# Patient Record
Sex: FEMALE | Race: BLACK OR AFRICAN AMERICAN | NOT HISPANIC OR LATINO | Employment: FULL TIME | URBAN - METROPOLITAN AREA
[De-identification: names, ages, dates, MRNs, and addresses within clinical notes are randomized per-mention and may not be internally consistent; named-entity substitution may affect disease eponyms.]

---

## 2017-03-16 ENCOUNTER — OFFICE VISIT (OUTPATIENT)
Dept: INTERNAL MEDICINE | Facility: CLINIC | Age: 67
End: 2017-03-16
Payer: COMMERCIAL

## 2017-03-16 VITALS
HEIGHT: 65 IN | SYSTOLIC BLOOD PRESSURE: 145 MMHG | BODY MASS INDEX: 32.06 KG/M2 | TEMPERATURE: 98 F | WEIGHT: 192.44 LBS | DIASTOLIC BLOOD PRESSURE: 90 MMHG | HEART RATE: 57 BPM | OXYGEN SATURATION: 99 %

## 2017-03-16 DIAGNOSIS — R51.9 OCCIPITAL HEADACHE: Primary | ICD-10-CM

## 2017-03-16 PROCEDURE — 99999 PR PBB SHADOW E&M-EST. PATIENT-LVL III: CPT | Mod: PBBFAC,,, | Performed by: INTERNAL MEDICINE

## 2017-03-16 PROCEDURE — 99213 OFFICE O/P EST LOW 20 MIN: CPT | Mod: S$GLB,,, | Performed by: INTERNAL MEDICINE

## 2017-03-16 PROCEDURE — 1160F RVW MEDS BY RX/DR IN RCRD: CPT | Mod: S$GLB,,, | Performed by: INTERNAL MEDICINE

## 2017-03-16 PROCEDURE — 3080F DIAST BP >= 90 MM HG: CPT | Mod: S$GLB,,, | Performed by: INTERNAL MEDICINE

## 2017-03-16 PROCEDURE — 3077F SYST BP >= 140 MM HG: CPT | Mod: S$GLB,,, | Performed by: INTERNAL MEDICINE

## 2017-03-16 RX ORDER — BUTALBITAL, ACETAMINOPHEN AND CAFFEINE 50; 325; 40 MG/1; MG/1; MG/1
1 TABLET ORAL EVERY 6 HOURS PRN
Qty: 20 TABLET | Refills: 0 | Status: SHIPPED | OUTPATIENT
Start: 2017-03-16 | End: 2017-04-15

## 2017-03-16 NOTE — PROGRESS NOTES
"Subjective:       Patient ID: Estephania Lyman is a 66 y.o. female.    Chief Complaint: No chief complaint on file.    HPI Comments:   Pt here for urgent care.     Pt c/o throbbing pain in R occipital region which started yesterday.  It is constant, 10/10, nonradiating.  No associated blurry vision or photophobia or nausea.  She considers it as different than a HA. No fever or neck stiffness.  Temporary relief w/ibuprofen and Tylenol.      No associated ear pain.          Review of Systems   Constitutional: Negative.    HENT: Negative.    Eyes: Negative.    Respiratory: Negative.    Cardiovascular: Negative.    Gastrointestinal: Negative.    Genitourinary: Negative.    Musculoskeletal: Negative.    Skin: Negative.    Neurological: Negative.    Psychiatric/Behavioral: Negative.        Objective:       Vitals:    03/16/17 1436   BP: (!) 145/90   Pulse: (!) 57   Temp: 98.3 °F (36.8 °C)   SpO2: 99%   Weight: 87.3 kg (192 lb 7.4 oz)   Height: 5' 5" (1.651 m)     Physical Exam   Constitutional: She is oriented to person, place, and time. She appears well-developed and well-nourished. No distress.   HENT:   Head: Normocephalic and atraumatic.   Right Ear: Tympanic membrane, external ear and ear canal normal.   Left Ear: Tympanic membrane, external ear and ear canal normal.   Mouth/Throat: Uvula is midline, oropharynx is clear and moist and mucous membranes are normal. No oropharyngeal exudate or posterior oropharyngeal erythema.   Eyes: Conjunctivae and EOM are normal. Pupils are equal, round, and reactive to light.   Neck: Normal range of motion. Neck supple.   Cardiovascular: Normal rate, regular rhythm and normal heart sounds.  Exam reveals no gallop and no friction rub.    No murmur heard.  Pulmonary/Chest: Effort normal and breath sounds normal. No respiratory distress. She has no wheezes. She has no rhonchi. She has no rales.   Lymphadenopathy:     She has no cervical adenopathy.   Neurological: She is alert and " oriented to person, place, and time. She has normal strength. She displays no atrophy and no tremor. No cranial nerve deficit. She exhibits normal muscle tone. Gait normal.   Skin: No rash noted. She is not diaphoretic.   Psychiatric: She has a normal mood and affect. Her behavior is normal. Thought content normal.       Assessment:       1. Occipital headache        Plan:           HA - Migrainous.  Pt was given Fioricet to try.  Pt advised to let us know if this is not working.

## 2017-03-16 NOTE — MR AVS SNAPSHOT
[facility]  [address]  [facility] [address]  Phone: [phone]  Fax: [phone]                  Estephania Lyman   3/16/2017 2:40 PM   Office Visit    Description:  Female : 1950   Provider:  Pop Farmer MD   Department:  [facility]           Diagnoses this Visit        Comments    Occipital headache    -  Primary            To Do List           Goals (5 Years of Data)     None      Follow-Up and Disposition     Return if symptoms worsen or fail to improve.       These Medications        Disp Refills Start End    butalbital-acetaminophen-caffeine -40 mg (FIORICET, ESGIC) -40 mg per tablet 20 tablet 0 3/16/2017 4/15/2017    Take 1 tablet by mouth every 6 (six) hours as needed for Headaches. - Oral    Pharmacy: [facility]/pharmacy #[id number] - [place], [place] - [address] #: [phone]         [facility]     [facility] Nurse Care [facility] -  Assistance  Registered nurses in the [facility] On Call Center provide clinical advisement, health education, appointment booking, and other advisory services.  Call for this free service at [phone].             Medications           Message regarding Medications     Verify the changes and/or additions to your medication regime listed below are the same as discussed with your clinician today.  If any of these changes or additions are incorrect, please notify your healthcare provider.        START taking these NEW medications        Refills    butalbital-acetaminophen-caffeine -40 mg (FIORICET, ESGIC) -40 mg per tablet 0    Sig: Take 1 tablet by mouth every 6 (six) hours as needed for Headaches.    Class: Normal    Route: Oral           Verify that the below list of medications is an accurate representation of the medications you are currently taking.  If none reported, the list may be blank. If incorrect, please contact your healthcare provider. Carry this list with  "you in case of emergency.           Current Medications     alendronate (FOSAMAX) 70 MG tablet Take 1 tablet (70 mg total) by mouth every 7 days.    citalopram (CELEXA) 20 MG tablet Take 1 tablet (20 mg total) by mouth once daily.    lisinopril 10 MG tablet Take 1 tablet (10 mg total) by mouth once daily.    pravastatin (PRAVACHOL) 20 MG tablet Take 1 tablet (20 mg total) by mouth once daily.    butalbital-acetaminophen-caffeine -40 mg (FIORICET, ESGIC) -40 mg per tablet Take 1 tablet by mouth every 6 (six) hours as needed for Headaches.    CHERATUSSIN AC  mg/5 mL syrup TAKE 2 TEASPOONSFUL EVERY 6 HOURS AS NEEDED FOR COUGH    estrogens, conjugated, (PREMARIN) 0.625 MG tablet Take 1 tablet (0.625 mg total) by mouth once daily.    ibuprofen (ADVIL,MOTRIN) 800 MG tablet Take 1 tablet (800 mg total) by mouth every 6 (six) hours as needed for Pain.    levocetirizine (XYZAL) 5 MG tablet Take 1 tablet (5 mg total) by mouth every evening.    naproxen (NAPROSYN) 500 MG tablet Take 1 tablet (500 mg total) by mouth 2 (two) times daily with meals.    pantoprazole (PROTONIX) 40 MG tablet TAKE 1 TABLET BY MOUTH EVERY DAY FOR ACID REFLUX           Clinical Reference Information           Your Vitals Were     BP Pulse Temp Height Weight SpO2    145/90 57 98.3 °F (36.8 °C) 5' 5" (1.651 m) 87.3 kg (192 lb 7.4 oz) 99%    BMI                32.03 kg/m2          Blood Pressure          Most Recent Value    BP  (!)  145/90      Allergies as of 3/16/2017     Penicillins    Sulfa (Sulfonamide Antibiotics)      Immunizations Administered on Date of Encounter - 3/16/2017     None      Language Assistance Services     ATTENTION: Language assistance services are available, free of charge. Please call [phone].      ATENCIÓN: Si brennan morgan, tiene a stevens disposición servicios gratuitos de asistencia lingüística. Llame al [phone].     CHÚ Ý: N?u b?n nói Ti?ng Vi?t, có các d?ch v? h? tr? ngôn ng? mi?n phí angel gomez " b?n. G?i s? [phone].         [facility] complies with applicable Federal civil rights laws and does not discriminate on the basis of race, color, national origin, age, disability, or sex.

## 2017-03-24 ENCOUNTER — TELEPHONE (OUTPATIENT)
Dept: FAMILY MEDICINE | Facility: CLINIC | Age: 67
End: 2017-03-24

## 2017-03-24 RX ORDER — IBUPROFEN 800 MG/1
800 TABLET ORAL
Qty: 60 TABLET | Refills: 0 | Status: SHIPPED | OUTPATIENT
Start: 2017-03-24 | End: 2017-04-12 | Stop reason: ALTCHOICE

## 2017-03-24 NOTE — TELEPHONE ENCOUNTER
Patient was informed that her prescription for ibuprofen was sent over to her pharmacy to help with her back pain.

## 2017-03-24 NOTE — TELEPHONE ENCOUNTER
----- Message from Aidee Lora MD sent at 3/24/2017 11:27 AM CDT -----  Contact: Pt  done  ----- Message -----     From: Tiara Lambert     Sent: 3/23/2017  10:10 AM       To: Aidee Lora MD    A reminder to send over Ibuprofen for back pain.

## 2017-04-12 ENCOUNTER — OFFICE VISIT (OUTPATIENT)
Dept: FAMILY MEDICINE | Facility: CLINIC | Age: 67
End: 2017-04-12
Attending: FAMILY MEDICINE
Payer: COMMERCIAL

## 2017-04-12 VITALS
BODY MASS INDEX: 32.55 KG/M2 | OXYGEN SATURATION: 96 % | DIASTOLIC BLOOD PRESSURE: 80 MMHG | HEIGHT: 65 IN | HEART RATE: 59 BPM | SYSTOLIC BLOOD PRESSURE: 128 MMHG | WEIGHT: 195.38 LBS | RESPIRATION RATE: 16 BRPM

## 2017-04-12 DIAGNOSIS — S39.012A LOW BACK STRAIN, INITIAL ENCOUNTER: Primary | ICD-10-CM

## 2017-04-12 PROCEDURE — 90670 PCV13 VACCINE IM: CPT | Mod: S$GLB,,, | Performed by: FAMILY MEDICINE

## 2017-04-12 PROCEDURE — 1160F RVW MEDS BY RX/DR IN RCRD: CPT | Mod: S$GLB,,, | Performed by: FAMILY MEDICINE

## 2017-04-12 PROCEDURE — 90471 IMMUNIZATION ADMIN: CPT | Mod: S$GLB,,, | Performed by: FAMILY MEDICINE

## 2017-04-12 PROCEDURE — 99213 OFFICE O/P EST LOW 20 MIN: CPT | Mod: 25,S$GLB,, | Performed by: FAMILY MEDICINE

## 2017-04-12 PROCEDURE — 3074F SYST BP LT 130 MM HG: CPT | Mod: S$GLB,,, | Performed by: FAMILY MEDICINE

## 2017-04-12 PROCEDURE — 90472 IMMUNIZATION ADMIN EACH ADD: CPT | Mod: S$GLB,,, | Performed by: FAMILY MEDICINE

## 2017-04-12 PROCEDURE — 90715 TDAP VACCINE 7 YRS/> IM: CPT | Mod: S$GLB,,, | Performed by: FAMILY MEDICINE

## 2017-04-12 PROCEDURE — 99999 PR PBB SHADOW E&M-EST. PATIENT-LVL III: CPT | Mod: PBBFAC,,, | Performed by: FAMILY MEDICINE

## 2017-04-12 PROCEDURE — 3079F DIAST BP 80-89 MM HG: CPT | Mod: S$GLB,,, | Performed by: FAMILY MEDICINE

## 2017-04-12 RX ORDER — METHOCARBAMOL 500 MG/1
500 TABLET, FILM COATED ORAL NIGHTLY PRN
Qty: 20 TABLET | Refills: 0 | Status: SHIPPED | OUTPATIENT
Start: 2017-04-12 | End: 2017-04-22

## 2017-04-12 RX ORDER — NAPROXEN 500 MG/1
500 TABLET ORAL 2 TIMES DAILY WITH MEALS
Qty: 60 TABLET | Refills: 0 | Status: SHIPPED | OUTPATIENT
Start: 2017-04-12 | End: 2018-03-12

## 2017-04-12 NOTE — PATIENT INSTRUCTIONS
Your test results will be communicated to you via : [facility], Telephone or Letter.   If you have not received your test results in one week, please contact the clinic at [phone].

## 2017-04-12 NOTE — PROGRESS NOTES
"Subjective:       Patient ID: Estephania Lyman is a 66 y.o. female.    Chief Complaint: Back Pain    HPI   Pt is here for low back pain after long walking over this past weekend no acute event no heavy lifting  she has been using moist heat and nsaids with some relief but she would like a muscle relaxer for night time pain 4/10   Review of Systems   Constitutional: Negative for chills, fatigue and fever.   Respiratory: Negative for cough, chest tightness and shortness of breath.    Cardiovascular: Negative for chest pain and palpitations.   Gastrointestinal: Negative for abdominal distention and blood in stool.   Musculoskeletal: Positive for back pain and neck pain.       Objective:      Physical Exam   Constitutional: She appears well-developed and well-nourished. No distress.   Cardiovascular: Normal rate and regular rhythm.  Exam reveals no gallop.    Pulmonary/Chest: Effort normal and breath sounds normal. No respiratory distress. She has no rales.   Abdominal: Soft. Bowel sounds are normal. She exhibits no distension. There is no tenderness.     labs discussed with pt   Assessment:       1. Low back strain, initial encounter        Plan:     orders x-ray declined  Start muscle relaxer prn qhs  Apply moist heat prn  No heavy lifting  rtc prn        "This note will not be shared with the patient."   "

## 2017-04-12 NOTE — MR AVS SNAPSHOT
[facility]  [address], [address]  [facility] [address]  Phone: [phone]                  Estephania Lyman   2017 10:20 AM   Office Visit    Description:  Female : 1950   Provider:  Aidee Lora MD   Department:  [facility]           Reason for Visit     Back Pain           Diagnoses this Visit        Comments    Muscle spasms of both lower extremities    -  Primary            To Do List           Future Appointments        Provider Department Dept Phone    2017 10:00 AM Aidee Lora MD [facility] [phone]      Goals (5 Years of Data)     None       These Medications        Disp Refills Start End    methocarbamol (ROBAXIN) 500 MG Tab 20 tablet 0 2017    Take 1 tablet (500 mg total) by mouth nightly as needed. - Oral    Pharmacy: [facility]/pharmacy #[id number] - [place], [place] - [address] Ph #: [phone]       naproxen (NAPROSYN) 500 MG tablet 60 tablet 0 2017     Take 1 tablet (500 mg total) by mouth 2 (two) times daily with meals. - Oral    Pharmacy: [facility]/pharmacy #[id number] - [place], [place] - [address] Ph #: [phone]         [facility] On Call     [facility] On Call Nurse Care Line - 24/ Assistance  Unless otherwise directed by your provider, please contact [facility], our nurse care line that is available for 24/ assistance.     Registered nurses in the [facility] Center provide: appointment scheduling, clinical advisement, health education, and other advisory services.  Call: [phone] (toll free)               Medications           Message regarding Medications     Verify the changes and/or additions to your medication regime listed below are the same as discussed with your clinician today.  If any of these changes or additions are incorrect, please notify your healthcare provider.        START taking these NEW medications        Refills    methocarbamol (ROBAXIN) 500 MG  "Tab 0    Sig: Take 1 tablet (500 mg total) by mouth nightly as needed.    Class: Normal    Route: Oral      STOP taking these medications     ibuprofen (ADVIL,MOTRIN) 800 MG tablet Take 1 tablet (800 mg total) by mouth after meals as needed for Pain.           Verify that the below list of medications is an accurate representation of the medications you are currently taking.  If none reported, the list may be blank. If incorrect, please contact your healthcare provider. Carry this list with you in case of emergency.           Current Medications     alendronate (FOSAMAX) 70 MG tablet Take 1 tablet (70 mg total) by mouth every 7 days.    butalbital-acetaminophen-caffeine -40 mg (FIORICET, ESGIC) -40 mg per tablet Take 1 tablet by mouth every 6 (six) hours as needed for Headaches.    citalopram (CELEXA) 20 MG tablet Take 1 tablet (20 mg total) by mouth once daily.    estrogens, conjugated, (PREMARIN) 0.625 MG tablet Take 1 tablet (0.625 mg total) by mouth once daily.    lisinopril 10 MG tablet Take 1 tablet (10 mg total) by mouth once daily.    pantoprazole (PROTONIX) 40 MG tablet TAKE 1 TABLET BY MOUTH EVERY DAY FOR ACID REFLUX    pravastatin (PRAVACHOL) 20 MG tablet Take 1 tablet (20 mg total) by mouth once daily.    CHERATUSSIN AC  mg/5 mL syrup TAKE 2 TEASPOONSFUL EVERY 6 HOURS AS NEEDED FOR COUGH    levocetirizine (XYZAL) 5 MG tablet Take 1 tablet (5 mg total) by mouth every evening.    methocarbamol (ROBAXIN) 500 MG Tab Take 1 tablet (500 mg total) by mouth nightly as needed.    naproxen (NAPROSYN) 500 MG tablet Take 1 tablet (500 mg total) by mouth 2 (two) times daily with meals.           Clinical Reference Information           Your Vitals Were     BP Pulse Resp Height Weight SpO2    128/80 (BP Location: Left arm, Patient Position: Sitting, BP Method: Manual) 59 16 5' 5" (1.651 m) 88.6 kg (195 lb 6.4 oz) 96%    BMI                32.52 kg/m2          Blood Pressure          Most Recent Value "    BP  128/80      Allergies as of 4/12/2017     Penicillins    Sulfa (Sulfonamide Antibiotics)      Immunizations Administered on Date of Encounter - 4/12/2017     Name Date Dose VIS Date Route    Pneumococcal Conjugate - 13 Valent 4/12/2017 0.5 mL 11/5/2015 Intramuscular    TDAP 4/12/2017 0.5 mL 2/24/2015 Intramuscular      Orders Placed During Today's Visit      Normal Orders This Visit    Pneumococcal Conjugate Vaccine (13 Valent) (IM)     Tdap Vaccine       Instructions    Your test results will be communicated to you via : [facility], Telephone or Letter.   If you have not received your test results in one week, please contact the clinic at [phone].       Language Assistance Services     ATTENTION: Language assistance services are available, free of charge. Please call [phone].      ATENCIÓN: Si brennan cathy, tiene a stevens disposición servicios gratuitos de asistencia lingüística. Llame al [phone].     CHÚ Ý: N?u b?n nói Ti?ng Vi?t, có các d?ch v? h? tr? ngôn ng? mi?n phí dành cho b?n. G?i s? [phone].         [facility] complies with applicable Federal civil rights laws and does not discriminate on the basis of race, color, national origin, age, disability, or sex.

## 2017-05-04 ENCOUNTER — LAB VISIT (OUTPATIENT)
Dept: LAB | Facility: HOSPITAL | Age: 67
End: 2017-05-04
Attending: FAMILY MEDICINE
Payer: COMMERCIAL

## 2017-05-04 ENCOUNTER — OFFICE VISIT (OUTPATIENT)
Dept: FAMILY MEDICINE | Facility: CLINIC | Age: 67
End: 2017-05-04
Attending: FAMILY MEDICINE
Payer: COMMERCIAL

## 2017-05-04 VITALS
HEART RATE: 70 BPM | SYSTOLIC BLOOD PRESSURE: 140 MMHG | BODY MASS INDEX: 33.05 KG/M2 | DIASTOLIC BLOOD PRESSURE: 84 MMHG | HEIGHT: 65 IN | OXYGEN SATURATION: 98 % | WEIGHT: 198.38 LBS | RESPIRATION RATE: 16 BRPM

## 2017-05-04 DIAGNOSIS — E78.00 HYPERCHOLESTEROLEMIA: ICD-10-CM

## 2017-05-04 DIAGNOSIS — Z12.11 SCREEN FOR COLON CANCER: ICD-10-CM

## 2017-05-04 DIAGNOSIS — I10 HTN (HYPERTENSION), BENIGN: ICD-10-CM

## 2017-05-04 DIAGNOSIS — Z00.00 ANNUAL PHYSICAL EXAM: Primary | ICD-10-CM

## 2017-05-04 DIAGNOSIS — Z00.00 ANNUAL PHYSICAL EXAM: ICD-10-CM

## 2017-05-04 LAB
ALBUMIN SERPL BCP-MCNC: 3.9 G/DL
ALP SERPL-CCNC: 108 U/L
ALT SERPL W/O P-5'-P-CCNC: 15 U/L
ANION GAP SERPL CALC-SCNC: 8 MMOL/L
AST SERPL-CCNC: 22 U/L
BASOPHILS # BLD AUTO: 0.05 K/UL
BASOPHILS NFR BLD: 0.9 %
BILIRUB SERPL-MCNC: 0.6 MG/DL
BILIRUB UR QL STRIP: NEGATIVE
BUN SERPL-MCNC: 13 MG/DL
CALCIUM SERPL-MCNC: 9.4 MG/DL
CHLORIDE SERPL-SCNC: 103 MMOL/L
CHOLEST/HDLC SERPL: 4.2 {RATIO}
CLARITY UR REFRACT.AUTO: CLEAR
CO2 SERPL-SCNC: 31 MMOL/L
COLOR UR AUTO: YELLOW
CREAT SERPL-MCNC: 0.9 MG/DL
DIFFERENTIAL METHOD: ABNORMAL
EOSINOPHIL # BLD AUTO: 0.7 K/UL
EOSINOPHIL NFR BLD: 11.8 %
ERYTHROCYTE [DISTWIDTH] IN BLOOD BY AUTOMATED COUNT: 12.5 %
EST. GFR  (AFRICAN AMERICAN): >60 ML/MIN/1.73 M^2
EST. GFR  (NON AFRICAN AMERICAN): >60 ML/MIN/1.73 M^2
GLUCOSE SERPL-MCNC: 68 MG/DL
GLUCOSE UR QL STRIP: NEGATIVE
HCT VFR BLD AUTO: 40.2 %
HDL/CHOLESTEROL RATIO: 23.9 %
HDLC SERPL-MCNC: 234 MG/DL
HDLC SERPL-MCNC: 56 MG/DL
HGB BLD-MCNC: 12.5 G/DL
HGB UR QL STRIP: NEGATIVE
KETONES UR QL STRIP: NEGATIVE
LDLC SERPL CALC-MCNC: 151.6 MG/DL
LEUKOCYTE ESTERASE UR QL STRIP: NEGATIVE
LYMPHOCYTES # BLD AUTO: 1.6 K/UL
LYMPHOCYTES NFR BLD: 28.2 %
MCH RBC QN AUTO: 31.4 PG
MCHC RBC AUTO-ENTMCNC: 31.1 %
MCV RBC AUTO: 101 FL
MONOCYTES # BLD AUTO: 0.5 K/UL
MONOCYTES NFR BLD: 9.8 %
NEUTROPHILS # BLD AUTO: 2.7 K/UL
NEUTROPHILS NFR BLD: 49.1 %
NITRITE UR QL STRIP: NEGATIVE
NONHDLC SERPL-MCNC: 178 MG/DL
PH UR STRIP: 7 [PH] (ref 5–8)
PLATELET # BLD AUTO: 226 K/UL
PMV BLD AUTO: 12 FL
POTASSIUM SERPL-SCNC: 4.7 MMOL/L
PROT SERPL-MCNC: 7.8 G/DL
PROT UR QL STRIP: NEGATIVE
RBC # BLD AUTO: 3.98 M/UL
SODIUM SERPL-SCNC: 142 MMOL/L
SP GR UR STRIP: 1.01 (ref 1–1.03)
TRIGL SERPL-MCNC: 132 MG/DL
TSH SERPL DL<=0.005 MIU/L-ACNC: 1.09 UIU/ML
URN SPEC COLLECT METH UR: NORMAL
UROBILINOGEN UR STRIP-ACNC: NEGATIVE EU/DL
WBC # BLD AUTO: 5.5 K/UL

## 2017-05-04 PROCEDURE — 84443 ASSAY THYROID STIM HORMONE: CPT

## 2017-05-04 PROCEDURE — 3077F SYST BP >= 140 MM HG: CPT | Mod: S$GLB,,, | Performed by: FAMILY MEDICINE

## 2017-05-04 PROCEDURE — 36415 COLL VENOUS BLD VENIPUNCTURE: CPT | Mod: PO

## 2017-05-04 PROCEDURE — 86803 HEPATITIS C AB TEST: CPT

## 2017-05-04 PROCEDURE — 80053 COMPREHEN METABOLIC PANEL: CPT

## 2017-05-04 PROCEDURE — 3079F DIAST BP 80-89 MM HG: CPT | Mod: S$GLB,,, | Performed by: FAMILY MEDICINE

## 2017-05-04 PROCEDURE — 99999 PR PBB SHADOW E&M-EST. PATIENT-LVL III: CPT | Mod: PBBFAC,,, | Performed by: FAMILY MEDICINE

## 2017-05-04 PROCEDURE — 99397 PER PM REEVAL EST PAT 65+ YR: CPT | Mod: S$GLB,,, | Performed by: FAMILY MEDICINE

## 2017-05-04 PROCEDURE — 80061 LIPID PANEL: CPT

## 2017-05-04 PROCEDURE — 85025 COMPLETE CBC W/AUTO DIFF WBC: CPT

## 2017-05-04 PROCEDURE — 81003 URINALYSIS AUTO W/O SCOPE: CPT

## 2017-05-04 NOTE — PROGRESS NOTES
Subjective:       Patient ID: Estephania Lyman is a 66 y.o. female.    Chief Complaint: Annual Exam    HPI   Pt is here for annual exam pt is well no sob/cp stable htn on ace no cough acceptable bp today  Pt has hypercholesterolemia stable on statin no muscle aches   Review of Systems   Constitutional: Negative for activity change, chills, diaphoresis, fatigue and fever.   HENT: Negative for congestion, ear discharge, ear pain, hearing loss, postnasal drip, rhinorrhea, sinus pressure, sneezing, sore throat, trouble swallowing and voice change.    Eyes: Negative for photophobia, discharge, redness, itching and visual disturbance.   Respiratory: Negative for cough, chest tightness, shortness of breath and wheezing.    Cardiovascular: Negative for chest pain, palpitations and leg swelling.   Gastrointestinal: Negative for abdominal pain, anal bleeding, blood in stool, constipation, diarrhea, nausea, rectal pain and vomiting.   Genitourinary: Negative for dyspareunia, dysuria, flank pain, frequency, hematuria, menstrual problem, pelvic pain, urgency, vaginal bleeding and vaginal discharge.   Musculoskeletal: Negative for arthralgias, back pain, joint swelling and neck pain.   Skin: Negative for color change and rash.   Neurological: Negative for dizziness, speech difficulty, weakness, light-headedness, numbness and headaches.   Hematological: Does not bruise/bleed easily.   Psychiatric/Behavioral: Negative for agitation, confusion, decreased concentration, sleep disturbance and suicidal ideas. The patient is not nervous/anxious.        Objective:      Physical Exam   Constitutional: She appears well-developed and well-nourished.   HENT:   Head: Normocephalic and atraumatic.   Right Ear: External ear normal.   Left Ear: External ear normal.   Nose: Nose normal.   Mouth/Throat: Oropharynx is clear and moist.   Eyes: Conjunctivae and EOM are normal. Pupils are equal, round, and reactive to light. Right eye exhibits no  "discharge. Left eye exhibits no discharge.   Neck: Normal range of motion. Neck supple. No thyromegaly present.   Cardiovascular: Normal rate, regular rhythm, normal heart sounds and intact distal pulses.  Exam reveals no gallop and no friction rub.    Pulmonary/Chest: Effort normal and breath sounds normal. She has no wheezes. She has no rales.   Abdominal: Soft. Bowel sounds are normal. She exhibits no distension. There is no tenderness. There is no rebound and no guarding.   Genitourinary:   Genitourinary Comments: declines   Musculoskeletal: Normal range of motion. She exhibits no edema or tenderness.   Lymphadenopathy:     She has no cervical adenopathy.   Neurological: She is alert. No cranial nerve deficit. She exhibits normal muscle tone. Coordination normal.   Skin: Skin is warm and dry. No rash noted. No erythema.   Psychiatric: She has a normal mood and affect. Her behavior is normal. Judgment and thought content normal.       Assessment:       1. Annual physical exam    2. HTN (hypertension), benign    3. Hypercholesterolemia    4. Screen for colon cancer        Plan:     orders cmp cbc lipid tsh urine  Cont meds  Low fat low salt diet  Graded exercise    Health maintenance  Pap discussed  Mammogram discussed  colonoscopy discussed  bmd discussed  Flu shot in fall  tetanus q 10 years         "This note will not be shared with the patient."   "

## 2017-05-04 NOTE — PATIENT INSTRUCTIONS
Your test results will be communicated to you via : [facility], Telephone or Letter.   If you have not received your test results in one week, please contact the clinic at [phone].

## 2017-05-04 NOTE — MR AVS SNAPSHOT
[facility]  [address] [place] [address], [address]  [facility] [address]  Phone: [phone]                  Estephania KHOURY Lyman   2017 10:00 AM   Office Visit    Description:  Female : 1950   Provider:  Aidee Lora MD   Department:  [facility]           Reason for Visit     Annual Exam           Diagnoses this Visit        Comments    Annual physical exam    -  Primary     HTN (hypertension), benign         Hypercholesterolemia         Screen for colon cancer                To Do List           Future Appointments        Provider Department Dept Phone    2017 10:30 AM [facility] [phone]      Goals (5 Years of Data)     None      [facility] On Call     [facility] Nurse Care Line -  Assistance  Unless otherwise directed by your provider, please contact [facility], our nurse care line that is available for  assistance.     Registered nurses in the [facility] Center provide: appointment scheduling, clinical advisement, health education, and other advisory services.  Call: [phone] (toll free)               Medications           Message regarding Medications     Verify the changes and/or additions to your medication regime listed below are the same as discussed with your clinician today.  If any of these changes or additions are incorrect, please notify your healthcare provider.             Verify that the below list of medications is an accurate representation of the medications you are currently taking.  If none reported, the list may be blank. If incorrect, please contact your healthcare provider. Carry this list with you in case of emergency.           Current Medications     alendronate (FOSAMAX) 70 MG tablet Take 1 tablet (70 mg total) by mouth every 7 days.    CHERATUSSIN AC  mg/5 mL syrup TAKE 2 TEASPOONSFUL EVERY 6 HOURS AS NEEDED FOR COUGH    citalopram (CELEXA) 20 MG tablet Take  "1 tablet (20 mg total) by mouth once daily.    estrogens, conjugated, (PREMARIN) 0.625 MG tablet Take 1 tablet (0.625 mg total) by mouth once daily.    lisinopril 10 MG tablet Take 1 tablet (10 mg total) by mouth once daily.    naproxen (NAPROSYN) 500 MG tablet Take 1 tablet (500 mg total) by mouth 2 (two) times daily with meals.    pantoprazole (PROTONIX) 40 MG tablet TAKE 1 TABLET BY MOUTH EVERY DAY FOR ACID REFLUX    pravastatin (PRAVACHOL) 20 MG tablet Take 1 tablet (20 mg total) by mouth once daily.    levocetirizine (XYZAL) 5 MG tablet Take 1 tablet (5 mg total) by mouth every evening.           Clinical Reference Information           Your Vitals Were     BP Pulse Resp Height Weight SpO2    140/84 (BP Location: Left arm, Patient Position: Sitting, BP Method: Manual) 70 16 5' 5" (1.651 m) 90 kg (198 lb 6.4 oz) 98%    BMI                33.02 kg/m2          Blood Pressure          Most Recent Value    BP  (!)  140/84      Allergies as of 5/4/2017     Penicillins    Sulfa (Sulfonamide Antibiotics)      Immunizations Administered on Date of Encounter - 5/4/2017     None      Orders Placed During Today's Visit      Normal Orders This Visit    Case request GI: COLONOSCOPY     URINALYSIS     Future Labs/Procedures Expected by Expires    CBC auto differential  5/4/2017 5/4/2018    Comprehensive metabolic panel  5/4/2017 5/4/2018    Hepatitis C antibody  5/4/2017 7/3/2018    Lipid panel  5/4/2017 7/3/2018    TSH  5/4/2017 5/4/2018    Zoster Vaccine - Live  As directed 5/4/2018      Instructions    Your test results will be communicated to you via : [facility], Telephone or Letter.   If you have not received your test results in one week, please contact the clinic at [phone].       Language Assistance Services     ATTENTION: Language assistance services are available, free of charge. Please call [phone].      ATENCIÓN: Si habla español, tiene a stevens disposición servicios gratuitos de asistencia lingüística. " Vin jamil [phone].     EDOUARD Ý: N?u b?n nói Ti?ng Vi?t, có các d?ch v? h? tr? ngôn ng? mi?n phí dành cho b?n. G?i s? [phone].         [facility] complies with applicable Federal civil rights laws and does not discriminate on the basis of race, color, national origin, age, disability, or sex.

## 2017-05-05 LAB — HCV AB SERPL QL IA: NEGATIVE

## 2017-05-22 ENCOUNTER — TELEPHONE (OUTPATIENT)
Dept: ENDOSCOPY | Facility: HOSPITAL | Age: 67
End: 2017-05-22

## 2017-05-22 DIAGNOSIS — Z86.010 HISTORY OF COLON POLYPS: Primary | ICD-10-CM

## 2017-05-22 RX ORDER — POLYETHYLENE GLYCOL 3350, SODIUM SULFATE ANHYDROUS, SODIUM BICARBONATE, SODIUM CHLORIDE, POTASSIUM CHLORIDE 236; 22.74; 6.74; 5.86; 2.97 G/4L; G/4L; G/4L; G/4L; G/4L
4 POWDER, FOR SOLUTION ORAL ONCE
Qty: 4000 ML | Refills: 0 | Status: SHIPPED | OUTPATIENT
Start: 2017-05-22 | End: 2017-05-22

## 2017-05-30 ENCOUNTER — ANESTHESIA EVENT (OUTPATIENT)
Dept: ENDOSCOPY | Facility: HOSPITAL | Age: 67
End: 2017-05-30
Payer: COMMERCIAL

## 2017-05-30 ENCOUNTER — HOSPITAL ENCOUNTER (OUTPATIENT)
Facility: HOSPITAL | Age: 67
Discharge: HOME | End: 2017-05-30
Attending: COLON & RECTAL SURGERY | Admitting: COLON & RECTAL SURGERY
Payer: COMMERCIAL

## 2017-05-30 ENCOUNTER — ANESTHESIA (OUTPATIENT)
Dept: ENDOSCOPY | Facility: HOSPITAL | Age: 67
End: 2017-05-30
Payer: COMMERCIAL

## 2017-05-30 VITALS
HEIGHT: 62 IN | HEART RATE: 67 BPM | DIASTOLIC BLOOD PRESSURE: 72 MMHG | WEIGHT: 192 LBS | BODY MASS INDEX: 35.33 KG/M2 | OXYGEN SATURATION: 100 % | RESPIRATION RATE: 18 BRPM | SYSTOLIC BLOOD PRESSURE: 110 MMHG | TEMPERATURE: 98 F

## 2017-05-30 DIAGNOSIS — Z12.11 SCREENING FOR COLON CANCER: ICD-10-CM

## 2017-05-30 PROCEDURE — 63600175 PHARM REV CODE 636 W HCPCS: Performed by: NURSE ANESTHETIST, CERTIFIED REGISTERED

## 2017-05-30 PROCEDURE — G0105 COLORECTAL SCRN; HI RISK IND: HCPCS | Performed by: COLON & RECTAL SURGERY

## 2017-05-30 PROCEDURE — 25000003 PHARM REV CODE 250: Performed by: NURSE PRACTITIONER

## 2017-05-30 PROCEDURE — 25000003 PHARM REV CODE 250: Performed by: NURSE ANESTHETIST, CERTIFIED REGISTERED

## 2017-05-30 PROCEDURE — D9220A PRA ANESTHESIA: Mod: CRNA,,, | Performed by: NURSE ANESTHETIST, CERTIFIED REGISTERED

## 2017-05-30 PROCEDURE — G0105 COLORECTAL SCRN; HI RISK IND: HCPCS | Mod: ,,, | Performed by: COLON & RECTAL SURGERY

## 2017-05-30 PROCEDURE — D9220A PRA ANESTHESIA: Mod: ANES,,, | Performed by: ANESTHESIOLOGY

## 2017-05-30 PROCEDURE — 37000008 HC ANESTHESIA 1ST 15 MINUTES: Performed by: COLON & RECTAL SURGERY

## 2017-05-30 PROCEDURE — 37000009 HC ANESTHESIA EA ADD 15 MINS: Performed by: COLON & RECTAL SURGERY

## 2017-05-30 RX ORDER — SODIUM CHLORIDE 9 MG/ML
INJECTION, SOLUTION INTRAVENOUS CONTINUOUS
Status: DISCONTINUED | OUTPATIENT
Start: 2017-05-30 | End: 2017-05-30 | Stop reason: HOSPADM

## 2017-05-30 RX ORDER — GLYCOPYRROLATE 0.2 MG/ML
INJECTION INTRAMUSCULAR; INTRAVENOUS
Status: DISCONTINUED | OUTPATIENT
Start: 2017-05-30 | End: 2017-05-30

## 2017-05-30 RX ORDER — LIDOCAINE HCL/PF 100 MG/5ML
SYRINGE (ML) INTRAVENOUS
Status: DISCONTINUED | OUTPATIENT
Start: 2017-05-30 | End: 2017-05-30

## 2017-05-30 RX ORDER — PROPOFOL 10 MG/ML
VIAL (ML) INTRAVENOUS
Status: DISCONTINUED | OUTPATIENT
Start: 2017-05-30 | End: 2017-05-30

## 2017-05-30 RX ORDER — PROPOFOL 10 MG/ML
VIAL (ML) INTRAVENOUS CONTINUOUS PRN
Status: DISCONTINUED | OUTPATIENT
Start: 2017-05-30 | End: 2017-05-30

## 2017-05-30 RX ORDER — PHENYLEPHRINE HYDROCHLORIDE 10 MG/ML
INJECTION INTRAVENOUS
Status: DISCONTINUED | OUTPATIENT
Start: 2017-05-30 | End: 2017-05-30

## 2017-05-30 RX ADMIN — SODIUM CHLORIDE: 0.9 INJECTION, SOLUTION INTRAVENOUS at 08:05

## 2017-05-30 RX ADMIN — PROPOFOL 80 MG: 10 INJECTION, EMULSION INTRAVENOUS at 09:05

## 2017-05-30 RX ADMIN — PHENYLEPHRINE HYDROCHLORIDE 100 MCG: 10 INJECTION INTRAVENOUS at 09:05

## 2017-05-30 RX ADMIN — LIDOCAINE HYDROCHLORIDE 80 MG: 20 INJECTION, SOLUTION INTRAVENOUS at 09:05

## 2017-05-30 RX ADMIN — GLYCOPYRROLATE 0.1 MG: 0.2 INJECTION, SOLUTION INTRAMUSCULAR; INTRAVENOUS at 09:05

## 2017-05-30 RX ADMIN — PROPOFOL 150 MCG/KG/MIN: 10 INJECTION, EMULSION INTRAVENOUS at 09:05

## 2017-05-30 NOTE — TRANSFER OF CARE
"Anesthesia Transfer of Care Note    Patient: Estephania Lyman    Procedure(s) Performed: Procedure(s) (LRB):  COLONOSCOPY (N/A)    Patient location: [facility]    Anesthesia Type: general    Transport from OR: Transported from OR on 6-10 L/min O2 by face mask with adequate spontaneous ventilation    Post pain: adequate analgesia    Post assessment: no apparent anesthetic complications and tolerated procedure well    Post vital signs: stable    Level of consciousness: awake and responds to stimulation    Nausea/Vomiting: no nausea/vomiting    Complications: none    Transfer of care protocol was followed      Last vitals:   Visit Vitals  /69 (BP Location: Left arm, Patient Position: Lying, BP Method: Automatic)   Pulse 81   Temp 36.6 °C (97.8 °F) (Axillary)   Resp 16   Ht 5' 2" (1.575 m)   Wt 87.1 kg (192 lb)   SpO2 (!) 94%   Breastfeeding? No   BMI 35.12 kg/m²     "

## 2017-05-30 NOTE — ANESTHESIA RELEASE NOTE
"Anesthesia Release from PACU Note    Patient: Estephania Lyman    Procedure(s) Performed: Procedure(s) (LRB):  COLONOSCOPY (N/A)      Last Vitals:   Visit Vitals  /67   Pulse (!) 58   Temp 36.5 °C (97.7 °F)   Resp 14   Ht 5' 2" (1.575 m)   Wt 87.1 kg (192 lb)   SpO2 100%   Breastfeeding? No   BMI 35.12 kg/m²       Anesthesia type: general    Post pain: Adequate analgesia    Post assessment: no apparent anesthetic complications, tolerated procedure well and no evidence of recall    Post vital signs: stable    Level of consciousness: awake, alert  and oriented    Nausea/Vomiting: no nausea/no vomiting    Complications: none    Airway Patency: patent    Respiratory: unassisted, spontaneous ventilation, room air    Cardiovascular: stable and blood pressure at baseline    Hydration: euvolemic     "

## 2017-05-30 NOTE — H&P
Endoscopy H&P    Procedure : Colonoscopy      asymptomatic screening exam      Past Medical History:   Diagnosis Date    GERD (gastroesophageal reflux disease)     High cholesterol     Hyperlipidemia     Hypertension     Sarcoidosis        Family History   Problem Relation Age of Onset    Cancer Mother      colon    Cancer Father      throat    Hypertension Sister     Breast cancer Sister     Hypertension Brother     Diabetes Sister     Hypertension Sister     Stroke Neg Hx     Ovarian cancer Neg Hx     Miscarriages / Stillbirths Neg Hx     Eclampsia Neg Hx        Social History     Social History    Marital status:      Spouse name: N/A    Number of children: N/A    Years of education: N/A     Occupational History     [employer]     Social History Main Topics    Smoking status: Former Smoker     Packs/day: 1.00     Years: 3.00     Types: Cigarettes     Quit date: 7/9/2010    Smokeless tobacco: Not on file    Alcohol use No    Drug use: Unknown    Sexual activity: Yes     Partners: Male     Birth control/ protection: Surgical      Comment: Hysterectomy     Other Topics Concern    Not on file     Social History Narrative    No narrative on file       Review of Systems:  Respiratory ROS: no cough, shortness of breath, or wheezing  Cardiovascular ROS: no chest pain or dyspnea on exertion  Gastrointestinal ROS: no abdominal pain, change in bowel habits, or black or bloody stools  Musculoskeletal ROS: negative  Neurological ROS: no TIA or stroke symptoms        Physical Exam:  General: no distress  Head: normocephalic  Neck: supple, symmetrical, trachea midline  Lungs:  clear to auscultation bilaterally and normal respiratory effort  Heart: regular rate and rhythm, S1, S2 normal, no murmur, rub or gallop  Abdomen: soft, non-tender non-distented; bowel sounds normal; no masses,  no  organomegaly  Extremities: no cyanosis or edema, or clubbing       Deep Sedation: Mallampati Score II (hard and soft palate, upper portion of tonsils anduvula visible)    II    Assessment and Plan:  Proceed with Colonoscopy

## 2017-05-30 NOTE — ANESTHESIA POSTPROCEDURE EVALUATION
"Anesthesia Post Evaluation    Patient: Estephania Lyman    Procedure(s) Performed: Procedure(s) (LRB):  COLONOSCOPY (N/A)    Final Anesthesia Type: general  Patient location during evaluation: GI PACU  Patient participation: Yes- Able to Participate  Level of consciousness: awake and alert and oriented  Post-procedure vital signs: reviewed and stable  Pain management: adequate  Airway patency: patent  PONV status at discharge: No PONV  Anesthetic complications: no      Cardiovascular status: blood pressure returned to baseline and hemodynamically stable  Respiratory status: unassisted, spontaneous ventilation and room air  Hydration status: euvolemic  Follow-up not needed.        Visit Vitals  /67   Pulse (!) 58   Temp 36.5 °C (97.7 °F)   Resp 14   Ht 5' 2" (1.575 m)   Wt 87.1 kg (192 lb)   SpO2 100%   Breastfeeding? No   BMI 35.12 kg/m²       Pain/Denise Score: Pain Assessment Performed: Yes (5/30/2017 10:16 AM)  Presence of Pain: denies (5/30/2017 10:16 AM)  Denise Score: 9 (5/30/2017 10:15 AM)      "

## 2017-05-30 NOTE — DISCHARGE INSTRUCTIONS
Colonoscopy     A camera attached to a flexible tube with a viewing lens is used to take video pictures.     Colonoscopy is a test to view the inside of your lower digestive tract (colon and rectum). Sometimes it can show the last part of the small intestine (ileum). During the test, small pieces of tissue may be removed for testing. This is called a biopsy. Small growths, such as polyps, may also be removed.   Why is colonoscopy done?  The test is done to help look for colon cancer. And it can help find the source of abdominal pain, bleeding, and changes in bowel habits. It may be needed once a year, depending on factors such as your:  · Age  · Health history  · Family health history  · Symptoms  · Results from any prior colonoscopy  Risks and possible complications  These include:  · Bleeding               · A puncture or tear in the colon   · Risks of anesthesia  · A cancer lesion not being seen  Getting ready   To prepare for the test:  · Talk with your healthcare provider about the risks of the test (see below). Also ask your healthcare provider about alternatives to the test.  · Tell your healthcare provider about any medicines you take. Also tell him or her about any health conditions you may have.  · Make sure your rectum and colon are empty for the test. Follow the diet and bowel prep instructions exactly. If you dont, the test may need to be rescheduled.  · Plan for a friend or family member to drive you home after the test.     Colonoscopy provides an inside view of the entire colon.     You may discuss the results with your doctor right away or at a future visit.  During the test   The test is usually done in the hospital on an outpatient basis. This means you go home the same day. The procedure takes about 30 minutes. During that time:  · You are given relaxing (sedating) medicine through an IV line. You may be drowsy, or fully asleep.  · The healthcare provider will first give you a physical exam to  check for anal and rectal problems.  · Then the anus is lubricated and the scope inserted.  · If you are awake, you may have a feeling similar to needing to have a bowel movement. You may also feel pressure as air is pumped into the colon. Its OK to pass gas during the procedure.  · Biopsy, polyp removal, or other treatments may be done during the test.  After the test   You may have gas right after the test. It can help to try to pass it to help prevent later bloating. Your healthcare provider may discuss the results with you right away. Or you may need to schedule a follow-up visit to talk about the results. After the test, you can go back to your normal eating and other activities. You may be tired from the sedation and need to rest for a few hours.  Date Last Reviewed: 11/1/2016  © 5001-2155 [employer]. [address] [place] [address], [place] [address]. All rights reserved. This information is not intended as a substitute for professional medical care. Always follow your healthcare professional's instructions.

## 2017-05-30 NOTE — PATIENT INSTRUCTIONS
Discharge Summary/Instructions for after Colonoscopy without   Biopsy/Polypectomy  Patient Name: Estephania Lyman  Patient MRN: [id number]  Patient YOB: 1950  Tuesday, May 30, 2017    Jed Garcia MD  RESTRICTIONS ON ACTIVITY:  - Do not drive a car or operate machinery until the day after the procedure.      - The following day: return to full activity including work.  - Diet: Eat and drink normally unless instructed otherwise.  TREATMENT FOR COMMON SIDE EFFECTS:  - Mild abdominal pain and bloating or excessive gas: walk, eat lightly, and   use a heating pad.  SYMPTOMS TO WATCH FOR AND REPORT TO YOUR PHYSICIAN:  1. Severe abdominal pain.  2. Fever greater than 101 degrees F within 24 hours after a procedure.  3. A large amount of rectal bleeding. (A small amount of blood from the   rectum is not serious, especially if hemorrhoids are present.  3.  Because air was put into your colon during the procedure, expelling   large amounts of air from your rectum is normal.  4.  You may not have a bowel movement for 1-3 days because of the   colonoscopy prep.  This is normal.  5.  Call you Doctor or go to the emergency room if you notice any of the   following:   Chills and/or fever over 101   Persistent vomiting   Severe abdominal pain, other than gas cramps   Severe chest pain   Black, tarry stools   Any bleeding - exceeding one cup  Your doctor recommends these additional instructions:  Your physician has recommended a repeat colonoscopy in five years for   screening purposes.  If you have any questions or problems, please call your physician.  COLON AND RECTAL SURGERY OFFICE:  [phone]  EMERGENCY PHONE NUMBER: [phone]  Jed Garcia MD  5/30/2017 9:57:18 AM  This report has been verified and signed electronically.

## 2017-05-30 NOTE — ANESTHESIA PREPROCEDURE EVALUATION
05/30/2017  Estephania Lyman is a 66 y.o., female.    Anesthesia Evaluation    I have reviewed the Patient Summary Reports.     I have reviewed the Medications.     Review of Systems  Anesthesia Hx:  No problems with previous Anesthesia Denies Hx of Anesthetic complications  History of prior surgery of interest to airway management or planning:  Denies Personal Hx of Anesthesia complications.   Social:  Former Smoker, No Alcohol Use    Hematology/Oncology:  Hematology Normal   Oncology Normal     EENT/Dental:EENT/Dental Normal   Cardiovascular:   Hypertension  Denies Angina. ECG has been reviewed.    Pulmonary:   Denies Shortness of breath.    Hepatic/GI:   GERD, well controlled    Musculoskeletal:   Arthritis         Physical Exam  General:  Well nourished    Airway/Jaw/Neck:  Airway Findings: Mouth Opening: Normal Tongue: Normal  General Airway Assessment: Adult  Mallampati: III  Improves to II with phonation.  TM Distance: Normal, at least 6 cm  Jaw/Neck Findings:  Neck ROM: Normal ROM      Dental:  Dental Findings: Upper Dentures   Chest/Lungs:  Chest/Lungs Findings: Clear to auscultation, Normal Respiratory Rate     Heart/Vascular:  Heart Findings: Rate: Normal  Rhythm: Regular Rhythm  Sounds: Normal        Mental Status:  Mental Status Findings:  Cooperative, Alert and Oriented         Anesthesia Plan  Type of Anesthesia, risks & benefits discussed:  Anesthesia Type:  general  Patient's Preference:   Intra-op Monitoring Plan: standard ASA monitors  Intra-op Monitoring Plan Comments:   Post Op Pain Control Plan:   Post Op Pain Control Plan Comments:   Induction:   IV  Beta Blocker:  Patient is not currently on a Beta-Blocker (No further documentation required).       Informed Consent: Patient understands risks and agrees with Anesthesia plan.  Questions answered. Anesthesia consent signed with  patient.  ASA Score: 2     Day of Surgery Review of History & Physical:    H&P update referred to the provider.         Ready For Surgery From Anesthesia Perspective.

## 2017-06-06 ENCOUNTER — TELEPHONE (OUTPATIENT)
Dept: ENDOSCOPY | Facility: HOSPITAL | Age: 67
End: 2017-06-06

## 2017-08-19 ENCOUNTER — HOSPITAL ENCOUNTER (OUTPATIENT)
Facility: HOSPITAL | Age: 67
Discharge: HOME | End: 2017-08-20
Attending: FAMILY MEDICINE | Admitting: HOSPITALIST
Payer: COMMERCIAL

## 2017-08-19 DIAGNOSIS — E78.00 HYPERCHOLESTEROLEMIA: ICD-10-CM

## 2017-08-19 DIAGNOSIS — I10 HTN (HYPERTENSION), BENIGN: ICD-10-CM

## 2017-08-19 DIAGNOSIS — K11.20 SIALADENITIS: Primary | ICD-10-CM

## 2017-08-19 DIAGNOSIS — R60.0 SUBMANDIBULAR GLAND SWELLING: ICD-10-CM

## 2017-08-19 LAB
ALBUMIN SERPL BCP-MCNC: 3.8 G/DL
ALP SERPL-CCNC: 117 U/L
ALT SERPL W/O P-5'-P-CCNC: 11 U/L
ANION GAP SERPL CALC-SCNC: 11 MMOL/L
AST SERPL-CCNC: 18 U/L
BASOPHILS # BLD AUTO: 0.04 K/UL
BASOPHILS NFR BLD: 0.6 %
BILIRUB SERPL-MCNC: 0.6 MG/DL
BUN SERPL-MCNC: 14 MG/DL
CALCIUM SERPL-MCNC: 9.2 MG/DL
CHLORIDE SERPL-SCNC: 105 MMOL/L
CO2 SERPL-SCNC: 27 MMOL/L
CREAT SERPL-MCNC: 0.9 MG/DL
DIFFERENTIAL METHOD: ABNORMAL
EOSINOPHIL # BLD AUTO: 0.5 K/UL
EOSINOPHIL NFR BLD: 7.7 %
ERYTHROCYTE [DISTWIDTH] IN BLOOD BY AUTOMATED COUNT: 12.8 %
EST. GFR  (AFRICAN AMERICAN): >60 ML/MIN/1.73 M^2
EST. GFR  (NON AFRICAN AMERICAN): >60 ML/MIN/1.73 M^2
GLUCOSE SERPL-MCNC: 93 MG/DL
HCT VFR BLD AUTO: 37.5 %
HGB BLD-MCNC: 12.4 G/DL
LYMPHOCYTES # BLD AUTO: 1.5 K/UL
LYMPHOCYTES NFR BLD: 21.8 %
MCH RBC QN AUTO: 32 PG
MCHC RBC AUTO-ENTMCNC: 33.1 G/DL
MCV RBC AUTO: 97 FL
MONOCYTES # BLD AUTO: 0.6 K/UL
MONOCYTES NFR BLD: 8.4 %
NEUTROPHILS # BLD AUTO: 4.1 K/UL
NEUTROPHILS NFR BLD: 61.3 %
PLATELET # BLD AUTO: 211 K/UL
PMV BLD AUTO: 10.6 FL
POTASSIUM SERPL-SCNC: 4 MMOL/L
PROT SERPL-MCNC: 7.6 G/DL
RBC # BLD AUTO: 3.87 M/UL
SODIUM SERPL-SCNC: 143 MMOL/L
WBC # BLD AUTO: 6.64 K/UL

## 2017-08-19 PROCEDURE — 96375 TX/PRO/DX INJ NEW DRUG ADDON: CPT

## 2017-08-19 PROCEDURE — G0378 HOSPITAL OBSERVATION PER HR: HCPCS

## 2017-08-19 PROCEDURE — 85025 COMPLETE CBC W/AUTO DIFF WBC: CPT

## 2017-08-19 PROCEDURE — S0077 INJECTION, CLINDAMYCIN PHOSP: HCPCS | Performed by: PHYSICIAN ASSISTANT

## 2017-08-19 PROCEDURE — 25500020 PHARM REV CODE 255: Performed by: EMERGENCY MEDICINE

## 2017-08-19 PROCEDURE — 96365 THER/PROPH/DIAG IV INF INIT: CPT

## 2017-08-19 PROCEDURE — 63600175 PHARM REV CODE 636 W HCPCS: Performed by: PHYSICIAN ASSISTANT

## 2017-08-19 PROCEDURE — 80053 COMPREHEN METABOLIC PANEL: CPT

## 2017-08-19 PROCEDURE — 63600175 PHARM REV CODE 636 W HCPCS: Performed by: HOSPITALIST

## 2017-08-19 PROCEDURE — 25000003 PHARM REV CODE 250: Performed by: PHYSICIAN ASSISTANT

## 2017-08-19 PROCEDURE — 99284 EMERGENCY DEPT VISIT MOD MDM: CPT | Mod: ,,, | Performed by: PHYSICIAN ASSISTANT

## 2017-08-19 PROCEDURE — 99285 EMERGENCY DEPT VISIT HI MDM: CPT | Mod: 25

## 2017-08-19 PROCEDURE — 99220 PR INITIAL OBSERVATION CARE,LEVL III: CPT | Mod: ,,, | Performed by: HOSPITALIST

## 2017-08-19 PROCEDURE — 63600175 PHARM REV CODE 636 W HCPCS: Performed by: EMERGENCY MEDICINE

## 2017-08-19 PROCEDURE — 99244 OFF/OP CNSLTJ NEW/EST MOD 40: CPT | Mod: ,,, | Performed by: OTOLARYNGOLOGY

## 2017-08-19 PROCEDURE — 96372 THER/PROPH/DIAG INJ SC/IM: CPT

## 2017-08-19 RX ORDER — CLINDAMYCIN PHOSPHATE 900 MG/50ML
900 INJECTION, SOLUTION INTRAVENOUS
Status: DISCONTINUED | OUTPATIENT
Start: 2017-08-20 | End: 2017-08-20 | Stop reason: HOSPADM

## 2017-08-19 RX ORDER — CLINDAMYCIN PHOSPHATE 900 MG/50ML
900 INJECTION, SOLUTION INTRAVENOUS
Status: COMPLETED | OUTPATIENT
Start: 2017-08-19 | End: 2017-08-19

## 2017-08-19 RX ORDER — PANTOPRAZOLE SODIUM 40 MG/1
40 TABLET, DELAYED RELEASE ORAL DAILY
Status: DISCONTINUED | OUTPATIENT
Start: 2017-08-20 | End: 2017-08-20 | Stop reason: HOSPADM

## 2017-08-19 RX ORDER — LISINOPRIL 5 MG/1
10 TABLET ORAL DAILY
Status: DISCONTINUED | OUTPATIENT
Start: 2017-08-20 | End: 2017-08-20 | Stop reason: HOSPADM

## 2017-08-19 RX ORDER — DEXAMETHASONE SODIUM PHOSPHATE 4 MG/ML
8 INJECTION, SOLUTION INTRA-ARTICULAR; INTRALESIONAL; INTRAMUSCULAR; INTRAVENOUS; SOFT TISSUE
Status: COMPLETED | OUTPATIENT
Start: 2017-08-19 | End: 2017-08-19

## 2017-08-19 RX ORDER — ONDANSETRON 2 MG/ML
4 INJECTION INTRAMUSCULAR; INTRAVENOUS EVERY 6 HOURS PRN
Status: DISCONTINUED | OUTPATIENT
Start: 2017-08-19 | End: 2017-08-20 | Stop reason: HOSPADM

## 2017-08-19 RX ORDER — DEXAMETHASONE SODIUM PHOSPHATE 4 MG/ML
8 INJECTION, SOLUTION INTRA-ARTICULAR; INTRALESIONAL; INTRAMUSCULAR; INTRAVENOUS; SOFT TISSUE EVERY 8 HOURS
Status: DISCONTINUED | OUTPATIENT
Start: 2017-08-20 | End: 2017-08-20 | Stop reason: HOSPADM

## 2017-08-19 RX ORDER — PRAVASTATIN SODIUM 20 MG/1
20 TABLET ORAL DAILY
Status: DISCONTINUED | OUTPATIENT
Start: 2017-08-20 | End: 2017-08-20 | Stop reason: HOSPADM

## 2017-08-19 RX ORDER — CITALOPRAM 10 MG/1
20 TABLET ORAL DAILY
Status: DISCONTINUED | OUTPATIENT
Start: 2017-08-20 | End: 2017-08-20 | Stop reason: HOSPADM

## 2017-08-19 RX ORDER — ACETAMINOPHEN 325 MG/1
650 TABLET ORAL EVERY 4 HOURS PRN
Status: DISCONTINUED | OUTPATIENT
Start: 2017-08-19 | End: 2017-08-20 | Stop reason: HOSPADM

## 2017-08-19 RX ORDER — KETOROLAC TROMETHAMINE 30 MG/ML
10 INJECTION, SOLUTION INTRAMUSCULAR; INTRAVENOUS
Status: COMPLETED | OUTPATIENT
Start: 2017-08-19 | End: 2017-08-19

## 2017-08-19 RX ORDER — IBUPROFEN 200 MG
24 TABLET ORAL
Status: DISCONTINUED | OUTPATIENT
Start: 2017-08-19 | End: 2017-08-20 | Stop reason: HOSPADM

## 2017-08-19 RX ORDER — LISINOPRIL 10 MG/1
10 TABLET ORAL
Status: COMPLETED | OUTPATIENT
Start: 2017-08-19 | End: 2017-08-19

## 2017-08-19 RX ORDER — GLUCAGON 1 MG
1 KIT INJECTION
Status: DISCONTINUED | OUTPATIENT
Start: 2017-08-19 | End: 2017-08-20 | Stop reason: HOSPADM

## 2017-08-19 RX ORDER — KETOROLAC TROMETHAMINE 30 MG/ML
15 INJECTION, SOLUTION INTRAMUSCULAR; INTRAVENOUS EVERY 6 HOURS PRN
Status: DISCONTINUED | OUTPATIENT
Start: 2017-08-19 | End: 2017-08-20 | Stop reason: HOSPADM

## 2017-08-19 RX ORDER — ENOXAPARIN SODIUM 100 MG/ML
40 INJECTION SUBCUTANEOUS EVERY 24 HOURS
Status: DISCONTINUED | OUTPATIENT
Start: 2017-08-19 | End: 2017-08-20 | Stop reason: HOSPADM

## 2017-08-19 RX ORDER — DEXAMETHASONE SODIUM PHOSPHATE 4 MG/ML
8 INJECTION, SOLUTION INTRA-ARTICULAR; INTRALESIONAL; INTRAMUSCULAR; INTRAVENOUS; SOFT TISSUE EVERY 8 HOURS
Status: DISCONTINUED | OUTPATIENT
Start: 2017-08-20 | End: 2017-08-19

## 2017-08-19 RX ORDER — IPRATROPIUM BROMIDE AND ALBUTEROL SULFATE 2.5; .5 MG/3ML; MG/3ML
3 SOLUTION RESPIRATORY (INHALATION) EVERY 4 HOURS PRN
Status: DISCONTINUED | OUTPATIENT
Start: 2017-08-19 | End: 2017-08-20 | Stop reason: HOSPADM

## 2017-08-19 RX ORDER — IBUPROFEN 200 MG
16 TABLET ORAL
Status: DISCONTINUED | OUTPATIENT
Start: 2017-08-19 | End: 2017-08-20 | Stop reason: HOSPADM

## 2017-08-19 RX ADMIN — ENOXAPARIN SODIUM 40 MG: 100 INJECTION SUBCUTANEOUS at 10:08

## 2017-08-19 RX ADMIN — KETOROLAC TROMETHAMINE 10 MG: 30 INJECTION, SOLUTION INTRAMUSCULAR at 03:08

## 2017-08-19 RX ADMIN — DEXAMETHASONE SODIUM PHOSPHATE 8 MG: 4 INJECTION, SOLUTION INTRAMUSCULAR; INTRAVENOUS at 06:08

## 2017-08-19 RX ADMIN — LISINOPRIL 10 MG: 10 TABLET ORAL at 07:08

## 2017-08-19 RX ADMIN — CLINDAMYCIN IN 5 PERCENT DEXTROSE 900 MG: 18 INJECTION, SOLUTION INTRAVENOUS at 04:08

## 2017-08-19 RX ADMIN — IOHEXOL 100 ML: 350 INJECTION, SOLUTION INTRAVENOUS at 07:08

## 2017-08-19 NOTE — SUBJECTIVE & OBJECTIVE
Medications:  Continuous Infusions:   Scheduled Meds:   dexamethasone  8 mg Intravenous ED 1 Time     PRN Meds:     No current facility-administered medications on file prior to encounter.      Current Outpatient Prescriptions on File Prior to Encounter   Medication Sig    estrogens, conjugated, (PREMARIN) 0.625 MG tablet Take 1 tablet (0.625 mg total) by mouth once daily.    lisinopril 10 MG tablet Take 1 tablet (10 mg total) by mouth once daily.    pravastatin (PRAVACHOL) 20 MG tablet Take 1 tablet (20 mg total) by mouth once daily.    alendronate (FOSAMAX) 70 MG tablet Take 1 tablet (70 mg total) by mouth every 7 days.    CHERATUSSIN AC  mg/5 mL syrup TAKE 2 TEASPOONSFUL EVERY 6 HOURS AS NEEDED FOR COUGH    citalopram (CELEXA) 20 MG tablet Take 1 tablet (20 mg total) by mouth once daily.    levocetirizine (XYZAL) 5 MG tablet Take 1 tablet (5 mg total) by mouth every evening.    naproxen (NAPROSYN) 500 MG tablet Take 1 tablet (500 mg total) by mouth 2 (two) times daily with meals.    pantoprazole (PROTONIX) 40 MG tablet TAKE 1 TABLET BY MOUTH EVERY DAY FOR ACID REFLUX       Review of patient's allergies indicates:   Allergen Reactions    Penicillins      Other reaction(s): Unknown    Sulfa (sulfonamide antibiotics)      Other reaction(s): Rash       Past Medical History:   Diagnosis Date    GERD (gastroesophageal reflux disease)     High cholesterol     Hyperlipidemia     Hypertension     Sarcoidosis      Past Surgical History:   Procedure Laterality Date    ANKLE FRACTURE SURGERY      COLONOSCOPY N/A 5/30/2017    Procedure: COLONOSCOPY;  Surgeon: Jed Garcia MD;  Location[address];  Service: Endoscopy;  Laterality: N/A;    HYSTERECTOMY  age 39    endo    LEG SURGERY      tibia    OOPHORECTOMY  age 39    endo    TONSILLECTOMY       Family History     Problem Relation (Age of Onset)    Breast cancer Sister    Cancer Mother, Father    Diabetes Sister    Hypertension  Sister, Brother, Sister        Social History Main Topics    Smoking status: Former Smoker     Packs/day: 1.00     Years: 3.00     Types: Cigarettes     Quit date: 7/9/2010    Smokeless tobacco: Never Used    Alcohol use No    Drug use: No    Sexual activity: Yes     Partners: Male     Birth control/ protection: Surgical      Comment: Hysterectomy     Review of Systems  Objective:     Vital Signs (Most Recent):  Temp: 98.3 °F (36.8 °C) (08/19/17 1635)  Pulse: 62 (08/19/17 1635)  Resp: 20 (08/19/17 1635)  BP: (!) 156/76 (08/19/17 1635)  SpO2: 100 % (room air) (08/19/17 1635) Vital Signs (24h Range):  Temp:  [98.3 °F (36.8 °C)-98.8 °F (37.1 °C)] 98.3 °F (36.8 °C)  Pulse:  [57-78] 62  Resp:  [16-20] 20  SpO2:  [97 %-100 %] 100 %  BP: (143-156)/(74-78) 156/76     Weight: 87.1 kg (192 lb)  Body mass index is 34.01 kg/m².         Physical Exam    General: NAD; Well appearing. Sipping [employer] iced tea on arrival  Neuro: AAOx3. CN II-XII intact.  Cardiovascular: normal rate  Respiratory: No labored breathing, no stridor  Head/Face: Swelling/fullness to left sided submandibular gland; No skin changes noted and primarily indurated on palpation without significant fluctuance. Negative sinus pressure/tenderness; TTP over gland  Eyes: EOMI; PERRLA   Right Ear: Auricle WNL. EAC WNL.      Left Ear: Auricle WNL. EAC WNL.   Nose: normal ext appearance and palpation. Normal septum, inferior turbinates, mucosa.   OC: Minimal purulence expressed with left submax gland pressure from Leticia's duct. No palpable stones intraorally. Lips and gingiva wnl.  Full maxillary denture. FOM Soft.  Anterior Tongue nml size and mobility; Hard Palate wnl  OP: BOT WNL. Soft palate wnl with Midline uvula.  Tonsils 2+ bilaterally. Posterior oropharynx patent, wnl  Neck/Lymphatic: No LAD.  Trachea midline. No thyromegaly. Full ROM.  Nl.      Flexible Fiberoptic Laryngoscopy   Verbal consent obtained. Anesthesia with 4% lidocaine instilled to  bilateral nares.  Nasal Cavity- normal   Nasopharynx   Adenoid tissue - normal    eustachian tube orifices - normal   Oropharynx   Posterior pharyngeal wall - normal   Base of tongue - normal    Valleculae - normal  Hypopharynx   Pyriform sinuses - normal    Post-cricoid normal  Supraglottis   Epiglottis - normal    AE folds- cystic pocket extending from left arytenoid over left AE fold   Arytenoids - left side with cystic fluid pocket over arytenoid. Right wnl.   Interarytenoid space - normal   False vocal cords - normal   Glottis   True vocal cords - normal  Subglottis: difficult to assess due to visualization      Significant Labs:  CBC:   Recent Labs  Lab 08/19/17  1512   WBC 6.64   RBC 3.87*   HGB 12.4   HCT 37.5      MCV 97   MCH 32.0*   MCHC 33.1     CMP:   Recent Labs  Lab 08/19/17  1512   GLU 93   CALCIUM 9.2   ALBUMIN 3.8   PROT 7.6      K 4.0   CO2 27      BUN 14   CREATININE 0.9   ALKPHOS 117   ALT 11   AST 18   BILITOT 0.6       Significant Diagnostics:  CT: I have reviewed all pertinent results/findings within the past 24 hours and my personal findings are:  pending

## 2017-08-19 NOTE — ED PROVIDER NOTES
Encounter Date: 8/19/2017       History     Chief Complaint   Patient presents with    Facial Swelling     sent from urgent care to see ent blocked salivary gland, on antibiotics     Patient is a 66-year-old female with a past medical history of sarcoidosis of the lung and uvea, HTN, HLD who presents the ED with jaw/neck swelling.  Patient states yesterday she noticed the swelling while eating.  She went to an urgent care yesterday and was prescribed Augmentin.  Patient states this morning the pain and swelling was worse and she presented back to the urgent care.  She was then referred to the emergency department after the physician there express purulent material for her from her salivary glands.  She completed a 5/10 pain to the area, difficulty swallowing, and chills.  She denies any fever.          Review of patient's allergies indicates:   Allergen Reactions    Penicillins      Other reaction(s): Unknown    Sulfa (sulfonamide antibiotics)      Other reaction(s): Rash     Past Medical History:   Diagnosis Date    GERD (gastroesophageal reflux disease)     High cholesterol     Hyperlipidemia     Hypertension     Sarcoidosis      Past Surgical History:   Procedure Laterality Date    ANKLE FRACTURE SURGERY      COLONOSCOPY N/A 5/30/2017    Procedure: COLONOSCOPY;  Surgeon: Jed Garcia MD;  Location: [address]);  Service: Endoscopy;  Laterality: N/A;    HYSTERECTOMY  age 39    endo    LEG SURGERY      tibia    OOPHORECTOMY  age 39    endo    TONSILLECTOMY       Family History   Problem Relation Age of Onset    Cancer Mother      colon    Cancer Father      throat    Hypertension Sister     Breast cancer Sister     Hypertension Brother     Diabetes Sister     Hypertension Sister     Stroke Neg Hx     Ovarian cancer Neg Hx     Miscarriages / Stillbirths Neg Hx     Eclampsia Neg Hx      Social History   Substance Use Topics    Smoking status: Former Smoker     Packs/day: 1.00      Years: 3.00     Types: Cigarettes     Quit date: 7/9/2010    Smokeless tobacco: Never Used    Alcohol use No     Review of Systems    Physical Exam     Initial Vitals [08/19/17 1158]   BP Pulse Resp Temp SpO2   (!) 155/74 78 18 98.7 °F (37.1 °C) 97 %      MAP       101         Physical Exam    ED Course   Procedures  Labs Reviewed - No data to display                            ED Course     Clinical Impression:   The primary encounter diagnosis was Sialadenitis. Diagnoses of Submandibular gland swelling, HTN (hypertension), benign, and Hypercholesterolemia were also pertinent to this visit.                           Valentino Rodriguez MD  08/23/17 0713

## 2017-08-19 NOTE — CONSULTS
[facility]  Otorhinolaryngology-Head & Neck Surgery  Consult Note    Patient Name: Estephania Lyman  MRN: [id number]  Code Status: No Order  Admission Date: 8/19/2017  Hospital Length of Stay: 0 days  Attending Physician: Valentino Rodriguez MD  Primary Care Provider: Aidee Lora MD    Patient information was obtained from patient, past medical records and ER records.     Inpatient consult to ENT  Consult performed by: KENDALL DILL  Consult ordered by: TOMAS CHAPA  Reason for consult: Sialadenitis        Subjective:     Chief Complaint/Reason for Admission: Sialadenitis    History of Present Illness: Ms. Lyman is a 67 yo F with sarcoid, HTN, HLD, PSVT, and h/o angina who presents with 2 day history of left sided submandibular swelling. Patient states the swelling came on suddenly, has become more painful, and subsequently has developed increased difficulty with swallowing solid foods 2/2 to pain. No difficulty with liquids. Denies SOB, iWOB, or fevers. Also denies prior similar episodes, trauma, or recent tooth/mouth infection. Does report chills last night. Initially went to urgent care 2 days ago and was prescribed Augmentin, but did not improve after one day so returned and was told to come to ED due to purulence from floor of mouth. ENT consulted for further recommendations.    Medications:  Continuous Infusions:   Scheduled Meds:   dexamethasone  8 mg Intravenous ED 1 Time     PRN Meds:     No current facility-administered medications on file prior to encounter.      Current Outpatient Prescriptions on File Prior to Encounter   Medication Sig    estrogens, conjugated, (PREMARIN) 0.625 MG tablet Take 1 tablet (0.625 mg total) by mouth once daily.    lisinopril 10 MG tablet Take 1 tablet (10 mg total) by mouth once daily.    pravastatin (PRAVACHOL) 20 MG tablet Take 1 tablet (20 mg total) by mouth once daily.    alendronate (FOSAMAX) 70 MG tablet Take 1 tablet (70 mg  total) by mouth every 7 days.    CHERATUSSIN AC  mg/5 mL syrup TAKE 2 TEASPOONSFUL EVERY 6 HOURS AS NEEDED FOR COUGH    citalopram (CELEXA) 20 MG tablet Take 1 tablet (20 mg total) by mouth once daily.    levocetirizine (XYZAL) 5 MG tablet Take 1 tablet (5 mg total) by mouth every evening.    naproxen (NAPROSYN) 500 MG tablet Take 1 tablet (500 mg total) by mouth 2 (two) times daily with meals.    pantoprazole (PROTONIX) 40 MG tablet TAKE 1 TABLET BY MOUTH EVERY DAY FOR ACID REFLUX       Review of patient's allergies indicates:   Allergen Reactions    Penicillins      Other reaction(s): Unknown    Sulfa (sulfonamide antibiotics)      Other reaction(s): Rash       Past Medical History:   Diagnosis Date    GERD (gastroesophageal reflux disease)     High cholesterol     Hyperlipidemia     Hypertension     Sarcoidosis      Past Surgical History:   Procedure Laterality Date    ANKLE FRACTURE SURGERY      COLONOSCOPY N/A 5/30/2017    Procedure: COLONOSCOPY;  Surgeon: Jed Garcia MD;  Location: [address]);  Service: Endoscopy;  Laterality: N/A;    HYSTERECTOMY  age 39    endo    LEG SURGERY      tibia    OOPHORECTOMY  age 39    endo    TONSILLECTOMY       Family History     Problem Relation (Age of Onset)    Breast cancer Sister    Cancer Mother, Father    Diabetes Sister    Hypertension Sister, Brother, Sister        Social History Main Topics    Smoking status: Former Smoker     Packs/day: 1.00     Years: 3.00     Types: Cigarettes     Quit date: 7/9/2010    Smokeless tobacco: Never Used    Alcohol use No    Drug use: No    Sexual activity: Yes     Partners: Male     Birth control/ protection: Surgical      Comment: Hysterectomy     Review of Systems  Objective:     Vital Signs (Most Recent):  Temp: 98.3 °F (36.8 °C) (08/19/17 1635)  Pulse: 62 (08/19/17 1635)  Resp: 20 (08/19/17 1635)  BP: (!) 156/76 (08/19/17 1635)  SpO2: 100 % (room air) (08/19/17 1635) Vital Signs (24h  Range):  Temp:  [98.3 °F (36.8 °C)-98.8 °F (37.1 °C)] 98.3 °F (36.8 °C)  Pulse:  [57-78] 62  Resp:  [16-20] 20  SpO2:  [97 %-100 %] 100 %  BP: (143-156)/(74-78) 156/76     Weight: 87.1 kg (192 lb)  Body mass index is 34.01 kg/m².         Physical Exam    General: NAD; Well appearing. Sipping [employer] iced tea on arrival  Neuro: AAOx3. CN II-XII intact.  Cardiovascular: normal rate  Respiratory: No labored breathing, no stridor  Head/Face: Swelling/fullness to left sided submandibular gland; No skin changes noted and primarily indurated on palpation without significant fluctuance. Negative sinus pressure/tenderness; TTP over gland  Eyes: EOMI; PERRLA   Right Ear: Auricle WNL. EAC WNL.      Left Ear: Auricle WNL. EAC WNL.   Nose: normal ext appearance and palpation. Normal septum, inferior turbinates, mucosa.   OC: Minimal purulence expressed with left submax gland pressure from Flushing's duct. No palpable stones intraorally. Lips and gingiva wnl.  Full maxillary denture. FOM Soft.  Anterior Tongue nml size and mobility; Hard Palate wnl  OP: BOT WNL. Soft palate wnl with Midline uvula.  Tonsils 2+ bilaterally. Posterior oropharynx patent, wnl  Neck/Lymphatic: No LAD.  Trachea midline. No thyromegaly. Full ROM.  Nl.      Flexible Fiberoptic Laryngoscopy   Verbal consent obtained. Anesthesia with 4% lidocaine instilled to bilateral nares.  Nasal Cavity- normal   Nasopharynx   Adenoid tissue - normal    eustachian tube orifices - normal   Oropharynx   Posterior pharyngeal wall - normal   Base of tongue - normal    Valleculae - normal  Hypopharynx   Pyriform sinuses - normal    Post-cricoid normal  Supraglottis   Epiglottis - normal    AE folds- cystic pocket extending from left arytenoid over left AE fold   Arytenoids - left side with cystic fluid pocket over arytenoid. Right wnl.   Interarytenoid space - normal   False vocal cords - normal   Glottis   True vocal cords - normal  Subglottis: difficult to assess due to  visualization      Significant Labs:  CBC:   Recent Labs  Lab 08/19/17  1512   WBC 6.64   RBC 3.87*   HGB 12.4   HCT 37.5      MCV 97   MCH 32.0*   MCHC 33.1     CMP:   Recent Labs  Lab 08/19/17  1512   GLU 93   CALCIUM 9.2   ALBUMIN 3.8   PROT 7.6      K 4.0   CO2 27      BUN 14   CREATININE 0.9   ALKPHOS 117   ALT 11   AST 18   BILITOT 0.6       Significant Diagnostics:  CT: I have reviewed all pertinent results/findings within the past 24 hours and my personal findings are: Significant left submandibular swelling with overlying fat stranding consistent with cellulitis. No fluid collection noted. Also with left arytenoid fullness as noted on scope exam. Airway patent overall.    Assessment/Plan:     Sialadenitis - left sided    65yo M with HTN, sarcoid, HLD, PSVT, and h/o angina now presents with left sided sialadenitis refractory to 2 days of OP treatment with Augmentin.    - CT scan with left submandibular swelling and superficial fat stranding consistent with sialadenitis  - Recommend medicine admission, given other comorbidities, failure of OP abx treatment and potential airway risk due to cystic fluid development over left arytenoid  - Recommend continuous pulse oximetry on floor as precaution  - Patient massages of gland q4 hours and prn  - Sialagogues (sugar free lemon candy) q4 hours and prn  - Warm compresses q 4hrs and prn  - IVF resuscitation  - IV decadron 8mg q 8 hrs if OK with primary given HTN (at least x1)  - Continue Clindamycin 900 mg IV q 8 hrs   - ENT will cont to follow, but no surgical intervention at this time            VTE Risk Mitigation     None          Thank you for your consult. I will follow-up with patient. Please contact us if you have any additional questions.    Lucien Roger MD  Otorhinolaryngology-Head & Neck Surgery  [facility]

## 2017-08-19 NOTE — HPI
Ms. Lyman is a 65 yo F with sarcoid, HTN, HLD, PSVT, and h/o angina who presents with 2 day history of right sided submandibular swelling. Patient states the swelling came on suddenly, has become more painful, and subsequently has developed increased difficulty with swallowing solid foods 2/2 to pain. No difficulty with liquids. Denies SOB, iWOB, or fevers. Also denies prior similar episodes, trauma, or recent tooth/mouth infection. Does report chills last night. Initially went to urgent care 2 days ago and was prescribed Augmentin, but did not improve after one day so returned and was told to come to ED due to purulence from floor of mouth. ENT consulted for further recommendations.

## 2017-08-19 NOTE — ED TRIAGE NOTES
"Pt presents to the ED c/o left neck swelling that began yesterday. Pt states she went to an urgent care yesterday and today. Pt states the Dr. at the urgent care this AM said her left salivary gland was enlarged. Pt states the Dr "tried to squeeze some of the pus out of it." Pt states she had an unknown temperature last night. +chills  "

## 2017-08-19 NOTE — ASSESSMENT & PLAN NOTE
65yo M with HTN, sarcoid, HLD, PSVT, and h/o angina now presents with left sided sialadenitis refractory to 2 days of OP treatment with Augmentin.    - Will follow-up CT scan  - Recommend medicine admission, given other comorbidities .   - Patient massages of gland q4 hours and prn  - Sialagogues (sugar free lemon candy) q4 hours and prn  - Warm compresses q 4hrs and prn  - IVF resuscitation  - IV decadron 8mg q 8 hrs if OK with primary given HTN (at least x1)  - Continue Clindamycin 900 mg IV q 8 hrs   - ENT will cont to follow, but no surgical intervention at this time

## 2017-08-20 VITALS
SYSTOLIC BLOOD PRESSURE: 130 MMHG | TEMPERATURE: 98 F | RESPIRATION RATE: 18 BRPM | OXYGEN SATURATION: 98 % | WEIGHT: 192 LBS | DIASTOLIC BLOOD PRESSURE: 70 MMHG | BODY MASS INDEX: 34.02 KG/M2 | HEART RATE: 61 BPM | HEIGHT: 63 IN

## 2017-08-20 PROBLEM — K12.2 CELLULITIS OF SUBMANDIBULAR REGION: Status: ACTIVE | Noted: 2017-08-20

## 2017-08-20 PROBLEM — Z86.2 HISTORY OF SARCOIDOSIS: Status: ACTIVE | Noted: 2017-08-20

## 2017-08-20 PROBLEM — F32.A DEPRESSION: Status: ACTIVE | Noted: 2017-08-20

## 2017-08-20 LAB
ALBUMIN SERPL BCP-MCNC: 3.6 G/DL
ALP SERPL-CCNC: 116 U/L
ALT SERPL W/O P-5'-P-CCNC: 10 U/L
ANION GAP SERPL CALC-SCNC: 10 MMOL/L
AST SERPL-CCNC: 16 U/L
BASOPHILS # BLD AUTO: 0 K/UL
BASOPHILS NFR BLD: 0 %
BILIRUB SERPL-MCNC: 0.5 MG/DL
BUN SERPL-MCNC: 11 MG/DL
CALCIUM SERPL-MCNC: 9.7 MG/DL
CHLORIDE SERPL-SCNC: 106 MMOL/L
CO2 SERPL-SCNC: 26 MMOL/L
CREAT SERPL-MCNC: 0.9 MG/DL
DIFFERENTIAL METHOD: ABNORMAL
EOSINOPHIL # BLD AUTO: 0 K/UL
EOSINOPHIL NFR BLD: 0 %
ERYTHROCYTE [DISTWIDTH] IN BLOOD BY AUTOMATED COUNT: 12.6 %
EST. GFR  (AFRICAN AMERICAN): >60 ML/MIN/1.73 M^2
EST. GFR  (NON AFRICAN AMERICAN): >60 ML/MIN/1.73 M^2
GLUCOSE SERPL-MCNC: 148 MG/DL
HCT VFR BLD AUTO: 39.6 %
HGB BLD-MCNC: 13 G/DL
LYMPHOCYTES # BLD AUTO: 0.7 K/UL
LYMPHOCYTES NFR BLD: 9.3 %
MAGNESIUM SERPL-MCNC: 1.9 MG/DL
MCH RBC QN AUTO: 31.6 PG
MCHC RBC AUTO-ENTMCNC: 32.8 G/DL
MCV RBC AUTO: 96 FL
MONOCYTES # BLD AUTO: 0 K/UL
MONOCYTES NFR BLD: 0.6 %
NEUTROPHILS # BLD AUTO: 6.4 K/UL
NEUTROPHILS NFR BLD: 90 %
PHOSPHATE SERPL-MCNC: 3.2 MG/DL
PLATELET # BLD AUTO: 233 K/UL
PMV BLD AUTO: 11.4 FL
POTASSIUM SERPL-SCNC: 4.4 MMOL/L
PROT SERPL-MCNC: 7.9 G/DL
RBC # BLD AUTO: 4.11 M/UL
SODIUM SERPL-SCNC: 142 MMOL/L
WBC # BLD AUTO: 7.06 K/UL

## 2017-08-20 PROCEDURE — 80053 COMPREHEN METABOLIC PANEL: CPT

## 2017-08-20 PROCEDURE — 84100 ASSAY OF PHOSPHORUS: CPT

## 2017-08-20 PROCEDURE — 99217 PR OBSERVATION CARE DISCHARGE: CPT | Mod: ,,, | Performed by: PHYSICIAN ASSISTANT

## 2017-08-20 PROCEDURE — G0378 HOSPITAL OBSERVATION PER HR: HCPCS

## 2017-08-20 PROCEDURE — 99213 OFFICE O/P EST LOW 20 MIN: CPT | Mod: ,,, | Performed by: OTOLARYNGOLOGY

## 2017-08-20 PROCEDURE — S0077 INJECTION, CLINDAMYCIN PHOSP: HCPCS | Performed by: HOSPITALIST

## 2017-08-20 PROCEDURE — 63600175 PHARM REV CODE 636 W HCPCS: Performed by: HOSPITALIST

## 2017-08-20 PROCEDURE — 83735 ASSAY OF MAGNESIUM: CPT

## 2017-08-20 PROCEDURE — 85025 COMPLETE CBC W/AUTO DIFF WBC: CPT

## 2017-08-20 PROCEDURE — 25000003 PHARM REV CODE 250: Performed by: HOSPITALIST

## 2017-08-20 PROCEDURE — 36415 COLL VENOUS BLD VENIPUNCTURE: CPT

## 2017-08-20 RX ORDER — HYDRALAZINE HYDROCHLORIDE 25 MG/1
25 TABLET, FILM COATED ORAL EVERY 8 HOURS PRN
Status: DISCONTINUED | OUTPATIENT
Start: 2017-08-20 | End: 2017-08-20 | Stop reason: HOSPADM

## 2017-08-20 RX ORDER — METHYLPREDNISOLONE 4 MG/1
TABLET ORAL
Qty: 1 PACKAGE | Refills: 0 | Status: SHIPPED | OUTPATIENT
Start: 2017-08-20 | End: 2017-09-10

## 2017-08-20 RX ORDER — SODIUM CHLORIDE 9 MG/ML
INJECTION, SOLUTION INTRAVENOUS ONCE
Status: COMPLETED | OUTPATIENT
Start: 2017-08-20 | End: 2017-08-20

## 2017-08-20 RX ORDER — CLINDAMYCIN HYDROCHLORIDE 300 MG/1
300 CAPSULE ORAL 4 TIMES DAILY
Qty: 40 CAPSULE | Refills: 0 | Status: SHIPPED | OUTPATIENT
Start: 2017-08-20 | End: 2017-08-30

## 2017-08-20 RX ADMIN — DEXAMETHASONE SODIUM PHOSPHATE 8 MG: 4 INJECTION, SOLUTION INTRAMUSCULAR; INTRAVENOUS at 02:08

## 2017-08-20 RX ADMIN — CLINDAMYCIN IN 5 PERCENT DEXTROSE 900 MG: 18 INJECTION, SOLUTION INTRAVENOUS at 01:08

## 2017-08-20 RX ADMIN — CITALOPRAM HYDROBROMIDE 20 MG: 10 TABLET ORAL at 10:08

## 2017-08-20 RX ADMIN — LISINOPRIL 10 MG: 5 TABLET ORAL at 10:08

## 2017-08-20 RX ADMIN — DEXAMETHASONE SODIUM PHOSPHATE 8 MG: 4 INJECTION, SOLUTION INTRAMUSCULAR; INTRAVENOUS at 01:08

## 2017-08-20 RX ADMIN — PRAVASTATIN SODIUM 20 MG: 20 TABLET ORAL at 10:08

## 2017-08-20 RX ADMIN — SODIUM CHLORIDE: 0.9 INJECTION, SOLUTION INTRAVENOUS at 02:08

## 2017-08-20 RX ADMIN — CLINDAMYCIN IN 5 PERCENT DEXTROSE 900 MG: 18 INJECTION, SOLUTION INTRAVENOUS at 10:08

## 2017-08-20 RX ADMIN — CLINDAMYCIN IN 5 PERCENT DEXTROSE 900 MG: 18 INJECTION, SOLUTION INTRAVENOUS at 03:08

## 2017-08-20 RX ADMIN — PANTOPRAZOLE SODIUM 40 MG: 40 TABLET, DELAYED RELEASE ORAL at 10:08

## 2017-08-20 RX ADMIN — KETOROLAC TROMETHAMINE 15 MG: 30 INJECTION, SOLUTION INTRAMUSCULAR at 12:08

## 2017-08-20 NOTE — NURSING
Discharge instructions,medication,and follow-up appt complete with and her  verbalize understanding. No acute distress or needs at this time. Pt  Refused transport bu requests wheel chair to exit unit per  pushing.

## 2017-08-20 NOTE — HOSPITAL COURSE
65 yo female admitted to observation for further evaluation and treatment of sialadenitis. CT soft tissue neck with contrast completed which showed LEFT submandibular gland enlarged and heterogeneous suggesting sialadenitis without evidence of discrete abscess collection. No salivary duct calculus identified. ENT consulted in the ER and evaluated patient with flexible fiberoptic laryngoscopy. Patient initiated on IV clindamycin and decadron in the ER. ENT followed during admission. Patient with improvement in pain, symptoms overnight with IV antibiotics and steroids. ENT ok with discharge, patient to continue PO Clindamycin x10 days and complete medrol dose pack, no surgical intervention at this time. Patient afebrile and without leukocytosis, tolerating regular diet well on day of discharge. Stable for discharge home today to complete PO antibiotics/steroids, will follow up with PCP.

## 2017-08-20 NOTE — HPI
Patient is a 66-year-old female with a past medical history of sarcoidosis of the lung, HTN, HLD who presents the ED with left jaw/neck swelling for one day.  Patient states yesterday she noticed the swelling while eating.  She went to an urgent care yesterday and was prescribed Augmentin.  Patient states this morning the pain and swelling was worse and she presented back to the urgent care.  She was then referred to the emergency department after the physician there expressed purulent material for her from her salivary glands.   She reports  a 5/10 pain to the area, pain with swallowing solid food, occasional dizziness upon standing and chills.  She denies any fever, trauma,  or or mouth infection,  sob, stridor, change in voice focal weakness or numbness. Denies any recent change of her medications .     ENT was consulted by ER and evaluated patient ER with flexible fiberoptic laryngoscopy. No fluid collection on CT to drain. Received clindamycin and decadron in ER.

## 2017-08-20 NOTE — ASSESSMENT & PLAN NOTE
- CT scan with left submandibular swelling and superficial fat stranding consistent with sialadenitis  - seen by ENT in ER with flexible fiberoptic laryngoscopy  - airway patent   - observation given failure  of OP abx treatment and potential airway risk due to cystic fluid development over left arytenoid  - Patient initiated on IV clindamycin and IV decadron 8/19  - Patient with significant improvement overnight with IV steroids and antibiotics. Patient advanced to regular diet and tolerated well. ENT recommended patient complete 3 doses of IV Clindamycin, once complete patient stable for discharge with PO Clindamycin x10 days and Medrol dose pack.   - Patient massages of gland q4 hours and prn  - Sialagogues (sugar free lemon candy) q4 hours and prn  - Warm compresses q 4hrs and prn  - Patient to follow up with PCP

## 2017-08-20 NOTE — DISCHARGE SUMMARY
[facility] Hospital Medicine  Discharge Summary      Patient Name: Estephania Lyman  MRN: [id number]  Admission Date: 8/19/2017  Hospital Length of Stay: 0 days  Discharge Date and Time:  08/20/2017 2:29 PM  Attending Physician: Ish Hayden MD   Discharging Provider: Yoselyn Klein PA-C  Primary Care Provider: Aidee Lora MD  [facility] Medicine Team: [facility] HOSP MED E Yoselyn Klein PA-C    HPI:   Patient is a 66-year-old female with a past medical history of sarcoidosis of the lung, HTN, HLD who presents the ED with left jaw/neck swelling for one day.  Patient states yesterday she noticed the swelling while eating.  She went to an urgent care yesterday and was prescribed Augmentin.  Patient states this morning the pain and swelling was worse and she presented back to the urgent care.  She was then referred to the emergency department after the physician there expressed purulent material for her from her salivary glands.   She reports  a 5/10 pain to the area, pain with swallowing solid food, occasional dizziness upon standing and chills.  She denies any fever, trauma,  or or mouth infection,  sob, stridor, change in voice focal weakness or numbness. Denies any recent change of her medications .     ENT was consulted by ER and evaluated patient ER with flexible fiberoptic laryngoscopy. No fluid collection on CT to drain. Received clindamycin and decadron in ER.     * No surgery found *      Indwelling Lines/Drains at time of discharge:   Lines/Drains/Airways          No matching active lines, drains, or airways        Hospital Course:   67 yo female admitted to observation for further evaluation and treatment of sialadenitis. CT soft tissue neck with contrast completed which showed LEFT submandibular gland enlarged and heterogeneous suggesting sialadenitis without evidence of discrete abscess collection. No salivary duct calculus identified. ENT consulted in the ER and evaluated  patient with flexible fiberoptic laryngoscopy. Patient initiated on IV clindamycin and decadron in the ER. ENT followed during admission. Patient with improvement in pain, symptoms overnight with IV antibiotics and steroids. Patient completed IV Clindamycin x3 doses during admission. ENT ok with discharge, patient to continue PO Clindamycin x10 days and complete medrol dose pack, no surgical intervention at this time. Patient afebrile and without leukocytosis, tolerating regular diet well on day of discharge. Stable for discharge home today to complete PO antibiotics/steroids, will follow up with PCP.     Consults:   Consults         Status Ordering Provider     Inpatient consult to ENT  Once     Provider:  (Not yet assigned)    Completed TOMAS CHAPA          Significant Diagnostic Studies: Radiology: CT scan:   LEFT submandibular gland is enlarged and heterogeneous suggesting sialadenitis without evidence of discrete abscess collection. No salivary duct calculus identified.     No soft tissue abscess identified in the neck. There is contiguous soft tissue phlegmon from the LEFT submandibular gland to the LEFT hypopharynx with some effacement of fat planes. There is asymmetric soft tissue mass in the hypopharynx on the LEFT side effacing the LEFT piriform sinus and encroaching on the laryngeal vestibule. This is concerning for potential extension of inflammatory process resulting in phlegmon without discrete abscess formation. As it encroaches on the airway, recommend ENT consultation.    Pending Diagnostic Studies:     None        Final Active Diagnoses:    Diagnosis Date Noted POA    PRINCIPAL PROBLEM:  Sialadenitis - left sided [K11.20] 08/19/2017 Yes    Cellulitis of submandibular region [K12.2] 08/20/2017 Yes    Depression [F32.9] 08/20/2017 Yes    History of sarcoidosis [Z86.2] 08/20/2017 Yes    Hypercholesterolemia [E78.00] 08/07/2012 Yes    HTN (hypertension), benign [I10] 07/09/2012 Yes       Problems Resolved During this Admission:    Diagnosis Date Noted Date Resolved POA      * Sialadenitis - left sided      - CT scan with left submandibular swelling and superficial fat stranding consistent with sialadenitis  - seen by ENT in ER with flexible fiberoptic laryngoscopy  - airway patent   - observation given failure  of OP abx treatment and potential airway risk due to cystic fluid development over left arytenoid  - Patient initiated on IV clindamycin and IV decadron 8/19  - Patient with significant improvement overnight with IV steroids and antibiotics. Patient advanced to regular diet and tolerated well. ENT recommended patient complete 3 doses of IV Clindamycin, once complete patient stable for discharge with PO Clindamycin x10 days and Medrol dose pack.   - Patient massages of gland q4 hours and prn  - Sialagogues (sugar free lemon candy) q4 hours and prn  - Warm compresses q 4hrs and prn  - Patient to follow up with PCP            History of sarcoidosis    - No s/s of flare up          Depression    - Stable on Celexa  - No SI/HI          Hypercholesterolemia    - Continue pravastatin          HTN (hypertension), benign    - Continue lisinopril              Discharged Condition: good    Disposition: Home or Self Care    Follow Up:  Follow-up Information     Aidee Lora MD.    Specialty:  Family Medicine  Why:  Please follow up with PCP in 1-2 weeks  Contact information:  [address]  [address]  [facility] [id number] [phone]                 Patient Instructions:     Diet Cardiac     Activity as tolerated   Order Comments: - Patient massages of gland q4 hours and prn  - Sialagogues (sugar free lemon candy) q4 hours and prn  - Warm compresses q 4hrs and prn     Call MD for:  severe uncontrolled pain     Call MD for:  temperature >100.4     Call MD for:  difficulty breathing or increased cough     Call MD for:  increased confusion or weakness       Medications:  Reconciled Home  Medications:   Current Discharge Medication List      START taking these medications    Details   clindamycin (CLEOCIN) 300 MG capsule Take 1 capsule (300 mg total) by mouth 4 (four) times daily.  Qty: 40 capsule, Refills: 0      methylPREDNISolone (MEDROL DOSEPACK) 4 mg tablet use as directed  Qty: 1 Package, Refills: 0         CONTINUE these medications which have NOT CHANGED    Details   estrogens, conjugated, (PREMARIN) 0.625 MG tablet Take 1 tablet (0.625 mg total) by mouth once daily.  Qty: 30 tablet, Refills: 11      lisinopril 10 MG tablet Take 1 tablet (10 mg total) by mouth once daily.  Qty: 90 tablet, Refills: 3      pravastatin (PRAVACHOL) 20 MG tablet Take 1 tablet (20 mg total) by mouth once daily.  Qty: 90 tablet, Refills: 3      alendronate (FOSAMAX) 70 MG tablet Take 1 tablet (70 mg total) by mouth every 7 days.  Qty: 12 tablet, Refills: 0      CHERATUSSIN AC  mg/5 mL syrup TAKE 2 TEASPOONSFUL EVERY 6 HOURS AS NEEDED FOR COUGH  Refills: 0      citalopram (CELEXA) 20 MG tablet Take 1 tablet (20 mg total) by mouth once daily.  Qty: 90 tablet, Refills: 3      levocetirizine (XYZAL) 5 MG tablet Take 1 tablet (5 mg total) by mouth every evening.  Qty: 30 tablet, Refills: 11      naproxen (NAPROSYN) 500 MG tablet Take 1 tablet (500 mg total) by mouth 2 (two) times daily with meals.  Qty: 60 tablet, Refills: 0      pantoprazole (PROTONIX) 40 MG tablet TAKE 1 TABLET BY MOUTH EVERY DAY FOR ACID REFLUX  Refills: 2           Time spent on the discharge of patient: 30 minutes    HOS POC IP DISCHARGE SUMMARY    Yoselyn Klein PA-C  Department of Hospital Medicine  [facility]  Staff: Dr. Hayden

## 2017-08-20 NOTE — PLAN OF CARE
Problem: Patient Care Overview  Goal: Plan of Care Review  Plan of care reviewed with patient, all questions and concerns addressed. Pt vital signs remain stable with no complaints of headache or nasuea. PT is resting comfortably at this time with no complaints of pain. Pt voided once and is tolerating diet well. Will continue to monitor patient.

## 2017-08-20 NOTE — DISCHARGE INSTRUCTIONS
- Patient massages of gland q4 hours and prn  - Sialagogues (sugar free lemon candy) q4 hours and prn  - Warm compresses q 4hrs and prn

## 2017-08-20 NOTE — ED PROVIDER NOTES
Encounter Date: 8/19/2017    SCRIBE #1 NOTE: I, Cassie Anthony, am scribing for, and in the presence of,  Dr. Rodriguez. I have scribed the following portions of the note - the APC attestation.       History     Chief Complaint   Patient presents with    Facial Swelling     sent from urgent care to see ent blocked salivary gland, on antibiotics     This is a 66 year old female with HTN, sarcoidosis, HTN, PSVT and angina who presents to the ED with a chief complaint of submandibular swelling. Patient states the swelling came on suddenly 2 days ago, has become more painful and has since developed increased difficulty with swallowing solid foods secondary to pain. No difficulty with liquids. Does report chills last night. Initially went to urgent care yesterday and was prescribed Augmentin, but did not improve after 2 doses, so returned and was referred to ED for worsening symptoms including purulence from floor of mouth. ENT consulted for further recommendations.    She denies fever, chills, dental problems, trauma, nausea/vomiting, drooling, neck pain/stiffness.          Review of patient's allergies indicates:   Allergen Reactions    Penicillins      Other reaction(s): Unknown    Sulfa (sulfonamide antibiotics)      Other reaction(s): Rash     Past Medical History:   Diagnosis Date    GERD (gastroesophageal reflux disease)     High cholesterol     Hyperlipidemia     Hypertension     Sarcoidosis      Past Surgical History:   Procedure Laterality Date    ANKLE FRACTURE SURGERY      COLONOSCOPY N/A 5/30/2017    Procedure: COLONOSCOPY;  Surgeon: Jed Garcia MD;  Location: [address]);  Service: Endoscopy;  Laterality: N/A;    HYSTERECTOMY  age 39    endo    LEG SURGERY      tibia    OOPHORECTOMY  age 39    endo    TONSILLECTOMY       Family History   Problem Relation Age of Onset    Cancer Mother      colon    Cancer Father      throat    Hypertension Sister     Breast cancer Sister      Hypertension Brother     Diabetes Sister     Hypertension Sister     Stroke Neg Hx     Ovarian cancer Neg Hx     Miscarriages / Stillbirths Neg Hx     Eclampsia Neg Hx      Social History   Substance Use Topics    Smoking status: Former Smoker     Packs/day: 1.00     Years: 3.00     Types: Cigarettes     Quit date: 7/9/2010    Smokeless tobacco: Never Used    Alcohol use No     Review of Systems   Constitutional: Positive for chills. Negative for fever.   HENT: Positive for facial swelling and trouble swallowing. Negative for dental problem, drooling, sore throat and voice change.    Respiratory: Negative for shortness of breath.    Cardiovascular: Negative for chest pain.   Gastrointestinal: Negative for nausea and vomiting.   Genitourinary: Negative for dysuria.   Musculoskeletal: Negative for back pain.   Skin: Negative for rash.   Neurological: Negative for weakness and headaches.   Hematological: Does not bruise/bleed easily.       Physical Exam     Initial Vitals [08/19/17 1158]   BP Pulse Resp Temp SpO2   (!) 155/74 78 18 98.7 °F (37.1 °C) 97 %      MAP       101         Physical Exam    Constitutional: She appears well-developed and well-nourished. No distress.   HENT:   Head: Normocephalic and atraumatic.   Right Ear: Tympanic membrane and ear canal normal.   Left Ear: Tympanic membrane and ear canal normal.   Nose: Nose normal.   Mouth/Throat: Uvula is midline, oropharynx is clear and moist and mucous membranes are normal. No trismus in the jaw. No dental abscesses or uvula swelling.   Left sided submandibular gland swelling, tenderness, and induration. No erythema or fluctuance.  Floor of mouth is soft.   Eyes: Conjunctivae and EOM are normal. Pupils are equal, round, and reactive to light.   Neck: Normal range of motion and full passive range of motion without pain. Neck supple.   Cardiovascular: Normal rate, regular rhythm and normal heart sounds.   Pulmonary/Chest: Breath sounds normal. No  respiratory distress. She has no wheezes. She has no rhonchi. She has no rales.   Abdominal: Soft. Bowel sounds are normal. There is no tenderness.   Neurological: She is alert and oriented to person, place, and time.   Skin: Skin is warm and dry. No rash noted.         ED Course   Procedures  Labs Reviewed   CBC W/ AUTO DIFFERENTIAL - Abnormal; Notable for the following:        Result Value    RBC 3.87 (*)     MCH 32.0 (*)     All other components within normal limits   COMPREHENSIVE METABOLIC PANEL     Imaging Results          CT Soft Tissue Neck With Contrast (Final result)     Abnormal  Result time 08/19/17 19:41:27    Final result by Sherif Leung MD (08/19/17 19:41:27)                 Impression:        LEFT submandibular gland is enlarged and heterogeneous suggesting sialadenitis without evidence of discrete abscess collection. No salivary duct calculus identified.     No soft tissue abscess identified in the neck. There is contiguous soft tissue phlegmon from the LEFT submandibular gland to the LEFT hypopharynx with some effacement of fat planes. There is asymmetric soft tissue mass in the hypopharynx on the LEFT side effacing the LEFT piriform sinus and encroaching on the laryngeal vestibule. This is concerning for potential extension of inflammatory process resulting in phlegmon without discrete abscess formation. As it encroaches on the airway, recommend ENT consultation.    Additional findings as above.    Report has been flagged in the EPIC medical record system.        Electronically signed by: SHERIF LEUNG MD  Date:     08/19/17  Time:    19:41              Narrative:    Technique:    Axial images of the soft tissues of the neck were obtained at 3.0 mm intervals following administration of 100 cc of omni-350 IV contrast.    Comparison:    March 21, 2013.    Findings:    LEFT submandibular gland is enlarged and heterogeneous without evidence of discrete abscess collection. No salivary duct  "calculus identified. The RIGHT submandibular gland and bilateral parotid glands appear unchanged from prior study. No soft tissue abscess identified in the neck. There is contiguous soft tissue phlegmon from the LEFT submandibular gland to the LEFT hypopharynx with some effacement of fat planes. There is asymmetric soft tissue mass in the hypopharynx on the LEFT side effacing the LEFT piriform sinus and encroaching on the laryngeal vestibule. This has developed since prior study. No cervical lymphadenopathy identified.     The visualized paranasal sinuses and mastoid air cells appear clear. The included pulmonary apices are clear. No thyroid enlargement or nodule identified. Aberrant RIGHT subclavian artery. Cervical vasculature otherwise appears unremarkable. The visualized portions of the brain and orbits appear unremarkable.                                      Medical Decision Making:   History:   Old Medical Records: I decided to obtain old medical records.  Clinical Tests:   Lab Tests: Ordered and Reviewed  Radiological Study: Ordered and Reviewed  Other:   I have discussed this case with another health care provider.       APC / Resident Notes:   66 year old female presents with left sided submandibular swelling and purulent drainage despite initiation of oral antibiotics.   ENT evaluated the pt in the ED, appreciate consult.    Labs are stable.  CT demonstrates "Left submandibular gland is enlarged and heterogeneous suggesting sialadenitis without evidence of discrete abscess collection. No salivary duct calculus identified."    Clindamycin and Decadron given in ED. Will admit to observation. I discussed the care of this patient with my supervising MD.        Scribe Attestation:   Scribe #1: I performed the above scribed service and the documentation accurately describes the services I performed. I attest to the accuracy of the note.    Attending Attestation:     Physician Attestation Statement for NP/PA: "   I discussed this assessment and plan of this patient with the NP/PA, but I did not personally examine the patient. The face to face encounter was performed by the NP/PA.    Other NP/PA Attestation Additions:    History of Present Illness: 66 year old woman presents for worsening tender facial swelling for several days duration          Physician Attestation for Scribe:  Physician Attestation Statement for Scribe #1: I, Dr. Rodriguez, reviewed documentation, as scribed by Cassie Anthony in my presence, and it is both accurate and complete.                 ED Course     Clinical Impression:   The primary encounter diagnosis was Sialadenitis. A diagnosis of Submandibular gland swelling was also pertinent to this visit.    Disposition:   Disposition: Placed in Observation  Condition: TERRANCE Little  08/20/17 0015

## 2017-08-20 NOTE — PROGRESS NOTES
[facility]  Otorhinolaryngology-Head & Neck Surgery  Progress Note    Subjective:     Post-Op Info:  * No surgery found *      Hospital Day: 2     Interval History: NAEON. Pain and swelling much improved. Difficulty with swallowing improving.    Medications:  Continuous Infusions:   Scheduled Meds:   citalopram  20 mg Oral Daily    clindamycin (CLEOCIN) IVPB  900 mg Intravenous Q8H    dexamethasone  8 mg Intravenous Q8H    enoxaparin  40 mg Subcutaneous Daily    lisinopril  10 mg Oral Daily    pantoprazole  40 mg Oral Daily    pravastatin  20 mg Oral Daily     PRN Meds:acetaminophen, albuterol-ipratropium 2.5mg-0.5mg/3mL, dextrose 50%, dextrose 50%, glucagon (human recombinant), glucose, glucose, ketorolac, ondansetron     Review of patient's allergies indicates:   Allergen Reactions    Penicillins      Other reaction(s): Unknown    Sulfa (sulfonamide antibiotics)      Other reaction(s): Rash     Objective:     Vital Signs (24h Range):  Temp:  [97.7 °F (36.5 °C)-98.8 °F (37.1 °C)] 97.7 °F (36.5 °C)  Pulse:  [57-78] 62  Resp:  [16-20] 18  SpO2:  [97 %-100 %] 99 %  BP: (143-176)/(74-91) 162/77        Lines/Drains/Airways     Peripheral Intravenous Line                 Peripheral IV - Single Lumen 08/19/17 1513 Left Antecubital less than 1 day                Physical Exam  General: NAD; Well appearing. Sipping [employer] iced tea on arrival  Neuro: AAOx3. CN II-XII intact.  Cardiovascular: normal rate  Respiratory: No labored breathing, no stridor  Head/Face: Swelling/fullness to left sided submandibular gland improving; Negative sinus pressure/tenderness; minimal if anyTTP over gland  Eyes: EOMI; PERRLA   Right Ear: Auricle WNL. EAC WNL.      Left Ear: Auricle WNL. EAC WNL.   Nose: normal ext appearance and palpation. Normal septum, inferior turbinates, mucosa.   OC: Minimal purulence expressed with left submax gland pressure from Leticia's duct. No palpable stones intraorally. Lips and  gingiva wnl.  Full maxillary denture. FOM Soft.  Anterior Tongue nml size and mobility; Hard Palate wnl  OP: BOT WNL. Soft palate wnl with Midline uvula.  Tonsils 2+ bilaterally. Posterior oropharynx patent, wnl  Neck/Lymphatic: No LAD.  Trachea midline. No thyromegaly. Full ROM.  Nl.    Significant Labs:  CBC:   Recent Labs  Lab 08/20/17  0438   WBC 7.06   RBC 4.11   HGB 13.0   HCT 39.6      MCV 96   MCH 31.6*   MCHC 32.8     CMP:   Recent Labs  Lab 08/20/17  0437   *   CALCIUM 9.7   ALBUMIN 3.6   PROT 7.9      K 4.4   CO2 26      BUN 11   CREATININE 0.9   ALKPHOS 116   ALT 10   AST 16   BILITOT 0.5       Significant Diagnostics:  None    Assessment/Plan:     * Sialadenitis - left sided    65yo M with HTN, sarcoid, HLD, PSVT, and h/o angina now presents with left sided sialadenitis refractory to 2 days of OP treatment with Augmentin. Doing well now after IP IV antibiotics and steroids.    - Can begin PO regular diet  - After 3rd dose of IV Clindamycin, OK for PO Clinda and discharge per primary with 10 days of OP Clindamycin + Medrol dose carla  - Patient massages of gland q4 hours and prn  - Sialagogues (sugar free lemon candy) q4 hours and prn  - Warm compresses q 4hrs and prn  - IVF resuscitation  - IV decadron 8mg q 8 hrs if OK with primary given HTN (at least x1)  - Continue Clindamycin 900 mg IV q 8 hrs x 3 doses total  - ENT will be available for further questions, but no surgical intervention at this time              Lucien Roger MD  Otorhinolaryngology-Head & Neck Surgery  [facility]

## 2017-08-20 NOTE — SUBJECTIVE & OBJECTIVE
Interval History: NAEON. Pain and swelling much improved. Difficulty with swallowing improving.    Medications:  Continuous Infusions:   Scheduled Meds:   citalopram  20 mg Oral Daily    clindamycin (CLEOCIN) IVPB  900 mg Intravenous Q8H    dexamethasone  8 mg Intravenous Q8H    enoxaparin  40 mg Subcutaneous Daily    lisinopril  10 mg Oral Daily    pantoprazole  40 mg Oral Daily    pravastatin  20 mg Oral Daily     PRN Meds:acetaminophen, albuterol-ipratropium 2.5mg-0.5mg/3mL, dextrose 50%, dextrose 50%, glucagon (human recombinant), glucose, glucose, ketorolac, ondansetron     Review of patient's allergies indicates:   Allergen Reactions    Penicillins      Other reaction(s): Unknown    Sulfa (sulfonamide antibiotics)      Other reaction(s): Rash     Objective:     Vital Signs (24h Range):  Temp:  [97.7 °F (36.5 °C)-98.8 °F (37.1 °C)] 97.7 °F (36.5 °C)  Pulse:  [57-78] 62  Resp:  [16-20] 18  SpO2:  [97 %-100 %] 99 %  BP: (143-176)/(74-91) 162/77        Lines/Drains/Airways     Peripheral Intravenous Line                 Peripheral IV - Single Lumen 08/19/17 1513 Left Antecubital less than 1 day                Physical Exam  General: NAD; Well appearing. Sipping [employer] iced tea on arrival  Neuro: AAOx3. CN II-XII intact.  Cardiovascular: normal rate  Respiratory: No labored breathing, no stridor  Head/Face: Swelling/fullness to left sided submandibular gland improving; Negative sinus pressure/tenderness; minimal if anyTTP over gland  Eyes: EOMI; PERRLA   Right Ear: Auricle WNL. EAC WNL.      Left Ear: Auricle WNL. EAC WNL.   Nose: normal ext appearance and palpation. Normal septum, inferior turbinates, mucosa.   OC: Minimal purulence expressed with left submax gland pressure from Letciia's duct. No palpable stones intraorally. Lips and gingiva wnl.  Full maxillary denture. FOM Soft.  Anterior Tongue nml size and mobility; Hard Palate wnl  OP: BOT WNL. Soft palate wnl with Midline uvula.  Tonsils 2+  bilaterally. Posterior oropharynx patent, wnl  Neck/Lymphatic: No LAD.  Trachea midline. No thyromegaly. Full ROM.  Nl.    Significant Labs:  CBC:   Recent Labs  Lab 08/20/17  0438   WBC 7.06   RBC 4.11   HGB 13.0   HCT 39.6      MCV 96   MCH 31.6*   MCHC 32.8     CMP:   Recent Labs  Lab 08/20/17  0437   *   CALCIUM 9.7   ALBUMIN 3.6   PROT 7.9      K 4.4   CO2 26      BUN 11   CREATININE 0.9   ALKPHOS 116   ALT 10   AST 16   BILITOT 0.5       Significant Diagnostics:  None

## 2017-08-20 NOTE — ASSESSMENT & PLAN NOTE
65yo M with HTN, sarcoid, HLD, PSVT, and h/o angina now presents with left sided sialadenitis refractory to 2 days of OP treatment with Augmentin. Doing well now after IP IV antibiotics and steroids.    - Can begin PO regular diet  - After 3rd dose of IV Clindamycin, OK for PO Clinda and discharge per primary with 10 days of OP Clindamycin + Medrol dose carla  - Patient massages of gland q4 hours and prn  - Sialagogues (sugar free lemon candy) q4 hours and prn  - Warm compresses q 4hrs and prn  - IVF resuscitation  - IV decadron 8mg q 8 hrs if OK with primary given HTN (at least x1)  - Continue Clindamycin 900 mg IV q 8 hrs x 3 doses total  - ENT will be available for further questions, but no surgical intervention at this time

## 2017-08-20 NOTE — H&P
[facility] Medicine  History & Physical    Patient Name: Estephania Lyman  MRN: [id number]  Admission Date: 8/19/2017  Attending Physician: Ish Hayden MD   Primary Care Provider: Aidee Lora MD    [facility] Medicine Team: [facility] MED E Samantha Pool DO     Patient information was obtained from patient and ER records.     Subjective:     Principal Problem:Sialadenitis    Chief Complaint:   Chief Complaint   Patient presents with    Facial Swelling     sent from urgent care to see ent blocked salivary gland, on antibiotics        HPI:     Patient is a 66-year-old female with a past medical history of sarcoidosis of the lung, HTN, HLD who presents the ED with left jaw/neck swelling for one day.  Patient states yesterday she noticed the swelling while eating.  She went to an urgent care yesterday and was prescribed Augmentin.  Patient states this morning the pain and swelling was worse and she presented back to the urgent care.  She was then referred to the emergency department after the physician there expressed purulent material for her from her salivary glands.   She reports  a 5/10 pain to the area, pain with swallowing solid food, occasional dizziness upon standing and chills.  She denies any fever, trauma,  or or mouth infection,  sob, stridor, change in voice focal weakness or numbness. Denies any recent change of her medications .    ENT was consulted by ER and evaluated patient ER with flexible fiberoptic laryngoscopy. No fluid collection on CT to drain. Received clindamycin and decadron in ER.           Past Medical History:   Diagnosis Date    GERD (gastroesophageal reflux disease)     High cholesterol     Hyperlipidemia     Hypertension     Sarcoidosis        Past Surgical History:   Procedure Laterality Date    ANKLE FRACTURE SURGERY      COLONOSCOPY N/A 5/30/2017    Procedure: COLONOSCOPY;  Surgeon: Jed Garcia MD;  Location: [facility] ([address] [place] [address]);   Service: Endoscopy;  Laterality: N/A;    HYSTERECTOMY  age 39    endo    LEG SURGERY      tibia    OOPHORECTOMY  age 39    endo    TONSILLECTOMY         Review of patient's allergies indicates:   Allergen Reactions    Penicillins      Other reaction(s): Unknown    Sulfa (sulfonamide antibiotics)      Other reaction(s): Rash       No current facility-administered medications on file prior to encounter.      Current Outpatient Prescriptions on File Prior to Encounter   Medication Sig    estrogens, conjugated, (PREMARIN) 0.625 MG tablet Take 1 tablet (0.625 mg total) by mouth once daily.    lisinopril 10 MG tablet Take 1 tablet (10 mg total) by mouth once daily.    pravastatin (PRAVACHOL) 20 MG tablet Take 1 tablet (20 mg total) by mouth once daily.    alendronate (FOSAMAX) 70 MG tablet Take 1 tablet (70 mg total) by mouth every 7 days.    CHERATUSSIN AC  mg/5 mL syrup TAKE 2 TEASPOONSFUL EVERY 6 HOURS AS NEEDED FOR COUGH    citalopram (CELEXA) 20 MG tablet Take 1 tablet (20 mg total) by mouth once daily.    levocetirizine (XYZAL) 5 MG tablet Take 1 tablet (5 mg total) by mouth every evening.    naproxen (NAPROSYN) 500 MG tablet Take 1 tablet (500 mg total) by mouth 2 (two) times daily with meals.    pantoprazole (PROTONIX) 40 MG tablet TAKE 1 TABLET BY MOUTH EVERY DAY FOR ACID REFLUX     Family History     Problem Relation (Age of Onset)    Breast cancer Sister    Cancer Mother, Father    Diabetes Sister    Hypertension Sister, Brother, Sister        Social History Main Topics    Smoking status: Former Smoker     Packs/day: 1.00     Years: 3.00     Types: Cigarettes     Quit date: 7/9/2010    Smokeless tobacco: Never Used    Alcohol use No    Drug use: No    Sexual activity: Yes     Partners: Male     Birth control/ protection: Surgical      Comment: Hysterectomy     Review of Systems   Constitutional: Negative for activity change, appetite change, chills, diaphoresis, fatigue and fever.    HENT: Positive for facial swelling (swelling and pain over below left mandible). Negative for congestion, dental problem, drooling, ear discharge, ear pain, hearing loss, mouth sores, nosebleeds, postnasal drip, rhinorrhea, sinus pressure, sneezing, sore throat, tinnitus, trouble swallowing and voice change.    Eyes: Negative for photophobia, pain, discharge, redness, itching and visual disturbance.   Respiratory: Negative for apnea, cough, choking, chest tightness, shortness of breath, wheezing and stridor.    Cardiovascular: Negative for chest pain, palpitations and leg swelling.   Gastrointestinal: Negative for abdominal distention, abdominal pain, anal bleeding, blood in stool, constipation, diarrhea, nausea, rectal pain and vomiting.   Endocrine: Negative for cold intolerance, heat intolerance, polydipsia, polyphagia and polyuria.   Genitourinary: Positive for difficulty urinating. Negative for decreased urine volume, dyspareunia, dysuria, enuresis, flank pain, frequency, genital sores, hematuria, menstrual problem, pelvic pain, urgency, vaginal bleeding, vaginal discharge and vaginal pain.   Musculoskeletal: Negative for arthralgias, back pain, gait problem, joint swelling, myalgias, neck pain and neck stiffness.   Skin: Negative for color change, pallor, rash and wound.   Allergic/Immunologic: Negative for environmental allergies, food allergies and immunocompromised state.   Neurological: Positive for dizziness. Negative for tremors, seizures, syncope, facial asymmetry, speech difficulty, weakness, light-headedness, numbness and headaches.   Hematological: Negative for adenopathy. Does not bruise/bleed easily.   Psychiatric/Behavioral: Negative for agitation, behavioral problems, confusion, decreased concentration, dysphoric mood, hallucinations, self-injury, sleep disturbance and suicidal ideas. The patient is not nervous/anxious and is not hyperactive.      Objective:     Vital Signs (Most  Recent):  Temp: 98.6 °F (37 °C) (08/19/17 2235)  Pulse: 61 (08/19/17 2300)  Resp: 16 (08/19/17 2235)  BP: (!) 176/84 (08/19/17 2235)  SpO2: 97 % (08/19/17 2235) Vital Signs (24h Range):  Temp:  [98.3 °F (36.8 °C)-98.8 °F (37.1 °C)] 98.6 °F (37 °C)  Pulse:  [57-78] 61  Resp:  [16-20] 16  SpO2:  [97 %-100 %] 97 %  BP: (143-176)/(74-91) 176/84     Weight: 87.1 kg (192 lb)  Body mass index is 34.01 kg/m².    Physical Exam   Constitutional: She is oriented to person, place, and time. She appears well-developed and well-nourished. No distress.   HENT:   Head: Normocephalic and atraumatic.   Right Ear: External ear normal.   Left Ear: External ear normal.   Nose: Nose normal.   Mouth/Throat: Oropharynx is clear and moist. No oropharyngeal exudate.   Eyes: Conjunctivae and EOM are normal. Pupils are equal, round, and reactive to light. Right eye exhibits no discharge. Left eye exhibits no discharge. No scleral icterus.   Neck: Normal range of motion. Neck supple. No JVD present. No thyromegaly present.   Swelling and fullness below left mandible with mild TTP.  No swelling of the tongue or lips. Posterior pharynx clear.   Cardiovascular: Normal rate, regular rhythm, normal heart sounds and intact distal pulses.  Exam reveals no gallop and no friction rub.    No murmur heard.  Pulmonary/Chest: Effort normal and breath sounds normal. No stridor. No respiratory distress. She has no wheezes. She has no rales. She exhibits no tenderness.   Abdominal: Soft. Bowel sounds are normal. She exhibits no distension and no mass. There is no tenderness. There is no rebound and no guarding. No hernia.   Genitourinary: Rectal exam shows guaiac negative stool. No vaginal discharge found.   Musculoskeletal: Normal range of motion. She exhibits no edema, tenderness or deformity.   Lymphadenopathy:     She has no cervical adenopathy.   Neurological: She is alert and oriented to person, place, and time. She has normal reflexes. She displays  normal reflexes. No cranial nerve deficit. She exhibits normal muscle tone. Coordination normal.   Skin: Skin is warm and dry. No rash noted. She is not diaphoretic. No erythema. No pallor.   Psychiatric: She has a normal mood and affect. Her behavior is normal. Judgment and thought content normal.       Significant Labs:   Recent Results (from the past 24 hour(s))   CBC auto differential    Collection Time: 08/19/17  3:12 PM   Result Value Ref Range    WBC 6.64 3.90 - 12.70 K/uL    RBC 3.87 (L) 4.00 - 5.40 M/uL    Hemoglobin 12.4 12.0 - 16.0 g/dL    Hematocrit 37.5 37.0 - 48.5 %    MCV 97 82 - 98 fL    MCH 32.0 (H) 27.0 - 31.0 pg    MCHC 33.1 32.0 - 36.0 g/dL    RDW 12.8 11.5 - 14.5 %    Platelets 211 150 - 350 K/uL    MPV 10.6 9.2 - 12.9 fL    Gran # 4.1 1.8 - 7.7 K/uL    Lymph # 1.5 1.0 - 4.8 K/uL    Mono # 0.6 0.3 - 1.0 K/uL    Eos # 0.5 0.0 - 0.5 K/uL    Baso # 0.04 0.00 - 0.20 K/uL    Gran% 61.3 38.0 - 73.0 %    Lymph% 21.8 18.0 - 48.0 %    Mono% 8.4 4.0 - 15.0 %    Eosinophil% 7.7 0.0 - 8.0 %    Basophil% 0.6 0.0 - 1.9 %    Differential Method Automated    Comprehensive metabolic panel    Collection Time: 08/19/17  3:12 PM   Result Value Ref Range    Sodium 143 136 - 145 mmol/L    Potassium 4.0 3.5 - 5.1 mmol/L    Chloride 105 95 - 110 mmol/L    CO2 27 23 - 29 mmol/L    Glucose 93 70 - 110 mg/dL    BUN, Bld 14 8 - 23 mg/dL    Creatinine 0.9 0.5 - 1.4 mg/dL    Calcium 9.2 8.7 - 10.5 mg/dL    Total Protein 7.6 6.0 - 8.4 g/dL    Albumin 3.8 3.5 - 5.2 g/dL    Total Bilirubin 0.6 0.1 - 1.0 mg/dL    Alkaline Phosphatase 117 55 - 135 U/L    AST 18 10 - 40 U/L    ALT 11 10 - 44 U/L    Anion Gap 11 8 - 16 mmol/L    eGFR if African American >60.0 >60 mL/min/1.73 m^2    eGFR if non African American >60.0 >60 mL/min/1.73 m^2         Significant Imaging: I have reviewed and interpreted all pertinent imaging results/findings within the past 24 hours.       CT soft tissue neck with contrast :     LEFT submandibular gland is  enlarged and heterogeneous suggesting sialadenitis without evidence of discrete abscess collection. No salivary duct calculus identified.     No soft tissue abscess identified in the neck. There is contiguous soft tissue phlegmon from the LEFT submandibular gland to the LEFT hypopharynx with some effacement of fat planes. There is asymmetric soft tissue mass in the hypopharynx on the LEFT side effacing the LEFT piriform sinus and encroaching on the laryngeal vestibule. This is concerning for potential extension of inflammatory process resulting in phlegmon without discrete abscess formation. As it encroaches on the airway, recommend ENT consultation.      Assessment/Plan:     # Left sialadenitis, submandibular cellulitis   - CT scan with left submandibular swelling and superficial fat stranding consistent with sialadenitis  - seen by ENT in ER with flexible fiberoptic laryngoscopy  - airway patent   - observation given failure  of OP abx treatment and potential airway risk due to cystic fluid development over left arytenoid  - continuous pulse oximetry on floor as precaution  - Patient massages of gland q4 hours and prn  - Sialagogues (sugar free lemon candy) q4 hours and prn  - Warm compresses q 4hrs and prn  - IVF resuscitation  - IV decadron 8mg q 8 hrs   - Continue Clindamycin 900 mg IV q 8 hr    # HTN   - continue lisinopril     # HLD   - continue pravastatin     # depression / anxiety     - stable on celexa   - no SI/HI    # h/o sarcoidosis    - no s/s of flare up     Active Diagnoses:    Diagnosis Date Noted POA    PRINCIPAL PROBLEM:  Sialadenitis - left sided [K11.20] 08/19/2017 Yes    Cellulitis of submandibular region [K12.2] 08/20/2017 Yes    Hypercholesterolemia [E78.00] 08/07/2012 Yes    HTN (hypertension), benign [I10] 07/09/2012 Yes      Problems Resolved During this Admission:    Diagnosis Date Noted Date Resolved POA     VTE Risk Mitigation         Ordered     enoxaparin injection 40 mg  Daily      Route:  Subcutaneous        08/19/17 2045     Medium Risk of VTE  Once      08/19/17 2045          Samantha Pool DO  Department of Hospital Medicine   [facility]

## 2017-08-21 ENCOUNTER — OFFICE VISIT (OUTPATIENT)
Dept: FAMILY MEDICINE | Facility: CLINIC | Age: 67
End: 2017-08-21
Attending: FAMILY MEDICINE
Payer: COMMERCIAL

## 2017-08-21 VITALS
DIASTOLIC BLOOD PRESSURE: 78 MMHG | HEART RATE: 68 BPM | OXYGEN SATURATION: 97 % | SYSTOLIC BLOOD PRESSURE: 140 MMHG | HEIGHT: 63 IN | WEIGHT: 196.19 LBS | BODY MASS INDEX: 34.76 KG/M2

## 2017-08-21 DIAGNOSIS — I10 HTN (HYPERTENSION), BENIGN: Primary | ICD-10-CM

## 2017-08-21 DIAGNOSIS — K11.20 SIALADENITIS: ICD-10-CM

## 2017-08-21 PROCEDURE — 3008F BODY MASS INDEX DOCD: CPT | Mod: S$GLB,,, | Performed by: FAMILY MEDICINE

## 2017-08-21 PROCEDURE — 3078F DIAST BP <80 MM HG: CPT | Mod: S$GLB,,, | Performed by: FAMILY MEDICINE

## 2017-08-21 PROCEDURE — 3077F SYST BP >= 140 MM HG: CPT | Mod: S$GLB,,, | Performed by: FAMILY MEDICINE

## 2017-08-21 PROCEDURE — 99999 PR PBB SHADOW E&M-EST. PATIENT-LVL III: CPT | Mod: PBBFAC,,, | Performed by: FAMILY MEDICINE

## 2017-08-21 PROCEDURE — 99214 OFFICE O/P EST MOD 30 MIN: CPT | Mod: S$GLB,,, | Performed by: FAMILY MEDICINE

## 2017-08-21 RX ORDER — LISINOPRIL 20 MG/1
20 TABLET ORAL DAILY
Qty: 90 TABLET | Refills: 3 | Status: SHIPPED | OUTPATIENT
Start: 2017-08-21 | End: 2018-02-26 | Stop reason: SDUPTHER

## 2017-08-21 NOTE — PATIENT INSTRUCTIONS
Your test results will be communicated to you via : [facility], Telephone or Letter.   If you have not received your test results in one week, please contact the clinic at [phone].

## 2017-08-23 NOTE — PROGRESS NOTES
"Subjective:       Patient ID: Estephania Lyman is a 66 y.o. female.    Chief Complaint: Hypertension and Follow-up    HPI   Pt is here for follow up of htn pt is well no sob/cp bp mildly elevated today pt has not had her medication yet   No cough on ace  Pt had an ed visit for sialadenitis pt treated in ed feeling much better no pain currently swelling is going down    Review of Systems   Constitutional: Negative for chills, fatigue and fever.   HENT: Positive for facial swelling.    Respiratory: Negative for cough, chest tightness and shortness of breath.    Cardiovascular: Negative for chest pain and palpitations.   Gastrointestinal: Negative for abdominal distention and abdominal pain.       Objective:      Physical Exam   Constitutional: She appears well-developed and well-nourished. No distress.   Neck:   Left submandibular prominence nontender   Cardiovascular: Normal rate and regular rhythm.  Exam reveals no gallop.    Pulmonary/Chest: Effort normal and breath sounds normal. No respiratory distress. She has no rales.       Assessment:       1. HTN (hypertension), benign    2. Sialadenitis        Plan:     orders labs declined  Pt declines pain meds  Cont meds  Low salt diet  Graded exercise   bp check in 1 month        "This note will not be shared with the patient."   "

## 2017-08-28 ENCOUNTER — TELEPHONE (OUTPATIENT)
Dept: FAMILY MEDICINE | Facility: CLINIC | Age: 67
End: 2017-08-28

## 2017-08-28 NOTE — TELEPHONE ENCOUNTER
----- Message from Ayleen Durand sent at 8/28/2017  8:29 AM CDT -----  _  1st Request  _  2nd Request  _  3rd Request        Who: Estephania Lyman    Why: Called and stated since Dr Lora increase her lisinopril (PRINIVIL,ZESTRIL) 20 MG tablet once daily have her feeling really bad and weak.  Pt stated she took her pressure on 08/26/17 and it was 88/64.  Pt stated on 08/27/17 its was 102/74.  She stated it makes her heart beat fast and she begin to sweat    What Number to Call Back:    When to Expect a call back: (With in 24 hours)

## 2017-08-29 NOTE — TELEPHONE ENCOUNTER
The patient was informed to start cutting her lisinopril 20mg in half.Patient is to call us if she kramer any further questions or concerns.Patient verbally understands.Thanks.

## 2017-09-11 RX ORDER — PRAVASTATIN SODIUM 20 MG/1
20 TABLET ORAL DAILY
Qty: 90 TABLET | Refills: 1 | Status: SHIPPED | OUTPATIENT
Start: 2017-09-11 | End: 2018-05-11 | Stop reason: SDUPTHER

## 2017-09-11 RX ORDER — CITALOPRAM 20 MG/1
20 TABLET, FILM COATED ORAL DAILY
Qty: 90 TABLET | Refills: 1 | Status: SHIPPED | OUTPATIENT
Start: 2017-09-11 | End: 2018-05-11 | Stop reason: SDUPTHER

## 2017-11-15 RX ORDER — LISINOPRIL 10 MG/1
10 TABLET ORAL DAILY
Qty: 90 TABLET | Refills: 2 | Status: SHIPPED | OUTPATIENT
Start: 2017-11-15 | End: 2018-03-12 | Stop reason: DRUGHIGH

## 2018-01-08 ENCOUNTER — TELEPHONE (OUTPATIENT)
Dept: PULMONOLOGY | Facility: CLINIC | Age: 68
End: 2018-01-08

## 2018-01-08 NOTE — TELEPHONE ENCOUNTER
----- Message from Tomasz Morris sent at 1/8/2018  9:42 AM CST -----  Contact: Self- [phone]  Patient requesting an earlier appointment to be seen  for sarcoidosis. The next available appointment is in March.   Thanks!

## 2018-02-26 NOTE — TELEPHONE ENCOUNTER
----- Message from Gina Tom sent at 2/26/2018 12:55 PM CST -----      x_  1st Request  _  2nd Request  _  3rd Request    Please refill the medication(s) listed below. Please call the patient when the prescription(s) is ready for  at this phone number      Pt states she is out of her medication.      Medication #1lisinopril (PRINIVIL,ZESTRIL) 20 MG tablet 90 tablet   Medication #2      Preferred Pharmacy:

## 2018-02-27 RX ORDER — LISINOPRIL 20 MG/1
20 TABLET ORAL DAILY
Qty: 90 TABLET | Refills: 3 | Status: SHIPPED | OUTPATIENT
Start: 2018-02-27 | End: 2018-03-12 | Stop reason: ALTCHOICE

## 2018-03-12 ENCOUNTER — HOSPITAL ENCOUNTER (OUTPATIENT)
Dept: RADIOLOGY | Facility: OTHER | Age: 68
Discharge: HOME | End: 2018-03-12
Attending: FAMILY MEDICINE
Payer: COMMERCIAL

## 2018-03-12 ENCOUNTER — OFFICE VISIT (OUTPATIENT)
Dept: FAMILY MEDICINE | Facility: CLINIC | Age: 68
End: 2018-03-12
Attending: FAMILY MEDICINE
Payer: COMMERCIAL

## 2018-03-12 VITALS
WEIGHT: 199.88 LBS | RESPIRATION RATE: 16 BRPM | SYSTOLIC BLOOD PRESSURE: 134 MMHG | HEIGHT: 63 IN | OXYGEN SATURATION: 99 % | HEART RATE: 60 BPM | DIASTOLIC BLOOD PRESSURE: 94 MMHG | BODY MASS INDEX: 35.41 KG/M2

## 2018-03-12 DIAGNOSIS — G89.29 CHRONIC RIGHT SHOULDER PAIN: ICD-10-CM

## 2018-03-12 DIAGNOSIS — I10 HTN (HYPERTENSION), BENIGN: ICD-10-CM

## 2018-03-12 DIAGNOSIS — M25.511 CHRONIC RIGHT SHOULDER PAIN: ICD-10-CM

## 2018-03-12 DIAGNOSIS — G89.29 CHRONIC RIGHT SHOULDER PAIN: Primary | ICD-10-CM

## 2018-03-12 DIAGNOSIS — M25.511 CHRONIC RIGHT SHOULDER PAIN: Primary | ICD-10-CM

## 2018-03-12 PROCEDURE — 73030 X-RAY EXAM OF SHOULDER: CPT | Mod: TC,FY,RT

## 2018-03-12 PROCEDURE — 73030 X-RAY EXAM OF SHOULDER: CPT | Mod: 26,RT,, | Performed by: RADIOLOGY

## 2018-03-12 PROCEDURE — 3080F DIAST BP >= 90 MM HG: CPT | Mod: CPTII,S$GLB,, | Performed by: FAMILY MEDICINE

## 2018-03-12 PROCEDURE — 99999 PR PBB SHADOW E&M-EST. PATIENT-LVL III: CPT | Mod: PBBFAC,,, | Performed by: FAMILY MEDICINE

## 2018-03-12 PROCEDURE — 96372 THER/PROPH/DIAG INJ SC/IM: CPT | Mod: S$GLB,,, | Performed by: FAMILY MEDICINE

## 2018-03-12 PROCEDURE — 99214 OFFICE O/P EST MOD 30 MIN: CPT | Mod: 25,S$GLB,, | Performed by: FAMILY MEDICINE

## 2018-03-12 PROCEDURE — 3075F SYST BP GE 130 - 139MM HG: CPT | Mod: CPTII,S$GLB,, | Performed by: FAMILY MEDICINE

## 2018-03-12 RX ORDER — NAPROXEN 500 MG/1
500 TABLET ORAL 2 TIMES DAILY WITH MEALS
Qty: 60 TABLET | Refills: 0 | Status: SHIPPED | OUTPATIENT
Start: 2018-03-12 | End: 2018-05-23 | Stop reason: ALTCHOICE

## 2018-03-12 RX ORDER — METHYLPREDNISOLONE ACETATE 40 MG/ML
40 INJECTION, SUSPENSION INTRA-ARTICULAR; INTRALESIONAL; INTRAMUSCULAR; SOFT TISSUE
Status: COMPLETED | OUTPATIENT
Start: 2018-03-12 | End: 2018-03-12

## 2018-03-12 RX ORDER — AMLODIPINE BESYLATE 10 MG/1
10 TABLET ORAL DAILY
Qty: 90 TABLET | Refills: 0 | Status: SHIPPED | OUTPATIENT
Start: 2018-03-12 | End: 2018-05-11 | Stop reason: SDUPTHER

## 2018-03-12 RX ADMIN — METHYLPREDNISOLONE ACETATE 40 MG: 40 INJECTION, SUSPENSION INTRA-ARTICULAR; INTRALESIONAL; INTRAMUSCULAR; SOFT TISSUE at 10:03

## 2018-03-22 ENCOUNTER — PATIENT MESSAGE (OUTPATIENT)
Dept: FAMILY MEDICINE | Facility: CLINIC | Age: 68
End: 2018-03-22

## 2018-03-22 ENCOUNTER — TELEPHONE (OUTPATIENT)
Dept: FAMILY MEDICINE | Facility: CLINIC | Age: 68
End: 2018-03-22

## 2018-03-22 RX ORDER — OSELTAMIVIR PHOSPHATE 75 MG/1
75 CAPSULE ORAL 2 TIMES DAILY
Qty: 10 CAPSULE | Refills: 0 | Status: SHIPPED | OUTPATIENT
Start: 2018-03-22 | End: 2018-03-27

## 2018-03-22 NOTE — TELEPHONE ENCOUNTER
----- Message from Jie Siddiuqi sent at 3/22/2018  8:44 AM CDT -----  Contact: pt  x_  1st Request  _  2nd Request  _  3rd Request    Who: LAURENCE CUELLAR [[id number]]    Why: Patient would like to speak with staff in reference to finding out what medications she can take for a cold. Offered appt. Pt declined.    What Number to Call Back:[phone]    When to Expect a call back: (Within 24 hours)    Please return the call at earliest convenience. Thanks!

## 2018-03-22 NOTE — TELEPHONE ENCOUNTER
Patient lost her voice, hurting all over, and gets really cold.  Patient had a cough since Monday and it have gotten worst since then.She having really bad body ache and her throat is really soar.. Patient stated her ribs hurts as well and this morning the pain have moved to her back.  She have taken:  Theraflu   Nyquil

## 2018-03-23 NOTE — PROGRESS NOTES
"Subjective:       Patient ID: Estephania Lyman is a 67 y.o. female.    Chief Complaint: Shoulder Pain and Medication Reaction (lisinopril)    HPI   Pt is here for right shoulder pain many months no acute event 5/10 takes otc prn with temp improvement   Pt has htn no sob/cp bp fine today would like to try a different med she has been on ace bp fine today no cough no difficulty breathing   Review of Systems   Constitutional: Negative for chills, fatigue and fever.   Respiratory: Negative for cough, chest tightness and shortness of breath.    Cardiovascular: Negative for chest pain and palpitations.   Gastrointestinal: Negative for abdominal distention, abdominal pain and blood in stool.   Musculoskeletal: Positive for arthralgias. Negative for joint swelling.       Objective:      Physical Exam   Constitutional: She appears well-developed and well-nourished. No distress.   Cardiovascular: Normal rate and regular rhythm.  Exam reveals no gallop.    Pulmonary/Chest: Effort normal and breath sounds normal. No respiratory distress. She has no rales.   Abdominal: Soft. Bowel sounds are normal. She exhibits no distension. There is no guarding.   Musculoskeletal: She exhibits no tenderness or deformity.   Decreased right shoulder abduction     labs reviewed with pt   Assessment:       1. Chronic right shoulder pain    2. HTN (hypertension), benign        Plan:     orders shoulder x-ray  Start nsaids vance schedule  Change to [facility]  rtc 1 month bp check        "This note will not be shared with the patient."   "

## 2018-04-27 ENCOUNTER — PATIENT OUTREACH (OUTPATIENT)
Dept: ADMINISTRATIVE | Facility: HOSPITAL | Age: 68
End: 2018-04-27

## 2018-04-27 NOTE — PROGRESS NOTES
[facility] is committed to your overall health.  To help you get the most out of each of your visits, we will review your information to make sure you are up to date on all of your recommended tests and/or procedures.       Your PCP  Aidee Lora MD   found that you may be due for:       Health Maintenance Due   Topic Date Due    Pneumococcal (65+) (2 of 2 - PPSV23) 04/12/2018             If you have had any of the above done at another facility, please bring the records or information with you so that your record at [facility] will be complete.  If you would like to schedule any of these, please contact me.     If you are currently taking medication, please bring it with you to your appointment for review.     Also, if you have any type of Advanced Directives, please bring them with you to your office visit so we may scan them into your chart.       Thank you for Choosing [facility] for your healthcare needs.        Additional Information  If you have questions, you can email [email or web address] or call [phone]  to talk to our [facility] staff. Remember, [facility] is NOT to be used for urgent needs. For medical emergencies, dial 911.

## 2018-05-11 ENCOUNTER — LAB VISIT (OUTPATIENT)
Dept: LAB | Facility: HOSPITAL | Age: 68
End: 2018-05-11
Attending: FAMILY MEDICINE
Payer: COMMERCIAL

## 2018-05-11 ENCOUNTER — OFFICE VISIT (OUTPATIENT)
Dept: FAMILY MEDICINE | Facility: CLINIC | Age: 68
End: 2018-05-11
Attending: FAMILY MEDICINE
Payer: COMMERCIAL

## 2018-05-11 VITALS
SYSTOLIC BLOOD PRESSURE: 118 MMHG | WEIGHT: 199.69 LBS | HEIGHT: 63 IN | BODY MASS INDEX: 35.38 KG/M2 | DIASTOLIC BLOOD PRESSURE: 82 MMHG | OXYGEN SATURATION: 97 % | HEART RATE: 82 BPM

## 2018-05-11 DIAGNOSIS — I10 HTN (HYPERTENSION), BENIGN: ICD-10-CM

## 2018-05-11 DIAGNOSIS — E78.00 HYPERCHOLESTEROLEMIA: ICD-10-CM

## 2018-05-11 DIAGNOSIS — Z00.00 ANNUAL PHYSICAL EXAM: Primary | ICD-10-CM

## 2018-05-11 DIAGNOSIS — Z23 NEED FOR PNEUMOCOCCAL VACCINATION: ICD-10-CM

## 2018-05-11 DIAGNOSIS — G89.29 CHRONIC RIGHT SHOULDER PAIN: ICD-10-CM

## 2018-05-11 DIAGNOSIS — Z00.00 ANNUAL PHYSICAL EXAM: ICD-10-CM

## 2018-05-11 DIAGNOSIS — M25.511 CHRONIC RIGHT SHOULDER PAIN: ICD-10-CM

## 2018-05-11 LAB
ALBUMIN SERPL BCP-MCNC: 3.7 G/DL
ALP SERPL-CCNC: 122 U/L
ALT SERPL W/O P-5'-P-CCNC: 11 U/L
ANION GAP SERPL CALC-SCNC: 8 MMOL/L
AST SERPL-CCNC: 17 U/L
BASOPHILS # BLD AUTO: 0.06 K/UL
BASOPHILS NFR BLD: 1.2 %
BILIRUB SERPL-MCNC: 0.6 MG/DL
BILIRUB SERPL-MCNC: NEGATIVE MG/DL
BLOOD URINE, POC: ABNORMAL
BUN SERPL-MCNC: 13 MG/DL
CALCIUM SERPL-MCNC: 9.2 MG/DL
CHLORIDE SERPL-SCNC: 104 MMOL/L
CHOLEST SERPL-MCNC: 245 MG/DL
CHOLEST/HDLC SERPL: 4.6 {RATIO}
CO2 SERPL-SCNC: 29 MMOL/L
COLOR, POC UA: YELLOW
CREAT SERPL-MCNC: 0.9 MG/DL
DIFFERENTIAL METHOD: ABNORMAL
EOSINOPHIL # BLD AUTO: 0.7 K/UL
EOSINOPHIL NFR BLD: 12.6 %
ERYTHROCYTE [DISTWIDTH] IN BLOOD BY AUTOMATED COUNT: 12 %
EST. GFR  (AFRICAN AMERICAN): >60 ML/MIN/1.73 M^2
EST. GFR  (NON AFRICAN AMERICAN): >60 ML/MIN/1.73 M^2
GLUCOSE SERPL-MCNC: 78 MG/DL
GLUCOSE UR QL STRIP: NORMAL
HCT VFR BLD AUTO: 40.4 %
HDLC SERPL-MCNC: 53 MG/DL
HDLC SERPL: 21.6 %
HGB BLD-MCNC: 13 G/DL
IMM GRANULOCYTES # BLD AUTO: 0.02 K/UL
IMM GRANULOCYTES NFR BLD AUTO: 0.4 %
KETONES UR QL STRIP: NEGATIVE
LDLC SERPL CALC-MCNC: 164.8 MG/DL
LEUKOCYTE ESTERASE URINE, POC: NEGATIVE
LYMPHOCYTES # BLD AUTO: 1.4 K/UL
LYMPHOCYTES NFR BLD: 27 %
MCH RBC QN AUTO: 31.9 PG
MCHC RBC AUTO-ENTMCNC: 32.2 G/DL
MCV RBC AUTO: 99 FL
MONOCYTES # BLD AUTO: 0.5 K/UL
MONOCYTES NFR BLD: 10.1 %
NEUTROPHILS # BLD AUTO: 2.5 K/UL
NEUTROPHILS NFR BLD: 48.7 %
NITRITE, POC UA: NEGATIVE
NONHDLC SERPL-MCNC: 192 MG/DL
NRBC BLD-RTO: 0 /100 WBC
PH, POC UA: 6
PLATELET # BLD AUTO: 244 K/UL
PMV BLD AUTO: 11.2 FL
POTASSIUM SERPL-SCNC: 4.5 MMOL/L
PROT SERPL-MCNC: 7.8 G/DL
PROTEIN, POC: NEGATIVE
RBC # BLD AUTO: 4.07 M/UL
SODIUM SERPL-SCNC: 141 MMOL/L
SPECIFIC GRAVITY, POC UA: 1.01
TRIGL SERPL-MCNC: 136 MG/DL
TSH SERPL DL<=0.005 MIU/L-ACNC: 1.09 UIU/ML
UROBILINOGEN, POC UA: NORMAL
WBC # BLD AUTO: 5.14 K/UL

## 2018-05-11 PROCEDURE — 3074F SYST BP LT 130 MM HG: CPT | Mod: CPTII,S$GLB,, | Performed by: FAMILY MEDICINE

## 2018-05-11 PROCEDURE — 90732 PPSV23 VACC 2 YRS+ SUBQ/IM: CPT | Mod: S$GLB,,, | Performed by: FAMILY MEDICINE

## 2018-05-11 PROCEDURE — 3079F DIAST BP 80-89 MM HG: CPT | Mod: CPTII,S$GLB,, | Performed by: FAMILY MEDICINE

## 2018-05-11 PROCEDURE — 84443 ASSAY THYROID STIM HORMONE: CPT

## 2018-05-11 PROCEDURE — 99397 PER PM REEVAL EST PAT 65+ YR: CPT | Mod: 25,S$GLB,, | Performed by: FAMILY MEDICINE

## 2018-05-11 PROCEDURE — 81001 URINALYSIS AUTO W/SCOPE: CPT | Mod: S$GLB,,, | Performed by: FAMILY MEDICINE

## 2018-05-11 PROCEDURE — 80053 COMPREHEN METABOLIC PANEL: CPT

## 2018-05-11 PROCEDURE — 36415 COLL VENOUS BLD VENIPUNCTURE: CPT | Mod: PO

## 2018-05-11 PROCEDURE — 85025 COMPLETE CBC W/AUTO DIFF WBC: CPT

## 2018-05-11 PROCEDURE — 90471 IMMUNIZATION ADMIN: CPT | Mod: S$GLB,,, | Performed by: FAMILY MEDICINE

## 2018-05-11 PROCEDURE — 99999 PR PBB SHADOW E&M-EST. PATIENT-LVL IV: CPT | Mod: PBBFAC,,, | Performed by: FAMILY MEDICINE

## 2018-05-11 PROCEDURE — 80061 LIPID PANEL: CPT

## 2018-05-11 RX ORDER — PANTOPRAZOLE SODIUM 40 MG/1
TABLET, DELAYED RELEASE ORAL
Qty: 90 TABLET | Refills: 3 | Status: SHIPPED | OUTPATIENT
Start: 2018-05-11 | End: 2019-07-10 | Stop reason: SDUPTHER

## 2018-05-11 RX ORDER — ALENDRONATE SODIUM 70 MG/1
70 TABLET ORAL
Qty: 12 TABLET | Refills: 3 | Status: SHIPPED | OUTPATIENT
Start: 2018-05-11 | End: 2021-01-26

## 2018-05-11 RX ORDER — AMLODIPINE BESYLATE 10 MG/1
10 TABLET ORAL DAILY
Qty: 90 TABLET | Refills: 3 | Status: SHIPPED | OUTPATIENT
Start: 2018-05-11 | End: 2019-01-09 | Stop reason: ALTCHOICE

## 2018-05-11 RX ORDER — PRAVASTATIN SODIUM 20 MG/1
20 TABLET ORAL DAILY
Qty: 90 TABLET | Refills: 3 | Status: SHIPPED | OUTPATIENT
Start: 2018-05-11 | End: 2019-07-25

## 2018-05-11 RX ORDER — CITALOPRAM 20 MG/1
20 TABLET, FILM COATED ORAL DAILY
Qty: 90 TABLET | Refills: 3 | Status: SHIPPED | OUTPATIENT
Start: 2018-05-11 | End: 2019-11-21 | Stop reason: SDUPTHER

## 2018-05-11 NOTE — PATIENT INSTRUCTIONS
Your test results will be communicated to you via : [facility], Telephone or Letter.   If you have not received your test results in one week, please contact the clinic at [phone].

## 2018-05-14 ENCOUNTER — OFFICE VISIT (OUTPATIENT)
Dept: ORTHOPEDICS | Facility: CLINIC | Age: 68
End: 2018-05-14
Payer: COMMERCIAL

## 2018-05-14 VITALS — BODY MASS INDEX: 35.39 KG/M2 | HEIGHT: 63 IN | WEIGHT: 199.75 LBS

## 2018-05-14 DIAGNOSIS — M25.511 ACUTE PAIN OF RIGHT SHOULDER: Primary | ICD-10-CM

## 2018-05-14 PROCEDURE — 99999 PR PBB SHADOW E&M-EST. PATIENT-LVL III: CPT | Mod: PBBFAC,,, | Performed by: PHYSICIAN ASSISTANT

## 2018-05-14 PROCEDURE — 99203 OFFICE O/P NEW LOW 30 MIN: CPT | Mod: 25,S$GLB,, | Performed by: PHYSICIAN ASSISTANT

## 2018-05-14 PROCEDURE — 20610 DRAIN/INJ JOINT/BURSA W/O US: CPT | Mod: RT,S$GLB,, | Performed by: PHYSICIAN ASSISTANT

## 2018-05-14 RX ORDER — BETAMETHASONE SODIUM PHOSPHATE AND BETAMETHASONE ACETATE 3; 3 MG/ML; MG/ML
6 INJECTION, SUSPENSION INTRA-ARTICULAR; INTRALESIONAL; INTRAMUSCULAR; SOFT TISSUE
Status: COMPLETED | OUTPATIENT
Start: 2018-05-14 | End: 2018-05-14

## 2018-05-14 RX ADMIN — BETAMETHASONE SODIUM PHOSPHATE AND BETAMETHASONE ACETATE 6 MG: 3; 3 INJECTION, SUSPENSION INTRA-ARTICULAR; INTRALESIONAL; INTRAMUSCULAR; SOFT TISSUE at 06:05

## 2018-05-14 NOTE — PROGRESS NOTES
SUBJECTIVE:     Chief Complaint & History of Present Illness:  Estephania Lyman is a  New  patient 67 y.o. female who is seen here today with a complaint of    Chief Complaint   Patient presents with    Right Shoulder - Pain    .  Here today for evaluation treatment of progressively worsening pain in and about the right shoulder.  Was last seen and treated this condition by her primary care 3/12/2018 at which time she undergone an IM injection which has not relieved her symptoms.  She is not remember a specific trauma or injury to the shoulder but has much difficulty with any work greater than shoulder height and difficulty with abduction  On a scale of 1-10, with 10 being worst pain imaginable, he rates this pain as 4 on good days and 7 on bad days.  she describes the pain as or and aching.    Review of patient's allergies indicates:   Allergen Reactions    Penicillins      Other reaction(s): Unknown    Sulfa (sulfonamide antibiotics)      Other reaction(s): Rash         Current Outpatient Prescriptions   Medication Sig Dispense Refill    alendronate (FOSAMAX) 70 MG tablet Take 1 tablet (70 mg total) by mouth every 7 days. 12 tablet 3    amLODIPine (NORVASC) 10 MG tablet Take 1 tablet (10 mg total) by mouth once daily. 90 tablet 3    citalopram (CELEXA) 20 MG tablet Take 1 tablet (20 mg total) by mouth once daily. 90 tablet 3    naproxen (NAPROSYN) 500 MG tablet Take 1 tablet (500 mg total) by mouth 2 (two) times daily with meals. 60 tablet 0    pantoprazole (PROTONIX) 40 MG tablet TAKE 1 TABLET BY MOUTH EVERY DAY FOR ACID REFLUX 90 tablet 3    pravastatin (PRAVACHOL) 20 MG tablet Take 1 tablet (20 mg total) by mouth once daily. 90 tablet 3     Current Facility-Administered Medications   Medication Dose Route Frequency Provider Last Rate Last Dose    betamethasone acetate-betamethasone sodium phosphate injection 6 mg  6 mg Intra-articular 1 time in Clinic/HOD Louis Cali PA-C           Past  "Medical History:   Diagnosis Date    GERD (gastroesophageal reflux disease)     High cholesterol     Hyperlipidemia     Hypertension     Sarcoidosis        Past Surgical History:   Procedure Laterality Date    ANKLE FRACTURE SURGERY      COLONOSCOPY N/A 5/30/2017    Procedure: COLONOSCOPY;  Surgeon: Jed Garcia MD;  Location: [address];  Service: Endoscopy;  Laterality: N/A;    HYSTERECTOMY  age 39    endo    LEG SURGERY      tibia    OOPHORECTOMY  age 39    endo    TONSILLECTOMY         Vital Signs (Most Recent)  There were no vitals filed for this visit.    Review of Systems:  ROS:  Constitutional: no fever or chills  Eyes: no visual changes  ENT: no nasal congestion or sore throat  Respiratory: no cough or shortness of breath  Cardiovascular: no chest pain or palpitations  Gastrointestinal: no nausea or vomiting, tolerating diet, Positive for GERD  Genitourinary: no hematuria or dysuria  Integument/Breast: no rash or pruritis  Hematologic/Lymphatic: no easy bruising or lymphadenopathy  Musculoskeletal: no arthralgias or myalgias  Neurological: no seizures or tremors  Behavioral/Psych: no auditory or visual hallucinations, Mother for depression  Endocrine: no heat or cold intolerance      OBJECTIVE:     PHYSICAL EXAM:  Height: 5' 3" (160 cm) Weight: 90.6 kg (199 lb 11.8 oz), General Appearance: Well nourished, well developed, in no acute distress.  Neurological: Mood & affect are normal.  Shoulder exam: right  Tenderness: AC joint, posterior acromial  ROM: forward flexion 180/180, extension 45/45, full abduction 180/180, abduction-glenohumeral 90/90, external rotation 50/50, pain at the extremes of mobility  Shoulder Strength: biceps 5/5, triceps 5/5, abduction 5/5, adduction 5/5, external rotation 5/5 with shoulder at side, flexion 5/5, and extension 5/5  positive for tenderness about the glenohumeral joint, negative for tenderness over the acromioclavicular joint and positive for " impingement sign  Stability tests: anterior apprehension test positive for pain only and posterior apprehension test negative  Special Tests:Munoz' test: negative, Cross-chest abduction: negative and Yergason's test: negative                     RADIOGRAPHS:  X-rays from previous visit reviewed by me today demonstrate well-preserved joint spaces throughout the shoulder no evidence of fracture dislocation or advanced degenerative joint disease    ASSESSMENT/PLAN:     Plan: We discussed with the patient at length all the different treatment options available for her rightshoulder including anti-inflammatories, acetaminophen, rest, ice, Physical therapy to include strengthening exercise, occasional cortisone injections for temporary relief, arthroscopic surgical repair, and finally shoulder arthroplasty.   We'll proceed with therapeutic diagnostic cortisone injection of the right shoulder followed by course of physical therapy    The injection site was identified and the skin was prepared with an ETOH solution. The    right  shoulder was injected with 1 ml of Celestone and 5 ml Lidocaine under sterile technique. Estephania Lyman tolerated the procedure well, she was advised to rest the  shoulder  today, ice and support. she did receive immediate relief of the pain in and about her  shoulder  she was told this would be short lived and is secondary to the lidocaine. she may have an increase in her discomfort tonight followed by steady improvement over the next several days. It may take 1-3 weeks following the injection to get the full benefit of the medication.  I will see her back in 4 weeks. Sooner if she has any problems or concerns.

## 2018-05-14 NOTE — LETTER
May 14, 2018      Aidee Lora MD  [address]  [address]  [place] [facility] [address]           [facility]  [address] [address]  Phone: [phone]  Fax: [phone]          Patient: Estephania Lyman   MR Number: [id number]   YOB: 1950   Date of Visit: 5/14/2018       Dear Dr. Aidee Lora:    Thank you for referring Estephania Lyman to me for evaluation. Attached you will find relevant portions of my assessment and plan of care.    If you have questions, please do not hesitate to call me. I look forward to following Estephania Lyman along with you.    Sincerely,    Louis Cali PA-C    Enclosure  CC:  No Recipients    If you would like to receive this communication electronically, please contact [email or web address] or [phone] to request more information on [email or web address] access.    For providers and/or their staff who would like to refer a patient to [facility], please contact us through our one-stop-shop provider referral line, [facility], at [phone].    If you feel you have received this communication in error or would no longer like to receive these types of communications, please e-mail [email or web address]

## 2018-05-19 ENCOUNTER — OFFICE VISIT (OUTPATIENT)
Dept: URGENT CARE | Facility: CLINIC | Age: 68
End: 2018-05-19
Payer: COMMERCIAL

## 2018-05-19 ENCOUNTER — HOSPITAL ENCOUNTER (OUTPATIENT)
Facility: HOSPITAL | Age: 68
Discharge: HOME | End: 2018-05-19
Attending: EMERGENCY MEDICINE | Admitting: HOSPITALIST
Payer: COMMERCIAL

## 2018-05-19 VITALS
OXYGEN SATURATION: 97 % | HEIGHT: 65 IN | DIASTOLIC BLOOD PRESSURE: 69 MMHG | TEMPERATURE: 99 F | RESPIRATION RATE: 14 BRPM | BODY MASS INDEX: 33.15 KG/M2 | HEART RATE: 57 BPM | SYSTOLIC BLOOD PRESSURE: 120 MMHG | WEIGHT: 199 LBS

## 2018-05-19 VITALS
BODY MASS INDEX: 35.26 KG/M2 | RESPIRATION RATE: 18 BRPM | HEART RATE: 72 BPM | WEIGHT: 199 LBS | SYSTOLIC BLOOD PRESSURE: 155 MMHG | HEIGHT: 63 IN | OXYGEN SATURATION: 96 % | TEMPERATURE: 99 F | DIASTOLIC BLOOD PRESSURE: 92 MMHG

## 2018-05-19 DIAGNOSIS — R07.9 CHEST PAIN: Primary | ICD-10-CM

## 2018-05-19 DIAGNOSIS — R53.1 WEAKNESS: ICD-10-CM

## 2018-05-19 DIAGNOSIS — R51.9 NONINTRACTABLE HEADACHE, UNSPECIFIED CHRONICITY PATTERN, UNSPECIFIED HEADACHE TYPE: ICD-10-CM

## 2018-05-19 DIAGNOSIS — R07.9 CHEST PAIN, UNSPECIFIED TYPE: Primary | ICD-10-CM

## 2018-05-19 DIAGNOSIS — R07.89 ATYPICAL CHEST PAIN: ICD-10-CM

## 2018-05-19 LAB
ALBUMIN SERPL BCP-MCNC: 3.8 G/DL
ALP SERPL-CCNC: 114 U/L
ALT SERPL W/O P-5'-P-CCNC: 30 U/L
ANION GAP SERPL CALC-SCNC: 10 MMOL/L
AST SERPL-CCNC: 25 U/L
BASOPHILS # BLD AUTO: 0.05 K/UL
BASOPHILS NFR BLD: 0.7 %
BILIRUB SERPL-MCNC: 0.7 MG/DL
BUN SERPL-MCNC: 13 MG/DL
CALCIUM SERPL-MCNC: 9.3 MG/DL
CHLORIDE SERPL-SCNC: 104 MMOL/L
CO2 SERPL-SCNC: 25 MMOL/L
CREAT SERPL-MCNC: 0.9 MG/DL
DIFFERENTIAL METHOD: ABNORMAL
EOSINOPHIL # BLD AUTO: 0.2 K/UL
EOSINOPHIL NFR BLD: 3.2 %
ERYTHROCYTE [DISTWIDTH] IN BLOOD BY AUTOMATED COUNT: 12 %
EST. GFR  (AFRICAN AMERICAN): >60 ML/MIN/1.73 M^2
EST. GFR  (NON AFRICAN AMERICAN): >60 ML/MIN/1.73 M^2
GLUCOSE SERPL-MCNC: 73 MG/DL
HCT VFR BLD AUTO: 39.9 %
HGB BLD-MCNC: 13.1 G/DL
IMM GRANULOCYTES # BLD AUTO: 0.06 K/UL
IMM GRANULOCYTES NFR BLD AUTO: 0.8 %
LYMPHOCYTES # BLD AUTO: 1.8 K/UL
LYMPHOCYTES NFR BLD: 23.1 %
MCH RBC QN AUTO: 31.7 PG
MCHC RBC AUTO-ENTMCNC: 32.8 G/DL
MCV RBC AUTO: 97 FL
MONOCYTES # BLD AUTO: 0.8 K/UL
MONOCYTES NFR BLD: 10.8 %
NEUTROPHILS # BLD AUTO: 4.7 K/UL
NEUTROPHILS NFR BLD: 61.4 %
NRBC BLD-RTO: 0 /100 WBC
PLATELET # BLD AUTO: 269 K/UL
PMV BLD AUTO: 10.8 FL
POTASSIUM SERPL-SCNC: 3.7 MMOL/L
PROT SERPL-MCNC: 7.9 G/DL
RBC # BLD AUTO: 4.13 M/UL
SODIUM SERPL-SCNC: 139 MMOL/L
TROPONIN I SERPL DL<=0.01 NG/ML-MCNC: 0.01 NG/ML
TROPONIN I SERPL DL<=0.01 NG/ML-MCNC: 0.01 NG/ML
WBC # BLD AUTO: 7.57 K/UL

## 2018-05-19 PROCEDURE — 99220 PR INITIAL OBSERVATION CARE,LEVL III: CPT | Mod: ,,, | Performed by: PHYSICIAN ASSISTANT

## 2018-05-19 PROCEDURE — 84484 ASSAY OF TROPONIN QUANT: CPT

## 2018-05-19 PROCEDURE — 99284 EMERGENCY DEPT VISIT MOD MDM: CPT | Mod: ,,, | Performed by: NURSE PRACTITIONER

## 2018-05-19 PROCEDURE — 99284 EMERGENCY DEPT VISIT MOD MDM: CPT

## 2018-05-19 PROCEDURE — G0378 HOSPITAL OBSERVATION PER HR: HCPCS

## 2018-05-19 PROCEDURE — 99214 OFFICE O/P EST MOD 30 MIN: CPT | Mod: 25,S$GLB,, | Performed by: NURSE PRACTITIONER

## 2018-05-19 PROCEDURE — 63600175 PHARM REV CODE 636 W HCPCS: Performed by: NURSE PRACTITIONER

## 2018-05-19 PROCEDURE — 3074F SYST BP LT 130 MM HG: CPT | Mod: CPTII,S$GLB,, | Performed by: NURSE PRACTITIONER

## 2018-05-19 PROCEDURE — 93010 ELECTROCARDIOGRAM REPORT: CPT | Mod: ,,, | Performed by: INTERNAL MEDICINE

## 2018-05-19 PROCEDURE — 85025 COMPLETE CBC W/AUTO DIFF WBC: CPT

## 2018-05-19 PROCEDURE — 25000003 PHARM REV CODE 250: Performed by: NURSE PRACTITIONER

## 2018-05-19 PROCEDURE — 3078F DIAST BP <80 MM HG: CPT | Mod: CPTII,S$GLB,, | Performed by: NURSE PRACTITIONER

## 2018-05-19 PROCEDURE — 80053 COMPREHEN METABOLIC PANEL: CPT

## 2018-05-19 PROCEDURE — 93000 ELECTROCARDIOGRAM COMPLETE: CPT | Mod: S$GLB,,, | Performed by: NURSE PRACTITIONER

## 2018-05-19 RX ORDER — NITROGLYCERIN 0.4 MG/1
0.4 TABLET SUBLINGUAL EVERY 5 MIN PRN
Status: DISCONTINUED | OUTPATIENT
Start: 2018-05-19 | End: 2018-05-19 | Stop reason: HOSPADM

## 2018-05-19 RX ORDER — ASPIRIN 325 MG
325 TABLET ORAL
Status: COMPLETED | OUTPATIENT
Start: 2018-05-19 | End: 2018-05-19

## 2018-05-19 RX ORDER — ONDANSETRON 4 MG/1
4 TABLET, ORALLY DISINTEGRATING ORAL
Status: COMPLETED | OUTPATIENT
Start: 2018-05-19 | End: 2018-05-19

## 2018-05-19 RX ADMIN — ONDANSETRON 4 MG: 4 TABLET, ORALLY DISINTEGRATING ORAL at 04:05

## 2018-05-19 RX ADMIN — ASPIRIN 325 MG ORAL TABLET 325 MG: 325 PILL ORAL at 04:05

## 2018-05-19 RX ADMIN — NITROGLYCERIN 0.4 MG: 0.4 TABLET SUBLINGUAL at 04:05

## 2018-05-19 RX ADMIN — ALUMINUM HYDROXIDE, MAGNESIUM HYDROXIDE, AND SIMETHICONE 50 ML: 200; 200; 20 SUSPENSION ORAL at 04:05

## 2018-05-19 NOTE — ED PROVIDER NOTES
Encounter Date: 5/19/2018       History     Chief Complaint   Patient presents with    Chest Pain     Pt with complaints of chest pain, headache, and weakness in the legs since yesterday; was seen at Urgent Care and sent to the ER for further evaluation.     Patient is a 67-year-old female with medical history of GERD, hypertension, hyperlipidemia, sarcoidosis presenting to the ED for chest pain, palpations, and right leg weaknewss x1 day.  Patient states she was seen at the urgent care and advised to come to the ED due to hypertension and chest pain.  Patient states substernal chest pain nonradiating.  Patient states she has not taken her acid reflux medication for the past 2 days. Pt states she did have 1 episode of vomiting yesterday and nausea since that time.  Pt states chest painwoke her up out of her sleep and that she had it approx 10 or 11am this morning and that she also had it on the way here.  Pt denies abdominal pain, shortness of breath, dizziness, constipation or diarrhea.           Review of patient's allergies indicates:   Allergen Reactions    Penicillins      Other reaction(s): Unknown    Sulfa (sulfonamide antibiotics)      Other reaction(s): Rash     Past Medical History:   Diagnosis Date    GERD (gastroesophageal reflux disease)     High cholesterol     Hyperlipidemia     Hypertension     Sarcoidosis      Past Surgical History:   Procedure Laterality Date    ANKLE FRACTURE SURGERY      COLONOSCOPY N/A 5/30/2017    Procedure: COLONOSCOPY;  Surgeon: Jed Garcia MD;  Location: [address]);  Service: Endoscopy;  Laterality: N/A;    HYSTERECTOMY  age 39    endo    LEG SURGERY      tibia    OOPHORECTOMY  age 39    endo    TONSILLECTOMY       Family History   Problem Relation Age of Onset    Cancer Mother         colon    Cancer Father         throat    Hypertension Sister     Breast cancer Sister     Hypertension Brother     Diabetes Sister     Hypertension Sister      Stroke Neg Hx     Ovarian cancer Neg Hx     Miscarriages / Stillbirths Neg Hx     Eclampsia Neg Hx      Social History   Substance Use Topics    Smoking status: Former Smoker     Packs/day: 1.00     Years: 3.00     Types: Cigarettes     Quit date: 7/9/2010    Smokeless tobacco: Never Used    Alcohol use No     Review of Systems   Constitutional: Negative for activity change, appetite change, chills, fatigue and fever.   HENT: Negative for congestion, sinus pain, sinus pressure, sore throat and trouble swallowing.    Eyes: Negative for photophobia, pain and discharge.   Respiratory: Negative for apnea, cough, choking, chest tightness, shortness of breath, wheezing and stridor.    Cardiovascular: Positive for chest pain and palpitations. Negative for leg swelling.   Gastrointestinal: Positive for nausea and vomiting ( 1 episode yesterday). Negative for abdominal distention, abdominal pain, constipation and diarrhea.   Endocrine: Negative.    Genitourinary: Negative for difficulty urinating, dysuria, frequency and urgency.   Musculoskeletal: Negative for arthralgias, back pain, gait problem, joint swelling, myalgias, neck pain and neck stiffness.   Skin: Negative for pallor, rash and wound.   Allergic/Immunologic: Negative.    Neurological: Negative for dizziness, tremors, syncope, weakness, numbness and headaches.   Hematological: Negative for adenopathy.   Psychiatric/Behavioral: Negative.        Physical Exam     Initial Vitals [05/19/18 1534]   BP Pulse Resp Temp SpO2   (!) 174/88 75 18 98.5 °F (36.9 °C) 96 %      MAP       116.67         Physical Exam    Nursing note and vitals reviewed.  Constitutional: She appears well-developed and well-nourished. She is cooperative. She is easily aroused. She does not have a sickly appearance. She does not appear ill. No distress.   HENT:   Head: Normocephalic and atraumatic.   Mouth/Throat: Uvula is midline, oropharynx is clear and moist and mucous membranes are  normal.   Eyes: Conjunctivae, EOM and lids are normal. Pupils are equal, round, and reactive to light.   Neck: Trachea normal, normal range of motion, full passive range of motion without pain and phonation normal. No spinous process tenderness and no muscular tenderness present. No JVD present.   Cardiovascular: Normal rate, regular rhythm, normal heart sounds and intact distal pulses.   Pulses:       Radial pulses are 2+ on the right side, and 2+ on the left side.        Dorsalis pedis pulses are 2+ on the right side, and 2+ on the left side.   Pulmonary/Chest: Effort normal and breath sounds normal. Chest wall is not dull to percussion. She exhibits tenderness. She exhibits no mass and no bony tenderness.       Abdominal: Soft. Normal appearance and bowel sounds are normal. There is no tenderness. There is no rigidity, no rebound and no guarding.   Musculoskeletal: Normal range of motion.   Neurological: She is alert, oriented to person, place, and time and easily aroused. She has normal strength. No cranial nerve deficit or sensory deficit. GCS eye subscore is 4. GCS verbal subscore is 5. GCS motor subscore is 6.   BLE 5/5 strength.   Skin: Skin is warm, dry and intact. Capillary refill takes less than 2 seconds. No abrasion and no rash noted. No cyanosis. Nails show no clubbing.   Psychiatric: She has a normal mood and affect. Her speech is normal and behavior is normal. Judgment and thought content normal. Cognition and memory are normal.         ED Course   Procedures  Labs Reviewed   CBC W/ AUTO DIFFERENTIAL - Abnormal; Notable for the following:        Result Value    MCH 31.7 (*)     Immature Granulocytes 0.8 (*)     Immature Grans (Abs) 0.06 (*)     All other components within normal limits   COMPREHENSIVE METABOLIC PANEL   TROPONIN I   TROPONIN I                Additional MDM:   PERC Rule:   Age is greater than or equal to 50 = 1.0  Heart Rate is greater than or equal to 100 = 0.0  SaO2 on room air < 95%  "= 0.0  Unilateral leg swelling = 0.0  Hemoptysis = 0.0  Recent surgery or trauma = 0.0  Prior PE or DVT =  0.0  Hormone use = 0.00  PERC Score = 1    Well's Criteria Score:  -Clinical symptoms of DVT (leg swelling, pain with palpation) = 0.0  -Other diagnosis less likely than pulmonary embolism =            0.0  -Heart Rate >100 =   0.0  -Immobilization (= or > than 3 days) or surgery in the previous 4 weeks = 0.0  -Previous DVT/PE = 0.0  -Hemoptysis =          0.0  -Malignancy =           0.0  Well's Probability Score =    0        NIH Stroke Scale:   Interval = baseline (upon arrival/admit)  Level of consciousness = 0 - alert  LOC questions = 0 - answers both correctly  Best gaze = 0 - normal  Visual = 0 - no visual loss  Facial palsy = 0 - normal  Motor left arm =  0 - no drift  Motor right arm =  0 - no drift  Motor left leg = 0 - no drift  Motor right leg =  1 - drift  Limb ataxia = 0 - absent  Sensory = 0 - normal  Best language = 0 - no aphasia  Dysarthria = 0 - normal articulation  Extinction and inattention = 0 - no neglect    APC / Resident Notes:   Emergent evaluation of a 66 yo female patient presenting to the ER with chief complaint of substernal chest pain and occasional palpations starting yesterday.  Pt states that also had 1 episode of vomiting and nausea since that time. On exam, pt endorses pain on palpation to chest wall.  Breath sounds clear.  EKG NSR.  Abdomen soft and non tender.  BLE strength 5/5.  Pt states "my right leg feels weaker."  I will get labs, EKG, medicate, GI cocktail and reassess.  Differential diagnoses include but are not limited to ischemic chest pain (STEMI, NonSTEMI, or unstable angina), pulmonary embolism, aortic dissection, pericarditis, chest wall pain (rib strain, muscle strain, shingles), pneumonia, esophageal rupture, referred pain from the abdomen (biliary colic, cholecystitis, gastritis, gas pains, pancreatitis), anxiety or other causes of chest pain (GERD, " esophageal spasm).  EKG reviewed does not show acute ischemia and no signs of pericarditis.  I do not believe this patient has a thoracic aortic dissection, as the pain is not ripping or tearing, it does not go through to her back, no history of poorly controlled high blood pressure. I do not believe the patient's pain represents a PE the pain is not pleuritic and patient has no risk factors for PE based on Well's Criteria.  Abdominal exam is benign, LFT's normal. Lipase normal thus doubt this pain is referred from the ABD.  No abdominal imaging ordered. This is not chest pain from pneumonia as this patient does not have productive cough or fever and the CXR did not demonstrate infiltrates. This is not chest pain from esophageal rupture as there was preceding forceful vomiting or retching and the CXR does not show mediastinal free air. CBC is without severe anemia. A single troponin was ordered in the ED for atypical chest pain. This test was added to 'rule in' an atypical presentation for cardiac ischemia, not to 'rule out' cardiac ischemia. If positive/abnormal, this would have altered the disposition of the patient. Troponin was negative. I discussed the care of this patient with my Supervising Physician.    1730- Pt reassessed.  Pt states pain completely resolved with GI cocktail.  Labs unremarkable.  Pt updated on lab results.  Will repeat troponin and reassess.    1840- Troponin slightly elevated.  Will place in observation for chest pain r/o.    2030- Pt states she is feeling much better.  Pt requesting to go home.  Patient is hemodynamically stable, vital signs are normal. Discharge instructions given. Return to ED precautions discussed. Follow up as directed. Pt verbalized understanding of this plan. Pt is stable for discharge.                    Clinical Impression:   The primary encounter diagnosis was Chest pain. A diagnosis of Atypical chest pain was also pertinent to this visit.    Disposition:    Disposition: Discharged  Condition: Stable                        Magda Oates NP  05/19/18 1917       Magda Oates NP  05/19/18 2038

## 2018-05-19 NOTE — ED TRIAGE NOTES
Presents to ER with mid sternal chest pain that radiates to the left, headache, nausea, and feeling weak in her legs since last pm.  Patient's name and date of birth checked and is correct.  LOC: The patient is awake, alert and aware of environment with an appropriate affect, the patient is oriented x 3 and speaking appropriately.  APPEARANCE: Patient resting comfortably and in no acute distress, patient is clean and well groomed, patient's clothing is properly fastened.  CARDIOVASCULAR:  Heart rate regular and even with no peripheral edema noted.  SKIN: The skin is warm and dry, patient has normal skin turgor and moist mucus membranes, skin intact, no breakdown or brusing noted.   MUSKULOSKELETAL: Patient moving all extremities well, no obvious swelling or deformities noted.  RESPIRATORY: Airway is open and patent, respirations are spontaneous, patient has a normal effort and rate.

## 2018-05-19 NOTE — PATIENT INSTRUCTIONS
PLEASE GO DIRECTLY TO [facility] ER AS WE DISCUSSED FOR FURTHER WORKUP.  DO NOT EAT OR DRINK ANYTHING ON WAY TO HOSPITAL.

## 2018-05-19 NOTE — ED NOTES
"Pt AAOx3..  Pt aware we continue to wait for test results and/or bed assignment and/or POC.   No complaints or concerns at this time.  Respirations E/U.  Will continue to monitor for updates or changes.  Pain now 2/10.  Reports more relieved after "the liquid"      "

## 2018-05-19 NOTE — PROGRESS NOTES
"Subjective:       Patient ID: Estephania Lyman is a 67 y.o. female.    Vitals:  height is 5' 3" (1.6 m) and weight is 90.3 kg (199 lb). Her oral temperature is 98.7 °F (37.1 °C). Her blood pressure is 155/92 (abnormal) and her pulse is 72. Her respiration is 18 and oxygen saturation is 96%.     Chief Complaint: Chest Pain    Patient presents with sensation of "thumping" in her chest, legs feeling weak, and a dull headache since yesterday.  She is extremely vague with her complaint.  She states that she did throw up one time yesterday and feels steadily nauseated.  She reports that yesterday while at a field trip she felt extremely weak and everyone made her go inside and sit down.    She several times has changed when she has had the thumping sensation in her chest but admits that it woke her up out of her sleep and that she had it approx 10 or 11am this morning and that she also had it on the way here.  Pain is substernal and non radiation.  She denies belching or indigestion. She has a history of HTN, Hyperlipidemia.  She does not have a history of MI, CAD, Stroke, or TIA.  She has a history of GERD and Hiatal Hernia, she states that she knows that pain and that this is not similar.  She actually saw her PCP in the last few days and reports that everything was fine at that time with normal labs.  She recently had cortisone injection to her right shoulder but this is not bothering her.        Chest Pain    This is a new problem. The current episode started yesterday. The onset quality is sudden. The problem occurs intermittently. The problem has been unchanged. The pain is present in the substernal region. The pain is at a severity of 0/10. The pain is mild. Quality: thumping. The pain does not radiate. Associated symptoms include exertional chest pressure, headaches, nausea, vomiting and weakness. Pertinent negatives include no abdominal pain, back pain, cough, diaphoresis, dizziness, fever, hemoptysis, " irregular heartbeat, leg pain, lower extremity edema, malaise/fatigue, near-syncope, numbness, orthopnea, palpitations, shortness of breath or syncope. The pain is aggravated by exertion. She has tried nothing for the symptoms. Risk factors include being elderly and obesity.   Pertinent negatives for past medical history include no muscle weakness. Prior workup: Last stress test nl in 2015.     Review of Systems   Constitution: Positive for weakness. Negative for chills, diaphoresis, fever and malaise/fatigue.   HENT: Negative for hoarse voice.    Eyes: Negative for blurred vision.   Cardiovascular: Positive for chest pain. Negative for dyspnea on exertion, irregular heartbeat, leg swelling, near-syncope, orthopnea, palpitations and syncope.   Respiratory: Negative for cough, hemoptysis and shortness of breath.    Skin: Negative for rash.   Musculoskeletal: Negative for back pain, falls and muscle weakness.        Leg weakness     Gastrointestinal: Positive for nausea and vomiting. Negative for abdominal pain, heartburn and hematemesis.   Neurological: Positive for headaches. Negative for dizziness, focal weakness, light-headedness and numbness.   Psychiatric/Behavioral: The patient is not nervous/anxious.        Objective:      Physical Exam   Constitutional: She is oriented to person, place, and time. She appears well-developed and well-nourished. She is cooperative.  Non-toxic appearance. She does not appear ill. No distress.   HENT:   Head: Normocephalic and atraumatic.   Right Ear: Hearing, tympanic membrane, external ear and ear canal normal.   Left Ear: Hearing, tympanic membrane, external ear and ear canal normal.   Nose: Nose normal. No mucosal edema, rhinorrhea or nasal deformity. No epistaxis. Right sinus exhibits no maxillary sinus tenderness and no frontal sinus tenderness. Left sinus exhibits no maxillary sinus tenderness and no frontal sinus tenderness.   Mouth/Throat: Uvula is midline, oropharynx is  clear and moist and mucous membranes are normal. No trismus in the jaw. Normal dentition. No uvula swelling. No posterior oropharyngeal erythema.   Eyes: Conjunctivae, EOM and lids are normal. Pupils are equal, round, and reactive to light. Right eye exhibits no discharge. Left eye exhibits no discharge. No scleral icterus.   Sclera clear bilat   Neck: Trachea normal, normal range of motion, full passive range of motion without pain and phonation normal. Neck supple.   Cardiovascular: Normal rate, regular rhythm, normal heart sounds, intact distal pulses and normal pulses.    Pulmonary/Chest: Effort normal and breath sounds normal. No respiratory distress. She exhibits no tenderness.   Abdominal: Soft. Normal appearance and bowel sounds are normal. She exhibits no distension, no pulsatile midline mass and no mass. There is no tenderness.   Musculoskeletal: Normal range of motion. She exhibits no edema or deformity.   Neurological: She is alert and oriented to person, place, and time. She has normal strength and normal reflexes. She displays no atrophy and no tremor. No cranial nerve deficit or sensory deficit. She exhibits normal muscle tone. Coordination and gait normal.   Skin: Skin is warm, dry and intact. She is not diaphoretic. No pallor.   Psychiatric: She has a normal mood and affect. Her speech is normal and behavior is normal. Judgment and thought content normal. Cognition and memory are normal.   Nursing note and vitals reviewed.      EKG: Normal Sinus Rhythm with no acute ST changes, HR of 67, no changes from previous EKG.  Assessment:       1. Chest pain, unspecified type    2. Weakness    3. Nonintractable headache, unspecified chronicity pattern, unspecified headache type        Plan:       Case discussed with Medical Director Dr. Abhijeet Tierney.  Advised on sending patient to the ER for further workup including cardiac enzymes.  I spoke with the patient and advised her on this, she states that she  normally goes to [facility] and agrees with this recommendation as she thinks that this pain is new and different for her.    She is not currently having chest pain and requesting to go by private vehicle, she is stable at this time to go by private vehicle, her  is here to drive her.  Chest pain, unspecified type  -     EKG 12-lead  -     Refer to Emergency Dept.    Weakness  -     Cancel: POCT Chemistry Panel  -     Refer to Emergency Dept.    Nonintractable headache, unspecified chronicity pattern, unspecified headache type  -     Refer to Emergency Dept.      Patient Instructions   PLEASE GO DIRECTLY TO [facility] ER AS WE DISCUSSED FOR FURTHER WORKUP.  DO NOT EAT OR DRINK ANYTHING ON WAY TO HOSPITAL.

## 2018-05-19 NOTE — PROVIDER PROGRESS NOTES - EMERGENCY DEPT.
Encounter Date: 5/19/2018    ED Physician Progress Notes         EKG - STEMI Decision  Initial Reading: No STEMI present.

## 2018-05-20 NOTE — SUBJECTIVE & OBJECTIVE
Past Medical History:   Diagnosis Date    GERD (gastroesophageal reflux disease)     High cholesterol     Hyperlipidemia     Hypertension     Sarcoidosis        Past Surgical History:   Procedure Laterality Date    ANKLE FRACTURE SURGERY      COLONOSCOPY N/A 5/30/2017    Procedure: COLONOSCOPY;  Surgeon: Jed Garcia MD;  Location: [address]);  Service: Endoscopy;  Laterality: N/A;    HYSTERECTOMY  age 39    endo    LEG SURGERY      tibia    OOPHORECTOMY  age 39    endo    TONSILLECTOMY         Review of patient's allergies indicates:   Allergen Reactions    Penicillins      Other reaction(s): Unknown    Sulfa (sulfonamide antibiotics)      Other reaction(s): Rash       No current facility-administered medications on file prior to encounter.      Current Outpatient Prescriptions on File Prior to Encounter   Medication Sig    alendronate (FOSAMAX) 70 MG tablet Take 1 tablet (70 mg total) by mouth every 7 days.    amLODIPine (NORVASC) 10 MG tablet Take 1 tablet (10 mg total) by mouth once daily.    citalopram (CELEXA) 20 MG tablet Take 1 tablet (20 mg total) by mouth once daily.    pantoprazole (PROTONIX) 40 MG tablet TAKE 1 TABLET BY MOUTH EVERY DAY FOR ACID REFLUX    pravastatin (PRAVACHOL) 20 MG tablet Take 1 tablet (20 mg total) by mouth once daily.    naproxen (NAPROSYN) 500 MG tablet Take 1 tablet (500 mg total) by mouth 2 (two) times daily with meals.     Family History     Problem Relation (Age of Onset)    Breast cancer Sister    Cancer Mother, Father    Diabetes Sister    Hypertension Sister, Brother, Sister        Social History Main Topics    Smoking status: Former Smoker     Packs/day: 1.00     Years: 3.00     Types: Cigarettes     Quit date: 7/9/2010    Smokeless tobacco: Never Used    Alcohol use No    Drug use: No    Sexual activity: Yes     Partners: Male     Birth control/ protection: Surgical      Comment: Hysterectomy     Review of Systems   Constitutional:  Negative for chills and fever.   Respiratory: Negative for cough, chest tightness, shortness of breath and wheezing.    Cardiovascular: Positive for chest pain (substernal, non-radiating, burning). Negative for palpitations and leg swelling.   Gastrointestinal: Positive for nausea and vomiting. Negative for abdominal distention, abdominal pain and diarrhea.   Endocrine: Negative for polyphagia and polyuria.   Genitourinary: Negative for difficulty urinating and dysuria.   Musculoskeletal: Negative for arthralgias and back pain.   Skin: Negative for rash.   Neurological: Negative for dizziness, syncope, weakness and light-headedness.   Psychiatric/Behavioral: Negative for agitation and confusion.     Objective:     Vital Signs (Most Recent):  Temp: 98.6 °F (37 °C) (05/19/18 1929)  Pulse: (!) 57 (05/19/18 1929)  Resp: 14 (05/19/18 1605)  BP: 120/69 (05/19/18 1929)  SpO2: 97 % (05/19/18 1929) Vital Signs (24h Range):  Temp:  [98.5 °F (36.9 °C)-98.7 °F (37.1 °C)] 98.6 °F (37 °C)  Pulse:  [57-87] 57  Resp:  [14-18] 14  SpO2:  [96 %-97 %] 97 %  BP: (120-174)/(69-92) 120/69     Weight: 90.3 kg (199 lb)  Body mass index is 33.12 kg/m².    Physical Exam   Constitutional: She is oriented to person, place, and time. She appears well-developed and well-nourished. She appears distressed.   HENT:   Head: Normocephalic and atraumatic.   Eyes: EOM are normal. Pupils are equal, round, and reactive to light.   Neck: Normal range of motion. Neck supple.   Cardiovascular: Normal rate and regular rhythm.    No murmur heard.  No chest wall tenderness   Pulmonary/Chest: Effort normal and breath sounds normal. No respiratory distress. She has no wheezes.   Abdominal: Soft. Bowel sounds are normal. She exhibits no distension. There is no tenderness.   Musculoskeletal: Normal range of motion. She exhibits no edema.   Neurological: She is alert and oriented to person, place, and time. No cranial nerve deficit.   Skin: Skin is warm and dry.  She is not diaphoretic.   Psychiatric: She has a normal mood and affect. Her behavior is normal. Judgment and thought content normal.   Nursing note and vitals reviewed.        CRANIAL NERVES     CN III, IV, VI   Pupils are equal, round, and reactive to light.  Extraocular motions are normal.        Significant Labs:   BMP:   Recent Labs  Lab 05/19/18  1559   GLU 73      K 3.7      CO2 25   BUN 13   CREATININE 0.9   CALCIUM 9.3     CBC:   Recent Labs  Lab 05/19/18  1559   WBC 7.57   HGB 13.1   HCT 39.9        Cardiac Markers: No results for input(s): CKMB, MYOGLOBIN, BNP, TROPISTAT in the last 48 hours.  Lipase: No results for input(s): LIPASE in the last 48 hours.  TSH:   Recent Labs  Lab 05/11/18  0957   TSH 1.087     Urine Culture: No results for input(s): LABURIN in the last 48 hours.  Urine Studies: No results for input(s): COLORU, APPEARANCEUA, PHUR, SPECGRAV, PROTEINUA, GLUCUA, KETONESU, BILIRUBINUA, OCCULTUA, NITRITE, UROBILINOGEN, LEUKOCYTESUR, RBCUA, WBCUA, BACTERIA, SQUAMEPITHEL, HYALINECASTS in the last 48 hours.    Invalid input(s): WRIGHTSUR    Significant Imaging: CXR: I have reviewed all pertinent results/findings within the past 24 hours and my personal findings are:  clear  EKG: I have reviewed all pertinent results/findings within the past 24 hours and my personal findings are: no ischemic changes

## 2018-05-20 NOTE — H&P
[facility] Hospital Medicine  History & Physical    Patient Name: Estephania Lyman  MRN: [id number]  Admission Date: 5/19/2018  Attending Physician: No att. providers found   Primary Care Provider: Aidee Lora MD    [facility] Medicine Team: [facility] MED F Elías Ramirez PA-C     Patient information was obtained from patient, past medical records and ER records.     Subjective:     Principal Problem:Chest pain    Chief Complaint:   Chief Complaint   Patient presents with    Chest Pain     Pt with complaints of chest pain, headache, and weakness in the legs since yesterday; was seen at Urgent Care and sent to the ER for further evaluation.        HPI: Estephania Lyman is a 67F with GERD, HTN, HLD, sarcoid who presents with non-radiating substernal chest pain/burning. The patient states she has not taken her PPI in 2 days. She reports an episode of emesis yesterday and her CP awoke her from her sleep. It has occurred intermittently since then. She denies any abdominal pain, SOB, dizziness, syncope, diarrhea, fever, HA, cough, dysuria. She was given GI cocktail in ED and experienced complete relief. She denies prior MI history, denies family cardiac history, no DM. Negative stress test in 2015.    In ED, trop 0.006>0.009, EKG without ischemic changes, CXR negative.    Past Medical History:   Diagnosis Date    GERD (gastroesophageal reflux disease)     High cholesterol     Hyperlipidemia     Hypertension     Sarcoidosis        Past Surgical History:   Procedure Laterality Date    ANKLE FRACTURE SURGERY      COLONOSCOPY N/A 5/30/2017    Procedure: COLONOSCOPY;  Surgeon: Jed Garcia MD;  Location: [address];  Service: Endoscopy;  Laterality: N/A;    HYSTERECTOMY  age 39    endo    LEG SURGERY      tibia    OOPHORECTOMY  age 39    endo    TONSILLECTOMY         Review of patient's allergies indicates:   Allergen Reactions    Penicillins      Other reaction(s):  Unknown    Sulfa (sulfonamide antibiotics)      Other reaction(s): Rash       No current facility-administered medications on file prior to encounter.      Current Outpatient Prescriptions on File Prior to Encounter   Medication Sig    alendronate (FOSAMAX) 70 MG tablet Take 1 tablet (70 mg total) by mouth every 7 days.    amLODIPine (NORVASC) 10 MG tablet Take 1 tablet (10 mg total) by mouth once daily.    citalopram (CELEXA) 20 MG tablet Take 1 tablet (20 mg total) by mouth once daily.    pantoprazole (PROTONIX) 40 MG tablet TAKE 1 TABLET BY MOUTH EVERY DAY FOR ACID REFLUX    pravastatin (PRAVACHOL) 20 MG tablet Take 1 tablet (20 mg total) by mouth once daily.    naproxen (NAPROSYN) 500 MG tablet Take 1 tablet (500 mg total) by mouth 2 (two) times daily with meals.     Family History     Problem Relation (Age of Onset)    Breast cancer Sister    Cancer Mother, Father    Diabetes Sister    Hypertension Sister, Brother, Sister        Social History Main Topics    Smoking status: Former Smoker     Packs/day: 1.00     Years: 3.00     Types: Cigarettes     Quit date: 7/9/2010    Smokeless tobacco: Never Used    Alcohol use No    Drug use: No    Sexual activity: Yes     Partners: Male     Birth control/ protection: Surgical      Comment: Hysterectomy     Review of Systems   Constitutional: Negative for chills and fever.   Respiratory: Negative for cough, chest tightness, shortness of breath and wheezing.    Cardiovascular: Positive for chest pain (substernal, non-radiating, burning). Negative for palpitations and leg swelling.   Gastrointestinal: Positive for nausea and vomiting. Negative for abdominal distention, abdominal pain and diarrhea.   Endocrine: Negative for polyphagia and polyuria.   Genitourinary: Negative for difficulty urinating and dysuria.   Musculoskeletal: Negative for arthralgias and back pain.   Skin: Negative for rash.   Neurological: Negative for dizziness, syncope, weakness and  light-headedness.   Psychiatric/Behavioral: Negative for agitation and confusion.     Objective:     Vital Signs (Most Recent):  Temp: 98.6 °F (37 °C) (05/19/18 1929)  Pulse: (!) 57 (05/19/18 1929)  Resp: 14 (05/19/18 1605)  BP: 120/69 (05/19/18 1929)  SpO2: 97 % (05/19/18 1929) Vital Signs (24h Range):  Temp:  [98.5 °F (36.9 °C)-98.7 °F (37.1 °C)] 98.6 °F (37 °C)  Pulse:  [57-87] 57  Resp:  [14-18] 14  SpO2:  [96 %-97 %] 97 %  BP: (120-174)/(69-92) 120/69     Weight: 90.3 kg (199 lb)  Body mass index is 33.12 kg/m².    Physical Exam   Constitutional: She is oriented to person, place, and time. She appears well-developed and well-nourished. She appears distressed.   HENT:   Head: Normocephalic and atraumatic.   Eyes: EOM are normal. Pupils are equal, round, and reactive to light.   Neck: Normal range of motion. Neck supple.   Cardiovascular: Normal rate and regular rhythm.    No murmur heard.  No chest wall tenderness   Pulmonary/Chest: Effort normal and breath sounds normal. No respiratory distress. She has no wheezes.   Abdominal: Soft. Bowel sounds are normal. She exhibits no distension. There is no tenderness.   Musculoskeletal: Normal range of motion. She exhibits no edema.   Neurological: She is alert and oriented to person, place, and time. No cranial nerve deficit.   Skin: Skin is warm and dry. She is not diaphoretic.   Psychiatric: She has a normal mood and affect. Her behavior is normal. Judgment and thought content normal.   Nursing note and vitals reviewed.        CRANIAL NERVES     CN III, IV, VI   Pupils are equal, round, and reactive to light.  Extraocular motions are normal.        Significant Labs:   BMP:   Recent Labs  Lab 05/19/18  1559   GLU 73      K 3.7      CO2 25   BUN 13   CREATININE 0.9   CALCIUM 9.3     CBC:   Recent Labs  Lab 05/19/18  1559   WBC 7.57   HGB 13.1   HCT 39.9        Cardiac Markers: No results for input(s): CKMB, MYOGLOBIN, BNP, TROPISTAT in the last 48  hours.  Lipase: No results for input(s): LIPASE in the last 48 hours.  TSH:   Recent Labs  Lab 05/11/18  0957   TSH 1.087     Urine Culture: No results for input(s): LABURIN in the last 48 hours.  Urine Studies: No results for input(s): COLORU, APPEARANCEUA, PHUR, SPECGRAV, PROTEINUA, GLUCUA, KETONESU, BILIRUBINUA, OCCULTUA, NITRITE, UROBILINOGEN, LEUKOCYTESUR, RBCUA, WBCUA, BACTERIA, SQUAMEPITHEL, HYALINECASTS in the last 48 hours.    Invalid input(s): WRIGHTSUR    Significant Imaging: CXR: I have reviewed all pertinent results/findings within the past 24 hours and my personal findings are:  clear  EKG: I have reviewed all pertinent results/findings within the past 24 hours and my personal findings are: no ischemic changes    Assessment/Plan:     * Chest pain    67F with HTN, HLD, no cardiac history, no family cardiac history, no tobacco use, no history of arterial disease who presents with sub-sternal burning chest pain x 1 day. Missed PPI dose x 2 days. Trop x2 and EKG negative, CXR clear. No SOB, vitals stable. Patient had complete relief of symptoms with GI cocktail. Patient stable for discharge home with RF stratification outpatient with PCP. Last stress test in 2015 negative. Patient to continue PPI at home. HEART Score 3, low risk.        Hypercholesterolemia    - statin        HTN (hypertension), benign    - amlodipine          VTE Risk Mitigation     None             Elías Ramirez PA-C  Department of Hospital Medicine   [facility]

## 2018-05-20 NOTE — ASSESSMENT & PLAN NOTE
67F with HTN, HLD, no cardiac history, no family cardiac history, no tobacco use, no history of arterial disease who presents with sub-sternal burning chest pain x 1 day. Missed PPI dose x 2 days. Trop x2 and EKG negative, CXR clear. No SOB, vitals stable. Patient had complete relief of symptoms with GI cocktail. Patient stable for discharge home with RF stratification outpatient with PCP. Last stress test in 2015 negative. Patient to continue PPI at home. HEART Score 3, low risk.

## 2018-05-20 NOTE — DISCHARGE SUMMARY
[facility] Hospital Medicine  Discharge Summary      Patient Name: Estephania Lyman  MRN: [id number]  Admission Date: 2018  Hospital Length of Stay: 0 days  Discharge Date and Time:  2018 9:04 PM  Attending Physician: No att. providers found   Discharging Provider: Elías Ramirez PA-C  Primary Care Provider: Aidee Lora MD  [facility] Medicine Team: [facility]OSP MED F Elías Ramirez PA-C    HPI:   Estephania Lyman is a 67F with GERD, HTN, HLD, sarcoid who presents with non-radiating substernal chest pain/burning. The patient states she has not taken her PPI in 2 days. She reports an episode of emesis yesterday and her CP awoke her from her sleep. It has occurred intermittently since then. She denies any abdominal pain, SOB, dizziness, syncope, diarrhea, fever, HA, cough, dysuria. She was given GI cocktail in ED and experienced complete relief. She denies prior MI history, denies family cardiac history, no DM. Negative stress test in .    In ED, trop 0.006>0.009, EKG without ischemic changes, CXR negative.    * No surgery found *      Hospital Course:   Patient discharged by medicine from the ED.     Consults:     * Atypical chest pain    67F with HTN, HLD, no cardiac history, no family cardiac history, no tobacco use, no history of arterial disease who presents with sub-sternal burning chest pain x 1 day. Missed PPI dose x 2 days. Trop x2 and EKG negative, CXR clear. No SOB, vitals stable. Patient had complete relief of symptoms with GI cocktail. Patient stable for discharge home with RF stratification outpatient with PCP. Last stress test in  negative. Patient to continue PPI at home. HEART Score 3, low risk.        Hypercholesterolemia    -statin  - needs lipid panel outpatient, none on file          HTN (hypertension), benign    - amlodipine  Systolic (24hrs), Av , Min:120 , Max:174   Diastolic (24hrs), Av, Min:69, Max:92              Final Active Diagnoses:     Diagnosis Date Noted POA    PRINCIPAL PROBLEM:  Atypical chest pain [R07.89] 05/19/2018 Yes    Hypercholesterolemia [E78.00] 08/07/2012 Yes    HTN (hypertension), benign [I10] 07/09/2012 Yes      Problems Resolved During this Admission:    Diagnosis Date Noted Date Resolved POA       Discharged Condition: good    Disposition: Home or Self Care    Follow Up:  Follow-up Information     Aidee Lora MD In 2 weeks.    Specialty:  Family Medicine  Contact information:  [address]  [address]  [facility] [address]  [phone]                 Patient Instructions:     Diet Cardiac     Activity as tolerated     Notify your health care provider if you experience any of the following:  severe uncontrolled pain     Notify your health care provider if you experience any of the following:  difficulty breathing or increased cough     Notify your health care provider if you experience any of the following:  persistent dizziness, light-headedness, or visual disturbances         Significant Diagnostic Studies: Labs:   BMP:   Recent Labs  Lab 05/19/18  1559   GLU 73      K 3.7      CO2 25   BUN 13   CREATININE 0.9   CALCIUM 9.3   , CMP   Recent Labs  Lab 05/19/18  1559      K 3.7      CO2 25   GLU 73   BUN 13   CREATININE 0.9   CALCIUM 9.3   PROT 7.9   ALBUMIN 3.8   BILITOT 0.7   ALKPHOS 114   AST 25   ALT 30   ANIONGAP 10   ESTGFRAFRICA >60.0   EGFRNONAA >60.0   , CBC   Recent Labs  Lab 05/19/18  1559   WBC 7.57   HGB 13.1   HCT 39.9       and Troponin   Recent Labs  Lab 05/19/18  1747   TROPONINI 0.009     Radiology: X-Ray: CXR: X-Ray Chest 1 View (CXR): No results found for this visit on 05/19/18. and X-Ray Chest PA and Lateral (CXR): No results found for this visit on 05/19/18.  Cardiac Graphics: ECG: non-ischemic    Pending Diagnostic Studies:     None         Medications:  Reconciled Home Medications:      Medication List      CONTINUE taking these medications    alendronate 70  MG tablet  Commonly known as:  FOSAMAX  Take 1 tablet (70 mg total) by mouth every 7 days.     amLODIPine 10 MG tablet  Commonly known as:  NORVASC  Take 1 tablet (10 mg total) by mouth once daily.     citalopram 20 MG tablet  Commonly known as:  CELEXA  Take 1 tablet (20 mg total) by mouth once daily.     naproxen 500 MG tablet  Commonly known as:  NAPROSYN  Take 1 tablet (500 mg total) by mouth 2 (two) times daily with meals.     pantoprazole 40 MG tablet  Commonly known as:  PROTONIX  TAKE 1 TABLET BY MOUTH EVERY DAY FOR ACID REFLUX     pravastatin 20 MG tablet  Commonly known as:  PRAVACHOL  Take 1 tablet (20 mg total) by mouth once daily.            Indwelling Lines/Drains at time of discharge:   Lines/Drains/Airways          No matching active lines, drains, or airways          Time spent on the discharge of patient: 35 minutes  Patient was seen and examined on the date of discharge and determined to be suitable for discharge.         Elías Ramirez PA-C  Department of Hospital Medicine  [facility]

## 2018-05-20 NOTE — ASSESSMENT & PLAN NOTE
- amlodipine  Systolic (24hrs), Av , Min:120 , Max:174   Diastolic (24hrs), Av, Min:69, Max:92

## 2018-05-23 ENCOUNTER — OFFICE VISIT (OUTPATIENT)
Dept: FAMILY MEDICINE | Facility: CLINIC | Age: 68
End: 2018-05-23
Attending: FAMILY MEDICINE
Payer: COMMERCIAL

## 2018-05-23 ENCOUNTER — LAB VISIT (OUTPATIENT)
Dept: LAB | Facility: HOSPITAL | Age: 68
End: 2018-05-23
Attending: FAMILY MEDICINE
Payer: COMMERCIAL

## 2018-05-23 VITALS
DIASTOLIC BLOOD PRESSURE: 82 MMHG | RESPIRATION RATE: 16 BRPM | HEART RATE: 61 BPM | HEIGHT: 65 IN | BODY MASS INDEX: 32.3 KG/M2 | SYSTOLIC BLOOD PRESSURE: 124 MMHG | WEIGHT: 193.88 LBS | OXYGEN SATURATION: 96 %

## 2018-05-23 DIAGNOSIS — R11.0 NAUSEA: Primary | ICD-10-CM

## 2018-05-23 DIAGNOSIS — K21.9 GASTROESOPHAGEAL REFLUX DISEASE, ESOPHAGITIS PRESENCE NOT SPECIFIED: ICD-10-CM

## 2018-05-23 PROCEDURE — 3079F DIAST BP 80-89 MM HG: CPT | Mod: CPTII,S$GLB,, | Performed by: FAMILY MEDICINE

## 2018-05-23 PROCEDURE — 99999 PR PBB SHADOW E&M-EST. PATIENT-LVL IV: CPT | Mod: PBBFAC,,, | Performed by: FAMILY MEDICINE

## 2018-05-23 PROCEDURE — 99213 OFFICE O/P EST LOW 20 MIN: CPT | Mod: S$GLB,,, | Performed by: FAMILY MEDICINE

## 2018-05-23 PROCEDURE — 3074F SYST BP LT 130 MM HG: CPT | Mod: CPTII,S$GLB,, | Performed by: FAMILY MEDICINE

## 2018-05-23 PROCEDURE — 36415 COLL VENOUS BLD VENIPUNCTURE: CPT | Mod: PO

## 2018-05-23 PROCEDURE — 86677 HELICOBACTER PYLORI ANTIBODY: CPT

## 2018-05-23 RX ORDER — ONDANSETRON 4 MG/1
4 TABLET, FILM COATED ORAL EVERY 8 HOURS PRN
Qty: 30 TABLET | Refills: 0 | Status: SHIPPED | OUTPATIENT
Start: 2018-05-23 | End: 2018-08-02

## 2018-05-23 NOTE — PATIENT INSTRUCTIONS
Your test results will be communicated to you via : [facility], Telephone or Letter.   If you have not received your test results in one week, please contact the clinic at [phone].

## 2018-05-23 NOTE — PROGRESS NOTES
"Subjective:       Patient ID: Estephania Lyman is a 67 y.o. female.    Chief Complaint: Follow-up (ER f/u )    HPI   Pt is here for follow up of gi upset with chest pain no acute cardiac event however she felt better with gi cocktail she is on ppi but still has nausea may improve with food  Review of Systems   Constitutional: Negative for chills, fatigue and fever.   Respiratory: Negative for cough, chest tightness and shortness of breath.    Cardiovascular: Negative for chest pain and palpitations.   Gastrointestinal: Negative for abdominal distention and abdominal pain.       Objective:      Physical Exam   Constitutional: She appears well-developed and well-nourished. No distress.   Cardiovascular: Normal rate and regular rhythm.  Exam reveals no gallop.    Pulmonary/Chest: Effort normal and breath sounds normal. No respiratory distress. She has no rales.   Abdominal: Soft. Bowel sounds are normal. She exhibits no distension. There is no tenderness.     labs discussed with pt   Assessment:       1. Nausea    2. Gastroesophageal reflux disease, esophagitis presence not specified        Plan:     orders h pylori  Start zofran prn  Cont ppi  F/u gi  egd considered  rtc prn       "This note will not be shared with the patient."   "

## 2018-05-24 LAB — H PYLORI IGG SERPL QL IA: NEGATIVE

## 2018-06-19 ENCOUNTER — TELEPHONE (OUTPATIENT)
Dept: FAMILY MEDICINE | Facility: CLINIC | Age: 68
End: 2018-06-19

## 2018-06-19 NOTE — TELEPHONE ENCOUNTER
----- Message from Mary Manuel sent at 6/19/2018  2:47 PM CDT -----            Name of Who is Calling: LAURENCE CUELLAR [[id number]]    What is the request in detail:pt is calling to get a sooner appt her legs are swelling from her knees to her feet. Our first avail is 7/16/18    Can the clinic reply by [facility]: no    What Number to Call Back if not in [facility]:[phone]

## 2018-06-21 ENCOUNTER — OFFICE VISIT (OUTPATIENT)
Dept: URGENT CARE | Facility: CLINIC | Age: 68
End: 2018-06-21
Payer: COMMERCIAL

## 2018-06-21 VITALS
BODY MASS INDEX: 32.15 KG/M2 | HEART RATE: 83 BPM | TEMPERATURE: 99 F | OXYGEN SATURATION: 98 % | DIASTOLIC BLOOD PRESSURE: 71 MMHG | WEIGHT: 193 LBS | HEIGHT: 65 IN | SYSTOLIC BLOOD PRESSURE: 126 MMHG

## 2018-06-21 DIAGNOSIS — M25.472 ANKLE EDEMA, BILATERAL: Primary | ICD-10-CM

## 2018-06-21 DIAGNOSIS — M25.471 ANKLE EDEMA, BILATERAL: Primary | ICD-10-CM

## 2018-06-21 PROCEDURE — 3074F SYST BP LT 130 MM HG: CPT | Mod: CPTII,S$GLB,, | Performed by: NURSE PRACTITIONER

## 2018-06-21 PROCEDURE — 3078F DIAST BP <80 MM HG: CPT | Mod: CPTII,S$GLB,, | Performed by: NURSE PRACTITIONER

## 2018-06-21 PROCEDURE — 99213 OFFICE O/P EST LOW 20 MIN: CPT | Mod: S$GLB,,, | Performed by: NURSE PRACTITIONER

## 2018-06-21 NOTE — PATIENT INSTRUCTIONS
Compression stockings as directed  Follow up with PCP      Leg Swelling in Both Legs    Swelling of the feet, ankles, and legs is called edema. It is caused by excess fluid that has collected in the tissues. Extra fluid in the body settles in the lowest part because of gravity. This is why the legs and feet are most affected.  Some of the causes for edema include:  · Disease of the heart like congestive heart failure  · Standing or sitting for long periods of time  · Infection of the feet or legs  · Blood pooling in the veins of your legs (venous insufficiency)  · Dilated veins in your lower leg (varicose veins)  · Garters or other clothing that is tight on your legs. This will cause blood to pool in your legs because the clothing limits blood flow.  · Some medicines such as hormones like birth control pills, some blood pressure medicines like calcium channel blockers (amlodipine) and steroids, some antidepressants like MAO inhibitors and tricyclics  · Menstrual periods that cause you to retain fluids  · Many types of renal disease  · Liver failure or cirrhosis  · Pregnancy, some swelling is normal, but a sudden increase in leg swelling or weight gain can be a sign of a dangerous complication of pregnancy  · Poor nutrition  · Thyroid disease  Medical treatment will depend on what is causing the swelling in your legs. Your healthcare provider may prescribe water pills (diuretics) to get rid of the extra fluid.  Home care  Follow these guidelines when caring for yourself at home:  · Don't wear clothing like garters that is tight on your legs.  · Keep your legs up while lying or sitting.  · If infection, injury, or recent surgery is causing the swelling, stay off your legs as much as possible until symptoms get better.  · If your healthcare provider says that your leg swelling is caused by venous insufficiency or varicose veins, don't sit or  one place for long periods of time. Take breaks and walk about  every few hours. Brisk walking is a good exercise. It helps circulate the blood that has collected in your leg. Talk with your provider about using support stockings to stop daytime leg swelling.  · If your provider says that heart disease is causing your leg swelling, follow a low-salt diet to stop extra fluid from staying in your body. You may also need medicine.  Follow-up care  Follow up with your healthcare provider, or as advised.  When to seek medical advice  Call your healthcare provider right away if any of these occur:  · New shortness of breath or chest pain  · Shortness of breath or chest pain that gets worse  · Swelling in both legs or ankles that gets worse  · Swelling of the abdomen  · Redness, warmth, or swelling in one leg  · Fever of 100.4ºF (38ºC) or higher, or as directed by your healthcare provider  · Yellow color to your skin or eyes  · Rapid, unexplained weight gain  · Having to sleep upright or use an increased number of pillows  Date Last Reviewed: 3/31/2016  © 1769-1202 [employer]. [address] [place] [address], [place] [address]. All rights reserved. This information is not intended as a substitute for professional medical care. Always follow your healthcare professional's instructions.

## 2018-06-21 NOTE — PROGRESS NOTES
"Subjective:       Patient ID: Estephania Lyman is a 67 y.o. female.    Vitals:  height is 5' 5" (1.651 m) and weight is 87.5 kg (193 lb). Her oral temperature is 98.5 °F (36.9 °C). Her blood pressure is 126/71 and her pulse is 83. Her oxygen saturation is 98%.     Chief Complaint: Foot Swelling    Bilateral ankles, onset from two weeks ago. No change to diet or physical activity. Doesn't eat a lot of salt, does not cook with salt. Denies +sob +denies chest pain States was put on amlodopine approx 1-2 months ago      Edema   This is a new problem. The current episode started 1 to 4 weeks ago. The problem occurs constantly. The problem has been unchanged. Pertinent negatives include no abdominal pain, chest pain, chills, fever, headaches, nausea, rash, sore throat or vomiting.     Review of Systems   Constitution: Negative for chills and fever.   HENT: Negative for sore throat.    Eyes: Negative for blurred vision.   Cardiovascular: Negative for chest pain.   Respiratory: Negative for shortness of breath.    Skin: Negative for rash.   Musculoskeletal: Positive for joint pain. Negative for back pain.   Gastrointestinal: Negative for abdominal pain, diarrhea, nausea and vomiting.   Neurological: Negative for headaches.   Psychiatric/Behavioral: The patient is not nervous/anxious.        Objective:      Physical Exam   Constitutional: She is oriented to person, place, and time. She appears well-developed and well-nourished. She is cooperative.  Non-toxic appearance. She does not appear ill. No distress.   HENT:   Head: Normocephalic and atraumatic.   Right Ear: Hearing, tympanic membrane and ear canal normal.   Left Ear: Hearing, tympanic membrane and ear canal normal.   Nose: Nose normal. No mucosal edema, rhinorrhea or nasal deformity. No epistaxis. Right sinus exhibits no maxillary sinus tenderness and no frontal sinus tenderness. Left sinus exhibits no maxillary sinus tenderness and no frontal sinus tenderness. "   Mouth/Throat: Uvula is midline and mucous membranes are normal. No trismus in the jaw. Normal dentition. No uvula swelling. No posterior oropharyngeal erythema.   Eyes: Conjunctivae and lids are normal. Right eye exhibits no discharge. Left eye exhibits no discharge. No scleral icterus.   Sclera clear bilat   Neck: Trachea normal, normal range of motion, full passive range of motion without pain and phonation normal. Neck supple.   Cardiovascular: Normal rate, regular rhythm, normal heart sounds, intact distal pulses and normal pulses.    Pulses:       Carotid pulses are 2+ on the right side, and 2+ on the left side.       Radial pulses are 2+ on the right side, and 2+ on the left side.        Femoral pulses are 2+ on the right side, and 2+ on the left side.       Popliteal pulses are 2+ on the right side, and 2+ on the left side.        Dorsalis pedis pulses are 2+ on the right side, and 2+ on the left side.        Posterior tibial pulses are 2+ on the right side, and 2+ on the left side.   Bilateral non-pitting ankle  edema   Pulmonary/Chest: Effort normal and breath sounds normal. No respiratory distress.   Abdominal: Soft. Normal appearance. She exhibits no distension, no pulsatile midline mass and no mass. There is no tenderness.   Musculoskeletal: Normal range of motion. She exhibits edema. She exhibits no deformity.   Neurological: She is alert and oriented to person, place, and time. She exhibits normal muscle tone. Coordination normal.   Skin: Skin is warm, dry and intact. She is not diaphoretic. No pallor.   Psychiatric: She has a normal mood and affect. Her speech is normal and behavior is normal. Judgment and thought content normal. Cognition and memory are normal.   Nursing note and vitals reviewed.      Assessment:       1. Ankle edema, bilateral        Plan:       Patient Instructions     Compression stockings as directed  Follow up with PCP      Leg Swelling in Both Legs    Swelling of the feet,  ankles, and legs is called edema. It is caused by excess fluid that has collected in the tissues. Extra fluid in the body settles in the lowest part because of gravity. This is why the legs and feet are most affected.  Some of the causes for edema include:  · Disease of the heart like congestive heart failure  · Standing or sitting for long periods of time  · Infection of the feet or legs  · Blood pooling in the veins of your legs (venous insufficiency)  · Dilated veins in your lower leg (varicose veins)  · Garters or other clothing that is tight on your legs. This will cause blood to pool in your legs because the clothing limits blood flow.  · Some medicines such as hormones like birth control pills, some blood pressure medicines like calcium channel blockers (amlodipine) and steroids, some antidepressants like MAO inhibitors and tricyclics  · Menstrual periods that cause you to retain fluids  · Many types of renal disease  · Liver failure or cirrhosis  · Pregnancy, some swelling is normal, but a sudden increase in leg swelling or weight gain can be a sign of a dangerous complication of pregnancy  · Poor nutrition  · Thyroid disease  Medical treatment will depend on what is causing the swelling in your legs. Your healthcare provider may prescribe water pills (diuretics) to get rid of the extra fluid.  Home care  Follow these guidelines when caring for yourself at home:  · Don't wear clothing like garters that is tight on your legs.  · Keep your legs up while lying or sitting.  · If infection, injury, or recent surgery is causing the swelling, stay off your legs as much as possible until symptoms get better.  · If your healthcare provider says that your leg swelling is caused by venous insufficiency or varicose veins, don't sit or  one place for long periods of time. Take breaks and walk about every few hours. Brisk walking is a good exercise. It helps circulate the blood that has collected in your leg. Talk  with your provider about using support stockings to stop daytime leg swelling.  · If your provider says that heart disease is causing your leg swelling, follow a low-salt diet to stop extra fluid from staying in your body. You may also need medicine.  Follow-up care  Follow up with your healthcare provider, or as advised.  When to seek medical advice  Call your healthcare provider right away if any of these occur:  · New shortness of breath or chest pain  · Shortness of breath or chest pain that gets worse  · Swelling in both legs or ankles that gets worse  · Swelling of the abdomen  · Redness, warmth, or swelling in one leg  · Fever of 100.4ºF (38ºC) or higher, or as directed by your healthcare provider  · Yellow color to your skin or eyes  · Rapid, unexplained weight gain  · Having to sleep upright or use an increased number of pillows  Date Last Reviewed: 3/31/2016  © 8186-1655 [employer]. [address] [place] [address], [place] [address]. All rights reserved. This information is not intended as a substitute for professional medical care. Always follow your healthcare professional's instructions.            Ankle edema, bilateral

## 2018-06-25 ENCOUNTER — OFFICE VISIT (OUTPATIENT)
Dept: ORTHOPEDICS | Facility: CLINIC | Age: 68
End: 2018-06-25
Payer: COMMERCIAL

## 2018-06-25 VITALS — HEIGHT: 65 IN | BODY MASS INDEX: 33.45 KG/M2 | WEIGHT: 200.75 LBS

## 2018-06-25 DIAGNOSIS — M25.511 ACUTE PAIN OF RIGHT SHOULDER: Primary | ICD-10-CM

## 2018-06-25 PROCEDURE — 99999 PR PBB SHADOW E&M-EST. PATIENT-LVL III: CPT | Mod: PBBFAC,,, | Performed by: PHYSICIAN ASSISTANT

## 2018-06-25 PROCEDURE — 99213 OFFICE O/P EST LOW 20 MIN: CPT | Mod: S$GLB,,, | Performed by: PHYSICIAN ASSISTANT

## 2018-06-25 RX ORDER — TRAMADOL HYDROCHLORIDE 50 MG/1
50 TABLET ORAL
Qty: 30 TABLET | Refills: 0 | Status: SHIPPED | OUTPATIENT
Start: 2018-06-25 | End: 2018-07-10

## 2018-06-25 NOTE — PROGRESS NOTES
SUBJECTIVE:     Chief Complaint & History of Present Illness:  Estephania Lyman is a  Established  patient 67 y.o. female who is seen here today with a complaint of    Chief Complaint   Patient presents with    Right Shoulder - Pain    .  She is patient well known to me was last seen treated in the clinic O5/14/2018 for her right shoulder pain.  At that time we had opted for a therapeutic diagnostic cortisone injection of the shoulder patient reports she received less than 2 days of good relief from the injection has had a return of pain to the same level or greater then her previous visit.  She did not attend the physical therapy ordered secondary to scheduling conflicts  On a scale of 1-10, with 10 being worst pain imaginable, he rates this pain as 5 on good days and 7 on bad days.  she describes the pain as tender and sore.    Review of patient's allergies indicates:   Allergen Reactions    Penicillins      Other reaction(s): Unknown    Sulfa (sulfonamide antibiotics)      Other reaction(s): Rash         Current Outpatient Prescriptions   Medication Sig Dispense Refill    alendronate (FOSAMAX) 70 MG tablet Take 1 tablet (70 mg total) by mouth every 7 days. 12 tablet 3    amLODIPine (NORVASC) 10 MG tablet Take 1 tablet (10 mg total) by mouth once daily. 90 tablet 3    citalopram (CELEXA) 20 MG tablet Take 1 tablet (20 mg total) by mouth once daily. 90 tablet 3    ondansetron (ZOFRAN) 4 MG tablet Take 1 tablet (4 mg total) by mouth every 8 (eight) hours as needed for Nausea. 30 tablet 0    pantoprazole (PROTONIX) 40 MG tablet TAKE 1 TABLET BY MOUTH EVERY DAY FOR ACID REFLUX 90 tablet 3    pravastatin (PRAVACHOL) 20 MG tablet Take 1 tablet (20 mg total) by mouth once daily. 90 tablet 3    traMADol (ULTRAM) 50 mg tablet Take 1 tablet (50 mg total) by mouth every 4 to 6 hours as needed for Pain. 30 tablet 0     No current facility-administered medications for this visit.        Past Medical History:  "  Diagnosis Date    GERD (gastroesophageal reflux disease)     High cholesterol     Hyperlipidemia     Hypertension     Sarcoidosis        Past Surgical History:   Procedure Laterality Date    ANKLE FRACTURE SURGERY      COLONOSCOPY N/A 5/30/2017    Procedure: COLONOSCOPY;  Surgeon: Jed Garcia MD;  Location: [address]);  Service: Endoscopy;  Laterality: N/A;    HYSTERECTOMY  age 39    endo    LEG SURGERY      tibia    OOPHORECTOMY  age 39    endo    TONSILLECTOMY         Vital Signs (Most Recent)  There were no vitals filed for this visit.    Review of Systems:  ROS:  Constitutional: no fever or chills  Eyes: no visual changes  ENT: no nasal congestion or sore throat  Respiratory: no cough or shortness of breath  Cardiovascular: no chest pain or palpitations  Gastrointestinal: no nausea or vomiting, tolerating diet, Positive for GERD  Genitourinary: no hematuria or dysuria  Integument/Breast: no rash or pruritis  Hematologic/Lymphatic: no easy bruising or lymphadenopathy  Musculoskeletal: no arthralgias or myalgias  Neurological: no seizures or tremors  Behavioral/Psych: no auditory or visual hallucinations, Mother for depression  Endocrine: no heat or cold intolerance        OBJECTIVE:     PHYSICAL EXAM:  Height: 5' 5" (165.1 cm) Weight: 91 kg (200 lb 11.7 oz), General Appearance: Well nourished, well developed, in no acute distress.  Neurological: Mood & affect are normal.  Shoulder exam: right  Tenderness: AC joint, posterior acromial  ROM: forward flexion 180/180, extension 45/45, full abduction 180/180, abduction-glenohumeral 90/90, external rotation 50/50, pain at the extremes of mobility  Shoulder Strength: biceps 5/5, triceps 5/5, abduction 5/5, adduction 5/5, external rotation 5/5 with shoulder at side, flexion 5/5, and extension 5/5  positive for tenderness about the glenohumeral joint, negative for tenderness over the acromioclavicular joint and positive for impingement " sign  Stability tests: anterior apprehension test positive for pain only and posterior apprehension test negative  Special Tests:Munoz' test: negative, Cross-chest abduction: negative and Yergason's test: negative                       ASSESSMENT/PLAN:     Plan: We discussed with the patient at length all the different treatment options available for her rightshoulder including anti-inflammatories, acetaminophen, rest, ice, Physical therapy to include strengthening exercise, occasional cortisone injections for temporary relief, arthroscopic surgical repair, and finally shoulder arthroplasty.   We'll schedule MRI of the right shoulder.    Tramadol 50 mg every 6 hours when necessary for pain control  Follow-up after MRI to discuss treatment options

## 2018-06-28 ENCOUNTER — HOSPITAL ENCOUNTER (OUTPATIENT)
Dept: RADIOLOGY | Facility: HOSPITAL | Age: 68
Discharge: HOME | End: 2018-06-28
Attending: PHYSICIAN ASSISTANT
Payer: COMMERCIAL

## 2018-06-28 DIAGNOSIS — M25.511 ACUTE PAIN OF RIGHT SHOULDER: ICD-10-CM

## 2018-06-28 PROCEDURE — 73221 MRI JOINT UPR EXTREM W/O DYE: CPT | Mod: TC,RT

## 2018-06-28 PROCEDURE — 73221 MRI JOINT UPR EXTREM W/O DYE: CPT | Mod: 26,RT,, | Performed by: RADIOLOGY

## 2018-07-09 ENCOUNTER — TELEPHONE (OUTPATIENT)
Dept: FAMILY MEDICINE | Facility: CLINIC | Age: 68
End: 2018-07-09

## 2018-07-09 RX ORDER — AMLODIPINE BESYLATE 10 MG/1
10 TABLET ORAL DAILY
Qty: 90 TABLET | Refills: 0 | Status: SHIPPED | OUTPATIENT
Start: 2018-07-09 | End: 2018-07-18 | Stop reason: SDUPTHER

## 2018-07-09 NOTE — PROGRESS NOTES
CC: RIGHT shoulder pain     67 y.o. RHD Female presents as a new patient to me. Accompanied by her . Right shoulder pain x 4 months, worsening over the last few weeks. Atraumatic onset, denies discrete injury. Pain localizes in the lateral subdeltoid recess. Worse with reaching away from her body and overhead activity. Pain is disruptive of sleep at night and has become a quality of life issue. Better with rest. Denies midline neck pain or radicular symptoms. Occasionally hand tingling, which she believes is a separate complaint. Treatment thus far has included 1 SA steroid injection (little to no relief), activity modifications, self-directed therapy, rest, and oral medication. Here today to discuss diagnosis and treatment options.     Negative for tobacco.   Negative for diabetes.    Pain Score:   6    PAST MEDICAL HISTORY:   Past Medical History:   Diagnosis Date    GERD (gastroesophageal reflux disease)     High cholesterol     Hyperlipidemia     Hypertension     Sarcoidosis        PAST SURGICAL HISTORY:  Past Surgical History:   Procedure Laterality Date    ANKLE FRACTURE SURGERY      COLONOSCOPY N/A 5/30/2017    Procedure: COLONOSCOPY;  Surgeon: Jed Garcia MD;  Location: [address]);  Service: Endoscopy;  Laterality: N/A;    HYSTERECTOMY  age 39    endo    LEG SURGERY      tibia    OOPHORECTOMY  age 39    endo    TONSILLECTOMY         FAMILY HISTORY:  Family History   Problem Relation Age of Onset    Cancer Mother         colon    Cancer Father         throat    Hypertension Sister     Breast cancer Sister     Hypertension Brother     Diabetes Sister     Hypertension Sister     Stroke Neg Hx     Ovarian cancer Neg Hx     Miscarriages / Stillbirths Neg Hx     Eclampsia Neg Hx        MEDICATIONS:    Current Outpatient Prescriptions:     alendronate (FOSAMAX) 70 MG tablet, Take 1 tablet (70 mg total) by mouth every 7 days., Disp: 12 tablet, Rfl: 3    amLODIPine  "(NORVASC) 10 MG tablet, Take 1 tablet (10 mg total) by mouth once daily., Disp: 90 tablet, Rfl: 3    amLODIPine (NORVASC) 10 MG tablet, TAKE 1 TABLET (10 MG TOTAL) BY MOUTH ONCE DAILY., Disp: 90 tablet, Rfl: 0    citalopram (CELEXA) 20 MG tablet, Take 1 tablet (20 mg total) by mouth once daily., Disp: 90 tablet, Rfl: 3    ondansetron (ZOFRAN) 4 MG tablet, Take 1 tablet (4 mg total) by mouth every 8 (eight) hours as needed for Nausea., Disp: 30 tablet, Rfl: 0    pantoprazole (PROTONIX) 40 MG tablet, TAKE 1 TABLET BY MOUTH EVERY DAY FOR ACID REFLUX, Disp: 90 tablet, Rfl: 3    pravastatin (PRAVACHOL) 20 MG tablet, Take 1 tablet (20 mg total) by mouth once daily., Disp: 90 tablet, Rfl: 3    traMADol (ULTRAM) 50 mg tablet, Take 1 tablet (50 mg total) by mouth every 4 to 6 hours as needed for Pain., Disp: 30 tablet, Rfl: 0    ALLERGIES:  Review of patient's allergies indicates:   Allergen Reactions    Penicillins      Other reaction(s): Unknown    Sulfa (sulfonamide antibiotics)      Other reaction(s): Rash          REVIEW OF SYSTEMS:  Constitution: Negative. Negative for chills, fever and night sweats.    Hematologic/Lymphatic: Negative for bleeding problem. Does not bruise/bleed easily.   Skin: Negative for dry skin, itching and rash.   Musculoskeletal: Negative for falls. Positive for right shoulder pain and  muscle weakness.     PHYSICAL EXAMINATION:  Vitals:  /70   Pulse 76   Ht 5' 5" (1.651 m)   Wt 90.7 kg (200 lb)   BMI 33.28 kg/m²    General: Well-developed well-nourished 67 y.o. femalein no acute distress   Cardiovascular: Regular rhythm by palpation of distal pulse, normal color and temperature, no concerning varicosities on symptomatic side   Lungs: No labored breathing or wheezing appreciated   Neuro: Alert and oriented ×3   Psychiatric: well oriented to person, place and time, demonstrates normal mood and affect   Skin: No rashes, lesions or ulcers, normal temperature, turgor, and texture on " uninvolved extremity    Ortho/SPM Exam  Exam of the right shoulder demonstrates painful arc of motion.  Positive impingement signs.  Active forward elevation scapular plane to 115°, passive to 170°.  Active external rotation with arm at side to 40°.  Passive to 60°.  Pain at mid and terminal ranges.  4 out of 5 resisted supraspinatus and infraspinatus with limitation due to pain.  Patient describes weakness.  Able to hold arm above head when prepositioned.  Positive drop arm test.  Negative belly press test.  Minimal tenderness over the proximal biceps groove.  Positive tenderness over Codman's point and the acromioclavicular joint.  Stable shoulder. Negative midline neck tenderness.  Negative Spurling's test.    IMAGING:  Xrays including AP, Outlet and Axillary Lateral of RIGHT shoulder are ordered / images reviewed by me: Moderate degeneration of the acromioclavicular joint.  Mild glenohumeral DJD.  Normal acromial humeral interval.     MRI of RIGHT shoulder reviewed by me and discussed with patient. Study shows:    1. High-grade, articular surface tears of the supraspinatus and infraspinatus with tendinosis of the remaining infraspinatus tendon.   2. Tendinosis of the subscapularis and biceps tendons.   3. Mild glenohumeral osteoarthritis.  On my read the patient has a near full-thickness tear of the supraspinatus and infraspinatus with significant undersurface near full-thickness superior cuff tissue retraction medial to the humeral head apex.  Negative tangent.  Goutallier 1 of the infraspinatus.    ASSESSMENT:      ICD-10-CM ICD-9-CM   1. Incomplete tear of right rotator cuff M75.111 840.4   2. Biceps tendinitis of right shoulder M75.21 726.12   3. Right rotator cuff tendinitis M75.81 726.10   4. Right shoulder pain, unspecified chronicity M25.511 719.41       PLAN:     Findings were discussed with the patient and her  at length.  Treatment options were reviewed both operative and nonoperative.  The  patient has had significant continued pain and weakness.  Failed prior conservative treatment.  She wishes to proceed with surgery.  The details of right shoulder arthroscopic rotator cuff repair, extensive debridement, subacromial decompression, distal clavicle excision, and biceps tenotomy were discussed.  The expected postop rehabilitation and recovery course was outlined.  The patient understands the inherent risk for tissue nonhealing, or re-tear, postoperative stiffness, continued or recurrent pain, problems associated with arthritis, DVT/PE, Ac deformity, and potential need further surgery.  Chosen date of surgery is July 23.  She will need preoperative medical clearance.    Informed Consent:    The details of the surgical procedure were explained, including the location of probable incisions and a description of possible hardware and/or grafts to be used. We also discussed the potential benefit of Amniox tissue biologic augmentation with associated literature support, theoretical risks and benefits. Alternatives to both operative and non-operative options with associated risks and benefits were discussed. The patient understands the likely convalescence after surgery and, in particular, the expected postop rehab and recovery course. The outlined risks and potential complications of the proposed procedure include but are not limited to: infection, poor wound healing, scarring, deformity, stiffness, swelling, continued or recurrent pain, instability, hardware or prosthetic failure if implanted, symptomatic hardware requiring removal, weakness, neurovascular injury, numbness, chronic regional pain disorder, tissue nonhealing/irreparability/retear, subsequent contralateral limb injury or pathology, chondral injury, arthritis, fracture, blood clot formation, inability to return to previous level of activity, anesthetic or regional block complication up to death, need for additional procedure as indicated  intraoperatively, and potential need for further surgery.    The patient was also informed and understands that the risks of surgery are greater for patients with a current condition or history of heart disease, obesity, clotting disorders, recurrent infections, steroid use, current or past smoking, and factors such as sedentary lifestyle and noncompliance with medications, therapy or follow-up. The degree of the increased risk is hard to estimate with any degree of precision. If applicable, smoking cessation was discussed.     All questions were answered. The patient has verbalized understanding of these issues and wishes to proceed with the surgery as discussed.        Procedures

## 2018-07-10 ENCOUNTER — OFFICE VISIT (OUTPATIENT)
Dept: SPORTS MEDICINE | Facility: CLINIC | Age: 68
End: 2018-07-10
Payer: COMMERCIAL

## 2018-07-10 ENCOUNTER — HOSPITAL ENCOUNTER (OUTPATIENT)
Dept: RADIOLOGY | Facility: HOSPITAL | Age: 68
Discharge: HOME | End: 2018-07-10
Attending: ORTHOPAEDIC SURGERY
Payer: COMMERCIAL

## 2018-07-10 VITALS
HEART RATE: 76 BPM | HEIGHT: 65 IN | BODY MASS INDEX: 33.32 KG/M2 | DIASTOLIC BLOOD PRESSURE: 70 MMHG | SYSTOLIC BLOOD PRESSURE: 115 MMHG | WEIGHT: 200 LBS

## 2018-07-10 DIAGNOSIS — M25.511 RIGHT SHOULDER PAIN, UNSPECIFIED CHRONICITY: ICD-10-CM

## 2018-07-10 DIAGNOSIS — M75.111 INCOMPLETE TEAR OF RIGHT ROTATOR CUFF: Primary | ICD-10-CM

## 2018-07-10 DIAGNOSIS — M75.21 BICEPS TENDINITIS OF RIGHT SHOULDER: ICD-10-CM

## 2018-07-10 DIAGNOSIS — M19.011 ARTHRITIS OF RIGHT ACROMIOCLAVICULAR JOINT: ICD-10-CM

## 2018-07-10 PROCEDURE — 3074F SYST BP LT 130 MM HG: CPT | Mod: CPTII,S$GLB,, | Performed by: ORTHOPAEDIC SURGERY

## 2018-07-10 PROCEDURE — 3078F DIAST BP <80 MM HG: CPT | Mod: CPTII,S$GLB,, | Performed by: ORTHOPAEDIC SURGERY

## 2018-07-10 PROCEDURE — 99999 PR PBB SHADOW E&M-EST. PATIENT-LVL III: CPT | Mod: PBBFAC,,, | Performed by: ORTHOPAEDIC SURGERY

## 2018-07-10 PROCEDURE — 73030 X-RAY EXAM OF SHOULDER: CPT | Mod: TC,FY,PO,RT

## 2018-07-10 PROCEDURE — 99204 OFFICE O/P NEW MOD 45 MIN: CPT | Mod: S$GLB,,, | Performed by: ORTHOPAEDIC SURGERY

## 2018-07-10 PROCEDURE — 73030 X-RAY EXAM OF SHOULDER: CPT | Mod: 26,RT,, | Performed by: RADIOLOGY

## 2018-07-10 RX ORDER — ACETAMINOPHEN AND CODEINE PHOSPHATE 300; 30 MG/1; MG/1
1 TABLET ORAL EVERY 8 HOURS PRN
Qty: 40 TABLET | Refills: 0 | Status: SHIPPED | OUTPATIENT
Start: 2018-07-10 | End: 2018-07-10 | Stop reason: SDUPTHER

## 2018-07-10 RX ORDER — ACETAMINOPHEN AND CODEINE PHOSPHATE 300; 30 MG/1; MG/1
1 TABLET ORAL EVERY 8 HOURS PRN
Qty: 21 TABLET | Refills: 0 | Status: SHIPPED | OUTPATIENT
Start: 2018-07-10 | End: 2018-07-18

## 2018-07-10 NOTE — TELEPHONE ENCOUNTER
----- Message from Sarah Martinze sent at 7/10/2018 12:10 PM CDT -----  Good Afternoon,    The attached patient is shared by you and Dr. Olivera.     The patient is scheduled for an arthroscopic shoulder rotator cuff repair on 7/23/2018. She is scheduled to see you on 7/18. The anesthesia team will require documentation in the chart regarding her clearance status and perioperative medical recommendations. Would you mind placing that in her visit note for that day?     Thank you for your help & let me know if I can assist in any way!    Sarah Martinez MA, [facility]  Sports Medicine Assistant to Dr. Olivera

## 2018-07-10 NOTE — TELEPHONE ENCOUNTER
----- Message from Sarah Martinez sent at 7/10/2018 12:10 PM CDT -----  Good Afternoon,    The attached patient is shared by you and Dr. Olivera.     The patient is scheduled for an arthroscopic shoulder rotator cuff repair on 7/23/2018. She is scheduled to see you on 7/18. The anesthesia team will require documentation in the chart regarding her clearance status and perioperative medical recommendations. Would you mind placing that in her visit note for that day?     Thank you for your help & let me know if I can assist in any way!    Sraah Martinez MA, [facility]  Sports Medicine Assistant to Dr. Olivera

## 2018-07-10 NOTE — TELEPHONE ENCOUNTER
Patient is scheduled to see you on 7/18 for a preop clearance.  She is scheduled for rotator cuff surgery on 7/23. The anesthesia team will require documentation in the chart regarding her clearance status and perioperative medical recommendations. Thanks

## 2018-07-12 DIAGNOSIS — M75.21 BICEPS TENDINITIS OF RIGHT UPPER EXTREMITY: ICD-10-CM

## 2018-07-12 DIAGNOSIS — M19.011 ARTHRITIS OF RIGHT ACROMIOCLAVICULAR JOINT: ICD-10-CM

## 2018-07-12 DIAGNOSIS — M75.101 NONTRAUMATIC TEAR OF RIGHT ROTATOR CUFF: Primary | ICD-10-CM

## 2018-07-12 DIAGNOSIS — M19.011 OSTEOARTHRITIS OF RIGHT SHOULDER, UNSPECIFIED OSTEOARTHRITIS TYPE: ICD-10-CM

## 2018-07-17 ENCOUNTER — OFFICE VISIT (OUTPATIENT)
Dept: SPORTS MEDICINE | Facility: CLINIC | Age: 68
End: 2018-07-17
Payer: COMMERCIAL

## 2018-07-17 ENCOUNTER — PATIENT OUTREACH (OUTPATIENT)
Dept: ADMINISTRATIVE | Facility: HOSPITAL | Age: 68
End: 2018-07-17

## 2018-07-17 VITALS
HEART RATE: 75 BPM | DIASTOLIC BLOOD PRESSURE: 88 MMHG | SYSTOLIC BLOOD PRESSURE: 131 MMHG | BODY MASS INDEX: 33.32 KG/M2 | HEIGHT: 65 IN | WEIGHT: 200 LBS

## 2018-07-17 DIAGNOSIS — M75.21 BICEPS TENDINITIS OF RIGHT UPPER EXTREMITY: ICD-10-CM

## 2018-07-17 DIAGNOSIS — M19.011 OSTEOARTHRITIS OF RIGHT SHOULDER, UNSPECIFIED OSTEOARTHRITIS TYPE: ICD-10-CM

## 2018-07-17 DIAGNOSIS — M75.21 BICEPS TENDINITIS OF RIGHT SHOULDER: ICD-10-CM

## 2018-07-17 DIAGNOSIS — M75.111 INCOMPLETE TEAR OF RIGHT ROTATOR CUFF: Primary | ICD-10-CM

## 2018-07-17 PROCEDURE — 99499 UNLISTED E&M SERVICE: CPT | Mod: S$GLB,,, | Performed by: PHYSICIAN ASSISTANT

## 2018-07-17 PROCEDURE — 99999 PR PBB SHADOW E&M-EST. PATIENT-LVL III: CPT | Mod: PBBFAC,,, | Performed by: PHYSICIAN ASSISTANT

## 2018-07-17 RX ORDER — ONDANSETRON 4 MG/1
4 TABLET, FILM COATED ORAL EVERY 8 HOURS PRN
Qty: 30 TABLET | Refills: 0 | Status: SHIPPED | OUTPATIENT
Start: 2018-07-17 | End: 2018-08-02

## 2018-07-17 RX ORDER — OXYCODONE HYDROCHLORIDE 5 MG/1
TABLET ORAL
Qty: 42 TABLET | Refills: 0 | Status: SHIPPED | OUTPATIENT
Start: 2018-07-17 | End: 2018-11-08 | Stop reason: ALTCHOICE

## 2018-07-17 RX ORDER — SODIUM CHLORIDE 0.9 % (FLUSH) 0.9 %
5 SYRINGE (ML) INJECTION
Status: CANCELLED | OUTPATIENT
Start: 2018-07-17

## 2018-07-17 RX ORDER — ASPIRIN 325 MG
325 TABLET ORAL EVERY 12 HOURS
Qty: 28 TABLET | Refills: 0 | Status: SHIPPED | OUTPATIENT
Start: 2018-07-17 | End: 2019-03-27

## 2018-07-17 RX ORDER — MUPIROCIN 20 MG/G
OINTMENT TOPICAL
Status: CANCELLED | OUTPATIENT
Start: 2018-07-17

## 2018-07-17 NOTE — H&P
Estephania Lyman  is here for a completion of her perioperative paperwork. she  Is scheduled to undergo right shoulder arthroscopic rotator cuff repair, extensive debridement, subacromial decompression, distal clavicle excision, and biceps tenotomy on 7/23/2018.      She  does need clearance for this procedure. She has appt with PCP 7/18, Per PCP,     Risks, indications and benefits of the surgical procedure were discussed with the patient. All questions with regard to surgery, rehab, expected return to functional activities, activities of daily living and recreational endeavors were answered to her satisfaction.    Patient was informed and understands the risks of surgery are greater for patients with a current condition or hx of heart disease, obesity, clotting disorders, recurrent infections, steroid use, current or past smoking, and factors such as sedentary lifestyle and noncompliance with medications, therapy or f/u. The degree of the increased risk is hard to estimate w/ any degree of precision.    Once no other questions were asked, a brief history and physical exam was then performed.    PAST MEDICAL HISTORY:   Past Medical History:   Diagnosis Date    GERD (gastroesophageal reflux disease)     High cholesterol     Hyperlipidemia     Hypertension     Sarcoidosis      PAST SURGICAL HISTORY:   Past Surgical History:   Procedure Laterality Date    ANKLE FRACTURE SURGERY      COLONOSCOPY N/A 5/30/2017    Procedure: COLONOSCOPY;  Surgeon: Jed Garcia MD;  Location: [facility] ([address] [place] [address]);  Service: Endoscopy;  Laterality: N/A;    HYSTERECTOMY  age 39    endo    LEG SURGERY      tibia    OOPHORECTOMY  age 39    endo    TONSILLECTOMY       FAMILY HISTORY:   Family History   Problem Relation Age of Onset    Cancer Mother         colon    Cancer Father         throat    Hypertension Sister     Breast cancer Sister     Hypertension Brother     Diabetes Sister     Hypertension Sister      Stroke Neg Hx     Ovarian cancer Neg Hx     Miscarriages / Stillbirths Neg Hx     Eclampsia Neg Hx      SOCIAL HISTORY:   Social History     Social History    Marital status:      Spouse name: N/A    Number of children: N/A    Years of education: N/A     Occupational History     [employer]     Social History Main Topics    Smoking status: Former Smoker     Packs/day: 1.00     Years: 3.00     Types: Cigarettes     Quit date: 7/9/2010    Smokeless tobacco: Never Used    Alcohol use No    Drug use: No    Sexual activity: Yes     Partners: Male     Birth control/ protection: Surgical      Comment: Hysterectomy     Other Topics Concern    Not on file     Social History Narrative    No narrative on file       MEDICATIONS:   Current Outpatient Prescriptions:     acetaminophen-codeine 300-30mg (TYLENOL #3) 300-30 mg Tab, Take 1 tablet by mouth every 8 (eight) hours as needed., Disp: 21 tablet, Rfl: 0    alendronate (FOSAMAX) 70 MG tablet, Take 1 tablet (70 mg total) by mouth every 7 days., Disp: 12 tablet, Rfl: 3    amLODIPine (NORVASC) 10 MG tablet, Take 1 tablet (10 mg total) by mouth once daily., Disp: 90 tablet, Rfl: 3    amLODIPine (NORVASC) 10 MG tablet, TAKE 1 TABLET (10 MG TOTAL) BY MOUTH ONCE DAILY., Disp: 90 tablet, Rfl: 0    citalopram (CELEXA) 20 MG tablet, Take 1 tablet (20 mg total) by mouth once daily., Disp: 90 tablet, Rfl: 3    ondansetron (ZOFRAN) 4 MG tablet, Take 1 tablet (4 mg total) by mouth every 8 (eight) hours as needed for Nausea., Disp: 30 tablet, Rfl: 0    pantoprazole (PROTONIX) 40 MG tablet, TAKE 1 TABLET BY MOUTH EVERY DAY FOR ACID REFLUX, Disp: 90 tablet, Rfl: 3    pravastatin (PRAVACHOL) 20 MG tablet, Take 1 tablet (20 mg total) by mouth once daily., Disp: 90 tablet, Rfl: 3  ALLERGIES:   Review of patient's allergies indicates:   Allergen Reactions    Penicillins      Other reaction(s): Unknown    Sulfa (sulfonamide antibiotics)      Other  reaction(s): Rash       Review of Systems   Constitution: Negative. Negative for chills, fever and night sweats.   HENT: Negative for congestion and headaches.    Eyes: Negative for blurred vision, left vision loss and right vision loss.   Cardiovascular: Negative for chest pain and syncope.   Respiratory: Negative for cough and shortness of breath.    Endocrine: Negative for polydipsia, polyphagia and polyuria.   Hematologic/Lymphatic: Negative for bleeding problem. Does not bruise/bleed easily.   Skin: Negative for dry skin, itching and rash.   Musculoskeletal: Negative for falls and muscle weakness.   Gastrointestinal: Negative for abdominal pain and bowel incontinence.   Genitourinary: Negative for bladder incontinence and nocturia.   Neurological: Negative for disturbances in coordination, loss of balance and seizures.   Psychiatric/Behavioral: Negative for depression. The patient does not have insomnia.    Allergic/Immunologic: Negative for hives and persistent infections.     PHYSICAL EXAM:  GEN: A&Ox3, WD WN NAD  HEENT: WNL  CHEST: CTAB, no W/R/R  HEART: RRR, no M/R/G   ABD: Soft, NT ND, BS x4 QUADS  MS: Refer to previous note for detailed MS exam  NEURO: CN II-XII intact       The surgical consent was then reviewed with the patient, who agreed with all the contents of the consent form and it was signed.     PHYSICAL THERAPY:  She was also instructed regarding physical therapy and will begin on POD#1-3. She was given a copy of the original prescription to schedule. Another copy of this prescription was also faxed to [facility].    POST OP CARE: Instructions were reviewed including care of the wound and dressing after surgery and when she can shower.     PAIN MANAGEMENT: Estephania Lyman was instructed regarding the Polar ice unit that will be in place after surgery and her postoperative pain medications.     MEDICATION:  Roxicodone 5 mg 1-2 q 4 hours PRN for pain  Zofran 4 mg q 8 hours PRN  for nausea and vomiting.  Aspirin 325mg BID x 2 weeks for DVT prophylaxis starting on the evening after surgery.    Patient was instructed to purchase and take Colace to counter possible GI side effects of taking opiates.     DVT prophylaxis was discussed with the patient today including risk factors for developing DVTs and history of DVTs. The patient was asked if any specific recommendations were given from the doctor/s that did pre-operative surgical clearance.      If the patient was previously taking 81mg baby aspirin, they were told to not take it will using the above stated aspirin and to restart the 81mg aspirin after completion of the aspirin dose.      Patient was also told to buy over the counter Prilosec medication and take it once daily for GI protection as long as they are taking NSAIDs or Aspirin.     The patient was told that narcotic pain medications may make them drowsy and instructions were given to not sign legal documents, drive or operate heavy machinery, cars, or equipment while under the influence of narcotic medications.     Dr. Olivera was present in clinic during this pre-op evaluation. The patient was offered the opportunity to ask Dr. Olivera any further questions regarding the procedure which may not have been addressed during their previous informed consent discussion. The patient has declined to see Dr. Olivera today.    As there were no other questions to be asked, she was given my business card along with Dr. Olivera's business card if she has any questions or concerns prior to surgery or in the postop period.

## 2018-07-17 NOTE — PROGRESS NOTES
Dear Estephania Lyman,        Our records indicate that you are due for a mammogram.    In the [place], one in nine women will develop breast cancer during their lifetime. While there is no way to prevent breast cancer, early detection provides the best opportunity for curing it.    For women over the age of 40, the American Cancer Society recommends a yearly clinical breast exam and a yearly mammogram. These practices have saved thousands of lives. We need your help to ensure that you are receiving optimal medical care.    Please make an appointment for a mammogram at your earliest convenience.    Sincerely,    KALEB Yadav  Clinical Care Coordinator  Internal Medicine  [facility] [place]

## 2018-07-18 ENCOUNTER — OFFICE VISIT (OUTPATIENT)
Dept: FAMILY MEDICINE | Facility: CLINIC | Age: 68
End: 2018-07-18
Attending: FAMILY MEDICINE
Payer: COMMERCIAL

## 2018-07-18 ENCOUNTER — HOSPITAL ENCOUNTER (OUTPATIENT)
Dept: RADIOLOGY | Facility: OTHER | Age: 68
Discharge: HOME | End: 2018-07-18
Attending: FAMILY MEDICINE
Payer: COMMERCIAL

## 2018-07-18 ENCOUNTER — HOSPITAL ENCOUNTER (OUTPATIENT)
Dept: CARDIOLOGY | Facility: OTHER | Age: 68
Discharge: HOME | End: 2018-07-18
Attending: FAMILY MEDICINE
Payer: COMMERCIAL

## 2018-07-18 VITALS
HEIGHT: 65 IN | OXYGEN SATURATION: 98 % | BODY MASS INDEX: 32.55 KG/M2 | HEART RATE: 67 BPM | SYSTOLIC BLOOD PRESSURE: 122 MMHG | WEIGHT: 195.38 LBS | DIASTOLIC BLOOD PRESSURE: 76 MMHG

## 2018-07-18 DIAGNOSIS — Z01.818 PRE-OP EXAM: Primary | ICD-10-CM

## 2018-07-18 DIAGNOSIS — I10 HTN (HYPERTENSION), BENIGN: ICD-10-CM

## 2018-07-18 DIAGNOSIS — Z12.39 SCREENING FOR BREAST CANCER: ICD-10-CM

## 2018-07-18 DIAGNOSIS — Z01.818 PRE-OP EXAM: ICD-10-CM

## 2018-07-18 DIAGNOSIS — E78.00 HYPERCHOLESTEROLEMIA: ICD-10-CM

## 2018-07-18 LAB
BILIRUB SERPL-MCNC: NEGATIVE MG/DL
BLOOD URINE, POC: ABNORMAL
COLOR, POC UA: YELLOW
GLUCOSE UR QL STRIP: NORMAL
KETONES UR QL STRIP: NEGATIVE
LEUKOCYTE ESTERASE URINE, POC: NEGATIVE
NITRITE, POC UA: NEGATIVE
PH, POC UA: 6
PROTEIN, POC: NEGATIVE
SPECIFIC GRAVITY, POC UA: 1.01
UROBILINOGEN, POC UA: NORMAL

## 2018-07-18 PROCEDURE — 93010 ELECTROCARDIOGRAM REPORT: CPT | Mod: ,,, | Performed by: INTERNAL MEDICINE

## 2018-07-18 PROCEDURE — 71046 X-RAY EXAM CHEST 2 VIEWS: CPT | Mod: 26,,, | Performed by: RADIOLOGY

## 2018-07-18 PROCEDURE — 99999 PR PBB SHADOW E&M-EST. PATIENT-LVL III: CPT | Mod: PBBFAC,,, | Performed by: FAMILY MEDICINE

## 2018-07-18 PROCEDURE — 3078F DIAST BP <80 MM HG: CPT | Mod: CPTII,S$GLB,, | Performed by: FAMILY MEDICINE

## 2018-07-18 PROCEDURE — 99214 OFFICE O/P EST MOD 30 MIN: CPT | Mod: 25,S$GLB,, | Performed by: FAMILY MEDICINE

## 2018-07-18 PROCEDURE — 71046 X-RAY EXAM CHEST 2 VIEWS: CPT | Mod: TC,FY

## 2018-07-18 PROCEDURE — 3074F SYST BP LT 130 MM HG: CPT | Mod: CPTII,S$GLB,, | Performed by: FAMILY MEDICINE

## 2018-07-18 PROCEDURE — 93005 ELECTROCARDIOGRAM TRACING: CPT

## 2018-07-18 PROCEDURE — 81001 URINALYSIS AUTO W/SCOPE: CPT | Mod: S$GLB,,, | Performed by: FAMILY MEDICINE

## 2018-07-18 NOTE — PROGRESS NOTES
Subjective:       Patient ID: Estephania Lyman is a 67 y.o. female.    Chief Complaint: Pre-op Exam    HPI   Pt is here for pre op clearance for orthopedic surgery pt is otherwise well stable htn no sob/cp on norvasc and statin for hypercholesterolemia and vascular protection  Pt bp fine today   Review of Systems   Constitutional: Negative for activity change, chills, diaphoresis, fatigue and fever.   HENT: Negative for congestion, ear discharge, ear pain, hearing loss, postnasal drip, rhinorrhea, sinus pressure, sneezing, sore throat, trouble swallowing and voice change.    Eyes: Negative for photophobia, discharge, redness, itching and visual disturbance.   Respiratory: Negative for cough, chest tightness, shortness of breath and wheezing.    Cardiovascular: Negative for chest pain, palpitations and leg swelling.   Gastrointestinal: Negative for abdominal pain, anal bleeding, blood in stool, constipation, diarrhea, nausea, rectal pain and vomiting.   Genitourinary: Negative for dyspareunia, dysuria, flank pain, frequency, hematuria, menstrual problem, pelvic pain, urgency, vaginal bleeding and vaginal discharge.   Musculoskeletal: Positive for arthralgias. Negative for back pain, joint swelling and neck pain.   Skin: Negative for color change and rash.   Neurological: Negative for dizziness, speech difficulty, weakness, light-headedness, numbness and headaches.   Hematological: Does not bruise/bleed easily.   Psychiatric/Behavioral: Negative for agitation, confusion, decreased concentration, sleep disturbance and suicidal ideas. The patient is not nervous/anxious.        Objective:      Physical Exam   Constitutional: She appears well-developed and well-nourished.   HENT:   Head: Normocephalic and atraumatic.   Right Ear: External ear normal.   Left Ear: External ear normal.   Nose: Nose normal.   Mouth/Throat: Oropharynx is clear and moist.   Eyes: Conjunctivae and EOM are normal. Pupils are equal, round, and  "reactive to light. Right eye exhibits no discharge. Left eye exhibits no discharge.   Neck: Normal range of motion. Neck supple. No thyromegaly present.   Cardiovascular: Normal rate, regular rhythm and intact distal pulses.  Exam reveals no gallop and no friction rub.    Pulmonary/Chest: Effort normal and breath sounds normal. She has no wheezes. She has no rales.   Abdominal: Soft. Bowel sounds are normal. She exhibits no distension. There is no tenderness. There is no rebound and no guarding.   Musculoskeletal: She exhibits no edema, tenderness or deformity.   Lymphadenopathy:     She has no cervical adenopathy.   Neurological: She is alert. No cranial nerve deficit. She exhibits normal muscle tone. Coordination normal.   Skin: Skin is warm and dry. No rash noted. No erythema.   Psychiatric: She has a normal mood and affect. Her behavior is normal. Judgment and thought content normal.       Assessment:       1. Pre-op exam    2. Screening for breast cancer    3. HTN (hypertension), benign    4. Hypercholesterolemia        Plan:     orders cmp cbc lipid tsh urine ekg cxr  F/u ortho  Cont meds  Low fat low salt diet  Graded exercise per ortho rehab   Clearance upon review of labs and studies       pt labs and studies reviewed pt is cleared for elective ortho surgery    "This note will not be shared with the patient."   "

## 2018-07-22 ENCOUNTER — ANESTHESIA EVENT (OUTPATIENT)
Dept: SURGERY | Facility: HOSPITAL | Age: 68
End: 2018-07-22
Payer: COMMERCIAL

## 2018-07-23 ENCOUNTER — ANESTHESIA (OUTPATIENT)
Dept: SURGERY | Facility: HOSPITAL | Age: 68
End: 2018-07-23
Payer: COMMERCIAL

## 2018-07-23 ENCOUNTER — HOSPITAL ENCOUNTER (OUTPATIENT)
Facility: HOSPITAL | Age: 68
Discharge: HOME | End: 2018-07-23
Attending: ORTHOPAEDIC SURGERY | Admitting: ORTHOPAEDIC SURGERY
Payer: COMMERCIAL

## 2018-07-23 VITALS
OXYGEN SATURATION: 96 % | HEART RATE: 75 BPM | WEIGHT: 195 LBS | SYSTOLIC BLOOD PRESSURE: 94 MMHG | BODY MASS INDEX: 33.29 KG/M2 | TEMPERATURE: 98 F | RESPIRATION RATE: 16 BRPM | HEIGHT: 64 IN | DIASTOLIC BLOOD PRESSURE: 57 MMHG

## 2018-07-23 DIAGNOSIS — M19.011 OSTEOARTHRITIS OF RIGHT SHOULDER, UNSPECIFIED OSTEOARTHRITIS TYPE: ICD-10-CM

## 2018-07-23 DIAGNOSIS — M75.21 BICEPS TENDINITIS OF RIGHT SHOULDER: ICD-10-CM

## 2018-07-23 DIAGNOSIS — M75.111 INCOMPLETE TEAR OF RIGHT ROTATOR CUFF: ICD-10-CM

## 2018-07-23 DIAGNOSIS — M75.21 BICEPS TENDINITIS OF RIGHT UPPER EXTREMITY: ICD-10-CM

## 2018-07-23 PROCEDURE — 25000003 PHARM REV CODE 250: Performed by: STUDENT IN AN ORGANIZED HEALTH CARE EDUCATION/TRAINING PROGRAM

## 2018-07-23 PROCEDURE — 29826 SHO ARTHRS SRG DECOMPRESSION: CPT | Mod: RT,,, | Performed by: ORTHOPAEDIC SURGERY

## 2018-07-23 PROCEDURE — 63600175 PHARM REV CODE 636 W HCPCS: Performed by: PHYSICIAN ASSISTANT

## 2018-07-23 PROCEDURE — 36000710: Performed by: ORTHOPAEDIC SURGERY

## 2018-07-23 PROCEDURE — 29824 SHO ARTHRS SRG DSTL CLAVICLC: CPT | Mod: 51,RT,, | Performed by: ORTHOPAEDIC SURGERY

## 2018-07-23 PROCEDURE — 64416 NJX AA&/STRD BRCH PL NFS IMG: CPT | Performed by: ANESTHESIOLOGY

## 2018-07-23 PROCEDURE — 71000015 HC POSTOP RECOV 1ST HR: Performed by: ORTHOPAEDIC SURGERY

## 2018-07-23 PROCEDURE — 27201423 OPTIME MED/SURG SUP & DEVICES STERILE SUPPLY: Performed by: ORTHOPAEDIC SURGERY

## 2018-07-23 PROCEDURE — 36000711: Performed by: ORTHOPAEDIC SURGERY

## 2018-07-23 PROCEDURE — 27200664 HC NERVE BLOCK COMPLETE KIT: Performed by: ANESTHESIOLOGY

## 2018-07-23 PROCEDURE — 63600175 PHARM REV CODE 636 W HCPCS: Performed by: STUDENT IN AN ORGANIZED HEALTH CARE EDUCATION/TRAINING PROGRAM

## 2018-07-23 PROCEDURE — 29827 SHO ARTHRS SRG RT8TR CUF RPR: CPT | Mod: RT,,, | Performed by: ORTHOPAEDIC SURGERY

## 2018-07-23 PROCEDURE — 37000008 HC ANESTHESIA 1ST 15 MINUTES: Performed by: ORTHOPAEDIC SURGERY

## 2018-07-23 PROCEDURE — 63600175 PHARM REV CODE 636 W HCPCS

## 2018-07-23 PROCEDURE — 63600175 PHARM REV CODE 636 W HCPCS: Performed by: ORTHOPAEDIC SURGERY

## 2018-07-23 PROCEDURE — 29828 SHO ARTHRS SRG BICP TENODSIS: CPT | Mod: 51,RT,, | Performed by: ORTHOPAEDIC SURGERY

## 2018-07-23 PROCEDURE — 71000045 HC DOSC ROUTINE RECOVERY EA ADD'L HR: Performed by: ORTHOPAEDIC SURGERY

## 2018-07-23 PROCEDURE — D9220A PRA ANESTHESIA: Mod: ,,, | Performed by: ANESTHESIOLOGY

## 2018-07-23 PROCEDURE — 25000003 PHARM REV CODE 250: Performed by: PHYSICIAN ASSISTANT

## 2018-07-23 PROCEDURE — 71000044 HC DOSC ROUTINE RECOVERY FIRST HOUR: Performed by: ORTHOPAEDIC SURGERY

## 2018-07-23 PROCEDURE — 37000009 HC ANESTHESIA EA ADD 15 MINS: Performed by: ORTHOPAEDIC SURGERY

## 2018-07-23 PROCEDURE — C1713 ANCHOR/SCREW BN/BN,TIS/BN: HCPCS | Performed by: ORTHOPAEDIC SURGERY

## 2018-07-23 PROCEDURE — 76942 ECHO GUIDE FOR BIOPSY: CPT | Mod: 26,,, | Performed by: ANESTHESIOLOGY

## 2018-07-23 PROCEDURE — 29823 SHO ARTHRS SRG XTNSV DBRDMT: CPT | Mod: 51,RT,, | Performed by: ORTHOPAEDIC SURGERY

## 2018-07-23 PROCEDURE — 63600175 PHARM REV CODE 636 W HCPCS: Performed by: ANESTHESIOLOGY

## 2018-07-23 DEVICE — ANCHOR SUT 6.25X19.1MM: Type: IMPLANTABLE DEVICE | Site: SHOULDER | Status: FUNCTIONAL

## 2018-07-23 DEVICE — ANCHOR BIOCOMP W/3 SUTURES: Type: IMPLANTABLE DEVICE | Site: SHOULDER | Status: FUNCTIONAL

## 2018-07-23 DEVICE — ANCHOR SUT BIOCOM 5.5X19.1MM: Type: IMPLANTABLE DEVICE | Site: SHOULDER | Status: FUNCTIONAL

## 2018-07-23 RX ORDER — GLYCOPYRROLATE 0.2 MG/ML
INJECTION INTRAMUSCULAR; INTRAVENOUS
Status: DISCONTINUED | OUTPATIENT
Start: 2018-07-23 | End: 2018-07-23

## 2018-07-23 RX ORDER — PROPOFOL 10 MG/ML
VIAL (ML) INTRAVENOUS
Status: DISCONTINUED | OUTPATIENT
Start: 2018-07-23 | End: 2018-07-23

## 2018-07-23 RX ORDER — ONDANSETRON 2 MG/ML
INJECTION INTRAMUSCULAR; INTRAVENOUS
Status: DISCONTINUED | OUTPATIENT
Start: 2018-07-23 | End: 2018-07-23

## 2018-07-23 RX ORDER — EPINEPHRINE 1 MG/ML
INJECTION INTRAMUSCULAR; INTRAVENOUS; SUBCUTANEOUS
Status: DISCONTINUED
Start: 2018-07-23 | End: 2018-07-23 | Stop reason: HOSPADM

## 2018-07-23 RX ORDER — SODIUM CHLORIDE 0.9 % (FLUSH) 0.9 %
3 SYRINGE (ML) INJECTION
Status: DISCONTINUED | OUTPATIENT
Start: 2018-07-23 | End: 2018-07-23 | Stop reason: HOSPADM

## 2018-07-23 RX ORDER — HYDROCODONE BITARTRATE AND ACETAMINOPHEN 5; 325 MG/1; MG/1
1 TABLET ORAL EVERY 4 HOURS PRN
Status: DISCONTINUED | OUTPATIENT
Start: 2018-07-23 | End: 2018-07-23 | Stop reason: HOSPADM

## 2018-07-23 RX ORDER — SODIUM CHLORIDE 0.9 % (FLUSH) 0.9 %
5 SYRINGE (ML) INJECTION
Status: DISCONTINUED | OUTPATIENT
Start: 2018-07-23 | End: 2018-07-23 | Stop reason: HOSPADM

## 2018-07-23 RX ORDER — DEXAMETHASONE SODIUM PHOSPHATE 4 MG/ML
INJECTION, SOLUTION INTRA-ARTICULAR; INTRALESIONAL; INTRAMUSCULAR; INTRAVENOUS; SOFT TISSUE
Status: DISCONTINUED | OUTPATIENT
Start: 2018-07-23 | End: 2018-07-23

## 2018-07-23 RX ORDER — EPINEPHRINE 1 MG/ML
INJECTION INTRAMUSCULAR; INTRAVENOUS; SUBCUTANEOUS
Status: DISCONTINUED | OUTPATIENT
Start: 2018-07-23 | End: 2018-07-23 | Stop reason: HOSPADM

## 2018-07-23 RX ORDER — LIDOCAINE HYDROCHLORIDE 10 MG/ML
INJECTION, SOLUTION EPIDURAL; INFILTRATION; INTRACAUDAL; PERINEURAL
Status: DISCONTINUED
Start: 2018-07-23 | End: 2018-07-23 | Stop reason: HOSPADM

## 2018-07-23 RX ORDER — FENTANYL CITRATE 50 UG/ML
25 INJECTION, SOLUTION INTRAMUSCULAR; INTRAVENOUS EVERY 5 MIN PRN
Status: DISCONTINUED | OUTPATIENT
Start: 2018-07-23 | End: 2018-07-23 | Stop reason: HOSPADM

## 2018-07-23 RX ORDER — HYDROCODONE BITARTRATE AND ACETAMINOPHEN 10; 325 MG/1; MG/1
1 TABLET ORAL EVERY 4 HOURS PRN
Status: DISCONTINUED | OUTPATIENT
Start: 2018-07-23 | End: 2018-07-23 | Stop reason: HOSPADM

## 2018-07-23 RX ORDER — SODIUM CHLORIDE 9 MG/ML
INJECTION, SOLUTION INTRAVENOUS CONTINUOUS PRN
Status: DISCONTINUED | OUTPATIENT
Start: 2018-07-23 | End: 2018-07-23

## 2018-07-23 RX ORDER — ROCURONIUM BROMIDE 10 MG/ML
INJECTION, SOLUTION INTRAVENOUS
Status: DISCONTINUED | OUTPATIENT
Start: 2018-07-23 | End: 2018-07-23

## 2018-07-23 RX ORDER — MIDAZOLAM HYDROCHLORIDE 1 MG/ML
INJECTION INTRAMUSCULAR; INTRAVENOUS
Status: COMPLETED
Start: 2018-07-23 | End: 2018-07-23

## 2018-07-23 RX ORDER — BUPIVACAINE HYDROCHLORIDE AND EPINEPHRINE 5; 5 MG/ML; UG/ML
INJECTION, SOLUTION EPIDURAL; INTRACAUDAL; PERINEURAL
Status: DISCONTINUED
Start: 2018-07-23 | End: 2018-07-23 | Stop reason: WASHOUT

## 2018-07-23 RX ORDER — ONDANSETRON 8 MG/1
8 TABLET, ORALLY DISINTEGRATING ORAL EVERY 8 HOURS PRN
Status: DISCONTINUED | OUTPATIENT
Start: 2018-07-23 | End: 2018-07-23 | Stop reason: HOSPADM

## 2018-07-23 RX ORDER — KETAMINE HCL IN 0.9 % NACL 50 MG/5 ML
SYRINGE (ML) INTRAVENOUS
Status: DISCONTINUED | OUTPATIENT
Start: 2018-07-23 | End: 2018-07-23

## 2018-07-23 RX ORDER — MUPIROCIN 20 MG/G
OINTMENT TOPICAL
Status: DISCONTINUED | OUTPATIENT
Start: 2018-07-23 | End: 2018-07-23 | Stop reason: HOSPADM

## 2018-07-23 RX ORDER — PHENYLEPHRINE HYDROCHLORIDE 10 MG/ML
INJECTION INTRAVENOUS
Status: DISCONTINUED | OUTPATIENT
Start: 2018-07-23 | End: 2018-07-23

## 2018-07-23 RX ORDER — FENTANYL CITRATE 50 UG/ML
INJECTION, SOLUTION INTRAMUSCULAR; INTRAVENOUS
Status: COMPLETED
Start: 2018-07-23 | End: 2018-07-23

## 2018-07-23 RX ORDER — LIDOCAINE HCL/PF 100 MG/5ML
SYRINGE (ML) INTRAVENOUS
Status: DISCONTINUED | OUTPATIENT
Start: 2018-07-23 | End: 2018-07-23

## 2018-07-23 RX ORDER — MIDAZOLAM HYDROCHLORIDE 1 MG/ML
0.5 INJECTION INTRAMUSCULAR; INTRAVENOUS
Status: DISCONTINUED | OUTPATIENT
Start: 2018-07-23 | End: 2018-07-23 | Stop reason: HOSPADM

## 2018-07-23 RX ORDER — ACETAMINOPHEN 10 MG/ML
INJECTION, SOLUTION INTRAVENOUS
Status: DISCONTINUED | OUTPATIENT
Start: 2018-07-23 | End: 2018-07-23

## 2018-07-23 RX ORDER — NEOSTIGMINE METHYLSULFATE 1 MG/ML
INJECTION, SOLUTION INTRAVENOUS
Status: DISCONTINUED | OUTPATIENT
Start: 2018-07-23 | End: 2018-07-23

## 2018-07-23 RX ADMIN — LIDOCAINE HYDROCHLORIDE 100 MG: 20 INJECTION, SOLUTION INTRAVENOUS at 07:07

## 2018-07-23 RX ADMIN — PHENYLEPHRINE HYDROCHLORIDE 100 MCG: 10 INJECTION INTRAVENOUS at 08:07

## 2018-07-23 RX ADMIN — PROPOFOL 150 MG: 10 INJECTION, EMULSION INTRAVENOUS at 07:07

## 2018-07-23 RX ADMIN — ROCURONIUM BROMIDE 50 MG: 10 INJECTION, SOLUTION INTRAVENOUS at 07:07

## 2018-07-23 RX ADMIN — PHENYLEPHRINE HYDROCHLORIDE 100 MCG: 10 INJECTION INTRAVENOUS at 07:07

## 2018-07-23 RX ADMIN — Medication: at 10:07

## 2018-07-23 RX ADMIN — PROPOFOL 50 MG: 10 INJECTION, EMULSION INTRAVENOUS at 09:07

## 2018-07-23 RX ADMIN — DEXAMETHASONE SODIUM PHOSPHATE 4 MG: 4 INJECTION, SOLUTION INTRAMUSCULAR; INTRAVENOUS at 07:07

## 2018-07-23 RX ADMIN — GLYCOPYRROLATE 0.4 MG: 0.2 INJECTION INTRAMUSCULAR; INTRAVENOUS at 09:07

## 2018-07-23 RX ADMIN — SODIUM CHLORIDE: 0.9 INJECTION, SOLUTION INTRAVENOUS at 07:07

## 2018-07-23 RX ADMIN — VANCOMYCIN HYDROCHLORIDE 1500 MG: 10 INJECTION, POWDER, LYOPHILIZED, FOR SOLUTION INTRAVENOUS at 05:07

## 2018-07-23 RX ADMIN — FENTANYL CITRATE 25 MCG: 50 INJECTION, SOLUTION INTRAMUSCULAR; INTRAVENOUS at 09:07

## 2018-07-23 RX ADMIN — ACETAMINOPHEN 1000 MG: 10 INJECTION, SOLUTION INTRAVENOUS at 07:07

## 2018-07-23 RX ADMIN — MIDAZOLAM HYDROCHLORIDE 2 MG: 1 INJECTION INTRAMUSCULAR; INTRAVENOUS at 06:07

## 2018-07-23 RX ADMIN — FENTANYL CITRATE 25 MCG: 50 INJECTION, SOLUTION INTRAMUSCULAR; INTRAVENOUS at 06:07

## 2018-07-23 RX ADMIN — MUPIROCIN: 20 OINTMENT TOPICAL at 05:07

## 2018-07-23 RX ADMIN — NEOSTIGMINE METHYLSULFATE 3 MG: 1 INJECTION INTRAVENOUS at 09:07

## 2018-07-23 RX ADMIN — Medication 30 MG: at 07:07

## 2018-07-23 RX ADMIN — ONDANSETRON 4 MG: 2 INJECTION INTRAMUSCULAR; INTRAVENOUS at 09:07

## 2018-07-23 RX ADMIN — SODIUM CHLORIDE, SODIUM GLUCONATE, SODIUM ACETATE, POTASSIUM CHLORIDE, MAGNESIUM CHLORIDE, SODIUM PHOSPHATE, DIBASIC, AND POTASSIUM PHOSPHATE: .53; .5; .37; .037; .03; .012; .00082 INJECTION, SOLUTION INTRAVENOUS at 09:07

## 2018-07-23 RX ADMIN — MIDAZOLAM HYDROCHLORIDE 2 MG: 1 INJECTION, SOLUTION INTRAMUSCULAR; INTRAVENOUS at 06:07

## 2018-07-23 RX ADMIN — FENTANYL CITRATE 50 MCG: 50 INJECTION, SOLUTION INTRAMUSCULAR; INTRAVENOUS at 07:07

## 2018-07-23 RX ADMIN — FENTANYL CITRATE 25 MCG: 50 INJECTION INTRAMUSCULAR; INTRAVENOUS at 06:07

## 2018-07-23 NOTE — DISCHARGE INSTRUCTIONS

## 2018-07-23 NOTE — TRANSFER OF CARE
"Anesthesia Transfer of Care Note    Patient: Estephania Lyman    Procedure(s) Performed: Procedure(s) (LRB):  REPAIR, ROTATOR CUFF, ARTHROSCOPIC (Right)  ARTHROSCOPY, SHOULDER, WITH DISTAL CLAVICLE EXCISION (Right)  FIXATION, TENDON (Right)  ARTHROSCOPY, SHOULDER, WITH SUBACROMIAL SPACE DECOMPRESSION (Right)    Patient location: PACU    Anesthesia Type: general    Transport from OR: Transported from OR on 6-10 L/min O2 by face mask with adequate spontaneous ventilation    Post pain: adequate analgesia    Post assessment: no apparent anesthetic complications    Post vital signs: stable    Level of consciousness: awake    Nausea/Vomiting: no nausea/vomiting    Complications: none    Transfer of care protocol was followed      Last vitals:   Visit Vitals  BP 92/68 (BP Location: Right arm, Patient Position: Lying)   Pulse 69   Temp 36.5 °C (97.7 °F) (Temporal)   Resp 16   Ht 5' 4" (1.626 m)   Wt 88.5 kg (195 lb)   SpO2 97%   Breastfeeding? No   BMI 33.47 kg/m²     "

## 2018-07-23 NOTE — INTERVAL H&P NOTE
The patient has been examined and the H&P has been reviewed:    I concur with the findings and no changes have occurred since H&P was written.    Anesthesia/Surgery risks, benefits and alternative options discussed and understood by patient/family.          Active Hospital Problems    Diagnosis  POA    Incomplete tear of right rotator cuff [M75.111]  Yes      Resolved Hospital Problems    Diagnosis Date Resolved POA   No resolved problems to display.

## 2018-07-23 NOTE — ANESTHESIA POSTPROCEDURE EVALUATION
"Anesthesia Post Evaluation    Patient: Estephania Lyman    Procedure(s) Performed: Procedure(s) (LRB):  REPAIR, ROTATOR CUFF, ARTHROSCOPIC (Right)  ARTHROSCOPY, SHOULDER, WITH DISTAL CLAVICLE EXCISION (Right)  FIXATION, TENDON (Right)  ARTHROSCOPY, SHOULDER, WITH SUBACROMIAL SPACE DECOMPRESSION (Right)    Final Anesthesia Type: general  Patient location during evaluation: PACU  Patient participation: Yes- Able to Participate  Level of consciousness: awake and alert  Post-procedure vital signs: reviewed and stable  Pain management: adequate  Airway patency: patent  PONV status at discharge: No PONV  Anesthetic complications: no      Cardiovascular status: blood pressure returned to baseline  Respiratory status: unassisted, spontaneous ventilation and room air  Hydration status: euvolemic  Follow-up needed (APS)         Visit Vitals  BP (!) 94/57 (BP Location: Right arm, Patient Position: Lying)   Pulse 75   Temp 36.4 °C (97.6 °F) (Temporal)   Resp 16   Ht 5' 4" (1.626 m)   Wt 88.5 kg (195 lb)   SpO2 96%   Breastfeeding? No   BMI 33.47 kg/m²       Pain/Denise Score: Pain Assessment Performed: Yes (7/23/2018 12:20 PM)  Presence of Pain: denies (7/23/2018 12:20 PM)  Pain Rating Prior to Med Admin: 3 (7/23/2018 10:27 AM)  Denise Score: 10 (7/23/2018 12:20 PM)      "

## 2018-07-23 NOTE — ANESTHESIA PREPROCEDURE EVALUATION
[facility]  Anesthesia Pre-Operative Evaluation         Patient Name: Estephania Lyman  YOB: 1950  MRN: [id number]    SUBJECTIVE:     Pre-operative evaluation for Procedure(s) (LRB):  REPAIR, ROTATOR CUFF, ARTHROSCOPIC (Right)  TENOTOMY (Right)  ARTHROSCOPY, SHOULDER, WITH DISTAL CLAVICLE EXCISION (Right)     07/22/2018    Estephania Lyman is a 67 y.o. female w/ a significant PMHx of HTN, HLD, GERD, sarcoidosis who presents for the above procedures.    MRI shoulder shows high grade articular surface tears of the supraspinatus and infraspinatus with tendinosis of the remaining infraspinatus tendon and tendinosis of the subscapularis and biceps tendons.    Prev airway: None documented.        Patient Active Problem List   Diagnosis    HTN (hypertension), benign    Hypercholesterolemia    Hematuria    Abnormal urine    Bilateral impacted cerumen    Osteoarthritis of both hips    Osteopenia    PVC's (premature ventricular contractions)    Paroxysmal ventricular tachycardia    Chest pain at rest    Sarcoid uveitis    Screening for colon cancer    Sialadenitis - left sided    Cellulitis of submandibular region    Depression    History of sarcoidosis    Atypical chest pain    Pre-op exam       Review of patient's allergies indicates:   Allergen Reactions    Penicillins      Other reaction(s): Unknown    Sulfa (sulfonamide antibiotics) Rash     Other reaction(s): Rash       Current Inpatient Medications:      No current facility-administered medications on file prior to encounter.      Current Outpatient Prescriptions on File Prior to Encounter   Medication Sig Dispense Refill    amLODIPine (NORVASC) 10 MG tablet Take 1 tablet (10 mg total) by mouth once daily. 90 tablet 3    citalopram (CELEXA) 20 MG tablet Take 1 tablet (20 mg total) by mouth once daily. 90 tablet 3    pantoprazole (PROTONIX) 40 MG tablet TAKE 1 TABLET BY MOUTH EVERY DAY FOR ACID REFLUX 90  tablet 3    pravastatin (PRAVACHOL) 20 MG tablet Take 1 tablet (20 mg total) by mouth once daily. 90 tablet 3    alendronate (FOSAMAX) 70 MG tablet Take 1 tablet (70 mg total) by mouth every 7 days. 12 tablet 3    ondansetron (ZOFRAN) 4 MG tablet Take 1 tablet (4 mg total) by mouth every 8 (eight) hours as needed for Nausea. 30 tablet 0       Past Surgical History:   Procedure Laterality Date    ANKLE FRACTURE SURGERY      COLONOSCOPY N/A 2017    Procedure: COLONOSCOPY;  Surgeon: Jed Garcia MD;  Location: [facility] ([address] [place] [address]);  Service: Endoscopy;  Laterality: N/A;    HYSTERECTOMY  age 39    endo    LEG SURGERY      tibia    OOPHORECTOMY  age 39    endo    TONSILLECTOMY         Social History     Social History    Marital status:      Spouse name: N/A    Number of children: N/A    Years of education: N/A     Occupational History     [employer]     Social History Main Topics    Smoking status: Former Smoker     Packs/day: 1.00     Years: 3.00     Types: Cigarettes     Quit date: 2010    Smokeless tobacco: Never Used    Alcohol use No    Drug use: No    Sexual activity: Yes     Partners: Male     Birth control/ protection: Surgical      Comment: Hysterectomy     Other Topics Concern    Not on file     Social History Narrative    No narrative on file       OBJECTIVE:     Vital Signs Range (Last 24H):         CBC:   No results for input(s): WBC, RBC, HGB, HCT, PLT, MCV, MCH, MCHC in the last 72 hours.    CMP: No results for input(s): NA, K, CL, CO2, BUN, CREATININE, GLU, MG, PHOS, CALCIUM, ALBUMIN, PROT, ALKPHOS, ALT, AST, BILITOT in the last 72 hours.    INR:  No results for input(s): PT, INR, PROTIME, APTT in the last 72 hours.    Diagnostic Studies: No relevant studies.    EK2018  Vent. Rate : 069 BPM     Atrial Rate : 069 BPM     P-R Int : 164 ms          QRS Dur : 068 ms      QT Int : 420 ms       P-R-T Axes : 069 035 058 degrees     QTc Int : 450  ms    Normal sinus rhythm  Nonspecific T wave abnormality    Confirmed by Junaid Ramírez MD ([id number]) on 7/22/2018 2:21:29 PM    Referred By: MEHDI MARROQUIN           Confirmed By:Junaid Ramírez MD    NM Multi Ttread Stress Test 3/23/2015  Impression: NORMAL MYOCARDIAL PERFUSION  1. The perfusion scan is free of evidence for myocardial ischemia or injury.   2. Resting wall motion is physiologic.   3. Visually estimated LV function is normal.   4. The ventricular volumes are normal at rest and stress.   5. The extracardiac distribution of radioactivity is normal.   6. There was no previous study available to compare.    ASSESSMENT/PLAN:         Anesthesia Evaluation    I have reviewed the Patient Summary Reports.    I have reviewed the Nursing Notes.   I have reviewed the Medications.     Review of Systems  Anesthesia Hx:  No problems with previous Anesthesia Denies Hx of Anesthetic complications  History of prior surgery of interest to airway management or planning: (hysterectomy, ankle fracture surgery)  Denies Personal Hx of Anesthesia complications.   Social:  Former Smoker, No Alcohol Use    Hematology/Oncology:  Hematology Normal   Oncology Normal     EENT/Dental:EENT/Dental Normal   Cardiovascular:   Hypertension    Pulmonary:  Pulmonary Normal    Renal/:  Renal/ Normal     Hepatic/GI:   GERD, well controlled    Musculoskeletal:   Arthritis     Neurological:  Neurology Normal    Endocrine:  Endocrine Normal    Psych:   depression          Physical Exam  General:  Well nourished    Airway/Jaw/Neck:  Airway Findings: Mouth Opening: Normal Tongue: Normal  General Airway Assessment: Adult  Mallampati: III  Improves to II with phonation.  TM Distance: Normal, at least 6 cm      Dental:  Dental Findings: Upper Dentures    Chest/Lungs:  Chest/Lungs Findings: Normal Respiratory Rate, Rhonchi     Heart/Vascular:  Heart Findings: Rate: Normal  Rhythm: Regular Rhythm     Abdomen:  Abdomen Findings:  Normal,  Soft, Nontender     Musculoskeletal:  Musculoskeletal Findings: Normal    Mental Status:  Mental Status Findings:  Cooperative, Alert and Oriented         Anesthesia Plan  Type of Anesthesia, risks & benefits discussed:  Anesthesia Type:  general  Patient's Preference:   Intra-op Monitoring Plan: standard ASA monitors  Intra-op Monitoring Plan Comments:   Post Op Pain Control Plan: multimodal analgesia, IV/PO Opioids PRN and per primary service following discharge from PACU  Post Op Pain Control Plan Comments:   Induction:   IV  Beta Blocker:  Patient is not currently on a Beta-Blocker (No further documentation required).       Informed Consent: Patient understands risks and agrees with Anesthesia plan.  Questions answered. Anesthesia consent signed with patient.  ASA Score: 2     Day of Surgery Review of History & Physical: I have interviewed and examined the patient. I have reviewed the patient's H&P dated:            Ready For Surgery From Anesthesia Perspective.

## 2018-07-23 NOTE — H&P (VIEW-ONLY)
Estephania Lyman  is here for a completion of her perioperative paperwork. she  Is scheduled to undergo right shoulder arthroscopic rotator cuff repair, extensive debridement, subacromial decompression, distal clavicle excision, and biceps tenotomy on 7/23/2018.      She  does need clearance for this procedure. She has appt with PCP 7/18, Per PCP,     Risks, indications and benefits of the surgical procedure were discussed with the patient. All questions with regard to surgery, rehab, expected return to functional activities, activities of daily living and recreational endeavors were answered to her satisfaction.    Patient was informed and understands the risks of surgery are greater for patients with a current condition or hx of heart disease, obesity, clotting disorders, recurrent infections, steroid use, current or past smoking, and factors such as sedentary lifestyle and noncompliance with medications, therapy or f/u. The degree of the increased risk is hard to estimate w/ any degree of precision.    Once no other questions were asked, a brief history and physical exam was then performed.    PAST MEDICAL HISTORY:   Past Medical History:   Diagnosis Date    GERD (gastroesophageal reflux disease)     High cholesterol     Hyperlipidemia     Hypertension     Sarcoidosis      PAST SURGICAL HISTORY:   Past Surgical History:   Procedure Laterality Date    ANKLE FRACTURE SURGERY      COLONOSCOPY N/A 5/30/2017    Procedure: COLONOSCOPY;  Surgeon: Jed Garcia MD;  Location: [facility] ([address] [place] [address]);  Service: Endoscopy;  Laterality: N/A;    HYSTERECTOMY  age 39    endo    LEG SURGERY      tibia    OOPHORECTOMY  age 39    endo    TONSILLECTOMY       FAMILY HISTORY:   Family History   Problem Relation Age of Onset    Cancer Mother         colon    Cancer Father         throat    Hypertension Sister     Breast cancer Sister     Hypertension Brother     Diabetes Sister     Hypertension Sister      Stroke Neg Hx     Ovarian cancer Neg Hx     Miscarriages / Stillbirths Neg Hx     Eclampsia Neg Hx      SOCIAL HISTORY:   Social History     Social History    Marital status:      Spouse name: N/A    Number of children: N/A    Years of education: N/A     Occupational History     [employer]     Social History Main Topics    Smoking status: Former Smoker     Packs/day: 1.00     Years: 3.00     Types: Cigarettes     Quit date: 7/9/2010    Smokeless tobacco: Never Used    Alcohol use No    Drug use: No    Sexual activity: Yes     Partners: Male     Birth control/ protection: Surgical      Comment: Hysterectomy     Other Topics Concern    Not on file     Social History Narrative    No narrative on file       MEDICATIONS:   Current Outpatient Prescriptions:     acetaminophen-codeine 300-30mg (TYLENOL #3) 300-30 mg Tab, Take 1 tablet by mouth every 8 (eight) hours as needed., Disp: 21 tablet, Rfl: 0    alendronate (FOSAMAX) 70 MG tablet, Take 1 tablet (70 mg total) by mouth every 7 days., Disp: 12 tablet, Rfl: 3    amLODIPine (NORVASC) 10 MG tablet, Take 1 tablet (10 mg total) by mouth once daily., Disp: 90 tablet, Rfl: 3    amLODIPine (NORVASC) 10 MG tablet, TAKE 1 TABLET (10 MG TOTAL) BY MOUTH ONCE DAILY., Disp: 90 tablet, Rfl: 0    citalopram (CELEXA) 20 MG tablet, Take 1 tablet (20 mg total) by mouth once daily., Disp: 90 tablet, Rfl: 3    ondansetron (ZOFRAN) 4 MG tablet, Take 1 tablet (4 mg total) by mouth every 8 (eight) hours as needed for Nausea., Disp: 30 tablet, Rfl: 0    pantoprazole (PROTONIX) 40 MG tablet, TAKE 1 TABLET BY MOUTH EVERY DAY FOR ACID REFLUX, Disp: 90 tablet, Rfl: 3    pravastatin (PRAVACHOL) 20 MG tablet, Take 1 tablet (20 mg total) by mouth once daily., Disp: 90 tablet, Rfl: 3  ALLERGIES:   Review of patient's allergies indicates:   Allergen Reactions    Penicillins      Other reaction(s): Unknown    Sulfa (sulfonamide antibiotics)      Other  reaction(s): Rash       Review of Systems   Constitution: Negative. Negative for chills, fever and night sweats.   HENT: Negative for congestion and headaches.    Eyes: Negative for blurred vision, left vision loss and right vision loss.   Cardiovascular: Negative for chest pain and syncope.   Respiratory: Negative for cough and shortness of breath.    Endocrine: Negative for polydipsia, polyphagia and polyuria.   Hematologic/Lymphatic: Negative for bleeding problem. Does not bruise/bleed easily.   Skin: Negative for dry skin, itching and rash.   Musculoskeletal: Negative for falls and muscle weakness.   Gastrointestinal: Negative for abdominal pain and bowel incontinence.   Genitourinary: Negative for bladder incontinence and nocturia.   Neurological: Negative for disturbances in coordination, loss of balance and seizures.   Psychiatric/Behavioral: Negative for depression. The patient does not have insomnia.    Allergic/Immunologic: Negative for hives and persistent infections.     PHYSICAL EXAM:  GEN: A&Ox3, WD WN NAD  HEENT: WNL  CHEST: CTAB, no W/R/R  HEART: RRR, no M/R/G   ABD: Soft, NT ND, BS x4 QUADS  MS: Refer to previous note for detailed MS exam  NEURO: CN II-XII intact       The surgical consent was then reviewed with the patient, who agreed with all the contents of the consent form and it was signed.     PHYSICAL THERAPY:  She was also instructed regarding physical therapy and will begin on POD#1-3. She was given a copy of the original prescription to schedule. Another copy of this prescription was also faxed to [facility].    POST OP CARE: Instructions were reviewed including care of the wound and dressing after surgery and when she can shower.     PAIN MANAGEMENT: Estephania Lyman was instructed regarding the Polar ice unit that will be in place after surgery and her postoperative pain medications.     MEDICATION:  Roxicodone 5 mg 1-2 q 4 hours PRN for pain  Zofran 4 mg q 8 hours PRN  for nausea and vomiting.  Aspirin 325mg BID x 2 weeks for DVT prophylaxis starting on the evening after surgery.    Patient was instructed to purchase and take Colace to counter possible GI side effects of taking opiates.     DVT prophylaxis was discussed with the patient today including risk factors for developing DVTs and history of DVTs. The patient was asked if any specific recommendations were given from the doctor/s that did pre-operative surgical clearance.      If the patient was previously taking 81mg baby aspirin, they were told to not take it will using the above stated aspirin and to restart the 81mg aspirin after completion of the aspirin dose.      Patient was also told to buy over the counter Prilosec medication and take it once daily for GI protection as long as they are taking NSAIDs or Aspirin.     The patient was told that narcotic pain medications may make them drowsy and instructions were given to not sign legal documents, drive or operate heavy machinery, cars, or equipment while under the influence of narcotic medications.     Dr. Olivera was present in clinic during this pre-op evaluation. The patient was offered the opportunity to ask Dr. Olivera any further questions regarding the procedure which may not have been addressed during their previous informed consent discussion. The patient has declined to see Dr. Olivera today.    As there were no other questions to be asked, she was given my business card along with Dr. Olivera's business card if she has any questions or concerns prior to surgery or in the postop period.

## 2018-07-23 NOTE — ANESTHESIA PROCEDURE NOTES
Interscalene catheter    Patient location during procedure: pre-op   Block not for primary anesthetic.  Reason for block: at surgeon's request and post-op pain management   Post-op Pain Location: Right shoulder  Start time: 7/23/2018 6:40 AM  Timeout: 7/23/2018 6:38 AM   End time: 7/23/2018 6:53 AM  Staffing  Anesthesiologist: CRISTINA AGOSTO  Resident/CRNA: FIFI LEVY  Performed: resident/CRNA   Preanesthetic Checklist  Completed: patient identified, site marked, surgical consent, pre-op evaluation, timeout performed, IV checked, risks and benefits discussed and monitors and equipment checked  Peripheral Block  Patient position: sitting  Prep: ChloraPrep and site prepped and draped  Patient monitoring: heart rate, cardiac monitor, continuous pulse ox, continuous capnometry and frequent blood pressure checks  Block type: interscalene  Laterality: right  Injection technique: continuous  Needle  Needle type: Tuohy   Needle gauge: 18 G  Needle length: 2 in  Needle localization: anatomical landmarks and ultrasound guidance  Catheter type: non-stimulating  Catheter size: 20 G  Test dose: lidocaine 1.5% with Epi 1-to-200,000 and negative   -ultrasound image captured on disc.  Assessment  Injection assessment: negative aspiration, negative parasthesia and local visualized surrounding nerve  Paresthesia pain: none  Heart rate change: no  Slow fractionated injection: yes  Medications:  Bolus administered: 20 mL of 0.25 ropivacaine  Epinephrine added: 3.75 mcg/mL (1/300,000)  Additional Notes  VSS.  [facility] RN monitoring vitals throughout procedure.  Patient tolerated procedure well.

## 2018-07-23 NOTE — BRIEF OP NOTE
[facility]  Brief Operative Note     SUMMARY     Surgery Date: 7/23/2018     Surgeon(s) and Role:     * Dustin Soliz MD - Resident - Assisting     * W Elías Olivera MD - Primary        Pre-op Diagnosis:  Arthritis of right acromioclavicular joint [M19.011]  Nontraumatic tear of right rotator cuff [M75.101]  Osteoarthritis of right shoulder, unspecified osteoarthritis type [M19.011]  Biceps tendinitis of right upper extremity [M75.21]    Post-op Diagnosis:  Post-Op Diagnosis Codes:     * Arthritis of right acromioclavicular joint [M19.011]     * Nontraumatic tear of right rotator cuff [M75.101]     * Osteoarthritis of right shoulder, unspecified osteoarthritis type [M19.011]     * Biceps tendinitis of right upper extremity [M75.21]    Procedure(s) (LRB):  REPAIR, ROTATOR CUFF, ARTHROSCOPIC (Right)  ARTHROSCOPY, SHOULDER, WITH DISTAL CLAVICLE EXCISION (Right)  FIXATION, TENDON (Right)  ARTHROSCOPY, SHOULDER, WITH SUBACROMIAL SPACE DECOMPRESSION (Right)    Anesthesia: General    Description of the findings of the procedure: see op note    Findings/Key Components: see op note    Estimated Blood Loss: * No values recorded between 7/23/2018  7:44 AM and 7/23/2018 10:02 AM *         Specimens:   Specimen (12h ago through future)    None          Discharge Note    SUMMARY     Admit Date: 7/23/2018    Discharge Date and Time:  07/23/2018 10:02 AM    Hospital Course:  The patient arrived to the [facility] of Surgery Center on the second floor of [facility] for proper pre-operative management.  Upon completion of pre-operative preparation, the patient was taken back to the operative theatre.  A shoulder arthroscopy was performed without complication and the patient was transported to the post anesthesia care unit in stable condition.     Morales: nil    Drain: nil    Pain Management:     - [facility]: yes    After appropriate recovery from the anaesthetic agents used during the surgery the  patient was discharged home without complications      Final Diagnosis: Post-Op Diagnosis Codes:     * Arthritis of right acromioclavicular joint [M19.011]     * Nontraumatic tear of right rotator cuff [M75.101]     * Osteoarthritis of right shoulder, unspecified osteoarthritis type [M19.011]     * Biceps tendinitis of right upper extremity [M75.21]    Disposition: Home or Self Care    Follow Up/Patient Instructions:     Medications:  Reconciled Home Medications:      Medication List      CONTINUE taking these medications    alendronate 70 MG tablet  Commonly known as:  FOSAMAX  Take 1 tablet (70 mg total) by mouth every 7 days.     amLODIPine 10 MG tablet  Commonly known as:  NORVASC  Take 1 tablet (10 mg total) by mouth once daily.     aspirin 325 MG tablet  Take 1 tablet (325 mg total) by mouth every 12 (twelve) hours. for 28 doses     citalopram 20 MG tablet  Commonly known as:  CELEXA  Take 1 tablet (20 mg total) by mouth once daily.     * ondansetron 4 MG tablet  Commonly known as:  ZOFRAN  Take 1 tablet (4 mg total) by mouth every 8 (eight) hours as needed for Nausea.     * ondansetron 4 MG tablet  Commonly known as:  ZOFRAN  Take 1 tablet (4 mg total) by mouth every 8 (eight) hours as needed for Nausea.     oxyCODONE 5 MG immediate release tablet  Commonly known as:  ROXICODONE  Take 1-2 tablets as needed for pain every 8 hours.     pantoprazole 40 MG tablet  Commonly known as:  PROTONIX  TAKE 1 TABLET BY MOUTH EVERY DAY FOR ACID REFLUX     pravastatin 20 MG tablet  Commonly known as:  PRAVACHOL  Take 1 tablet (20 mg total) by mouth once daily.        * This list has 2 medication(s) that are the same as other medications prescribed for you. Read the directions carefully, and ask your doctor or other care provider to review them with you.                Discharge Procedure Orders  Diet general     Call MD for:  temperature >100.4     Call MD for:  persistent nausea and vomiting     Call MD for:  severe  uncontrolled pain     Call MD for:  difficulty breathing, headache or visual disturbances     Call MD for:  redness, tenderness, or signs of infection (pain, swelling, redness, odor or green/yellow discharge around incision site)     Call MD for:  hives     Call MD for:  persistent dizziness or light-headedness     Call MD for:  extreme fatigue     Other restrictions (specify):   Order Comments: Please see instructions sheet     Change dressing (specify)   Order Comments: Please see instructions sheet       Follow-up Information     Anatoliy Olivera MD In 2 weeks.    Specialties:  Sports Medicine, Orthopedic Surgery  Why:  For wound re-check, For suture removal  Contact information:  [address] [facility]  1ST FLOOR [facility] [address] [address]  [phone]

## 2018-07-24 PROBLEM — M75.111 INCOMPLETE ROTATOR CUFF TEAR OR RUPTURE OF RIGHT SHOULDER, NOT SPECIFIED AS TRAUMATIC: Status: ACTIVE | Noted: 2018-07-24

## 2018-07-24 PROBLEM — M19.011 ARTHRITIS OF RIGHT SHOULDER REGION: Status: ACTIVE | Noted: 2018-07-24

## 2018-07-24 PROBLEM — M75.21 BICIPITAL TENDINITIS OF RIGHT SHOULDER: Status: ACTIVE | Noted: 2018-07-24

## 2018-07-24 NOTE — OP NOTE
[facility]   OPERATIVE REPORT   ORTHOPAEDIC SURGERY   PROVIDER: DR. TASIA BUENO    PATIENT INFORMATION   Estephania Lyman 67 y.o. female 1950   MRN: [id number]   LOCATION: [facility]     DATE OF PROCEDURE: 7/23/2018     PREOPERATIVE DIAGNOSES:   Right  1. Shoulder rotator cuff tear   2. Shoulder biceps tendinitis  3. Shoulder impingement, bursitis  4. Shoulder AC joint arthritis    POSTOPERATIVE DIAGNOSES:   Right  1. Shoulder rotator cuff tear, large, degenerative and delaminated (tissue and bone quality - fair)  2. Shoulder biceps tendinitis  3. Shoulder impingement, bursitis  4. Shoulder AC joint arthritis  5. Shoulder synovitis  6. Shoulder humeral head chondromalacia, grade III-IV (8 x 8 mm)    OPERATION:   Right  1. Shoulder arthroscopic rotator cuff repair, trans osseous equivalent double row (CPT 19244)  2. Shoulder arthroscopic biceps tenodesis (CPT 92601)  3. Shoulder arthroscopic subacromial decompression, bursectomy (CPT 25023)  4. Shoulder arthroscopic extensive debridement (anterior, posterior glenohumeral joint, subacromial space) (CPT 47257)        5. Shoulder arthroscopic distal clavicle excision (CPT 53195)    Surgeon(s) and Role:     * YELENA Bueno MD - Primary     * Dustin Soliz MD - Resident - Assisting     * SMA Lilia    ANESTHESIA: General with interscalene block    ESTIMATED BLOOD LOSS: 20 cc    IMPLANTS:   Implant Name Type Inv. Item Serial No.  Lot No. LRB No. Used   SUTURE ANCH BIOCOM 5.5X19.1MM - [id number]  SUTURE ANCH BIOCOM 5.5X19.1MM  [employer] [id number] Right 2   ANCHOR BIOCOMP W/3 SUTURES - [id number]  ANCHOR BIOCOMP W/3 SUTURES  [employer] [id number] Right 2   SUTURE ANCHOR 6.25X19.1MM - [id number]   SUTURE ANCHOR 6.25X19.1MM   [employer] [id number] Right 1        SPECIMENS:   Specimen (12h ago through future)    None        COMPLICATIONS: None.     INTRAOPERATIVE COUNTS: Correct.     PROPHYLACTIC IV ANTIBIOTICS: Given per [facility]  Protocol.    INDICATIONS FOR PROCEDURE:   Estephania Lyman 67 y.o. female  has been seen and evaluated in the office for continued right shoulder pain and weakness.  After a lengthy discussion and failed nonoperative management, the patient wished to proceed with surgical intervention and was fully informed of risks and benefits.    DETAILS OF PROCEDURE:  After the correct operative site was marked by the operating surgeon, an interscalene block was administered by the anesthesia team.  The patient was then taken to the operating room and placed supine on the operating room table, where the patient underwent general anesthesia by the anesthesia team.  The patient was then rolled into the lateral decubitus position with the operative side up.  A well-padded axillary roll, beanbag and pillows were placed.  All pressure points were carefully padded and checked.  The upper extremities and both lower extremities were placed in comfortable positions and were also well-padded.      A verbal timeout was confirmed to identify the patient, operative site and planned operative procedure. It was also confirmed the patient had received preoperative IV antibiotic per protocol.     Examination under anesthesia of the right shoulder demonstrated: Forward elevation 170 degrees, external rotation with arm to side 60-70 degrees; grade 1 anterior and posterior load and shift, negative sulcus    The operative upper extremity was then prepped and draped in the usual sterile fashion.     The Spider arm positioner was implemented with balanced suspension and appropriate landmarks were noted on the skin.  A posterior followed by ashley-superior portals were created and systematic examination of the joint revealed the following:      -Biceps tendinitis at the root attachment  -Diffuse synovitis from anterior to posterior with labral tearing  -Thickened and inflamed capsular tissue over the anterior rotator interval extending distally  through the MGHL  -A posterior push-pull maneuver with probing was performing demonstrating an intact subscapularis  -The undersurface of the superior cuff was torn completely with full thickness involvement of the supraspinatus in the anterior portion of the infraspinatus.  A delaminated portion over the under surface was seen. Tissue retraction to the level of the glenoid over the delaminated portion.  -No loose bodies  -Humeral head chondromalacia, grade III-IV, 8 x 8 mm.    A shaver was again brought in to clear the field of view and to debride torn labrum and undersurface cuff from anterior to posterior. Extensive synovitis was also removed. Cautery was used to resect the interval to the coracoid and to release capsule through the MGHL. All thickened capsular tissue was released adjacent to the glenoid labrum. Care was taken to protect the axillary nerve during this portion of the procedure. Shaver also was used to perform chondroplasty of the humeral head. Debridement involving the glenohumeral joint was extensive.    The biceps was tagged with a Fiberlink suture and released with Metzenbaum scissors.     The torn cuff undersurface was debrided carefully with a shaver. Starting at the level of the superior glenoid and moving medially, a hand held rasp was used to complete the capsular sided releases. Care was taken to avoid injury to the suprascapular nerve. The cuff footprint was also debrided and lightly decorticated for healing response.    Attention was then turned to the subacromial space where significant hypertrophic bursa was encountered. Through an anterolateral working portal, shaver and cautery devices were introduced to clear the subacromial space of bursa and adhesions. Bursal reflections to the deltoid fascia anteriorly and posteriorly were taken down to further expand this space. This created a nice room with a view. The undersurface of the acromion was exposed with cautery to delineate bony  anatomy. Systematic examination of this space revealed the following:    -Subacromial spurring with narrowing of the anterolateral subacromial interval  -Degenerative changes with spurring of the distal clavicle, medial acromial facet articulation. Near bone-on-bone acromioclavicular joint.  -Fraying and degeneration of the CA ligament indicative of chronic outlet impingement  -Full thickness cuff tearing anteriorly involving the supraspinatus with extension into the anterior infraspinatus.  Large reverse L type with delamination as outlined above.    The large full thickness cuff tear was revisualized. Bursal sided releases were performed down to the scapular spine. The tuberosity was prepared with the shaver through accessory posterolateral and posterior portals while looking from the standard anterolateral portal.  The large sized, posteriorly retracted tear was then repaired with Fiberwire suture originating from two triple loaded 5.5 mm Swivelock medial row anchors passed in horizontal mattress fashion. The medial row was then tied for a total of 6 knots. These were then taken laterally to two 4.75 mm Swivelock anchors in a transosseous equivalent double row configuration. The cuff was repaired to its native position on the greater tuberosity without excessive tension. Following repair, probing of the repair site revealed good tissue apposition to the footprint and good construct stability.    The Fiberlink suture used to tag the biceps tendon was incorporated into the anterior lateral row anchor for biceps tenodesis.    Subacromial decompression was completed using posterior cutting block technique in the standard fashion with a 4.5 mm lanre without difficulty.  The anterior osteophyte was flattened.  Confirmation of adequate resection was confirmed while viewing from the lateral portal and referencing from the posterior acromial undersurface.    Next, attention was then turned to the distal clavicle excision.   A thermal device was used to clear the soft tissue off the end of the AC joint. The anterior portal was used to perform the AC resection with a lanre while viewing from the anterolateral portal.  The adequacy of the resection was confirmed while viewing from the anterior portal.  Care was taken to resect enough postero-superiorly.  Approximately 7 mm was resected.     The shoulder and subacromial space were then irrigated and fluid was extravasated using suction.     All portals were reapproximated using 3-0 Nylon. Xeroform and absorbant mepilex pads were placed to cover all incisions.  A polar care shoulder sleeve was secured followed by a sling with abduction pillow. The patient was then repositioned supine, extubated and taken to the recovery room where the patient arrived in stable condition with the compartments of the arm and forearm soft and good perfusion in all digits.     POSTOPERATIVE PLAN OF CARE:  -Patient will be discharged home according to protocol.  -Physical Therapy: Follow the >3 cm cuff repair (large) protocol. PROM 3 weeks. AROM 8-9 weeks. No cuff resistive activity 12 weeks. No biceps resistive activity 8 weeks.  -DVT prophylaxis:  mg twice a day x 2 weeks.

## 2018-07-25 NOTE — PROGRESS NOTES
Called to follow up PNC. Pain currently well controlled. Removed catheter while on telephone with me. Blue tip intact.    Smooth Thomas MD

## 2018-07-25 NOTE — ADDENDUM NOTE
Addendum  created 07/25/18 1309 by Smooth Thomas MD    Anesthesia Event edited, Sign clinical note

## 2018-07-26 ENCOUNTER — HOSPITAL ENCOUNTER (EMERGENCY)
Facility: HOSPITAL | Age: 68
Discharge: HOME | End: 2018-07-27
Attending: EMERGENCY MEDICINE
Payer: COMMERCIAL

## 2018-07-26 DIAGNOSIS — R07.9 CHEST PAIN: ICD-10-CM

## 2018-07-26 DIAGNOSIS — R06.02 SHORTNESS OF BREATH: Primary | ICD-10-CM

## 2018-07-26 LAB
ALBUMIN SERPL BCP-MCNC: 3.1 G/DL
ALP SERPL-CCNC: 100 U/L
ALT SERPL W/O P-5'-P-CCNC: 24 U/L
ANION GAP SERPL CALC-SCNC: 9 MMOL/L
AST SERPL-CCNC: 34 U/L
BASOPHILS # BLD AUTO: 0.05 K/UL
BASOPHILS NFR BLD: 0.5 %
BILIRUB SERPL-MCNC: 0.4 MG/DL
BNP SERPL-MCNC: 35 PG/ML
BUN SERPL-MCNC: 8 MG/DL
CALCIUM SERPL-MCNC: 8.7 MG/DL
CHLORIDE SERPL-SCNC: 104 MMOL/L
CO2 SERPL-SCNC: 25 MMOL/L
CREAT SERPL-MCNC: 0.8 MG/DL
DIFFERENTIAL METHOD: ABNORMAL
EOSINOPHIL # BLD AUTO: 0.3 K/UL
EOSINOPHIL NFR BLD: 3 %
ERYTHROCYTE [DISTWIDTH] IN BLOOD BY AUTOMATED COUNT: 12.1 %
EST. GFR  (AFRICAN AMERICAN): >60 ML/MIN/1.73 M^2
EST. GFR  (NON AFRICAN AMERICAN): >60 ML/MIN/1.73 M^2
GLUCOSE SERPL-MCNC: 192 MG/DL
HCT VFR BLD AUTO: 32.6 %
HGB BLD-MCNC: 10.4 G/DL
IMM GRANULOCYTES # BLD AUTO: 0.06 K/UL
IMM GRANULOCYTES NFR BLD AUTO: 0.6 %
LYMPHOCYTES # BLD AUTO: 0.8 K/UL
LYMPHOCYTES NFR BLD: 8.9 %
MCH RBC QN AUTO: 31 PG
MCHC RBC AUTO-ENTMCNC: 31.9 G/DL
MCV RBC AUTO: 97 FL
MONOCYTES # BLD AUTO: 0.9 K/UL
MONOCYTES NFR BLD: 9.6 %
NEUTROPHILS # BLD AUTO: 7.3 K/UL
NEUTROPHILS NFR BLD: 77.4 %
NRBC BLD-RTO: 0 /100 WBC
PLATELET # BLD AUTO: 211 K/UL
PMV BLD AUTO: 10.5 FL
POTASSIUM SERPL-SCNC: 3.5 MMOL/L
PROT SERPL-MCNC: 6.8 G/DL
RBC # BLD AUTO: 3.35 M/UL
SODIUM SERPL-SCNC: 138 MMOL/L
TROPONIN I SERPL DL<=0.01 NG/ML-MCNC: 0.01 NG/ML
WBC # BLD AUTO: 9.44 K/UL

## 2018-07-26 PROCEDURE — 84484 ASSAY OF TROPONIN QUANT: CPT

## 2018-07-26 PROCEDURE — 83880 ASSAY OF NATRIURETIC PEPTIDE: CPT

## 2018-07-26 PROCEDURE — 93005 ELECTROCARDIOGRAM TRACING: CPT

## 2018-07-26 PROCEDURE — 85025 COMPLETE CBC W/AUTO DIFF WBC: CPT

## 2018-07-26 PROCEDURE — 80053 COMPREHEN METABOLIC PANEL: CPT

## 2018-07-26 PROCEDURE — 63600175 PHARM REV CODE 636 W HCPCS: Performed by: EMERGENCY MEDICINE

## 2018-07-26 PROCEDURE — 99285 EMERGENCY DEPT VISIT HI MDM: CPT | Mod: 25

## 2018-07-26 PROCEDURE — 93010 ELECTROCARDIOGRAM REPORT: CPT | Mod: ,,, | Performed by: INTERNAL MEDICINE

## 2018-07-26 PROCEDURE — 99285 EMERGENCY DEPT VISIT HI MDM: CPT | Mod: ,,, | Performed by: EMERGENCY MEDICINE

## 2018-07-26 RX ORDER — NITROGLYCERIN 0.4 MG/1
0.4 TABLET SUBLINGUAL ONCE
Status: COMPLETED | OUTPATIENT
Start: 2018-07-26 | End: 2018-07-26

## 2018-07-26 RX ADMIN — NITROGLYCERIN 0.4 MG: 0.4 TABLET SUBLINGUAL at 11:07

## 2018-07-27 VITALS
TEMPERATURE: 98 F | RESPIRATION RATE: 18 BRPM | OXYGEN SATURATION: 98 % | BODY MASS INDEX: 32.49 KG/M2 | WEIGHT: 195 LBS | HEART RATE: 68 BPM | SYSTOLIC BLOOD PRESSURE: 145 MMHG | DIASTOLIC BLOOD PRESSURE: 78 MMHG | HEIGHT: 65 IN

## 2018-07-27 LAB — TROPONIN I SERPL DL<=0.01 NG/ML-MCNC: <0.006 NG/ML

## 2018-07-27 PROCEDURE — 25000003 PHARM REV CODE 250: Performed by: EMERGENCY MEDICINE

## 2018-07-27 PROCEDURE — 84484 ASSAY OF TROPONIN QUANT: CPT

## 2018-07-27 PROCEDURE — 25500020 PHARM REV CODE 255: Performed by: EMERGENCY MEDICINE

## 2018-07-27 RX ORDER — HYDROCODONE BITARTRATE AND ACETAMINOPHEN 5; 325 MG/1; MG/1
1 TABLET ORAL
Status: COMPLETED | OUTPATIENT
Start: 2018-07-27 | End: 2018-07-27

## 2018-07-27 RX ADMIN — HYDROCODONE BITARTRATE AND ACETAMINOPHEN 1 TABLET: 5; 325 TABLET ORAL at 02:07

## 2018-07-27 RX ADMIN — IOHEXOL 100 ML: 350 INJECTION, SOLUTION INTRAVENOUS at 01:07

## 2018-07-27 NOTE — DISCHARGE INSTRUCTIONS
Take your pain medications as prescribed.   Follow up with your PCP as soon as possible. Return to the ED if you experience worsening chest pain/difficulty breathing, coughing up blood or any other concerning symptoms.   Notify your PCP at follow up that you had an acute drop in your hemoglobin levels over a 10 day period, and that a follow up lab was recommended.

## 2018-07-27 NOTE — ED NOTES
Pt to ED following two episodes of SOB & CP which is worse upon exertion; labored breathing noted; arrived on 4L NC per EMS; denies previous episodes; pulmonary hx; recent shoulder surgery

## 2018-07-27 NOTE — ED NOTES
Pt awake in bed with family at bedside, states that cp relieved after administering nitro tab x1, no signs of distress noted at this time, call bell in reach, bed low and locked, instructed to call for assistance, will continue to monitor.

## 2018-07-27 NOTE — ED NOTES
Per charge nurse, T.T: pt to be taken to C POD, ED 24, and report to be given to JASMIN Caldwell. JASMIN Whalen, to wheel pt to C POD and give report after IV is established. Once pt arrives and is placed on ED monitor, nitro to be given.

## 2018-07-27 NOTE — ED PROVIDER NOTES
Encounter Date: 7/26/2018       History     Chief Complaint   Patient presents with    Shortness of Breath     Patient reports shortness of breath for the past 45mins, substernal pressure radiating to back. Patient had similar episode earlier today that lasted approx 10mins. Patient had rotator cuff surgery on Monday. Reports shoulder pain.     Chest Pain     HPI   Patient is a 67 y.o. female with sarcoidosis, HLD, HTN, BIB EMS with chest pain and SOB. Patient describes the pain as a sharp/stabbing, it is sternal and radiates to her back, with associated SOB. It is constant, seems to worsen with exertion and on deep inspiration. Her first episode was around 2PM today, lasted about an hour and self resolved. Second episode started 2 hours PTA. EMS had patient on 4L O2, sats 95%. She has never had pain like this before. Took 325mg ASA x2 today and an oxycodone. She is not on any blood thinners. Denies any significant cardiac history. She had arthroscopic right sided rotator cuff repair 4 days ago.      Review of patient's allergies indicates:   Allergen Reactions    Penicillins      Other reaction(s): Unknown    Sulfa (sulfonamide antibiotics) Rash     Other reaction(s): Rash     Past Medical History:   Diagnosis Date    GERD (gastroesophageal reflux disease)     High cholesterol     Hyperlipidemia     Hypertension     Sarcoidosis      Past Surgical History:   Procedure Laterality Date    ANKLE FRACTURE SURGERY      ARTHROSCOPIC REPAIR OF ROTATOR CUFF OF SHOULDER Right 7/23/2018    Procedure: REPAIR, ROTATOR CUFF, ARTHROSCOPIC;  Surgeon: YELENA Olivera MD;  Location: [facility] OR [address] [place] [address];  Service: Orthopedics;  Laterality: Right;  regional with catheter, interscalene  Vladimir/Linvatec & Ann/[employer] notified 7-18 LO    ARTHROSCOPY OF SHOULDER WITH DECOMPRESSION OF SUBACROMIAL SPACE Right 7/23/2018    Procedure: ARTHROSCOPY, SHOULDER, WITH SUBACROMIAL SPACE DECOMPRESSION;  Surgeon: YELENA Olivera MD;   Location: [facility] OR 1ST FLR;  Service: Orthopedics;  Laterality: Right;    ARTHROSCOPY OF SHOULDER WITH REMOVAL OF DISTAL CLAVICLE Right 7/23/2018    Procedure: ARTHROSCOPY, SHOULDER, WITH DISTAL CLAVICLE EXCISION;  Surgeon: YELENA Olivera MD;  Location: [facility] OR 1ST FLR;  Service: Orthopedics;  Laterality: Right;    COLONOSCOPY N/A 5/30/2017    Procedure: COLONOSCOPY;  Surgeon: Jed Garcia MD;  Location: [facility] ENDO (4TH FLR);  Service: Endoscopy;  Laterality: N/A;    FIXATION OF TENDON Right 7/23/2018    Procedure: FIXATION, TENDON;  Surgeon: YELENA Olivera MD;  Location: [facility] OR 1ST FLR;  Service: Orthopedics;  Laterality: Right;    HYSTERECTOMY  age 39    endo    LEG SURGERY      tibia    OOPHORECTOMY  age 39    endo    TONSILLECTOMY       Family History   Problem Relation Age of Onset    Cancer Mother         colon    Cancer Father         throat    Hypertension Sister     Breast cancer Sister     Hypertension Brother     Diabetes Sister     Hypertension Sister     Stroke Neg Hx     Ovarian cancer Neg Hx     Miscarriages / Stillbirths Neg Hx     Eclampsia Neg Hx      Social History   Substance Use Topics    Smoking status: Former Smoker     Packs/day: 1.00     Years: 3.00     Types: Cigarettes     Quit date: 7/9/2010    Smokeless tobacco: Never Used    Alcohol use No     Review of Systems   Constitutional: Negative for appetite change, chills and fever.   HENT: Negative for rhinorrhea, sore throat and trouble swallowing.    Respiratory: Positive for chest tightness and shortness of breath. Negative for cough.    Cardiovascular: Positive for chest pain. Negative for palpitations and leg swelling.   Gastrointestinal: Negative for abdominal pain, blood in stool, nausea and vomiting.   Genitourinary: Negative for dysuria, hematuria and vaginal bleeding.   Musculoskeletal: Negative for back pain, neck pain and neck stiffness.   Skin: Negative for color change, pallor and rash.    Neurological: Negative for seizures, syncope and weakness.   Hematological: Does not bruise/bleed easily.   All other systems reviewed and are negative.      Physical Exam     Initial Vitals [07/26/18 2208]   BP Pulse Resp Temp SpO2   132/82 92 18 99.1 °F (37.3 °C) 95 %      MAP       --         Physical Exam    Nursing note and vitals reviewed.  Constitutional: She appears well-developed and well-nourished. No distress.   HENT:   Head: Normocephalic and atraumatic.   Eyes: Conjunctivae and EOM are normal. Pupils are equal, round, and reactive to light.   Neck: Normal range of motion. Neck supple.   Cardiovascular: Normal rate, regular rhythm and normal heart sounds.   Pulmonary/Chest: No respiratory distress. She has no wheezes. She has no rhonchi. She has no rales.   Abdominal: Soft. She exhibits no distension. There is no tenderness. There is no rebound and no guarding.   Genitourinary: Rectal exam shows guaiac negative stool. Guaiac negative stool.   Musculoskeletal: Normal range of motion. She exhibits no edema or tenderness.   Right arm with sling in place   Neurological: She is alert and oriented to person, place, and time. She exhibits normal muscle tone. Coordination normal.   Skin: Skin is warm and dry.         ED Course   Procedures  Labs Reviewed   CBC W/ AUTO DIFFERENTIAL - Abnormal; Notable for the following:        Result Value    RBC 3.35 (*)     Hemoglobin 10.4 (*)     Hematocrit 32.6 (*)     MCHC 31.9 (*)     Immature Granulocytes 0.6 (*)     Immature Grans (Abs) 0.06 (*)     Lymph # 0.8 (*)     Gran% 77.4 (*)     Lymph% 8.9 (*)     All other components within normal limits   COMPREHENSIVE METABOLIC PANEL - Abnormal; Notable for the following:     Glucose 192 (*)     Albumin 3.1 (*)     All other components within normal limits   TROPONIN I   B-TYPE NATRIURETIC PEPTIDE     EKG Readings: (Independently Interpreted)   Initial Reading: No STEMI.   Sinus rhythm at 89bpm. Poor T wave  progression/flattening throughout. Stable compared to prior study 7/18/18.        Imaging Results          CTA Chest Non-Coronary - PE Study (Final result)  Result time 07/27/18 02:28:44    Final result by Abram Nicholson MD (07/27/18 02:28:44)                 Impression:      No evidence of pulmonary thromboembolism.    Prominent linear bibasilar subsegmental atelectasis, possibly related to recent anesthesia/intubation.    Aberrant right subclavian artery.    2.8 cm intermediate density lesion originating from the superior pole of the left kidney.  Differential includes proteinaceous/hemorrhagic cyst or renal neoplasm.  Recommend nonemergent evaluation with renal ultrasound to exclude solid component.    Probable cholelithiasis.    Electronically signed by resident: Adam Gorman  Date:    07/27/2018  Time:    01:50    Electronically signed by: Abram Nicholson MD  Date:    07/27/2018  Time:    02:28             Narrative:    EXAMINATION:  CTA CHEST NON CORONARY    CLINICAL HISTORY:  Chest pain, acute, PE suspected, high pretest prob;    TECHNIQUE:  Low dose axial images, sagittal and coronal reformations were obtained from the thoracic inlet to the lung bases following the IV administration of 100 mL of Omnipaque 350.  Contrast timing was optimized to evaluate the pulmonary arteries.    COMPARISON:  Chest radiograph 07/26/2018.    FINDINGS:  Evaluation is suboptimal secondary to patient motion artifact and inability to raise right arm above the patient's head due to rotator cuff injury.    Pulmonary vasculature: The pulmonary arteries distribute appropriately.  Noting limited evaluation, there is no filling defect to the level of the segmental arteries to suggest pulmonary thromboembolism.    Aorta: There is a left-sided aortic arch noting an aberrant right subclavian artery.  Thoracic aorta maintains appropriate caliber, contour, and course without significant calcific atherosclerosis..    Base of Neck:  "Unremarkable.    Thoracic soft tissues: No significant abnormality.    Heart: No cardiomegaly or pericardial effusion.    Mahsa/Mediastinum: No pathologic roberto carlos enlargement.    Airways: Trachea is midline and the proximal airways are patent.    Lungs/Pleura: The lungs are symmetrically expanded.  There is prominent linear subsegmental atelectasis at the lung bases, right side greater than left.  No large focal consolidation.    Esophagus: Normal.    Upper Abdomen: There is a small hiatal hernia.  There is a 2.8 cm intermediate density lesion arising from the upper pole of the left kidney.  Small amount of hyperdense material or stones noted in the dependent aspect of the gallbladder.    Bones: There multilevel degenerative changes are noted in the thoracic spine.  There is postsurgical change of the right humeral head.  Osseous structures demonstrate degenerative change without evidence for acute fracture or dislocation.                               X-Ray Chest AP Portable (Final result)  Result time 07/26/18 23:42:49    Final result by Abram Nicholson MD (07/26/18 23:42:49)                 Impression:      Technically limited exam, with mild bibasilar airspace disease versus subsegmental atelectasis.  Consider follow-up with upright PA and lateral chest radiographs if indicated.      Electronically signed by: Abarm Nicholson MD  Date:    07/26/2018  Time:    23:42             Narrative:    EXAMINATION:  XR CHEST AP PORTABLE    CLINICAL HISTORY:  Provided history is "Chest Pain;  ".    TECHNIQUE:  One view of the chest.    COMPARISON:  07/18/2018.    FINDINGS:  There is significant artifact on the image related to a right shoulder sling from recent surgery.  The examination is under penetrated, which also limits evaluation.  Lung volumes are low.  Cardiac wires overlie the chest.  Cardiac silhouette is not significantly enlarged.  There is mild bilateral airspace disease and subsegmental atelectasis, more " pronounced on the right.  No pneumothorax.                                 Medical Decision Making:   Initial Assessment:   10:51 PM, 7/26/18  Patient is a 68 YO female with sarcoidosis, HLD, HTN, rotator cuff surgery 4 days ago BIB EMS with 8 hours intermittent chest pain and SOB. SpO2 95% on 4L O2, vitals otherwise stable.    Differential Diagnosis:   Includes but is not limited to ACS, CHF, pneumonia, pneumothorax, PE, costochondritis, GERD, symptomatic anemia, cardiac arrythmia, electrolyte abnormality. Have ordered CBC, CMP, EKG, troponin, BNP, CXR.   ED Management:  11:45 PM, 7/26/18  Bedside ultrasound shows no septal bowing, RV dysfunction or dilation. Patient reports pain has slightly improved after nitro x1. Due to her clinical presentation and risk factors, CT PE protocol ordered.     3:58 AM, 7/26/18  CT negative for PE as noted above. Patient noted to have drop in H/H of about 3 points in 10 days. She denies any blood in stool, urine, or vaginal bleeding. On rectal exam no gross blood, guiaic negative. Instructed patient to follow up with PCP in the next 2-3 days to follow up todays ED visit and have repeat CBC drawn. Discussed breathing exercises she can do at home to help prevent postoperative atelectasis. She verbalized understanding of the plan and of return precautions.   Brenna Harper MD  [facility], [facility]    Additional MDM:     MARCELA Score:   Age over 65:                                    ___   > or = to 3 CAD risk factors:           ___  Established CAD:                            ___  > or = to 2 anginal events in the past 24 hours: ___  Use of ASA in past 7 days:              ___  Elevated Enzymes:                         ___  ST Depression > or = to 0.05 mV:  ___                   Clinical Impression:   The encounter diagnosis was Chest pain.                             Brenna Harper MD  Resident  07/27/18 3123

## 2018-07-29 ENCOUNTER — NURSE TRIAGE (OUTPATIENT)
Dept: ADMINISTRATIVE | Facility: CLINIC | Age: 68
End: 2018-07-29

## 2018-07-29 NOTE — TELEPHONE ENCOUNTER
"Pt had cuff surgery Monday 7/23, arm black and blue, broke out in blisters on postop arm elbow when took off ice pack 7/29    Reason for Disposition   Sounds like a serious complication to the triager    Answer Assessment - Initial Assessment Questions  1. SYMPTOM: "What's the main symptom you're concerned about?" (e.g., pain, fever, vomiting)    Rash near elbow on R arm   2. ONSET: "When did ________  start?"     7/28  3. SURGERY: "What surgery was performed?"     R arm   4. DATE of SURGERY: "When was surgery performed?"     7/23  5. ANESTHESIA: " What type of anesthesia did you have?" (e.g., general, spinal, epidural, local)    N/a   6. PAIN: "Is there any pain?" If so, ask: "How bad is it?"  (Scale 1-10; or mild, moderate, severe)     + itching, pain rated 5, pain meds - due now   7. FEVER: "Do you have a fever?" If so, ask: "What is your temperature, how was it measured, and when did it start?"   afeb   8. VOMITING: "Is there any vomiting?" If yes, ask: "How many times?"     No   9. BLEEDING: "Is there any bleeding?" If so, ask: "How much?" and "Where?"     No   10. OTHER SYMPTOMS: "Do you have any other symptoms?" (e.g., drainage from wound, painful urination, constipation)       + flatus, aust[place] reg diet. Good uop    Protocols used: ST POST-OP SYMPTOMS AND [id number]  pt transferred to speak with MD on call. Call back with questions.     "

## 2018-08-02 ENCOUNTER — OFFICE VISIT (OUTPATIENT)
Dept: FAMILY MEDICINE | Facility: CLINIC | Age: 68
End: 2018-08-02
Attending: FAMILY MEDICINE
Payer: COMMERCIAL

## 2018-08-02 VITALS
DIASTOLIC BLOOD PRESSURE: 68 MMHG | SYSTOLIC BLOOD PRESSURE: 122 MMHG | WEIGHT: 198.5 LBS | HEART RATE: 62 BPM | HEIGHT: 65 IN | BODY MASS INDEX: 33.07 KG/M2 | OXYGEN SATURATION: 99 %

## 2018-08-02 DIAGNOSIS — E78.00 HYPERCHOLESTEROLEMIA: ICD-10-CM

## 2018-08-02 DIAGNOSIS — D64.9 NORMOCYTIC ANEMIA: Primary | ICD-10-CM

## 2018-08-02 DIAGNOSIS — I10 HTN (HYPERTENSION), BENIGN: ICD-10-CM

## 2018-08-02 PROCEDURE — 3078F DIAST BP <80 MM HG: CPT | Mod: CPTII,S$GLB,, | Performed by: FAMILY MEDICINE

## 2018-08-02 PROCEDURE — 99999 PR PBB SHADOW E&M-EST. PATIENT-LVL IV: CPT | Mod: PBBFAC,,, | Performed by: FAMILY MEDICINE

## 2018-08-02 PROCEDURE — 3074F SYST BP LT 130 MM HG: CPT | Mod: CPTII,S$GLB,, | Performed by: FAMILY MEDICINE

## 2018-08-02 PROCEDURE — 99214 OFFICE O/P EST MOD 30 MIN: CPT | Mod: S$GLB,,, | Performed by: FAMILY MEDICINE

## 2018-08-02 NOTE — PATIENT INSTRUCTIONS
Your test results will be communicated to you via : [facility], Telephone or Letter.   If you have not received your test results in one week, please contact the clinic at [phone].

## 2018-08-06 NOTE — PROGRESS NOTES
S:Estephania Lyman presents for post-operative evaluation.     DATE OF PROCEDURE: 7/23/2018   OPERATION:   Right  1. Shoulder arthroscopic rotator cuff repair, trans osseous equivalent double row  2. Shoulder arthroscopic biceps tenodesis   3. Shoulder arthroscopic subacromial decompression, bursectomy  4. Shoulder arthroscopic extensive debridement (anterior, posterior glenohumeral joint, subacromial space)       5. Shoulder arthroscopic distal clavicle excision     Estephania Lyman reports to be doing very well 2wk s/p the above mentioned procedure. Accompanied by her . Denies fevers, chills, night sweats, chest pain, difficulty breathing, calf pain or tenderness. Has been in for 1 therapy session since surgery at the [facility]. States her typical pre-surgery pain has predominately resolved. Only taking pain medication at night, requesting refill today. Very pleased thus far.     O: The incisions are healing well.  No signs of infection.  Sutures were removed. No significant pain or unusual tenderness.    A/P: Arthroscopic pictures were reviewed with the patient and her . Tramadol sent to pharmacy. Hold off on NSAIDs for now. Plan to follow the rehab plan as previously outlined. >3 cm cuff repair (large) protocol. PROM 3 weeks. AROM 8-9 weeks. No cuff resistive activity 12 weeks. No biceps resistive activity 8 weeks.  Patient understands the importance for compliance.  RTC in 4 weeks. All questions answered.

## 2018-08-07 ENCOUNTER — OFFICE VISIT (OUTPATIENT)
Dept: SPORTS MEDICINE | Facility: CLINIC | Age: 68
End: 2018-08-07
Payer: COMMERCIAL

## 2018-08-07 VITALS
BODY MASS INDEX: 33.07 KG/M2 | SYSTOLIC BLOOD PRESSURE: 126 MMHG | HEART RATE: 79 BPM | DIASTOLIC BLOOD PRESSURE: 79 MMHG | WEIGHT: 198.5 LBS | HEIGHT: 65 IN

## 2018-08-07 DIAGNOSIS — Z47.89 SURGICAL AFTERCARE, MUSCULOSKELETAL SYSTEM: Primary | ICD-10-CM

## 2018-08-07 PROCEDURE — 99024 POSTOP FOLLOW-UP VISIT: CPT | Mod: S$GLB,,, | Performed by: ORTHOPAEDIC SURGERY

## 2018-08-07 PROCEDURE — 99999 PR PBB SHADOW E&M-EST. PATIENT-LVL III: CPT | Mod: PBBFAC,,, | Performed by: ORTHOPAEDIC SURGERY

## 2018-08-08 RX ORDER — TRAMADOL HYDROCHLORIDE 50 MG/1
TABLET ORAL
Qty: 60 TABLET | Refills: 0 | Status: SHIPPED | OUTPATIENT
Start: 2018-08-08 | End: 2018-09-04

## 2018-08-12 NOTE — PROGRESS NOTES
"Subjective:       Patient ID: Estephania Lyman is a 67 y.o. female.    Chief Complaint: Anemia and Edema    HPI   Pt is here for follow up of anemia no sob/cp no light headedness no brbpr no vaginal bleeding  Pt has htn stable on norvasc pos intermittent ankle swelling pt declines med change as it is tolerable bp fine today  Pt has hypercholesterolemia stable on statin no muscle aches  Review of Systems   Constitutional: Negative for chills, fatigue and fever.   Respiratory: Negative for cough, chest tightness and shortness of breath.    Cardiovascular: Negative for chest pain and palpitations.   Gastrointestinal: Negative for abdominal distention and abdominal pain.       Objective:      Physical Exam   Constitutional: She appears well-developed and well-nourished. No distress.   Cardiovascular: Normal rate and regular rhythm. Exam reveals no gallop.   Pulmonary/Chest: Effort normal and breath sounds normal. No respiratory distress. She has no wheezes.   Abdominal: Soft. Bowel sounds are normal. She exhibits no distension.   Musculoskeletal: She exhibits edema.   1 + bilateral ankle      labs discussed with pt   Assessment:       1. Normocytic anemia    2. HTN (hypertension), benign    3. Hypercholesterolemia        Plan:        orders cmp lipid  cbc  Cont meds  Increase water intake  Low salt diet  Low fat diet  Keep feet elevated when seated  rtc 6 months and prn   "This note will not be shared with the patient."   "

## 2018-09-04 ENCOUNTER — OFFICE VISIT (OUTPATIENT)
Dept: SPORTS MEDICINE | Facility: CLINIC | Age: 68
End: 2018-09-04
Payer: COMMERCIAL

## 2018-09-04 VITALS
BODY MASS INDEX: 32.99 KG/M2 | DIASTOLIC BLOOD PRESSURE: 82 MMHG | SYSTOLIC BLOOD PRESSURE: 118 MMHG | HEART RATE: 85 BPM | HEIGHT: 65 IN | WEIGHT: 198 LBS

## 2018-09-04 DIAGNOSIS — Z47.89 SURGICAL AFTERCARE, MUSCULOSKELETAL SYSTEM: Primary | ICD-10-CM

## 2018-09-04 PROCEDURE — 99024 POSTOP FOLLOW-UP VISIT: CPT | Mod: S$GLB,,, | Performed by: ORTHOPAEDIC SURGERY

## 2018-09-04 PROCEDURE — 99999 PR PBB SHADOW E&M-EST. PATIENT-LVL III: CPT | Mod: PBBFAC,,, | Performed by: ORTHOPAEDIC SURGERY

## 2018-09-04 RX ORDER — NAPROXEN 500 MG/1
500 TABLET ORAL 2 TIMES DAILY WITH MEALS
Qty: 60 TABLET | Refills: 1 | Status: SHIPPED | OUTPATIENT
Start: 2018-09-04 | End: 2018-11-08 | Stop reason: ALTCHOICE

## 2018-09-04 RX ORDER — TRAMADOL HYDROCHLORIDE 50 MG/1
TABLET ORAL
Qty: 60 TABLET | Refills: 0 | Status: SHIPPED | OUTPATIENT
Start: 2018-09-04 | End: 2019-06-26

## 2018-09-04 RX ORDER — ESOMEPRAZOLE MAGNESIUM 40 MG/1
40 CAPSULE, DELAYED RELEASE ORAL
Qty: 60 CAPSULE | Refills: 1 | Status: SHIPPED | OUTPATIENT
Start: 2018-09-04 | End: 2018-11-08 | Stop reason: ALTCHOICE

## 2018-09-04 NOTE — PROGRESS NOTES
S:Estephania Lyman presents for post-operative evaluation.     DATE OF PROCEDURE: 7/23/2018   OPERATION:   Right  1. Shoulder arthroscopic rotator cuff repair, trans osseous equivalent double row  2. Shoulder arthroscopic biceps tenodesis   3. Shoulder arthroscopic subacromial decompression, bursectomy  4. Shoulder arthroscopic extensive debridement (anterior, posterior glenohumeral joint, subacromial space)       5. Shoulder arthroscopic distal clavicle excision     Estephania Lyman reports to be doing very well 6 wk s/p the above mentioned procedure. Accompanied by her . Going to therapy 3xWeek [place] location, focusing on passive motion. 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  Reports generalized soreness after therapy. Taking pain medication as needed, mainly taking tylenol.  Gradually getting better.    O: RUE - The incisions are well healed. No signs of infection. No significant pain or unusual tenderness. Passed FE to 120. Passive ER with arm at side 15-20.  Biceps appears well tensioned.      A/P: Arthroscopic pictures were again reviewed with the patient and her . Plan to follow the rehab plan as previously outlined. >3 cm cuff repair (large) protocol. PROM now. AROM 8-9 weeks. No cuff resistive activity 12 weeks. No biceps resistive activity 8 weeks.  Patient understands the importance for compliance.  RTC in 6 weeks.  May begin use of oral anti-inflammatory medication now with GI precautions.  Begin wean of sling and pillow over the next few weeks.  All questions answered.

## 2018-09-28 ENCOUNTER — IMMUNIZATION (OUTPATIENT)
Dept: FAMILY MEDICINE | Facility: CLINIC | Age: 68
End: 2018-09-28
Payer: COMMERCIAL

## 2018-09-28 PROCEDURE — 90662 IIV NO PRSV INCREASED AG IM: CPT | Mod: S$GLB,,, | Performed by: FAMILY MEDICINE

## 2018-09-28 PROCEDURE — 90471 IMMUNIZATION ADMIN: CPT | Mod: S$GLB,,, | Performed by: FAMILY MEDICINE

## 2018-10-15 NOTE — PROGRESS NOTES
S:Estephania Lyman presents for post-operative evaluation.     DATE OF PROCEDURE: 7/23/2018   OPERATION:   Right  1. Shoulder arthroscopic rotator cuff repair, trans osseous equivalent double row  2. Shoulder arthroscopic biceps tenodesis   3. Shoulder arthroscopic subacromial decompression, bursectomy  4. Shoulder arthroscopic extensive debridement (anterior, posterior glenohumeral joint, subacromial space)       5. Shoulder arthroscopic distal clavicle excision     Estephania Lyman reports to be doing very well 12 wk s/p the above mentioned procedure. Accompanied by her . Going to therapy 2xWeek [facility], working on passive and active motion. Making good progress. Doing pulley work at home as well. Taking naproxen BID. SANE 90. No sig pain. Anxious to return to work as a .     O: RUE - The incisions are well healed. No signs of infection. No significant pain or unusual tenderness. Active FE to 150, passive FE to 170. Passive ER with arm at side 40.  Biceps appears well tensioned.  4/5 resisted cuff function.    A/P: Plan to follow the rehab plan as previously outlined. >3 cm cuff repair (large) protocol. Continue therapy 1-2xWeek, begin to integrate the strengthening phase now. Continue ROM exercises to maximize overhead function. Continue antiinflammatories as needed. No strenuous overhead activity. No heavy lift. Note provided to return to work. All questions answered. RTC in 2 mos.

## 2018-10-16 ENCOUNTER — OFFICE VISIT (OUTPATIENT)
Dept: SPORTS MEDICINE | Facility: CLINIC | Age: 68
End: 2018-10-16
Payer: COMMERCIAL

## 2018-10-16 VITALS
DIASTOLIC BLOOD PRESSURE: 88 MMHG | HEIGHT: 65 IN | SYSTOLIC BLOOD PRESSURE: 135 MMHG | HEART RATE: 81 BPM | BODY MASS INDEX: 32.99 KG/M2 | WEIGHT: 198 LBS

## 2018-10-16 DIAGNOSIS — Z47.89 SURGICAL AFTERCARE, MUSCULOSKELETAL SYSTEM: Primary | ICD-10-CM

## 2018-10-16 PROCEDURE — 99024 POSTOP FOLLOW-UP VISIT: CPT | Mod: S$GLB,,, | Performed by: ORTHOPAEDIC SURGERY

## 2018-10-16 PROCEDURE — 99999 PR PBB SHADOW E&M-EST. PATIENT-LVL III: CPT | Mod: PBBFAC,,, | Performed by: ORTHOPAEDIC SURGERY

## 2018-10-24 ENCOUNTER — PATIENT OUTREACH (OUTPATIENT)
Dept: ADMINISTRATIVE | Facility: HOSPITAL | Age: 68
End: 2018-10-24

## 2018-10-24 NOTE — PROGRESS NOTES
Dear Estephania Lyman,        Our records indicate that you are due for a mammogram.    In the [place], one in nine women will develop breast cancer during their lifetime. While there is no way to prevent breast cancer, early detection provides the best opportunity for curing it.    For women over the age of 40, the American Cancer Society recommends a yearly clinical breast exam and a yearly mammogram. These practices have saved thousands of lives. We need your help to ensure that you are receiving optimal medical care.    Please make an appointment for a mammogram at your earliest convenience.    Sincerely,    KALEB Yadav  Clinical Care Coordinator  Internal Medicine  [facility]/[place]

## 2018-11-08 ENCOUNTER — OFFICE VISIT (OUTPATIENT)
Dept: PRIMARY CARE CLINIC | Facility: CLINIC | Age: 68
End: 2018-11-08
Payer: COMMERCIAL

## 2018-11-08 VITALS
HEIGHT: 65 IN | DIASTOLIC BLOOD PRESSURE: 74 MMHG | WEIGHT: 189.88 LBS | HEART RATE: 79 BPM | BODY MASS INDEX: 31.64 KG/M2 | SYSTOLIC BLOOD PRESSURE: 107 MMHG | OXYGEN SATURATION: 98 % | TEMPERATURE: 99 F | RESPIRATION RATE: 18 BRPM

## 2018-11-08 DIAGNOSIS — H65.02 ACUTE SEROUS OTITIS MEDIA OF LEFT EAR, RECURRENCE NOT SPECIFIED: Primary | ICD-10-CM

## 2018-11-08 DIAGNOSIS — Z86.2 HISTORY OF SARCOIDOSIS: ICD-10-CM

## 2018-11-08 DIAGNOSIS — J06.9 VIRAL URI WITH COUGH: ICD-10-CM

## 2018-11-08 PROCEDURE — 99214 OFFICE O/P EST MOD 30 MIN: CPT | Mod: S$GLB,,, | Performed by: NURSE PRACTITIONER

## 2018-11-08 PROCEDURE — 99999 PR PBB SHADOW E&M-EST. PATIENT-LVL IV: CPT | Mod: PBBFAC,,, | Performed by: NURSE PRACTITIONER

## 2018-11-08 PROCEDURE — 1101F PT FALLS ASSESS-DOCD LE1/YR: CPT | Mod: CPTII,S$GLB,, | Performed by: NURSE PRACTITIONER

## 2018-11-08 PROCEDURE — 3074F SYST BP LT 130 MM HG: CPT | Mod: CPTII,S$GLB,, | Performed by: NURSE PRACTITIONER

## 2018-11-08 PROCEDURE — 3078F DIAST BP <80 MM HG: CPT | Mod: CPTII,S$GLB,, | Performed by: NURSE PRACTITIONER

## 2018-11-08 RX ORDER — ALBUTEROL SULFATE 90 UG/1
2 AEROSOL, METERED RESPIRATORY (INHALATION) EVERY 4 HOURS PRN
Qty: 1 INHALER | Refills: 5 | Status: SHIPPED | OUTPATIENT
Start: 2018-11-08 | End: 2019-06-26

## 2018-11-08 RX ORDER — AZITHROMYCIN 250 MG/1
TABLET, FILM COATED ORAL
Qty: 6 TABLET | Refills: 0 | Status: SHIPPED | OUTPATIENT
Start: 2018-11-08 | End: 2018-11-13

## 2018-11-08 RX ORDER — PROMETHAZINE HYDROCHLORIDE AND DEXTROMETHORPHAN HYDROBROMIDE 6.25; 15 MG/5ML; MG/5ML
5 SYRUP ORAL
Qty: 180 ML | Refills: 0 | Status: SHIPPED | OUTPATIENT
Start: 2018-11-08 | End: 2018-11-18

## 2018-11-08 RX ORDER — METHYLPREDNISOLONE 4 MG/1
TABLET ORAL
Qty: 1 PACKAGE | Refills: 0 | Status: SHIPPED | OUTPATIENT
Start: 2018-11-08 | End: 2019-01-09

## 2018-11-08 NOTE — PROGRESS NOTES
"Chief Complaint  Chief Complaint   Patient presents with    Otalgia    Sore Throat    Nasal Congestion       HPI  Estephania Lyman is a 68 y.o. female with multiple medical diagnoses as listed in the medical history and problem list that presents for nasal congestion, sore throat, cough.    Reports the onset of symptoms approximately 5 days ago.  Described as worsening.  Reports nasal congestion, green rhinorrhea.  Sore throat.  Chest congestion. Cough-nonproductive.  Reports wheezing at night.  Shortness of breath and reports the chest "tightness" when taking a deep breath..  No fever or chills.  No nausea vomiting or diarrhea.  Not currently taking any over-the-counter medications.  Patient is  and exposed to numerous children throughout the day.  Also has a history of sarcoidosis.  Patient complains bilateral ear pain worse on the left.  States it feels like she is underwater.      PAST MEDICAL HISTORY:  Past Medical History:   Diagnosis Date    GERD (gastroesophageal reflux disease)     High cholesterol     Hyperlipidemia     Hypertension     Sarcoidosis        PAST SURGICAL HISTORY:  Past Surgical History:   Procedure Laterality Date    ANKLE FRACTURE SURGERY      ARTHROSCOPIC REPAIR OF ROTATOR CUFF OF SHOULDER Right 7/23/2018    Procedure: REPAIR, ROTATOR CUFF, ARTHROSCOPIC;  Surgeon: YELENA Olivera MD;  Location: [facility] OR [address] [place] [address];  Service: Orthopedics;  Laterality: Right;  regional with catheter, interscalene  Vladimir/Linvatec & Ann/Arthrex notified 7-18 LO    ARTHROSCOPY OF SHOULDER WITH DECOMPRESSION OF SUBACROMIAL SPACE Right 7/23/2018    Procedure: ARTHROSCOPY, SHOULDER, WITH SUBACROMIAL SPACE DECOMPRESSION;  Surgeon: YELENA Olivera MD;  Location: [facility] OR [address] [place] [address];  Service: Orthopedics;  Laterality: Right;    ARTHROSCOPY OF SHOULDER WITH REMOVAL OF DISTAL CLAVICLE Right 7/23/2018    Procedure: ARTHROSCOPY, SHOULDER, WITH DISTAL CLAVICLE EXCISION;  Surgeon: YELENA Mckinley" MD Jarod;  Location: [facility] OR [address] [place] [address];  Service: Orthopedics;  Laterality: Right;    ARTHROSCOPY, SHOULDER, WITH DISTAL CLAVICLE EXCISION Right 2018    [facility]    ARTHROSCOPY, SHOULDER, WITH SUBACROMIAL SPACE DECOMPRESSION Right 2018    [facility]    COLONOSCOPY N/A 2017    Procedure: COLONOSCOPY;  Surgeon: Jed Garcia MD;  Location: [facility] ENDO (4TH FLR);  Service: Endoscopy;  Laterality: N/A;    COLONOSCOPY N/A 2017    Performed by Jed Garcia MD at [facility] ENDO (4TH FLR)    FIXATION OF TENDON Right 2018    Procedure: FIXATION, TENDON;  Surgeon: YELENA Olivera MD;  Location: [facility] OR [address] [place] [address];  Service: Orthopedics;  Laterality: Right;    FIXATION, TENDON Right 2018    Performed by YELENA Olivera MD at [facility] OR [address] [place] [address]    HYSTERECTOMY  age 39    endo    LEG SURGERY      tibia    OOPHORECTOMY  age 39    endo    REPAIR, ROTATOR CUFF, ARTHROSCOPIC Right 2018    [facility]    TONSILLECTOMY         SOCIAL HISTORY:  Social History     Socioeconomic History    Marital status:      Spouse name: Not on file    Number of children: Not on file    Years of education: Not on file    Highest education level: Not on file   Social Needs    Financial resource strain: Not on file    Food insecurity - worry: Not on file    Food insecurity - inability: Not on file    Transportation needs - medical: Not on file    Transportation needs - non-medical: Not on file   Occupational History     Employer: [employer]   Tobacco Use    Smoking status: Former Smoker     Packs/day: 1.00     Years: 3.00     Pack years: 3.00     Types: Cigarettes     Last attempt to quit: 2010     Years since quittin.3    Smokeless tobacco: Never Used   Substance and Sexual Activity    Alcohol use: No    Drug use: No    Sexual activity: Yes      Partners: Male     Birth control/protection: Surgical     Comment: Hysterectomy   Other Topics Concern    Not on file   Social History Narrative    Not on file       FAMILY HISTORY:  Family History   Problem Relation Age of Onset    Cancer Mother         colon    Cancer Father         throat    Hypertension Sister     Breast cancer Sister     Hypertension Brother     Diabetes Sister     Hypertension Sister     Stroke Neg Hx     Ovarian cancer Neg Hx     Miscarriages / Stillbirths Neg Hx     Eclampsia Neg Hx        ALLERGIES AND MEDICATIONS: updated and reviewed.  Review of patient's allergies indicates:   Allergen Reactions    Penicillins      Other reaction(s): Unknown    Sulfa (sulfonamide antibiotics) Rash     Other reaction(s): Rash     Current Outpatient Medications   Medication Sig Dispense Refill    alendronate (FOSAMAX) 70 MG tablet Take 1 tablet (70 mg total) by mouth every 7 days. 12 tablet 3    amLODIPine (NORVASC) 10 MG tablet Take 1 tablet (10 mg total) by mouth once daily. 90 tablet 3    aspirin 325 MG tablet Take 1 tablet (325 mg total) by mouth every 12 (twelve) hours. for 28 doses 28 tablet 0    citalopram (CELEXA) 20 MG tablet Take 1 tablet (20 mg total) by mouth once daily. 90 tablet 3    pantoprazole (PROTONIX) 40 MG tablet TAKE 1 TABLET BY MOUTH EVERY DAY FOR ACID REFLUX 90 tablet 3    pravastatin (PRAVACHOL) 20 MG tablet Take 1 tablet (20 mg total) by mouth once daily. 90 tablet 3    traMADol (ULTRAM) 50 mg tablet 1-2 tablets as needed every 4-6 hours 60 tablet 0    albuterol (PROVENTIL/VENTOLIN HFA) 90 mcg/actuation inhaler Inhale 2 puffs into the lungs every 4 (four) hours as needed for Wheezing or Shortness of Breath. Rescue 1 Inhaler 5     No current facility-administered medications for this visit.          ROS  Review of Systems   Constitutional: Positive for fatigue. Negative for chills and fever.   HENT: Positive for congestion, ear pain, postnasal drip, sinus  "pressure, sinus pain, sneezing and sore throat.    Respiratory: Positive for cough, chest tightness, shortness of breath and wheezing.    Cardiovascular: Negative for chest pain and palpitations.   Gastrointestinal: Negative for abdominal pain, diarrhea, nausea and vomiting.   Genitourinary: Negative for dysuria, frequency and urgency.   Musculoskeletal: Negative for arthralgias and joint swelling.   Skin: Negative for rash and wound.   Neurological: Positive for headaches. Negative for dizziness and weakness.   Psychiatric/Behavioral: Positive for sleep disturbance. Negative for dysphoric mood. The patient is not nervous/anxious.          PHYSICAL EXAM  Vitals:    11/08/18 1015   BP: 107/74   BP Location: Right arm   Patient Position: Sitting   BP Method: Medium (Automatic)   Pulse: 79   Resp: 18   Temp: 98.7 °F (37.1 °C)   TempSrc: Oral   SpO2: 98%   Weight: 86.1 kg (189 lb 14.4 oz)   Height: 5' 5" (1.651 m)    Body mass index is 31.6 kg/m².  Weight: 86.1 kg (189 lb 14.4 oz)   Height: 5' 5" (165.1 cm)     Physical Exam   Constitutional: She is oriented to person, place, and time. She appears well-developed and well-nourished. She has a sickly appearance.   HENT:   Head: Normocephalic.   Right Ear: Tympanic membrane normal.   Left Ear: Tympanic membrane is injected and erythematous.   Mouth/Throat: Uvula is midline and mucous membranes are normal. Posterior oropharyngeal erythema present. Tonsils are 0 on the right. Tonsils are 0 on the left. No tonsillar exudate.   Eyes: Conjunctivae are normal.   Cardiovascular: Normal rate, regular rhythm, normal heart sounds and normal pulses.   No murmur heard.  Pulses:       Radial pulses are 2+ on the right side, and 2+ on the left side.   Pulmonary/Chest: Effort normal and breath sounds normal. She has no wheezes.   No noted wheezing.  Noted tight, incessant cough.   Abdominal: Soft. Bowel sounds are normal. There is no tenderness.   Musculoskeletal: She exhibits no edema. "   Lymphadenopathy:     She has no cervical adenopathy.   Neurological: She is alert and oriented to person, place, and time.   Skin: Skin is warm and dry. No rash noted.   Psychiatric: She has a normal mood and affect.         Health Maintenance       Date Due Completion Date    Mammogram 07/15/2018 7/15/2016    DEXA SCAN 07/15/2019 7/15/2016    Colonoscopy 05/30/2022 5/30/2017    Override on 8/7/2012: Done (pt had in the last 2 years b9 polyps only per pt)    Lipid Panel 07/18/2023 7/18/2018    TETANUS VACCINE 04/12/2027 4/12/2017 (Done)    Override on 4/12/2017: Done            Assessment & Plan    Estephania was seen today for otalgia, sore throat and nasal congestion.    Diagnoses and all orders for this visit:    Acute serous otitis media of left ear, recurrence not specified  -     azithromycin (Z-TYE) 250 MG tablet; Take 2 tablets by mouth on day 1; Take 1 tablet by mouth on days 2-5    Viral URI with cough  -     azithromycin (Z-TYE) 250 MG tablet; Take 2 tablets by mouth on day 1; Take 1 tablet by mouth on days 2-5  -     promethazine-dextromethorphan (PROMETHAZINE-DM) 6.25-15 mg/5 mL Syrp; Take 5 mLs by mouth every 4 to 6 hours as needed.    History of sarcoidosis  -     albuterol (PROVENTIL/VENTOLIN HFA) 90 mcg/actuation inhaler; Inhale 2 puffs into the lungs every 4 (four) hours as needed for Wheezing or Shortness of Breath. Rescue    Other orders  -     methylPREDNISolone (MEDROL DOSEPACK) 4 mg tablet; use as directed                Follow-up: Follow-up if symptoms worsen or fail to improve.

## 2018-11-20 ENCOUNTER — HOSPITAL ENCOUNTER (OUTPATIENT)
Dept: RADIOLOGY | Facility: HOSPITAL | Age: 68
Discharge: HOME | End: 2018-11-20
Attending: FAMILY MEDICINE
Payer: COMMERCIAL

## 2018-11-20 DIAGNOSIS — Z12.39 SCREENING FOR BREAST CANCER: ICD-10-CM

## 2018-11-20 PROCEDURE — 77067 SCR MAMMO BI INCL CAD: CPT | Mod: 26,,, | Performed by: RADIOLOGY

## 2018-11-20 PROCEDURE — 77063 BREAST TOMOSYNTHESIS BI: CPT | Mod: 26,,, | Performed by: RADIOLOGY

## 2018-11-20 PROCEDURE — 77063 BREAST TOMOSYNTHESIS BI: CPT | Mod: TC

## 2018-11-20 PROCEDURE — 77067 SCR MAMMO BI INCL CAD: CPT | Mod: TC

## 2018-12-04 PROBLEM — M19.011 ARTHRITIS OF RIGHT SHOULDER REGION: Status: RESOLVED | Noted: 2018-07-24 | Resolved: 2018-12-04

## 2018-12-04 PROBLEM — M75.111 INCOMPLETE ROTATOR CUFF TEAR OR RUPTURE OF RIGHT SHOULDER, NOT SPECIFIED AS TRAUMATIC: Status: RESOLVED | Noted: 2018-07-24 | Resolved: 2018-12-04

## 2018-12-04 PROBLEM — M75.21 BICIPITAL TENDINITIS OF RIGHT SHOULDER: Status: RESOLVED | Noted: 2018-07-24 | Resolved: 2018-12-04

## 2019-01-07 NOTE — PROGRESS NOTES
CC: RIGHT shoulder pain    DATE OF PROCEDURE: 7/23/2018   OPERATION:   Right  1. Shoulder arthroscopic rotator cuff repair, trans osseous equivalent double row  2. Shoulder arthroscopic biceps tenodesis   3. Shoulder arthroscopic subacromial decompression, bursectomy  4. Shoulder arthroscopic extensive debridement (anterior, posterior glenohumeral joint, subacromial space)       5. Shoulder arthroscopic distal clavicle excision     Estephania Lyman reports to be doing very well 5.5 mos s/p the above mentioned procedure. Accompanied by her . Has been d/c'd from therapy since last visit. Doing a HEP. Has returned to baseline activity. Occasional generalized soreness if she overdoes it. No sig pain. Has returned to work as a  without problems. Taking naproxen as needed. SANE 90.     Pain Score: 0-No pain    PAST MEDICAL HISTORY:   Past Medical History:   Diagnosis Date    GERD (gastroesophageal reflux disease)     High cholesterol     Hyperlipidemia     Hypertension     Sarcoidosis        PAST SURGICAL HISTORY:  Past Surgical History:   Procedure Laterality Date    ANKLE FRACTURE SURGERY      ARTHROSCOPY, SHOULDER, WITH DISTAL CLAVICLE EXCISION Right 7/23/2018    Performed [facility] at [facility] OR 1ST FLR    ARTHROSCOPY, SHOULDER, WITH SUBACROMIAL SPACE DECOMPRESSION Right 7/23/2018    Performed by [facility]LE[facility] Oliver[facility] MD at [facility] OR 1ST FLR    COLONOSCOPY N/A 5/30/2017    Performed by Jed Garcia MD at [facility] ENDO (4TH FLR)    FIXATION, TENDON Right 7/23/2018    Performed by [facility]LE[facility] Olivera MD at [facility] OR 1ST FLR    HYSTERECTOMY  age 39    endo    LEG SURGERY      tibia    OOPHORECTOMY  age 39    endo    REPAIR, ROTATOR CUFF, ARTHROSCOPIC Right 7/23/2018    Performed by [facility]LE[facility] Olivera MD at [facility] OR 1ST FLR    TONSILLECTOMY         FAMILY HISTORY:  Family History   Problem Relation Age of Onset    Cancer Mother         colon    Cancer Father         throat     Hypertension Sister     Breast cancer Sister 59    Hypertension Brother     Diabetes Sister     Hypertension Sister     Stroke Neg Hx     Ovarian cancer Neg Hx     Miscarriages / Stillbirths Neg Hx     Eclampsia Neg Hx        MEDICATIONS:    Current Outpatient Medications:     albuterol (PROVENTIL/VENTOLIN HFA) 90 mcg/actuation inhaler, Inhale 2 puffs into the lungs every 4 (four) hours as needed for Wheezing or Shortness of Breath. Rescue, Disp: 1 Inhaler, Rfl: 5    alendronate (FOSAMAX) 70 MG tablet, Take 1 tablet (70 mg total) by mouth every 7 days., Disp: 12 tablet, Rfl: 3    amLODIPine (NORVASC) 10 MG tablet, Take 1 tablet (10 mg total) by mouth once daily., Disp: 90 tablet, Rfl: 3    citalopram (CELEXA) 20 MG tablet, Take 1 tablet (20 mg total) by mouth once daily., Disp: 90 tablet, Rfl: 3    methylPREDNISolone (MEDROL DOSEPACK) 4 mg tablet, use as directed, Disp: 1 Package, Rfl: 0    pantoprazole (PROTONIX) 40 MG tablet, TAKE 1 TABLET BY MOUTH EVERY DAY FOR ACID REFLUX, Disp: 90 tablet, Rfl: 3    pravastatin (PRAVACHOL) 20 MG tablet, Take 1 tablet (20 mg total) by mouth once daily., Disp: 90 tablet, Rfl: 3    traMADol (ULTRAM) 50 mg tablet, 1-2 tablets as needed every 4-6 hours, Disp: 60 tablet, Rfl: 0    aspirin 325 MG tablet, Take 1 tablet (325 mg total) by mouth every 12 (twelve) hours. for 28 doses, Disp: 28 tablet, Rfl: 0    ALLERGIES:  Review of patient's allergies indicates:   Allergen Reactions    Penicillins      Other reaction(s): Unknown    Sulfa (sulfonamide antibiotics) Rash     Other reaction(s): Rash     REVIEW OF SYSTEMS:  Constitution: Negative. Negative for chills, fever and night sweats.    Hematologic/Lymphatic: Negative for bleeding problem. Does not bruise/bleed easily.   Skin: Negative for dry skin, itching and rash.   Musculoskeletal: Negative for falls. Positive for right shoulder stiffness.     All other review of symptoms were reviewed and found to be  "noncontributory.     PHYSICAL EXAMINATION:  Vitals:  BP (!) 151/91   Pulse 73   Ht 5' 5" (1.651 m)   Wt 85.7 kg (189 lb)   BMI 31.45 kg/m²    General: Well-developed well-nourished 68 y.o. femalein no acute distress   Cardiovascular: Regular rhythm by palpation of distal pulse, normal color and temperature, no concerning varicosities on symptomatic side   Lungs: No labored breathing or wheezing appreciated   Neuro: Alert and oriented ×3   Psychiatric: well oriented to person, place and time, demonstrates normal mood and affect   Skin: No rashes, lesions or ulcers, normal temperature, turgor, and texture on uninvolved extremity    Ortho/SPM Exam  Examination of the right shoulder demonstrates well healed incisions. No signs of infection. No significant pain or unusual tenderness. Active FE to 150, passive FE to 170. Passive ER with arm at side 40.  Biceps remains well tensioned. 4+/5 scaption, 5-/5 resisted ER.    IMAGING: No new images.     ASSESSMENT:      ICD-10-CM ICD-9-CM   1. Shoulder stiffness, right M25.611 719.51       PLAN:     Estephania has made good progress in therapy. Continue HEP to maximize motion and strength. Take antiinflammatories as needed. RTC as needed. All questions answered.       Procedures      "

## 2019-01-08 ENCOUNTER — OFFICE VISIT (OUTPATIENT)
Dept: SPORTS MEDICINE | Facility: CLINIC | Age: 69
End: 2019-01-08
Payer: COMMERCIAL

## 2019-01-08 VITALS
SYSTOLIC BLOOD PRESSURE: 151 MMHG | DIASTOLIC BLOOD PRESSURE: 91 MMHG | HEART RATE: 73 BPM | BODY MASS INDEX: 31.49 KG/M2 | WEIGHT: 189 LBS | HEIGHT: 65 IN

## 2019-01-08 DIAGNOSIS — M25.611 SHOULDER STIFFNESS, RIGHT: Primary | ICD-10-CM

## 2019-01-08 PROCEDURE — 3077F SYST BP >= 140 MM HG: CPT | Mod: CPTII,S$GLB,, | Performed by: ORTHOPAEDIC SURGERY

## 2019-01-08 PROCEDURE — 99999 PR PBB SHADOW E&M-EST. PATIENT-LVL III: CPT | Mod: PBBFAC,,, | Performed by: ORTHOPAEDIC SURGERY

## 2019-01-08 PROCEDURE — 99213 OFFICE O/P EST LOW 20 MIN: CPT | Mod: S$GLB,,, | Performed by: ORTHOPAEDIC SURGERY

## 2019-01-08 PROCEDURE — 3080F DIAST BP >= 90 MM HG: CPT | Mod: CPTII,S$GLB,, | Performed by: ORTHOPAEDIC SURGERY

## 2019-01-08 PROCEDURE — 1101F PR PT FALLS ASSESS DOC 0-1 FALLS W/OUT INJ PAST YR: ICD-10-PCS | Mod: CPTII,S$GLB,, | Performed by: ORTHOPAEDIC SURGERY

## 2019-01-08 PROCEDURE — 3080F PR MOST RECENT DIASTOLIC BLOOD PRESSURE >= 90 MM HG: ICD-10-PCS | Mod: CPTII,S$GLB,, | Performed by: ORTHOPAEDIC SURGERY

## 2019-01-08 PROCEDURE — 99213 PR OFFICE/OUTPT VISIT, EST, LEVL III, 20-29 MIN: ICD-10-PCS | Mod: S$GLB,,, | Performed by: ORTHOPAEDIC SURGERY

## 2019-01-08 PROCEDURE — 1101F PT FALLS ASSESS-DOCD LE1/YR: CPT | Mod: CPTII,S$GLB,, | Performed by: ORTHOPAEDIC SURGERY

## 2019-01-08 PROCEDURE — 3077F PR MOST RECENT SYSTOLIC BLOOD PRESSURE >= 140 MM HG: ICD-10-PCS | Mod: CPTII,S$GLB,, | Performed by: ORTHOPAEDIC SURGERY

## 2019-01-08 PROCEDURE — 99999 PR PBB SHADOW E&M-EST. PATIENT-LVL III: ICD-10-PCS | Mod: PBBFAC,,, | Performed by: ORTHOPAEDIC SURGERY

## 2019-01-09 ENCOUNTER — TELEPHONE (OUTPATIENT)
Dept: FAMILY MEDICINE | Facility: CLINIC | Age: 69
End: 2019-01-09

## 2019-01-09 ENCOUNTER — PATIENT MESSAGE (OUTPATIENT)
Dept: FAMILY MEDICINE | Facility: CLINIC | Age: 69
End: 2019-01-09

## 2019-01-09 ENCOUNTER — OFFICE VISIT (OUTPATIENT)
Dept: FAMILY MEDICINE | Facility: CLINIC | Age: 69
End: 2019-01-09
Payer: COMMERCIAL

## 2019-01-09 VITALS
HEART RATE: 72 BPM | DIASTOLIC BLOOD PRESSURE: 74 MMHG | BODY MASS INDEX: 32.22 KG/M2 | HEIGHT: 65 IN | OXYGEN SATURATION: 98 % | SYSTOLIC BLOOD PRESSURE: 124 MMHG | WEIGHT: 193.38 LBS

## 2019-01-09 DIAGNOSIS — R09.82 ALLERGIC RHINITIS WITH POSTNASAL DRIP: Primary | ICD-10-CM

## 2019-01-09 DIAGNOSIS — R09.81 CONGESTION OF NASAL SINUS: ICD-10-CM

## 2019-01-09 DIAGNOSIS — J30.9 ALLERGIC RHINITIS WITH POSTNASAL DRIP: Primary | ICD-10-CM

## 2019-01-09 PROCEDURE — 99213 PR OFFICE/OUTPT VISIT, EST, LEVL III, 20-29 MIN: ICD-10-PCS | Mod: S$GLB,,, | Performed by: NURSE PRACTITIONER

## 2019-01-09 PROCEDURE — 1101F PT FALLS ASSESS-DOCD LE1/YR: CPT | Mod: CPTII,S$GLB,, | Performed by: NURSE PRACTITIONER

## 2019-01-09 PROCEDURE — 3078F PR MOST RECENT DIASTOLIC BLOOD PRESSURE < 80 MM HG: ICD-10-PCS | Mod: CPTII,S$GLB,, | Performed by: NURSE PRACTITIONER

## 2019-01-09 PROCEDURE — 99213 OFFICE O/P EST LOW 20 MIN: CPT | Mod: S$GLB,,, | Performed by: NURSE PRACTITIONER

## 2019-01-09 PROCEDURE — 3074F PR MOST RECENT SYSTOLIC BLOOD PRESSURE < 130 MM HG: ICD-10-PCS | Mod: CPTII,S$GLB,, | Performed by: NURSE PRACTITIONER

## 2019-01-09 PROCEDURE — 3078F DIAST BP <80 MM HG: CPT | Mod: CPTII,S$GLB,, | Performed by: NURSE PRACTITIONER

## 2019-01-09 PROCEDURE — 99999 PR PBB SHADOW E&M-EST. PATIENT-LVL III: ICD-10-PCS | Mod: PBBFAC,,, | Performed by: NURSE PRACTITIONER

## 2019-01-09 PROCEDURE — 1101F PR PT FALLS ASSESS DOC 0-1 FALLS W/OUT INJ PAST YR: ICD-10-PCS | Mod: CPTII,S$GLB,, | Performed by: NURSE PRACTITIONER

## 2019-01-09 PROCEDURE — 3074F SYST BP LT 130 MM HG: CPT | Mod: CPTII,S$GLB,, | Performed by: NURSE PRACTITIONER

## 2019-01-09 PROCEDURE — 99999 PR PBB SHADOW E&M-EST. PATIENT-LVL III: CPT | Mod: PBBFAC,,, | Performed by: NURSE PRACTITIONER

## 2019-01-09 RX ORDER — FLUTICASONE PROPIONATE 50 MCG
2 SPRAY, SUSPENSION (ML) NASAL DAILY
Qty: 16 G | Refills: 3 | Status: SHIPPED | OUTPATIENT
Start: 2019-01-09 | End: 2020-03-25 | Stop reason: SDUPTHER

## 2019-01-09 RX ORDER — GUAIFENESIN 1200 MG/1
1 TABLET, EXTENDED RELEASE ORAL EVERY 12 HOURS
Qty: 20 TABLET | Refills: 0 | Status: SHIPPED | OUTPATIENT
Start: 2019-01-09 | End: 2019-01-19

## 2019-01-09 RX ORDER — FELODIPINE 10 MG/1
10 TABLET, EXTENDED RELEASE ORAL DAILY
Qty: 90 TABLET | Refills: 3 | Status: SHIPPED | OUTPATIENT
Start: 2019-01-09 | End: 2020-02-29

## 2019-01-09 RX ORDER — PROMETHAZINE HYDROCHLORIDE AND DEXTROMETHORPHAN HYDROBROMIDE 6.25; 15 MG/5ML; MG/5ML
5 SYRUP ORAL
Qty: 240 ML | Refills: 0 | Status: SHIPPED | OUTPATIENT
Start: 2019-01-09 | End: 2019-01-19

## 2019-01-09 NOTE — TELEPHONE ENCOUNTER
----- Message from Adela Pérez sent at 1/9/2019  8:46 AM CST -----  Contact: pt  Name of Who is Calling:Estephania      What is the request in detail: Pt requesting a call back in concern of the recall on her Bp medication amLODIPine (NORVASC) 10 MG tablet. Please call and advise    Can the clinic reply by [facility]: yes      What Number to Call Back if not in [facility]: [phone]

## 2019-01-09 NOTE — PROGRESS NOTES
Subjective:       Patient ID: Estephania Lyman is a 68 y.o. female.    Chief Complaint: URI    HPI     Patient presents today with c/o URI symptoms for about a week. Frontal sinus pressure, congestion, ear pain and pressure, post nasal drip. Slight cough. Taking zyrtec daily. No fever/chills or body aches.     Review of Systems   HENT: Positive for congestion, ear pain, postnasal drip, sinus pressure and sinus pain. Negative for rhinorrhea and sore throat.    Respiratory: Positive for cough and shortness of breath (because cant breathe out of nose).    Cardiovascular: Negative for chest pain and palpitations.   Gastrointestinal: Negative for abdominal pain, diarrhea, nausea and vomiting.   Musculoskeletal: Negative.    Neurological: Positive for headaches. Negative for dizziness and light-headedness.         Objective:      Vitals:    01/09/19 1644   BP: 124/74   Pulse: 72     Physical Exam   Constitutional: She is oriented to person, place, and time. She appears well-developed and well-nourished.   HENT:   Head: Normocephalic.   Right Ear: No tenderness. Tympanic membrane is not erythematous and not bulging. A middle ear effusion is present.   Left Ear: No tenderness. Tympanic membrane is not erythematous and not bulging. A middle ear effusion is present.   Nose: Mucosal edema present. Right sinus exhibits frontal sinus tenderness. Right sinus exhibits no maxillary sinus tenderness. Left sinus exhibits frontal sinus tenderness. Left sinus exhibits no maxillary sinus tenderness.   Mouth/Throat: Posterior oropharyngeal edema and posterior oropharyngeal erythema present.   Cardiovascular: Normal rate, regular rhythm and normal heart sounds. Exam reveals no friction rub.   No murmur heard.  Pulmonary/Chest: Effort normal and breath sounds normal. She has no wheezes. She has no rales.   Lymphadenopathy:     She has no cervical adenopathy.   Neurological: She is alert and oriented to person, place, and time.          Assessment:       1. Allergic rhinitis with postnasal drip    2. Congestion of nasal sinus        Plan:     Zyrtec daily  Flonase 2 sprays per nostril daily  Promethazine DM at night for cough  Guaifenesin 1200mg BID  Tylenol/Motrin for headache  Drink plenty water   Rest    RTC if fever presents, if symptoms worsen or do not begin to improve within 1 week

## 2019-01-24 ENCOUNTER — OFFICE VISIT (OUTPATIENT)
Dept: PRIMARY CARE CLINIC | Facility: CLINIC | Age: 69
End: 2019-01-24
Payer: COMMERCIAL

## 2019-01-24 VITALS
HEIGHT: 65 IN | DIASTOLIC BLOOD PRESSURE: 85 MMHG | HEART RATE: 87 BPM | BODY MASS INDEX: 33.32 KG/M2 | SYSTOLIC BLOOD PRESSURE: 131 MMHG | RESPIRATION RATE: 18 BRPM | WEIGHT: 200 LBS | TEMPERATURE: 99 F | OXYGEN SATURATION: 99 %

## 2019-01-24 DIAGNOSIS — Z86.2 HISTORY OF SARCOIDOSIS: ICD-10-CM

## 2019-01-24 DIAGNOSIS — J32.9 SINUSITIS, UNSPECIFIED CHRONICITY, UNSPECIFIED LOCATION: Primary | ICD-10-CM

## 2019-01-24 DIAGNOSIS — J40 BRONCHITIS: ICD-10-CM

## 2019-01-24 DIAGNOSIS — I10 HTN (HYPERTENSION), BENIGN: ICD-10-CM

## 2019-01-24 DIAGNOSIS — I49.3 PVC'S (PREMATURE VENTRICULAR CONTRACTIONS): ICD-10-CM

## 2019-01-24 DIAGNOSIS — E78.00 HYPERCHOLESTEROLEMIA: ICD-10-CM

## 2019-01-24 DIAGNOSIS — I47.29 PAROXYSMAL VENTRICULAR TACHYCARDIA: ICD-10-CM

## 2019-01-24 DIAGNOSIS — M85.80 OSTEOPENIA, UNSPECIFIED LOCATION: ICD-10-CM

## 2019-01-24 PROCEDURE — 1101F PT FALLS ASSESS-DOCD LE1/YR: CPT | Mod: CPTII,S$GLB,, | Performed by: FAMILY MEDICINE

## 2019-01-24 PROCEDURE — 99999 PR PBB SHADOW E&M-EST. PATIENT-LVL IV: CPT | Mod: PBBFAC,,, | Performed by: FAMILY MEDICINE

## 2019-01-24 PROCEDURE — 3079F PR MOST RECENT DIASTOLIC BLOOD PRESSURE 80-89 MM HG: ICD-10-PCS | Mod: CPTII,S$GLB,, | Performed by: FAMILY MEDICINE

## 2019-01-24 PROCEDURE — 3079F DIAST BP 80-89 MM HG: CPT | Mod: CPTII,S$GLB,, | Performed by: FAMILY MEDICINE

## 2019-01-24 PROCEDURE — 99214 OFFICE O/P EST MOD 30 MIN: CPT | Mod: 25,S$GLB,, | Performed by: FAMILY MEDICINE

## 2019-01-24 PROCEDURE — 99999 PR PBB SHADOW E&M-EST. PATIENT-LVL IV: ICD-10-PCS | Mod: PBBFAC,,, | Performed by: FAMILY MEDICINE

## 2019-01-24 PROCEDURE — 3075F PR MOST RECENT SYSTOLIC BLOOD PRESS GE 130-139MM HG: ICD-10-PCS | Mod: CPTII,S$GLB,, | Performed by: FAMILY MEDICINE

## 2019-01-24 PROCEDURE — 99214 PR OFFICE/OUTPT VISIT, EST, LEVL IV, 30-39 MIN: ICD-10-PCS | Mod: 25,S$GLB,, | Performed by: FAMILY MEDICINE

## 2019-01-24 PROCEDURE — 1101F PR PT FALLS ASSESS DOC 0-1 FALLS W/OUT INJ PAST YR: ICD-10-PCS | Mod: CPTII,S$GLB,, | Performed by: FAMILY MEDICINE

## 2019-01-24 PROCEDURE — 96372 PR INJECTION,THERAP/PROPH/DIAG2ST, IM OR SUBCUT: ICD-10-PCS | Mod: S$GLB,,, | Performed by: FAMILY MEDICINE

## 2019-01-24 PROCEDURE — 96372 THER/PROPH/DIAG INJ SC/IM: CPT | Mod: S$GLB,,, | Performed by: FAMILY MEDICINE

## 2019-01-24 PROCEDURE — 3075F SYST BP GE 130 - 139MM HG: CPT | Mod: CPTII,S$GLB,, | Performed by: FAMILY MEDICINE

## 2019-01-24 RX ORDER — PROMETHAZINE HYDROCHLORIDE AND CODEINE PHOSPHATE 6.25; 1 MG/5ML; MG/5ML
5 SOLUTION ORAL EVERY 6 HOURS PRN
Qty: 180 ML | Refills: 0 | Status: SHIPPED | OUTPATIENT
Start: 2019-01-24 | End: 2019-06-26

## 2019-01-24 RX ORDER — AZITHROMYCIN 250 MG/1
TABLET, FILM COATED ORAL
Qty: 6 TABLET | Refills: 0 | Status: SHIPPED | OUTPATIENT
Start: 2019-01-24 | End: 2019-01-28

## 2019-01-24 RX ORDER — BETAMETHASONE SODIUM PHOSPHATE AND BETAMETHASONE ACETATE 3; 3 MG/ML; MG/ML
12 INJECTION, SUSPENSION INTRA-ARTICULAR; INTRALESIONAL; INTRAMUSCULAR; SOFT TISSUE
Status: COMPLETED | OUTPATIENT
Start: 2019-01-24 | End: 2019-01-24

## 2019-01-24 RX ORDER — METHYLPREDNISOLONE 4 MG/1
TABLET ORAL
Qty: 1 PACKAGE | Refills: 0 | Status: SHIPPED | OUTPATIENT
Start: 2019-01-24 | End: 2019-03-27

## 2019-01-24 RX ADMIN — BETAMETHASONE SODIUM PHOSPHATE AND BETAMETHASONE ACETATE 12 MG: 3; 3 INJECTION, SUSPENSION INTRA-ARTICULAR; INTRALESIONAL; INTRAMUSCULAR; SOFT TISSUE at 05:01

## 2019-01-24 NOTE — PROGRESS NOTES
Subjective:       Patient ID: Estephania Lyman is a 68 y.o. female.    Chief Complaint: URI    HPI:  68-year-old female in for cold--started Sunday--had 2 weeks ago but keeps coming back.  No fever, +runny nose, +slight sore throat, +cough, +phlegm clear.  No pneumonia asthma TB no smoking +sarcoidosis.  +nausea no vomiting diarrhea    ROS:  Skin: no psoriasis, eczema, skin cancer  HEENT:+ headache, ocular pain, blurred vision, diplopia, epistaxis, hoarseness change in voice, thyroid trouble  Lung: No pneumonia, asthma, Tb, wheezing, SOB, + sarcoidosis   Heart: No chest pain, ankle edema, palpitations, MI, andreia murmur,+hypertension, +hyperlipidemia, history PVC history paroxysmal ventricular tachycardia  Abdomen: No nausea, vomiting, diarrhea, constipation, ulcers, hepatitis, gallbladder disease, melena, hematochezia, hematemesis  : no UTI, renal disease, stones   Gyn hysterectomy  MS: no fractures, O/A, lupus, rheumatoid, gout--history of fracture both arms, both ankle has osteoarthritis both hips history osteopenia history of incomplete rotator cuff tear  Neuro: No dizziness, LOC, seizures   No diabetes, no anemia, no anxiety, no depression   , 3 children, , lives with  and grandson    Objective:   Physical Exam:  General: Well nourished, well developed, no acute distress + overweight   Skin: No lesions  HEENT: Eyes PERRLA, EOM intact, nose clear D/C , throat +1/4 erythematous  Ears TM cl   NECK: Supple, no bruits, No JVD, no nodes  Lungs: Clear, no rales, rhonchi, wheezing +coarse cough  Heart: Regular rate and rhythm, no murmurs, gallops, or rubs  Abdomen: flat, bowel sounds positive, no tenderness, or organomegaly  MS: Range of motion and muscle strength intact  Neuro: Alert, CN intact, oriented X 3  Extremities: No cyanosis, clubbing, or edema         Assessment:       1. Sinusitis, unspecified chronicity, unspecified location    2. Bronchitis    3. HTN (hypertension), benign     4. Hypercholesterolemia    5. PVC's (premature ventricular contractions)    6. Paroxysmal ventricular tachycardia    7. History of sarcoidosis    8. Osteopenia, unspecified location        Plan:       Sinusitis, unspecified chronicity, unspecified location    Bronchitis    HTN (hypertension), benign    Hypercholesterolemia    PVC's (premature ventricular contractions)    Paroxysmal ventricular tachycardia    History of sarcoidosis    Osteopenia, unspecified location      Celestone 2cc , Zithromax/Medrol/Phenergan with codeine  Patient told if not better in a week will consider Levaquin

## 2019-01-24 NOTE — PROGRESS NOTES
Pt identified by name and date of birth, states only allergic to pcn, injection administered as ordered by aseptic technique, tolerated well by pt

## 2019-03-24 ENCOUNTER — HOSPITAL ENCOUNTER (EMERGENCY)
Facility: OTHER | Age: 69
Discharge: HOME | End: 2019-03-24
Attending: EMERGENCY MEDICINE
Payer: COMMERCIAL

## 2019-03-24 VITALS
BODY MASS INDEX: 34.15 KG/M2 | OXYGEN SATURATION: 99 % | WEIGHT: 200 LBS | HEIGHT: 64 IN | HEART RATE: 66 BPM | TEMPERATURE: 99 F | SYSTOLIC BLOOD PRESSURE: 138 MMHG | RESPIRATION RATE: 16 BRPM | DIASTOLIC BLOOD PRESSURE: 76 MMHG

## 2019-03-24 DIAGNOSIS — R05.9 COUGH: ICD-10-CM

## 2019-03-24 DIAGNOSIS — J06.9 VIRAL URI WITH COUGH: Primary | ICD-10-CM

## 2019-03-24 LAB
INFLUENZA A, MOLECULAR: NEGATIVE
INFLUENZA B, MOLECULAR: NEGATIVE
SPECIMEN SOURCE: NORMAL

## 2019-03-24 PROCEDURE — 25000003 PHARM REV CODE 250: Performed by: PHYSICIAN ASSISTANT

## 2019-03-24 PROCEDURE — 87502 INFLUENZA DNA AMP PROBE: CPT

## 2019-03-24 PROCEDURE — 99284 EMERGENCY DEPT VISIT MOD MDM: CPT | Mod: 25

## 2019-03-24 RX ORDER — NAPROXEN 500 MG/1
500 TABLET ORAL
Status: COMPLETED | OUTPATIENT
Start: 2019-03-24 | End: 2019-03-24

## 2019-03-24 RX ORDER — BENZONATATE 100 MG/1
100 CAPSULE ORAL 3 TIMES DAILY PRN
Qty: 20 CAPSULE | Refills: 0 | Status: SHIPPED | OUTPATIENT
Start: 2019-03-24 | End: 2019-04-03

## 2019-03-24 RX ORDER — ONDANSETRON 4 MG/1
4 TABLET, ORALLY DISINTEGRATING ORAL EVERY 6 HOURS PRN
Qty: 10 TABLET | Refills: 0 | Status: SHIPPED | OUTPATIENT
Start: 2019-03-24 | End: 2019-07-19

## 2019-03-24 RX ORDER — NAPROXEN 500 MG/1
500 TABLET ORAL 2 TIMES DAILY WITH MEALS
Qty: 20 TABLET | Refills: 0 | Status: SHIPPED | OUTPATIENT
Start: 2019-03-24 | End: 2019-06-26 | Stop reason: SDUPTHER

## 2019-03-24 RX ADMIN — NAPROXEN 500 MG: 500 TABLET ORAL at 10:03

## 2019-03-24 NOTE — ED PROVIDER NOTES
Encounter Date: 3/24/2019       History     Chief Complaint   Patient presents with    URI     Pt c/o fever with generalized aches, nausea, non productive cough which started last night. Last dose tylenol at 3 am.     Patient is a 68-year-old female with a past medical history of hypertension, presenting to the emergency department with complaints of headache, body aches, cough, nausea.  The patient states her symptoms started yesterday evening.  She states that yesterday during the day she felt in her usual state of health, went to work.  She reports that she has been unable to eat or drink today due to her nausea and decreased appetite.  No fevers at home.  No vomiting.  No known sick contacts.  She does report a nonproductive cough.  No shortness of breath or chest pain. This is the extent of the patient's complaints at this time.       The history is provided by the patient.     Review of patient's allergies indicates:   Allergen Reactions    Penicillins      Other reaction(s): Unknown    Sulfa (sulfonamide antibiotics) Rash     Other reaction(s): Rash     Past Medical History:   Diagnosis Date    GERD (gastroesophageal reflux disease)     High cholesterol     Hyperlipidemia     Hypertension     Sarcoidosis      Past Surgical History:   Procedure Laterality Date    ANKLE FRACTURE SURGERY      ARTHROSCOPY, SHOULDER, WITH DISTAL CLAVICLE EXCISION Right 7/23/2018    [facility]ST FLR    ARTHROSCOPY, SHOULDER, WITH SUBACROMIAL SPACE DECOMPRESSION Right 7/23/2018    Performed by [facility] at [facility] OR 1ST FLR    COLONOSCOPY N/A 5/30/2017    Performed by Jed Garcia MD at [facility] ENDO (4TH FLR)    FIXATION, TENDON Right 7/23/2018    Performed by [facility] at [facility] OR 1ST FLR    HYSTERECTOMY  age 39    endo    LEG SURGERY      tibia    OOPHORECTOMY  age 39    endo    REPAIR, ROTATOR CUFF, ARTHROSCOPIC Right 7/23/2018    [facility]    TONSILLECTOMY       Family History   Problem Relation Age of Onset    Cancer Mother         colon    Cancer Father         throat    Hypertension Sister     Breast cancer Sister 59    Hypertension Brother     Diabetes Sister     Hypertension Sister     Stroke Neg Hx     Ovarian cancer Neg Hx     Miscarriages / Stillbirths Neg Hx     Eclampsia Neg Hx      Social History     Tobacco Use    Smoking status: Former Smoker     Packs/day: 1.00     Years: 3.00     Pack years: 3.00     Types: Cigarettes     Last attempt to quit: 2010     Years since quittin.7    Smokeless tobacco: Never Used   Substance Use Topics    Alcohol use: No    Drug use: No     Review of Systems   Constitutional: Positive for fatigue. Negative for activity change, appetite change, chills and fever.   HENT: Negative for congestion, rhinorrhea and sore throat.    Eyes: Negative for photophobia and visual disturbance.   Respiratory: Positive for cough. Negative for shortness of breath and wheezing.    Cardiovascular: Negative for chest pain.   Gastrointestinal: Positive for nausea. Negative for abdominal pain, diarrhea and vomiting.   Genitourinary: Negative for dysuria, hematuria and urgency.   Musculoskeletal: Positive for myalgias. Negative for back pain and neck pain.   Skin: Negative for color change and wound.   Neurological: Positive for headaches. Negative for weakness.   Psychiatric/Behavioral: Negative for agitation and confusion.       Physical Exam     Initial Vitals [19 0942]   BP Pulse Resp Temp SpO2   (!) 141/77 93 20 99.2 °F (37.3 °C) 99 %      MAP       --         Physical Exam    Nursing note and vitals reviewed.  Constitutional: She appears well-developed and well-nourished. She is not diaphoretic. She is cooperative.  Non-toxic appearance. She does not have a sickly appearance. She does not appear ill. No distress.   Well-appearing,  female accompanied by  her  the emergency room.  Speaking clearly in full sentences.  No acute distress.   HENT:   Head: Normocephalic and atraumatic.   Right Ear: External ear normal.   Left Ear: External ear normal.   Nose: Nose normal.   Mouth/Throat: Oropharynx is clear and moist.   Eyes: Conjunctivae and EOM are normal.   Neck: Normal range of motion. Neck supple.   Cardiovascular: Normal rate, regular rhythm and normal heart sounds.   Pulmonary/Chest: Breath sounds normal. No respiratory distress. She has no wheezes.   Abdominal: Soft. Bowel sounds are normal. She exhibits no distension. There is no tenderness. There is no rebound and no guarding.   Musculoskeletal: Normal range of motion.   Neurological: She is alert and oriented to person, place, and time.   Skin: Skin is warm.   Psychiatric: She has a normal mood and affect. Her behavior is normal. Judgment and thought content normal.         ED Course   Procedures  Labs Reviewed   INFLUENZA A & B BY MOLECULAR          Imaging Results          X-Ray Chest PA And Lateral (Final result)  Result time 03/24/19 10:35:25    Final result by Sahil Boo MD (03/24/19 10:35:25)                 Impression:      Borderline cardiomegaly without definite active pulmonary process.      Electronically signed by: Sahil Boo MD  Date:    03/24/2019  Time:    10:35             Narrative:    EXAMINATION:  XR CHEST PA AND LATERAL    CLINICAL HISTORY:  Cough    TECHNIQUE:  PA and lateral views of the chest were performed.    COMPARISON:  07/26/2018 and 07/18/2018    FINDINGS:  Cardiac silhouette is borderline enlarged.  Mediastinal contours unremarkable.  Lungs demonstrate chronic interstitial coarsening without focal airspace opacity.  No pleural effusion.  Osseous structures intact.                              X-Rays:   Independently Interpreted Readings:   Chest X-Ray: Normal heart size.  No infiltrates.  No acute abnormalities. Enlarged cardiac silhouette      Medical  Decision Making:   Initial Assessment:   Urgent evaluation of a 68-year-old female presenting to the emergency department with URI like symptoms. Patient is afebrile, nontoxic appearing, hemodynamically stable.  Vital signs were significant for mild hypertension however no other significant findings.  Will plan for chest x-ray, rapid influenza and reassess.  Independently Interpreted Test(s):   I have ordered and independently interpreted X-rays - see prior notes.  Clinical Tests:   Lab Tests: Reviewed and Ordered  Radiological Study: Reviewed and Ordered  ED Management:  Rapid influenza is negative. CXR shows mild cardiomegaly without evidence of pneumonia. At this time, there is no evidence of acute bacterial infection to warranted antibiotic therapy. Signs and symptoms are most likely secondary to a viral etiology. Will plan for symptomatic care with Zofran, tessalon, naproxen. She is counseled on symptomatic care and treatment. Will be discharged home with care instructions. The patient was instructed to follow up with a primary care provider in 2 days or to return to the emergency department for worsening symptoms. The treatment plan was discussed with the patient who demonstrated understanding and comfort with plan.     This note was created using [employer] Fluency Direct. There may be typographical errors secondary to dictation.                         Clinical Impression:     1. Viral URI with cough    2. Cough           Disposition:   Disposition: Discharged  Condition: Stable                        Sana Jorgensen PA-C  03/24/19 1056

## 2019-03-24 NOTE — ED NOTES
Pt c/o nonproductive cough, body aches, fever, HA, nasal drainage x last PM, denies SOB. Pt AAOx4 and appropriate at this time. Respirations even and unlabored. No acute distress noted.

## 2019-03-27 ENCOUNTER — OFFICE VISIT (OUTPATIENT)
Dept: FAMILY MEDICINE | Facility: CLINIC | Age: 69
End: 2019-03-27
Payer: COMMERCIAL

## 2019-03-27 VITALS
TEMPERATURE: 98 F | HEART RATE: 82 BPM | WEIGHT: 196.88 LBS | BODY MASS INDEX: 33.61 KG/M2 | HEIGHT: 64 IN | DIASTOLIC BLOOD PRESSURE: 64 MMHG | OXYGEN SATURATION: 97 % | SYSTOLIC BLOOD PRESSURE: 104 MMHG

## 2019-03-27 DIAGNOSIS — R09.82 ALLERGIC RHINITIS WITH POSTNASAL DRIP: Primary | ICD-10-CM

## 2019-03-27 DIAGNOSIS — J30.9 ALLERGIC RHINITIS WITH POSTNASAL DRIP: Primary | ICD-10-CM

## 2019-03-27 PROCEDURE — 3078F DIAST BP <80 MM HG: CPT | Mod: CPTII,S$GLB,, | Performed by: NURSE PRACTITIONER

## 2019-03-27 PROCEDURE — 3078F PR MOST RECENT DIASTOLIC BLOOD PRESSURE < 80 MM HG: ICD-10-PCS | Mod: CPTII,S$GLB,, | Performed by: NURSE PRACTITIONER

## 2019-03-27 PROCEDURE — 1101F PR PT FALLS ASSESS DOC 0-1 FALLS W/OUT INJ PAST YR: ICD-10-PCS | Mod: CPTII,S$GLB,, | Performed by: NURSE PRACTITIONER

## 2019-03-27 PROCEDURE — 99999 PR PBB SHADOW E&M-EST. PATIENT-LVL III: CPT | Mod: PBBFAC,,, | Performed by: NURSE PRACTITIONER

## 2019-03-27 PROCEDURE — 3074F SYST BP LT 130 MM HG: CPT | Mod: CPTII,S$GLB,, | Performed by: NURSE PRACTITIONER

## 2019-03-27 PROCEDURE — 99213 PR OFFICE/OUTPT VISIT, EST, LEVL III, 20-29 MIN: ICD-10-PCS | Mod: S$GLB,,, | Performed by: NURSE PRACTITIONER

## 2019-03-27 PROCEDURE — 3074F PR MOST RECENT SYSTOLIC BLOOD PRESSURE < 130 MM HG: ICD-10-PCS | Mod: CPTII,S$GLB,, | Performed by: NURSE PRACTITIONER

## 2019-03-27 PROCEDURE — 99213 OFFICE O/P EST LOW 20 MIN: CPT | Mod: S$GLB,,, | Performed by: NURSE PRACTITIONER

## 2019-03-27 PROCEDURE — 99999 PR PBB SHADOW E&M-EST. PATIENT-LVL III: ICD-10-PCS | Mod: PBBFAC,,, | Performed by: NURSE PRACTITIONER

## 2019-03-27 PROCEDURE — 1101F PT FALLS ASSESS-DOCD LE1/YR: CPT | Mod: CPTII,S$GLB,, | Performed by: NURSE PRACTITIONER

## 2019-03-27 RX ORDER — LEVOCETIRIZINE DIHYDROCHLORIDE 5 MG/1
5 TABLET, FILM COATED ORAL NIGHTLY
Qty: 30 TABLET | Refills: 11 | Status: SHIPPED | OUTPATIENT
Start: 2019-03-27 | End: 2021-01-26

## 2019-03-27 NOTE — PROGRESS NOTES
Subjective:       Patient ID: Estephania Lyman is a 68 y.o. female.    Chief Complaint: Sinusitis (sneezing and coughing after amira to ER on Sunday for fever and aching)    HPI   Patient presents for follow up to ER visit on Sunday. She was diagnosed with viral URI.  She states that she is feeling a little better but still having some congestion, dry cough and scratchy throat. She states that she feels like something is sitting in her throat. Her cough is worse at night.  She is taking Tessalon pearls during the day and promethazine with codeine at night.     Review of Systems   Constitutional: Positive for fatigue. Negative for chills and fever.   HENT: Positive for congestion, postnasal drip and sore throat. Negative for ear pain, rhinorrhea, sinus pressure, sinus pain and trouble swallowing.    Respiratory: Positive for cough. Negative for shortness of breath and wheezing.    Cardiovascular: Negative for chest pain and palpitations.   Gastrointestinal: Negative.    Musculoskeletal:        Generally achy   Neurological: Negative for dizziness, light-headedness and headaches.         Objective:      Vitals:    03/27/19 1108   BP: 104/64   Pulse: 82   Temp: 98.1 °F (36.7 °C)     Physical Exam   Constitutional: She is oriented to person, place, and time. She appears well-developed and well-nourished.   HENT:   Right Ear: No tenderness. Tympanic membrane is not erythematous and not bulging. A middle ear effusion is present.   Left Ear: No tenderness. Tympanic membrane is not erythematous and not bulging. A middle ear effusion is present.   Nose: Mucosal edema present. Right sinus exhibits maxillary sinus tenderness (mild). Right sinus exhibits no frontal sinus tenderness. Left sinus exhibits maxillary sinus tenderness (mild). Left sinus exhibits no frontal sinus tenderness.   Mouth/Throat: Oropharynx is clear and moist.   Postnasal drainage noted   Cardiovascular: Normal rate, regular rhythm and normal heart  sounds. Exam reveals no friction rub.   No murmur heard.  Pulmonary/Chest: Effort normal and breath sounds normal. No respiratory distress. She has no wheezes. She has no rales.   Lymphadenopathy:     She has no cervical adenopathy.   Neurological: She is alert and oriented to person, place, and time.   Skin: Skin is warm and dry.   Psychiatric: She has a normal mood and affect. Her behavior is normal. Thought content normal.       Assessment:       1. Allergic rhinitis with postnasal drip        Plan:     xyzal once nightly  Flonase 2 sprays each nostril daily  Continue cough suppressants previously prescribed  Increase water intake    Call or RTC if symptoms worsen or do not begin to improve

## 2019-06-26 ENCOUNTER — OFFICE VISIT (OUTPATIENT)
Dept: FAMILY MEDICINE | Facility: CLINIC | Age: 69
End: 2019-06-26
Payer: COMMERCIAL

## 2019-06-26 ENCOUNTER — LAB VISIT (OUTPATIENT)
Dept: LAB | Facility: HOSPITAL | Age: 69
End: 2019-06-26
Attending: NURSE PRACTITIONER
Payer: COMMERCIAL

## 2019-06-26 VITALS
DIASTOLIC BLOOD PRESSURE: 65 MMHG | WEIGHT: 199.31 LBS | HEIGHT: 64 IN | HEART RATE: 65 BPM | SYSTOLIC BLOOD PRESSURE: 141 MMHG | BODY MASS INDEX: 34.03 KG/M2

## 2019-06-26 DIAGNOSIS — M16.11 OSTEOARTHRITIS OF RIGHT HIP, UNSPECIFIED OSTEOARTHRITIS TYPE: ICD-10-CM

## 2019-06-26 DIAGNOSIS — Z00.00 ANNUAL PHYSICAL EXAM: Primary | ICD-10-CM

## 2019-06-26 DIAGNOSIS — J30.9 ALLERGIC RHINITIS WITH POSTNASAL DRIP: ICD-10-CM

## 2019-06-26 DIAGNOSIS — Z00.00 ANNUAL PHYSICAL EXAM: ICD-10-CM

## 2019-06-26 DIAGNOSIS — R09.82 ALLERGIC RHINITIS WITH POSTNASAL DRIP: ICD-10-CM

## 2019-06-26 LAB
ALBUMIN SERPL BCP-MCNC: 4 G/DL (ref 3.5–5.2)
ALP SERPL-CCNC: 122 U/L (ref 55–135)
ALT SERPL W/O P-5'-P-CCNC: 16 U/L (ref 10–44)
ANION GAP SERPL CALC-SCNC: 8 MMOL/L (ref 8–16)
AST SERPL-CCNC: 21 U/L (ref 10–40)
BASOPHILS # BLD AUTO: 0.05 K/UL (ref 0–0.2)
BASOPHILS NFR BLD: 0.9 % (ref 0–1.9)
BILIRUB SERPL-MCNC: 0.5 MG/DL (ref 0.1–1)
BILIRUB UR QL STRIP: NEGATIVE
BUN SERPL-MCNC: 10 MG/DL (ref 8–23)
CALCIUM SERPL-MCNC: 9.5 MG/DL (ref 8.7–10.5)
CHLORIDE SERPL-SCNC: 104 MMOL/L (ref 95–110)
CHOLEST SERPL-MCNC: 236 MG/DL (ref 120–199)
CHOLEST/HDLC SERPL: 3.6 {RATIO} (ref 2–5)
CLARITY UR REFRACT.AUTO: CLEAR
CO2 SERPL-SCNC: 28 MMOL/L (ref 23–29)
COLOR UR AUTO: YELLOW
CREAT SERPL-MCNC: 0.8 MG/DL (ref 0.5–1.4)
DIFFERENTIAL METHOD: ABNORMAL
EOSINOPHIL # BLD AUTO: 0.5 K/UL (ref 0–0.5)
EOSINOPHIL NFR BLD: 9.7 % (ref 0–8)
ERYTHROCYTE [DISTWIDTH] IN BLOOD BY AUTOMATED COUNT: 12.6 % (ref 11.5–14.5)
EST. GFR  (AFRICAN AMERICAN): >60 ML/MIN/1.73 M^2
EST. GFR  (NON AFRICAN AMERICAN): >60 ML/MIN/1.73 M^2
GLUCOSE SERPL-MCNC: 75 MG/DL (ref 70–110)
GLUCOSE UR QL STRIP: NEGATIVE
HCT VFR BLD AUTO: 41.3 % (ref 37–48.5)
HDLC SERPL-MCNC: 65 MG/DL (ref 40–75)
HDLC SERPL: 27.5 % (ref 20–50)
HGB BLD-MCNC: 12.7 G/DL (ref 12–16)
HGB UR QL STRIP: ABNORMAL
IMM GRANULOCYTES # BLD AUTO: 0.02 K/UL (ref 0–0.04)
IMM GRANULOCYTES NFR BLD AUTO: 0.4 % (ref 0–0.5)
KETONES UR QL STRIP: NEGATIVE
LDLC SERPL CALC-MCNC: 142.8 MG/DL (ref 63–159)
LEUKOCYTE ESTERASE UR QL STRIP: NEGATIVE
LYMPHOCYTES # BLD AUTO: 1.3 K/UL (ref 1–4.8)
LYMPHOCYTES NFR BLD: 25 % (ref 18–48)
MCH RBC QN AUTO: 31.4 PG (ref 27–31)
MCHC RBC AUTO-ENTMCNC: 30.8 G/DL (ref 32–36)
MCV RBC AUTO: 102 FL (ref 82–98)
MICROSCOPIC COMMENT: ABNORMAL
MONOCYTES # BLD AUTO: 0.4 K/UL (ref 0.3–1)
MONOCYTES NFR BLD: 8 % (ref 4–15)
NEUTROPHILS # BLD AUTO: 3 K/UL (ref 1.8–7.7)
NEUTROPHILS NFR BLD: 56 % (ref 38–73)
NITRITE UR QL STRIP: NEGATIVE
NONHDLC SERPL-MCNC: 171 MG/DL
NRBC BLD-RTO: 0 /100 WBC
PH UR STRIP: 5 [PH] (ref 5–8)
PLATELET # BLD AUTO: 239 K/UL (ref 150–350)
PMV BLD AUTO: 11.5 FL (ref 9.2–12.9)
POTASSIUM SERPL-SCNC: 3.9 MMOL/L (ref 3.5–5.1)
PROT SERPL-MCNC: 8.3 G/DL (ref 6–8.4)
PROT UR QL STRIP: NEGATIVE
RBC # BLD AUTO: 4.05 M/UL (ref 4–5.4)
RBC #/AREA URNS AUTO: 15 /HPF (ref 0–4)
SODIUM SERPL-SCNC: 140 MMOL/L (ref 136–145)
SP GR UR STRIP: 1.02 (ref 1–1.03)
SQUAMOUS #/AREA URNS AUTO: 1 /HPF
TRIGL SERPL-MCNC: 141 MG/DL (ref 30–150)
TSH SERPL DL<=0.005 MIU/L-ACNC: 1.26 UIU/ML (ref 0.4–4)
URN SPEC COLLECT METH UR: ABNORMAL
WBC # BLD AUTO: 5.27 K/UL (ref 3.9–12.7)
WBC #/AREA URNS AUTO: 1 /HPF (ref 0–5)

## 2019-06-26 PROCEDURE — 36415 COLL VENOUS BLD VENIPUNCTURE: CPT | Mod: PO

## 2019-06-26 PROCEDURE — 80061 LIPID PANEL: CPT

## 2019-06-26 PROCEDURE — 84443 ASSAY THYROID STIM HORMONE: CPT

## 2019-06-26 PROCEDURE — 85025 COMPLETE CBC W/AUTO DIFF WBC: CPT

## 2019-06-26 PROCEDURE — 1101F PT FALLS ASSESS-DOCD LE1/YR: CPT | Mod: CPTII,S$GLB,, | Performed by: NURSE PRACTITIONER

## 2019-06-26 PROCEDURE — 3078F DIAST BP <80 MM HG: CPT | Mod: CPTII,S$GLB,, | Performed by: NURSE PRACTITIONER

## 2019-06-26 PROCEDURE — 81001 URINALYSIS AUTO W/SCOPE: CPT

## 2019-06-26 PROCEDURE — 99999 PR PBB SHADOW E&M-EST. PATIENT-LVL III: ICD-10-PCS | Mod: PBBFAC,,, | Performed by: NURSE PRACTITIONER

## 2019-06-26 PROCEDURE — 99213 PR OFFICE/OUTPT VISIT, EST, LEVL III, 20-29 MIN: ICD-10-PCS | Mod: S$GLB,,, | Performed by: NURSE PRACTITIONER

## 2019-06-26 PROCEDURE — 3078F PR MOST RECENT DIASTOLIC BLOOD PRESSURE < 80 MM HG: ICD-10-PCS | Mod: CPTII,S$GLB,, | Performed by: NURSE PRACTITIONER

## 2019-06-26 PROCEDURE — 99999 PR PBB SHADOW E&M-EST. PATIENT-LVL III: CPT | Mod: PBBFAC,,, | Performed by: NURSE PRACTITIONER

## 2019-06-26 PROCEDURE — 1101F PR PT FALLS ASSESS DOC 0-1 FALLS W/OUT INJ PAST YR: ICD-10-PCS | Mod: CPTII,S$GLB,, | Performed by: NURSE PRACTITIONER

## 2019-06-26 PROCEDURE — 3077F PR MOST RECENT SYSTOLIC BLOOD PRESSURE >= 140 MM HG: ICD-10-PCS | Mod: CPTII,S$GLB,, | Performed by: NURSE PRACTITIONER

## 2019-06-26 PROCEDURE — 80053 COMPREHEN METABOLIC PANEL: CPT

## 2019-06-26 PROCEDURE — 3077F SYST BP >= 140 MM HG: CPT | Mod: CPTII,S$GLB,, | Performed by: NURSE PRACTITIONER

## 2019-06-26 PROCEDURE — 99213 OFFICE O/P EST LOW 20 MIN: CPT | Mod: S$GLB,,, | Performed by: NURSE PRACTITIONER

## 2019-06-26 RX ORDER — NAPROXEN 500 MG/1
500 TABLET ORAL 2 TIMES DAILY WITH MEALS
Qty: 60 TABLET | Refills: 1 | Status: SHIPPED | OUTPATIENT
Start: 2019-06-26 | End: 2020-03-25 | Stop reason: SDUPTHER

## 2019-06-26 NOTE — PATIENT INSTRUCTIONS
Estephania,     We are always striving for excellence. Should you receive a patient experience survey in the mail, we would appreciate if you would take a few moments to give us your feedback. These surveys let us know our strengths as well as areas of opportunity for improvement to better serve you.    Thank you for your time,  Lisset Riley MA

## 2019-06-26 NOTE — PROGRESS NOTES
Subjective:       Patient ID: Estephania Lyman is a 68 y.o. female.    Chief Complaint: Leg Swelling    HPI   Patient presents today with complaints of leg swelling. Right occurs more often than left. Edema is not present today. It does improve by morning and when elevating legs. Discussed with pt that her BP is slightly elevated today, discussed diet and exercise, pt is not on low diet.   Pt also complains of right hip pain. She has a history of osteoarthritis of right hip as diagnosed through xray.     Review of Systems   Constitutional: Negative for activity change, chills and fever.   HENT: Negative.    Respiratory: Negative for cough, chest tightness and shortness of breath.    Cardiovascular: Positive for leg swelling. Negative for chest pain and palpitations.   Gastrointestinal: Negative.    Musculoskeletal: Negative.    Skin: Negative for color change and pallor.   Neurological: Negative for dizziness, weakness, light-headedness and headaches.       Objective:      Vitals:    06/26/19 1131   BP: (!) 141/65   Pulse: 65     Physical Exam   Constitutional: She is oriented to person, place, and time. She appears well-developed and well-nourished.   HENT:   Head: Normocephalic and atraumatic.   Eyes: No scleral icterus.   Neck: No JVD present.   Cardiovascular: Normal rate, regular rhythm and normal heart sounds. Exam reveals no gallop and no friction rub.   No murmur heard.  Pulmonary/Chest: Effort normal and breath sounds normal. No respiratory distress. She has no wheezes. She has no rales.   Musculoskeletal: She exhibits no edema or tenderness.   Neurological: She is alert and oriented to person, place, and time.   Psychiatric: She has a normal mood and affect. Her behavior is normal. Thought content normal.       Assessment:       1. Annual physical exam    2. Allergic rhinitis with postnasal drip    3. Osteoarthritis of right hip, unspecified osteoarthritis type        Plan:     Continue meds as  prescribed.   Annual labs ordered in preparation for annual visit with Dr. Lora (pt is fasting)  Reassured pt that leg swelling is benign if improves with elevation and overnight.  Do not sit or stand for long periods of time. Stay active  Low salt diet  Graded exercise    RTC for annual exam and BP eval

## 2019-07-11 RX ORDER — PANTOPRAZOLE SODIUM 40 MG/1
TABLET, DELAYED RELEASE ORAL
Qty: 90 TABLET | Refills: 3 | Status: SHIPPED | OUTPATIENT
Start: 2019-07-11 | End: 2020-10-13 | Stop reason: SDUPTHER

## 2019-07-19 ENCOUNTER — OFFICE VISIT (OUTPATIENT)
Dept: FAMILY MEDICINE | Facility: CLINIC | Age: 69
End: 2019-07-19
Attending: FAMILY MEDICINE
Payer: COMMERCIAL

## 2019-07-19 ENCOUNTER — TELEPHONE (OUTPATIENT)
Dept: FAMILY MEDICINE | Facility: CLINIC | Age: 69
End: 2019-07-19

## 2019-07-19 VITALS
HEART RATE: 73 BPM | SYSTOLIC BLOOD PRESSURE: 138 MMHG | BODY MASS INDEX: 34.2 KG/M2 | DIASTOLIC BLOOD PRESSURE: 76 MMHG | OXYGEN SATURATION: 95 % | WEIGHT: 200.31 LBS | HEIGHT: 64 IN

## 2019-07-19 DIAGNOSIS — R51.9 NONINTRACTABLE HEADACHE, UNSPECIFIED CHRONICITY PATTERN, UNSPECIFIED HEADACHE TYPE: Primary | ICD-10-CM

## 2019-07-19 DIAGNOSIS — Z86.2 HISTORY OF SARCOIDOSIS: ICD-10-CM

## 2019-07-19 DIAGNOSIS — I10 HTN (HYPERTENSION), BENIGN: ICD-10-CM

## 2019-07-19 PROCEDURE — 3075F PR MOST RECENT SYSTOLIC BLOOD PRESS GE 130-139MM HG: ICD-10-PCS | Mod: CPTII,S$GLB,, | Performed by: FAMILY MEDICINE

## 2019-07-19 PROCEDURE — 3075F SYST BP GE 130 - 139MM HG: CPT | Mod: CPTII,S$GLB,, | Performed by: FAMILY MEDICINE

## 2019-07-19 PROCEDURE — 99999 PR PBB SHADOW E&M-EST. PATIENT-LVL III: CPT | Mod: PBBFAC,,, | Performed by: FAMILY MEDICINE

## 2019-07-19 PROCEDURE — 99214 OFFICE O/P EST MOD 30 MIN: CPT | Mod: S$GLB,,, | Performed by: FAMILY MEDICINE

## 2019-07-19 PROCEDURE — 3078F DIAST BP <80 MM HG: CPT | Mod: CPTII,S$GLB,, | Performed by: FAMILY MEDICINE

## 2019-07-19 PROCEDURE — 1101F PR PT FALLS ASSESS DOC 0-1 FALLS W/OUT INJ PAST YR: ICD-10-PCS | Mod: CPTII,S$GLB,, | Performed by: FAMILY MEDICINE

## 2019-07-19 PROCEDURE — 99999 PR PBB SHADOW E&M-EST. PATIENT-LVL III: ICD-10-PCS | Mod: PBBFAC,,, | Performed by: FAMILY MEDICINE

## 2019-07-19 PROCEDURE — 1101F PT FALLS ASSESS-DOCD LE1/YR: CPT | Mod: CPTII,S$GLB,, | Performed by: FAMILY MEDICINE

## 2019-07-19 PROCEDURE — 99214 PR OFFICE/OUTPT VISIT, EST, LEVL IV, 30-39 MIN: ICD-10-PCS | Mod: S$GLB,,, | Performed by: FAMILY MEDICINE

## 2019-07-19 PROCEDURE — 3078F PR MOST RECENT DIASTOLIC BLOOD PRESSURE < 80 MM HG: ICD-10-PCS | Mod: CPTII,S$GLB,, | Performed by: FAMILY MEDICINE

## 2019-07-19 RX ORDER — BUTALBITAL, ACETAMINOPHEN AND CAFFEINE 50; 325; 40 MG/1; MG/1; MG/1
1 TABLET ORAL EVERY 6 HOURS PRN
Qty: 20 TABLET | Refills: 0 | Status: SHIPPED | OUTPATIENT
Start: 2019-07-19 | End: 2019-08-26 | Stop reason: SDUPTHER

## 2019-07-19 NOTE — PATIENT INSTRUCTIONS
Estephania,     We are always striving for excellence. Should you receive a patient experience survey electronically or by mail, we would appreciate if you would take a few moments to give us your feedback. These surveys let us know our strengths as well as areas of opportunity for improvement to better serve you.    Thank you for your time,  Dana Olivares LPN    Test results will be communicated to you via : [facility], Telephone or Letter.   If you have not received test results in one week, please contact the clinic at   [phone].

## 2019-07-19 NOTE — TELEPHONE ENCOUNTER
----- Message from Carolee Sarah sent at 7/19/2019  9:21 AM CDT -----  Contact: pt   Name of Who is Calling: LAURENCE CUELLAR [[id number]]    What is the request in detail:Patient is requesting a call back in regards to headaches, patient states it feels like something is pulling in her head and nothing helps the pain....Please contact to further discuss and advise      Can the clinic reply by [facility]: No     [facility] [phone]

## 2019-07-19 NOTE — PROGRESS NOTES
"Subjective:       Patient ID: Estephania Lyman is a 68 y.o. female.    Chief Complaint: Headache    Headache    This is a recurrent problem. The current episode started in the past 7 days (3 days ago). The pain is located in the right unilateral, parietal and occipital region. The pain radiates to the right neck. The pain quality is not similar to prior headaches. The quality of the pain is described as aching and dull. The pain is mild. Associated symptoms include dizziness ("room spinning") and photophobia. Pertinent negatives include no abdominal pain, blurred vision, ear pain, eye redness, eye watering, facial sweating, loss of balance, nausea, phonophobia, scalp tenderness, seizures, sinus pressure, sore throat, tinnitus or vomiting.       Patient Active Problem List   Diagnosis    HTN (hypertension), benign    Hypercholesterolemia    Hematuria    Abnormal urine    Bilateral impacted cerumen    Osteoarthritis of both hips    Osteopenia    PVC's (premature ventricular contractions)    Paroxysmal ventricular tachycardia    Chest pain at rest    Sarcoid uveitis    Screening for colon cancer    Sialadenitis - left sided    Cellulitis of submandibular region    Depression    History of sarcoidosis    Atypical chest pain    Pre-op exam    Incomplete tear of right rotator cuff       Current Outpatient Medications:     alendronate (FOSAMAX) 70 MG tablet, Take 1 tablet (70 mg total) by mouth every 7 days., Disp: 12 tablet, Rfl: 3    citalopram (CELEXA) 20 MG tablet, Take 1 tablet (20 mg total) by mouth once daily., Disp: 90 tablet, Rfl: 3    felodipine (PLENDIL) 10 MG 24 hr tablet, Take 1 tablet (10 mg total) by mouth once daily., Disp: 90 tablet, Rfl: 3    fluticasone (FLONASE) 50 mcg/actuation nasal spray, 2 sprays (100 mcg total) by Each Nare route once daily., Disp: 16 g, Rfl: 3    levocetirizine (XYZAL) 5 MG tablet, Take 1 tablet (5 mg total) by mouth every evening., Disp: 30 tablet, " "Rfl: 11    naproxen (NAPROSYN) 500 MG tablet, Take 1 tablet (500 mg total) by mouth 2 (two) times daily with meals., Disp: 60 tablet, Rfl: 1    pantoprazole (PROTONIX) 40 MG tablet, TAKE 1 TABLET BY MOUTH ONCE DAILY FOR  ACID  REFLUX, Disp: 90 tablet, Rfl: 3    pravastatin (PRAVACHOL) 20 MG tablet, Take 1 tablet (20 mg total) by mouth once daily., Disp: 90 tablet, Rfl: 3    The following portions of the patient's history were reviewed and updated as appropriate: allergies, past family history, past medical history, past social history and past surgical history.    Review of Systems   HENT: Negative for ear pain, sinus pressure, sore throat and tinnitus.    Eyes: Positive for photophobia. Negative for blurred vision and redness.   Gastrointestinal: Negative for abdominal pain, nausea and vomiting.   Neurological: Positive for dizziness ("room spinning") and headaches. Negative for seizures and loss of balance.       Objective:      /76 (BP Location: Right arm, Patient Position: Sitting, BP Method: Small (Manual))   Pulse 73   Ht 5' 4" (1.626 m)   Wt 90.9 kg (200 lb 4.8 oz)   LMP  (LMP Unknown)   SpO2 95%   BMI 34.38 kg/m²     Physical Exam   Constitutional: She is oriented to person, place, and time. She appears well-developed and well-nourished. She is cooperative.   HENT:   Head: Normocephalic and atraumatic.   Nose: Nose normal.   Mouth/Throat: Oropharynx is clear and moist and mucous membranes are normal.   Eyes: Conjunctivae and lids are normal. No scleral icterus.   Fundoscopic exam:       The right eye shows no hemorrhage and no papilledema.        The left eye shows no hemorrhage and no papilledema.   Bilateral arcus senilis.   Neck: Neck supple. No JVD present. Carotid bruit is not present. No thyromegaly present.   Cardiovascular: Normal rate, regular rhythm, normal heart sounds and normal pulses. Exam reveals no gallop and no friction rub.   No murmur heard.  Pulmonary/Chest: Effort normal " "and breath sounds normal. She has no wheezes. She has no rhonchi. She has no rales.   Abdominal: Soft. Bowel sounds are normal. She exhibits no distension and no mass. There is no splenomegaly or hepatomegaly. There is no tenderness.   Musculoskeletal: Normal range of motion. She exhibits no edema or tenderness.   Lymphadenopathy:     She has no cervical adenopathy.     She has no axillary adenopathy.   Neurological: She is alert and oriented to person, place, and time. She has normal strength and normal reflexes. No cranial nerve deficit or sensory deficit. She displays a negative Romberg sign. Coordination and gait normal.   Skin: Skin is warm and dry.   Psychiatric: She has a normal mood and affect. Her speech is normal.   Vitals reviewed.        Assessment:       1. Nonintractable headache, unspecified chronicity pattern, unspecified headache type    2. HTN (hypertension), benign    3. History of sarcoidosis          Plan:       Doubt migrainous, or vertiginous.  No signs consistent with intracranial pathology.  ? Vestibular (2/t dizziness)  Will Rx Fioricet.  Follow-up by phone/email in a few days.    Follow-up with Dr. Lora soon.    "This note will not be shared with the patient."  "

## 2019-07-25 ENCOUNTER — LAB VISIT (OUTPATIENT)
Dept: LAB | Facility: HOSPITAL | Age: 69
End: 2019-07-25
Attending: FAMILY MEDICINE
Payer: COMMERCIAL

## 2019-07-25 ENCOUNTER — OFFICE VISIT (OUTPATIENT)
Dept: FAMILY MEDICINE | Facility: CLINIC | Age: 69
End: 2019-07-25
Attending: FAMILY MEDICINE
Payer: COMMERCIAL

## 2019-07-25 VITALS
BODY MASS INDEX: 34.15 KG/M2 | SYSTOLIC BLOOD PRESSURE: 132 MMHG | HEART RATE: 64 BPM | DIASTOLIC BLOOD PRESSURE: 86 MMHG | HEIGHT: 64 IN | WEIGHT: 200 LBS

## 2019-07-25 DIAGNOSIS — D75.89 MACROCYTOSIS: ICD-10-CM

## 2019-07-25 DIAGNOSIS — E78.2 MIXED HYPERLIPIDEMIA: ICD-10-CM

## 2019-07-25 DIAGNOSIS — I10 HTN (HYPERTENSION), BENIGN: Primary | ICD-10-CM

## 2019-07-25 DIAGNOSIS — R51.9 NEW ONSET OF HEADACHES AFTER AGE 50: ICD-10-CM

## 2019-07-25 LAB — VIT B12 SERPL-MCNC: 276 PG/ML (ref 210–950)

## 2019-07-25 PROCEDURE — 1101F PT FALLS ASSESS-DOCD LE1/YR: CPT | Mod: CPTII,S$GLB,, | Performed by: FAMILY MEDICINE

## 2019-07-25 PROCEDURE — 99214 PR OFFICE/OUTPT VISIT, EST, LEVL IV, 30-39 MIN: ICD-10-PCS | Mod: S$GLB,,, | Performed by: FAMILY MEDICINE

## 2019-07-25 PROCEDURE — 82747 ASSAY OF FOLIC ACID RBC: CPT

## 2019-07-25 PROCEDURE — 3079F PR MOST RECENT DIASTOLIC BLOOD PRESSURE 80-89 MM HG: ICD-10-PCS | Mod: CPTII,S$GLB,, | Performed by: FAMILY MEDICINE

## 2019-07-25 PROCEDURE — 1101F PR PT FALLS ASSESS DOC 0-1 FALLS W/OUT INJ PAST YR: ICD-10-PCS | Mod: CPTII,S$GLB,, | Performed by: FAMILY MEDICINE

## 2019-07-25 PROCEDURE — 3075F PR MOST RECENT SYSTOLIC BLOOD PRESS GE 130-139MM HG: ICD-10-PCS | Mod: CPTII,S$GLB,, | Performed by: FAMILY MEDICINE

## 2019-07-25 PROCEDURE — 99214 OFFICE O/P EST MOD 30 MIN: CPT | Mod: S$GLB,,, | Performed by: FAMILY MEDICINE

## 2019-07-25 PROCEDURE — 82607 VITAMIN B-12: CPT

## 2019-07-25 PROCEDURE — 36415 COLL VENOUS BLD VENIPUNCTURE: CPT | Mod: PO

## 2019-07-25 PROCEDURE — 99999 PR PBB SHADOW E&M-EST. PATIENT-LVL III: CPT | Mod: PBBFAC,,, | Performed by: FAMILY MEDICINE

## 2019-07-25 PROCEDURE — 3075F SYST BP GE 130 - 139MM HG: CPT | Mod: CPTII,S$GLB,, | Performed by: FAMILY MEDICINE

## 2019-07-25 PROCEDURE — 3079F DIAST BP 80-89 MM HG: CPT | Mod: CPTII,S$GLB,, | Performed by: FAMILY MEDICINE

## 2019-07-25 PROCEDURE — 99999 PR PBB SHADOW E&M-EST. PATIENT-LVL III: ICD-10-PCS | Mod: PBBFAC,,, | Performed by: FAMILY MEDICINE

## 2019-07-25 RX ORDER — PRAVASTATIN SODIUM 40 MG/1
40 TABLET ORAL DAILY
Qty: 90 TABLET | Refills: 3 | Status: SHIPPED | OUTPATIENT
Start: 2019-07-25 | End: 2020-11-30

## 2019-07-25 NOTE — PROGRESS NOTES
"Subjective:       Patient ID: Estephania Lyman is a 68 y.o. female.    Chief Complaint: Hypertension    HPI   Pt is here for follow up of htn stable on plendil no sob/cp bp fine today   Pt has hypercholesterolemia stable on statin no muscle aches  Pt has high mcv not a drinker she is a meat eater  Pt had recent visit for headache she is not a headache person she would like to see neurology as sh still feels pressure in the back of her head  No visual disturbance no gait disturbance no n/v   Review of Systems   Constitutional: Negative for chills, fatigue and fever.   Respiratory: Negative for cough, chest tightness and shortness of breath.    Cardiovascular: Negative for chest pain and palpitations.   Gastrointestinal: Negative for abdominal distention and abdominal pain.   Neurological: Positive for headaches.   Hematological: Negative for adenopathy. Does not bruise/bleed easily.       Objective:      Physical Exam   Constitutional: She is oriented to person, place, and time. She appears well-developed and well-nourished.   Cardiovascular: Normal rate and regular rhythm. Exam reveals no gallop.   Pulmonary/Chest: Effort normal and breath sounds normal. No stridor. No respiratory distress.   Abdominal: Soft. Bowel sounds are normal. She exhibits no distension. There is no tenderness.   Neurological: She is oriented to person, place, and time. No cranial nerve deficit. She exhibits normal muscle tone. Coordination normal.   abs discussed with pt   Assessment:       1. HTN (hypertension), benign    2. Mixed hyperlipidemia    3. Macrocytosis    4. New onset of headaches after age 50        Plan:     orders cmp lipid vit b 12 folate  F/u neurology  Low fat diet  Low salt diet  Graded exercise   rtc 6 months and prn        "This note will not be shared with the patient."   "

## 2019-07-25 NOTE — PATIENT INSTRUCTIONS
Estephania,     We are always striving for excellence. Should you receive a patient experience survey electronically or by mail, we would appreciate if you would take a few moments to give us your feedback. These surveys let us know our strengths as well as areas of opportunity for improvement to better serve you.    Thank you for your time,  Juan J Terrell MA    Your test results will be communicated to you via : My [facility], Telephone or Letter.   If you have not received your test results in one week, please contact the clinic at [phone].

## 2019-07-29 LAB — FOLATE RBC-MCNC: 625 NG/ML (ref 280–791)

## 2019-08-22 NOTE — PROGRESS NOTES
"SUBJECTIVE:  Patient ID: Estephania Lyman   MRN: [id number]  Referred By: Dr. Aidee Lora  Chief Complaint: Consult    History of Present Illness:   68 y.o. female with HTN, HLD, migraines, depression and anxiety, who presents to clinic with her  for evaluation of headaches.  Headaches have been going on for a "couple years", about 3 years ago, prior to this, she would experience a headache 2-3 times per week.  However, they are now becoming more intense.  Headache is described as a moderate to severe, pounding located in the right occipitalis, can radiate towards the right temporalis.  Can last about a day in duration, with varying intensity.  Pain can be rated anywhere from 5 to 10 out of 10, intensifying to peak within a few hours.  She has been unable to identify any triggers.  Sunlight can worsen her headache.  Pain is debilitating.  Pain can be associated with nausea, phonophobia, photophobia.  Worsened by movement.   Triggers - unsure, bright sunlight    Aggravating Factors - movement, bright light, loud noise   Alleviating Factors - fioricet   Head Trauma? Infection? Fever? Cancer? Pregnancy? <-- denies   Recent Changes - denies   Family Hx of Migraines <-- maternal grandmother     Treatments Tried and Response  Fioricet - helping   Supplements - recommended today     Notes from Family Medicine:  7/25/2019:  HPI   Pt is here for follow up of htn stable on plendil no sob/cp bp fine today   Pt has hypercholesterolemia stable on statin no muscle aches  Pt has high mcv not a drinker she is a meat eater  Pt had recent visit for headache she is not a headache person she would like to see neurology as sh still feels pressure in the back of her head  No visual disturbance no gait disturbance no n/v     7/19/2019:  Headache    This is a recurrent problem. The current episode started in the past 7 days (3 days ago). The pain is located in the right unilateral, parietal and occipital region. The pain " "radiates to the right neck. The pain quality is not similar to prior headaches. The quality of the pain is described as aching and dull. The pain is mild. Associated symptoms include dizziness ("room spinning") and photophobia. Pertinent negatives include no abdominal pain, blurred vision, ear pain, eye redness, eye watering, facial sweating, loss of balance, nausea, phonophobia, scalp tenderness, seizures, sinus pressure, sore throat, tinnitus or vomiting.     Current Medications:    alendronate (FOSAMAX) 70 MG tablet, Take 1 tablet (70 mg total) by mouth every 7 days., Disp: 12 tablet, Rfl: 3    butalbital-acetaminophen-caffeine -40 mg (FIORICET, ESGIC) -40 mg per tablet, Take 1 tablet by mouth every 6 (six) hours as needed for Headaches., Disp: 15 tablet, Rfl: 2    citalopram (CELEXA) 20 MG tablet, Take 1 tablet (20 mg total) by mouth once daily., Disp: 90 tablet, Rfl: 3    felodipine (PLENDIL) 10 MG 24 hr tablet, Take 1 tablet (10 mg total) by mouth once daily., Disp: 90 tablet, Rfl: 3    levocetirizine (XYZAL) 5 MG tablet, Take 1 tablet (5 mg total) by mouth every evening., Disp: 30 tablet, Rfl: 11    pantoprazole (PROTONIX) 40 MG tablet, TAKE 1 TABLET BY MOUTH ONCE DAILY FOR  ACID  REFLUX, Disp: 90 tablet, Rfl: 3    pravastatin (PRAVACHOL) 40 MG tablet, Take 1 tablet (40 mg total) by mouth once daily., Disp: 90 tablet, Rfl: 3    fluticasone (FLONASE) 50 mcg/actuation nasal spray, 2 sprays (100 mcg total) by Each Nare route once daily., Disp: 16 g, Rfl: 3    naproxen (NAPROSYN) 500 MG tablet, Take 1 tablet (500 mg total) by mouth 2 (two) times daily with meals., Disp: 60 tablet, Rfl: 1    Review of Systems - as per HPI, otherwise a balanced 10 systems review is negative.    OBJECTIVE:  Vitals:  /79 (BP Location: Right arm, Patient Position: Sitting, BP Method: X-Large (Automatic))   Pulse 77   Ht 5' 5" (1.651 m)   Wt 90.9 kg (200 lb 6.4 oz)   LMP  (LMP Unknown)   BMI 33.35 kg/m² "      Physical Exam:  Constitutional: she appears well-developed and well-nourished. she is well groomed. NAD.    HENT:    Head: Normocephalic and atraumatic, oral and nasal mucosa intact.  Right and Left external ear normal. Frontalis was NTTP, temporalis was NTTP   Eyes: Eyelids normal.  Conjunctivae and EOM are normal. Pupils are equal, round, and reactive to light   Neck: Neck supple. Full passive ROM without pain. Occiput and trapezius NTTP bilaterally    Resp: Respiratory effort is normal.   Musculoskeletal: Normal range of motion. No joint stiffness.   Skin: Skin is warm and dry.  Psychiatric: Normal mood and affect.     Neuro exam:    Mental status:  The patient is alert and oriented to person, place and time.  Language is intact and fluent  Remote and recent memory are intact  Normal attention and concentration  Speech is normal   Knowledge is good (consistent with education)    Cranial Nerves:  Pupils are equal and reactive to light.    Extraocular movements are intact and without nystagmus.    Visual fields are full to confrontation testing.   Facial movement is full and symmetric.  Facial sensation is intact.    Hearing is intact to finger rub   Uvula in midline. Tongue in midline without fasciculation.   Shoulder shrug symmetrical.    Motor:  Normal muscle bulk and symmetry. No fasciculations were noted.   Tremor not apparent   Pronator drift not apparent.    strength was strong and symmetric    Finger extension strength was strong and symmetric   RUE:appropriate against gravity and medium force as tested 5/5  LUE: appropriate against gravity and medium force as tested 5/5  RLE:appropriate against gravity and medium force as tested 5/5              LLE: appropriate against gravity and medium force as tested 5/5    Reflexes:  Right Brachioradialis 2+  Left Brachioradialis 2+  Right Biceps 2+  Left Biceps 2+  Right Patellar2+  Left Patellar 2+    Henley was negative bilaterally      Sensory:  RUE   intact light touch  LUE intact light touch    RLE intact light touch  LLE intact light touch    Gait:   Romberg - negative   Normal gait  Tandem, Heel, and Toe Walk - able to perform without difficulty     Review of Data:   Notes from family medicine, ED visit, primary care reviewed   Labs:  Lab Visit on 07/25/2019   Component Date Value Ref Range Status    Vitamin B-12 07/25/2019 276  210 - 950 pg/mL Final    RBC Folate 07/25/2019 625  280 - 791 ng/mL Final   Lab Visit on 06/26/2019   Component Date Value Ref Range Status    WBC 06/26/2019 5.27  3.90 - 12.70 K/uL Final    RBC 06/26/2019 4.05  4.00 - 5.40 M/uL Final    Hemoglobin 06/26/2019 12.7  12.0 - 16.0 g/dL Final    Hematocrit 06/26/2019 41.3  37.0 - 48.5 % Final    Mean Corpuscular Volume 06/26/2019 102* 82 - 98 fL Final    Mean Corpuscular Hemoglobin 06/26/2019 31.4* 27.0 - 31.0 pg Final    Mean Corpuscular Hemoglobin Conc 06/26/2019 30.8* 32.0 - 36.0 g/dL Final    RDW 06/26/2019 12.6  11.5 - 14.5 % Final    Platelets 06/26/2019 239  150 - 350 K/uL Final    MPV 06/26/2019 11.5  9.2 - 12.9 fL Final    Immature Granulocytes 06/26/2019 0.4  0.0 - 0.5 % Final    Gran # (ANC) 06/26/2019 3.0  1.8 - 7.7 K/uL Final    Immature Grans (Abs) 06/26/2019 0.02  0.00 - 0.04 K/uL Final    Lymph # 06/26/2019 1.3  1.0 - 4.8 K/uL Final    Mono # 06/26/2019 0.4  0.3 - 1.0 K/uL Final    Eos # 06/26/2019 0.5  0.0 - 0.5 K/uL Final    Baso # 06/26/2019 0.05  0.00 - 0.20 K/uL Final    nRBC 06/26/2019 0  0 /100 WBC Final    Gran% 06/26/2019 56.0  38.0 - 73.0 % Final    Lymph% 06/26/2019 25.0  18.0 - 48.0 % Final    Mono% 06/26/2019 8.0  4.0 - 15.0 % Final    Eosinophil% 06/26/2019 9.7* 0.0 - 8.0 % Final    Basophil% 06/26/2019 0.9  0.0 - 1.9 % Final    Differential Method 06/26/2019 Automated   Final    Sodium 06/26/2019 140  136 - 145 mmol/L Final    Potassium 06/26/2019 3.9  3.5 - 5.1 mmol/L Final    Chloride 06/26/2019 104  95 - 110 mmol/L Final     CO2 06/26/2019 28  23 - 29 mmol/L Final    Glucose 06/26/2019 75  70 - 110 mg/dL Final    BUN, Bld 06/26/2019 10  8 - 23 mg/dL Final    Creatinine 06/26/2019 0.8  0.5 - 1.4 mg/dL Final    Calcium 06/26/2019 9.5  8.7 - 10.5 mg/dL Final    Total Protein 06/26/2019 8.3  6.0 - 8.4 g/dL Final    Albumin 06/26/2019 4.0  3.5 - 5.2 g/dL Final    Total Bilirubin 06/26/2019 0.5  0.1 - 1.0 mg/dL Final    Alkaline Phosphatase 06/26/2019 122  55 - 135 U/L Final    AST 06/26/2019 21  10 - 40 U/L Final    ALT 06/26/2019 16  10 - 44 U/L Final    Anion Gap 06/26/2019 8  8 - 16 mmol/L Final    eGFR if African American 06/26/2019 >60.0  >60 mL/min/1.73 m^2 Final    eGFR if non African American 06/26/2019 >60.0  >60 mL/min/1.73 m^2 Final    Cholesterol 06/26/2019 236* 120 - 199 mg/dL Final    Triglycerides 06/26/2019 141  30 - 150 mg/dL Final    HDL 06/26/2019 65  40 - 75 mg/dL Final    LDL Cholesterol 06/26/2019 142.8  63.0 - 159.0 mg/dL Final    Hdl/Cholesterol Ratio 06/26/2019 27.5  20.0 - 50.0 % Final    Total Cholesterol/HDL Ratio 06/26/2019 3.6  2.0 - 5.0 Final    Non-HDL Cholesterol 06/26/2019 171  mg/dL Final    TSH 06/26/2019 1.264  0.400 - 4.000 uIU/mL Final   Office Visit on 06/26/2019   Component Date Value Ref Range Status    Specimen UA 06/26/2019 Urine, Clean Catch   Final    Color, UA 06/26/2019 Yellow  Yellow, Straw, Umu Final    Appearance, UA 06/26/2019 Clear  Clear Final    pH, UA 06/26/2019 5.0  5.0 - 8.0 Final    Specific Gravity, UA 06/26/2019 1.025  1.005 - 1.030 Final    Protein, UA 06/26/2019 Negative  Negative Final    Glucose, UA 06/26/2019 Negative  Negative Final    Ketones, UA 06/26/2019 Negative  Negative Final    Bilirubin (UA) 06/26/2019 Negative  Negative Final    Occult Blood UA 06/26/2019 1+* Negative Final    Nitrite, UA 06/26/2019 Negative  Negative Final    Leukocytes, UA 06/26/2019 Negative  Negative Final    RBC, UA 06/26/2019 15* 0 - 4 /hpf Final    WBC, UA  06/26/2019 1  0 - 5 /hpf Final    Squam Epithel, UA 06/26/2019 1  /hpf Final    Microscopic Comment 06/26/2019 SEE COMMENT   Final     Note: I have independently reviewed any/all imaging/labs/tests and agree with the report (s) as documented.  Any discrepancies will be as noted/demarcated by free text.  DRAKE SCHWARTZ 8/26/2019    ASSESSMENT:  1. Migraine without aura and without status migrainosus, not intractable    2. Depression, unspecified depression type    3. Anxiety    4. HTN (hypertension), benign    5. Osteopenia, unspecified location      PLAN:  - For migraine and headache prevention - recommend supplements magox 400 mg, vit B2 400 mg, and/or co-q10 200 mg daily   - If ineffective, discussed next step in treatment would be changing celexa 20 mg (takes for anxiety and depression, feels is only mildly effective) to either duloxetine vs venlafaxine to help with both depression/anxiety as well as migraine and headache prevention  - Okay to continue use of fioricet for acute migraines which is effective   - Start tracking headaches via Migraine Abdulkadir kalpesh on phone   - Risks, benefits, and potential side effects of fioricet discussed  - Alternative treatment options offered   - HTN - blood pressure well controlled on current regimen, management per PCP   - Osteopenia - will avoid use of steroids, currently on foxamax daily   - RTC in 3 months or sooner if needed      Orders Placed This Encounter    butalbital-acetaminophen-caffeine -40 mg (FIORICET, ESGIC) -40 mg per tablet     I have discussed realistic goals of care with patient at length as well as medication options, and need for lifestyle adjustment. I have explained that treatment will take time. We have agreed that the goal will be to reduce frequency/intensity/quantity of HA, not to be completely HA free. I have explained my non narcotic policy regarding headache treatment.    Patient to track frequency of headaches.  Patient agreeable to work  on lifestyle adjustments.    Questions and concerns were sought and answered to the patient's stated verbal satisfaction.  The patient verbalizes understanding and agreement with the above stated treatment plan.     CC: MD Joaquina Enriquez, FNP-C  [facility]  [phone]    Dr. Hills was available during today's encounter.

## 2019-08-26 ENCOUNTER — OFFICE VISIT (OUTPATIENT)
Dept: NEUROLOGY | Facility: CLINIC | Age: 69
End: 2019-08-26
Payer: COMMERCIAL

## 2019-08-26 VITALS
DIASTOLIC BLOOD PRESSURE: 79 MMHG | WEIGHT: 200.38 LBS | BODY MASS INDEX: 33.38 KG/M2 | HEIGHT: 65 IN | HEART RATE: 77 BPM | SYSTOLIC BLOOD PRESSURE: 109 MMHG

## 2019-08-26 DIAGNOSIS — M85.80 OSTEOPENIA, UNSPECIFIED LOCATION: ICD-10-CM

## 2019-08-26 DIAGNOSIS — F41.9 ANXIETY: ICD-10-CM

## 2019-08-26 DIAGNOSIS — I10 HTN (HYPERTENSION), BENIGN: ICD-10-CM

## 2019-08-26 DIAGNOSIS — F32.A DEPRESSION, UNSPECIFIED DEPRESSION TYPE: ICD-10-CM

## 2019-08-26 DIAGNOSIS — G43.009 MIGRAINE WITHOUT AURA AND WITHOUT STATUS MIGRAINOSUS, NOT INTRACTABLE: Primary | ICD-10-CM

## 2019-08-26 PROCEDURE — 3074F SYST BP LT 130 MM HG: CPT | Mod: CPTII,S$GLB,, | Performed by: NURSE PRACTITIONER

## 2019-08-26 PROCEDURE — 1101F PR PT FALLS ASSESS DOC 0-1 FALLS W/OUT INJ PAST YR: ICD-10-PCS | Mod: CPTII,S$GLB,, | Performed by: NURSE PRACTITIONER

## 2019-08-26 PROCEDURE — 3074F PR MOST RECENT SYSTOLIC BLOOD PRESSURE < 130 MM HG: ICD-10-PCS | Mod: CPTII,S$GLB,, | Performed by: NURSE PRACTITIONER

## 2019-08-26 PROCEDURE — 99204 PR OFFICE/OUTPT VISIT, NEW, LEVL IV, 45-59 MIN: ICD-10-PCS | Mod: S$GLB,,, | Performed by: NURSE PRACTITIONER

## 2019-08-26 PROCEDURE — 3078F DIAST BP <80 MM HG: CPT | Mod: CPTII,S$GLB,, | Performed by: NURSE PRACTITIONER

## 2019-08-26 PROCEDURE — 99204 OFFICE O/P NEW MOD 45 MIN: CPT | Mod: S$GLB,,, | Performed by: NURSE PRACTITIONER

## 2019-08-26 PROCEDURE — 3078F PR MOST RECENT DIASTOLIC BLOOD PRESSURE < 80 MM HG: ICD-10-PCS | Mod: CPTII,S$GLB,, | Performed by: NURSE PRACTITIONER

## 2019-08-26 PROCEDURE — 99999 PR PBB SHADOW E&M-EST. PATIENT-LVL III: ICD-10-PCS | Mod: PBBFAC,,, | Performed by: NURSE PRACTITIONER

## 2019-08-26 PROCEDURE — 99999 PR PBB SHADOW E&M-EST. PATIENT-LVL III: CPT | Mod: PBBFAC,,, | Performed by: NURSE PRACTITIONER

## 2019-08-26 PROCEDURE — 1101F PT FALLS ASSESS-DOCD LE1/YR: CPT | Mod: CPTII,S$GLB,, | Performed by: NURSE PRACTITIONER

## 2019-08-26 RX ORDER — BUTALBITAL, ACETAMINOPHEN AND CAFFEINE 50; 325; 40 MG/1; MG/1; MG/1
1 TABLET ORAL EVERY 6 HOURS PRN
Qty: 15 TABLET | Refills: 2 | Status: SHIPPED | OUTPATIENT
Start: 2019-08-26 | End: 2021-02-23

## 2019-08-26 NOTE — PATIENT INSTRUCTIONS
Supplements for Migraine:  Magnesium Oxide 400 mg daily   Vitamin B2 (Riboflavin) - 400 mg daily   Co-Q10 200 mg daily     If supplements are not effective, we will consider switching celexa to another anti-depressant.

## 2019-11-21 RX ORDER — CITALOPRAM 20 MG/1
TABLET, FILM COATED ORAL
Qty: 90 TABLET | Refills: 3 | Status: SHIPPED | OUTPATIENT
Start: 2019-11-21 | End: 2021-01-22

## 2020-02-29 RX ORDER — FELODIPINE 10 MG/1
TABLET, EXTENDED RELEASE ORAL
Qty: 90 TABLET | Refills: 0 | Status: SHIPPED | OUTPATIENT
Start: 2020-02-29 | End: 2020-06-24

## 2020-03-17 ENCOUNTER — LAB VISIT (OUTPATIENT)
Dept: LAB | Facility: HOSPITAL | Age: 70
End: 2020-03-17
Attending: FAMILY MEDICINE
Payer: COMMERCIAL

## 2020-03-17 ENCOUNTER — OFFICE VISIT (OUTPATIENT)
Dept: FAMILY MEDICINE | Facility: CLINIC | Age: 70
End: 2020-03-17
Attending: FAMILY MEDICINE
Payer: COMMERCIAL

## 2020-03-17 VITALS
HEART RATE: 63 BPM | DIASTOLIC BLOOD PRESSURE: 69 MMHG | SYSTOLIC BLOOD PRESSURE: 137 MMHG | HEIGHT: 65 IN | WEIGHT: 196 LBS | BODY MASS INDEX: 32.65 KG/M2

## 2020-03-17 DIAGNOSIS — I10 ESSENTIAL HYPERTENSION: ICD-10-CM

## 2020-03-17 DIAGNOSIS — K21.9 GASTROESOPHAGEAL REFLUX DISEASE, ESOPHAGITIS PRESENCE NOT SPECIFIED: ICD-10-CM

## 2020-03-17 DIAGNOSIS — E78.2 MIXED HYPERLIPIDEMIA: ICD-10-CM

## 2020-03-17 DIAGNOSIS — K21.9 GASTROESOPHAGEAL REFLUX DISEASE, ESOPHAGITIS PRESENCE NOT SPECIFIED: Primary | ICD-10-CM

## 2020-03-17 DIAGNOSIS — R71.8 ELEVATED MCV: ICD-10-CM

## 2020-03-17 DIAGNOSIS — Z78.0 ASYMPTOMATIC MENOPAUSE: ICD-10-CM

## 2020-03-17 LAB
ALBUMIN SERPL BCP-MCNC: 3.6 G/DL (ref 3.5–5.2)
ALP SERPL-CCNC: 138 U/L (ref 55–135)
ALT SERPL W/O P-5'-P-CCNC: 13 U/L (ref 10–44)
ANION GAP SERPL CALC-SCNC: 7 MMOL/L (ref 8–16)
AST SERPL-CCNC: 18 U/L (ref 10–40)
BASOPHILS # BLD AUTO: 0.09 K/UL (ref 0–0.2)
BASOPHILS NFR BLD: 1.6 % (ref 0–1.9)
BILIRUB SERPL-MCNC: 0.6 MG/DL (ref 0.1–1)
BUN SERPL-MCNC: 8 MG/DL (ref 8–23)
CALCIUM SERPL-MCNC: 9.1 MG/DL (ref 8.7–10.5)
CHLORIDE SERPL-SCNC: 104 MMOL/L (ref 95–110)
CHOLEST SERPL-MCNC: 216 MG/DL (ref 120–199)
CHOLEST/HDLC SERPL: 3.5 {RATIO} (ref 2–5)
CO2 SERPL-SCNC: 29 MMOL/L (ref 23–29)
CREAT SERPL-MCNC: 0.9 MG/DL (ref 0.5–1.4)
DIFFERENTIAL METHOD: ABNORMAL
EOSINOPHIL # BLD AUTO: 0.7 K/UL (ref 0–0.5)
EOSINOPHIL NFR BLD: 11.6 % (ref 0–8)
ERYTHROCYTE [DISTWIDTH] IN BLOOD BY AUTOMATED COUNT: 12.2 % (ref 11.5–14.5)
EST. GFR  (AFRICAN AMERICAN): >60 ML/MIN/1.73 M^2
EST. GFR  (NON AFRICAN AMERICAN): >60 ML/MIN/1.73 M^2
GLUCOSE SERPL-MCNC: 94 MG/DL (ref 70–110)
HCT VFR BLD AUTO: 41.8 % (ref 37–48.5)
HDLC SERPL-MCNC: 61 MG/DL (ref 40–75)
HDLC SERPL: 28.2 % (ref 20–50)
HGB BLD-MCNC: 12.7 G/DL (ref 12–16)
IMM GRANULOCYTES # BLD AUTO: 0.03 K/UL (ref 0–0.04)
IMM GRANULOCYTES NFR BLD AUTO: 0.5 % (ref 0–0.5)
LDLC SERPL CALC-MCNC: 127.8 MG/DL (ref 63–159)
LYMPHOCYTES # BLD AUTO: 1.2 K/UL (ref 1–4.8)
LYMPHOCYTES NFR BLD: 20.4 % (ref 18–48)
MCH RBC QN AUTO: 31.6 PG (ref 27–31)
MCHC RBC AUTO-ENTMCNC: 30.4 G/DL (ref 32–36)
MCV RBC AUTO: 104 FL (ref 82–98)
MONOCYTES # BLD AUTO: 0.6 K/UL (ref 0.3–1)
MONOCYTES NFR BLD: 10.4 % (ref 4–15)
NEUTROPHILS # BLD AUTO: 3.2 K/UL (ref 1.8–7.7)
NEUTROPHILS NFR BLD: 55.5 % (ref 38–73)
NONHDLC SERPL-MCNC: 155 MG/DL
NRBC BLD-RTO: 0 /100 WBC
PLATELET # BLD AUTO: 235 K/UL (ref 150–350)
PMV BLD AUTO: 11.7 FL (ref 9.2–12.9)
POTASSIUM SERPL-SCNC: 4 MMOL/L (ref 3.5–5.1)
PROT SERPL-MCNC: 7.9 G/DL (ref 6–8.4)
RBC # BLD AUTO: 4.02 M/UL (ref 4–5.4)
SODIUM SERPL-SCNC: 140 MMOL/L (ref 136–145)
TRIGL SERPL-MCNC: 136 MG/DL (ref 30–150)
WBC # BLD AUTO: 5.68 K/UL (ref 3.9–12.7)

## 2020-03-17 PROCEDURE — 99214 PR OFFICE/OUTPT VISIT, EST, LEVL IV, 30-39 MIN: ICD-10-PCS | Mod: S$GLB,,, | Performed by: FAMILY MEDICINE

## 2020-03-17 PROCEDURE — 99999 PR PBB SHADOW E&M-EST. PATIENT-LVL III: ICD-10-PCS | Mod: PBBFAC,,, | Performed by: FAMILY MEDICINE

## 2020-03-17 PROCEDURE — 1159F PR MEDICATION LIST DOCUMENTED IN MEDICAL RECORD: ICD-10-PCS | Mod: S$GLB,,, | Performed by: FAMILY MEDICINE

## 2020-03-17 PROCEDURE — 3078F PR MOST RECENT DIASTOLIC BLOOD PRESSURE < 80 MM HG: ICD-10-PCS | Mod: CPTII,S$GLB,, | Performed by: FAMILY MEDICINE

## 2020-03-17 PROCEDURE — 85025 COMPLETE CBC W/AUTO DIFF WBC: CPT

## 2020-03-17 PROCEDURE — 3078F DIAST BP <80 MM HG: CPT | Mod: CPTII,S$GLB,, | Performed by: FAMILY MEDICINE

## 2020-03-17 PROCEDURE — 1101F PT FALLS ASSESS-DOCD LE1/YR: CPT | Mod: CPTII,S$GLB,, | Performed by: FAMILY MEDICINE

## 2020-03-17 PROCEDURE — 1126F AMNT PAIN NOTED NONE PRSNT: CPT | Mod: S$GLB,,, | Performed by: FAMILY MEDICINE

## 2020-03-17 PROCEDURE — 1126F PR PAIN SEVERITY QUANTIFIED, NO PAIN PRESENT: ICD-10-PCS | Mod: S$GLB,,, | Performed by: FAMILY MEDICINE

## 2020-03-17 PROCEDURE — 99999 PR PBB SHADOW E&M-EST. PATIENT-LVL III: CPT | Mod: PBBFAC,,, | Performed by: FAMILY MEDICINE

## 2020-03-17 PROCEDURE — 3075F SYST BP GE 130 - 139MM HG: CPT | Mod: CPTII,S$GLB,, | Performed by: FAMILY MEDICINE

## 2020-03-17 PROCEDURE — 1159F MED LIST DOCD IN RCRD: CPT | Mod: S$GLB,,, | Performed by: FAMILY MEDICINE

## 2020-03-17 PROCEDURE — 80053 COMPREHEN METABOLIC PANEL: CPT

## 2020-03-17 PROCEDURE — 80061 LIPID PANEL: CPT

## 2020-03-17 PROCEDURE — 3075F PR MOST RECENT SYSTOLIC BLOOD PRESS GE 130-139MM HG: ICD-10-PCS | Mod: CPTII,S$GLB,, | Performed by: FAMILY MEDICINE

## 2020-03-17 PROCEDURE — 1101F PR PT FALLS ASSESS DOC 0-1 FALLS W/OUT INJ PAST YR: ICD-10-PCS | Mod: CPTII,S$GLB,, | Performed by: FAMILY MEDICINE

## 2020-03-17 PROCEDURE — 36415 COLL VENOUS BLD VENIPUNCTURE: CPT | Mod: PO

## 2020-03-17 PROCEDURE — 99214 OFFICE O/P EST MOD 30 MIN: CPT | Mod: S$GLB,,, | Performed by: FAMILY MEDICINE

## 2020-03-17 NOTE — PROGRESS NOTES
"Subjective:       Patient ID: Estephania Lyman is a 69 y.o. female.    Chief Complaint: Follow-up    HPI   Pt is here for follow up of gerd pt takes her ppi daily as directed with occasional break through heart burn denies exertional sob/cp  Pt is concerned about covid 19 has an appt with rheum for her sarcoid woul dlike a note to stay home from work until she sees them for clearance  Pt has htn on plendil no sob/cp bp fine today  Pt has hypercholesterolemia on statin no muscle aches   Pt had elevated mcv b12 and folate wnl pos etoh pt denies excess    Review of Systems   Constitutional: Negative for chills, fatigue and fever.   Respiratory: Negative for cough, chest tightness and shortness of breath.    Cardiovascular: Negative for chest pain and palpitations.   Gastrointestinal: Negative for abdominal distention, abdominal pain and blood in stool.       Objective:      Physical Exam   Constitutional: She appears well-developed and well-nourished. No distress.   Cardiovascular: Normal rate and regular rhythm. Exam reveals no gallop.   Pulmonary/Chest: Effort normal and breath sounds normal. No stridor. No respiratory distress.   Abdominal: Soft. Bowel sounds are normal. She exhibits no distension. There is no tenderness.     labs discussed with pt   Assessment:       1. Gastroesophageal reflux disease, esophagitis presence not specified    2. Mixed hyperlipidemia    3. Essential hypertension    4. Asymptomatic menopause    5. Elevated MCV        Plan:     orders cmp lipid cbc egd  Cont meds  Low fat low salt diet  Graded exercise  rtc 6 months and prn  F/u gi         "This note will not be shared with the patient."   "

## 2020-03-18 ENCOUNTER — TELEPHONE (OUTPATIENT)
Dept: FAMILY MEDICINE | Facility: CLINIC | Age: 70
End: 2020-03-18

## 2020-03-20 ENCOUNTER — TELEPHONE (OUTPATIENT)
Dept: GASTROENTEROLOGY | Facility: CLINIC | Age: 70
End: 2020-03-20

## 2020-03-25 ENCOUNTER — OFFICE VISIT (OUTPATIENT)
Dept: PRIMARY CARE CLINIC | Facility: CLINIC | Age: 70
End: 2020-03-25
Payer: COMMERCIAL

## 2020-03-25 ENCOUNTER — TELEPHONE (OUTPATIENT)
Dept: PRIMARY CARE CLINIC | Facility: CLINIC | Age: 70
End: 2020-03-25

## 2020-03-25 VITALS
SYSTOLIC BLOOD PRESSURE: 110 MMHG | HEIGHT: 64 IN | HEART RATE: 76 BPM | WEIGHT: 199.94 LBS | DIASTOLIC BLOOD PRESSURE: 70 MMHG | TEMPERATURE: 98 F | BODY MASS INDEX: 34.13 KG/M2

## 2020-03-25 DIAGNOSIS — M16.11 OSTEOARTHRITIS OF RIGHT HIP, UNSPECIFIED OSTEOARTHRITIS TYPE: ICD-10-CM

## 2020-03-25 DIAGNOSIS — R09.82 ALLERGIC RHINITIS WITH POSTNASAL DRIP: ICD-10-CM

## 2020-03-25 DIAGNOSIS — R09.81 CONGESTION OF NASAL SINUS: ICD-10-CM

## 2020-03-25 DIAGNOSIS — H69.90 DYSFUNCTION OF EUSTACHIAN TUBE, UNSPECIFIED LATERALITY: ICD-10-CM

## 2020-03-25 DIAGNOSIS — J30.9 ALLERGIC RHINITIS WITH POSTNASAL DRIP: ICD-10-CM

## 2020-03-25 DIAGNOSIS — I47.29 PAROXYSMAL VENTRICULAR TACHYCARDIA: ICD-10-CM

## 2020-03-25 DIAGNOSIS — H60.90 OTITIS EXTERNA, UNSPECIFIED CHRONICITY, UNSPECIFIED LATERALITY, UNSPECIFIED TYPE: Primary | ICD-10-CM

## 2020-03-25 DIAGNOSIS — J06.9 UPPER RESPIRATORY TRACT INFECTION, UNSPECIFIED TYPE: ICD-10-CM

## 2020-03-25 DIAGNOSIS — I49.3 PVC'S (PREMATURE VENTRICULAR CONTRACTIONS): ICD-10-CM

## 2020-03-25 PROBLEM — R07.89 ATYPICAL CHEST PAIN: Status: RESOLVED | Noted: 2018-05-19 | Resolved: 2020-03-25

## 2020-03-25 PROCEDURE — 3078F PR MOST RECENT DIASTOLIC BLOOD PRESSURE < 80 MM HG: ICD-10-PCS | Mod: CPTII,S$GLB,, | Performed by: FAMILY MEDICINE

## 2020-03-25 PROCEDURE — 3074F SYST BP LT 130 MM HG: CPT | Mod: CPTII,S$GLB,, | Performed by: FAMILY MEDICINE

## 2020-03-25 PROCEDURE — 1159F MED LIST DOCD IN RCRD: CPT | Mod: S$GLB,,, | Performed by: FAMILY MEDICINE

## 2020-03-25 PROCEDURE — 99999 PR PBB SHADOW E&M-EST. PATIENT-LVL III: CPT | Mod: PBBFAC,,, | Performed by: FAMILY MEDICINE

## 2020-03-25 PROCEDURE — 3074F PR MOST RECENT SYSTOLIC BLOOD PRESSURE < 130 MM HG: ICD-10-PCS | Mod: CPTII,S$GLB,, | Performed by: FAMILY MEDICINE

## 2020-03-25 PROCEDURE — 99999 PR PBB SHADOW E&M-EST. PATIENT-LVL III: ICD-10-PCS | Mod: PBBFAC,,, | Performed by: FAMILY MEDICINE

## 2020-03-25 PROCEDURE — 99213 OFFICE O/P EST LOW 20 MIN: CPT | Mod: S$GLB,,, | Performed by: FAMILY MEDICINE

## 2020-03-25 PROCEDURE — 3078F DIAST BP <80 MM HG: CPT | Mod: CPTII,S$GLB,, | Performed by: FAMILY MEDICINE

## 2020-03-25 PROCEDURE — 99213 PR OFFICE/OUTPT VISIT, EST, LEVL III, 20-29 MIN: ICD-10-PCS | Mod: S$GLB,,, | Performed by: FAMILY MEDICINE

## 2020-03-25 PROCEDURE — 1101F PR PT FALLS ASSESS DOC 0-1 FALLS W/OUT INJ PAST YR: ICD-10-PCS | Mod: CPTII,S$GLB,, | Performed by: FAMILY MEDICINE

## 2020-03-25 PROCEDURE — 1101F PT FALLS ASSESS-DOCD LE1/YR: CPT | Mod: CPTII,S$GLB,, | Performed by: FAMILY MEDICINE

## 2020-03-25 PROCEDURE — 1159F PR MEDICATION LIST DOCUMENTED IN MEDICAL RECORD: ICD-10-PCS | Mod: S$GLB,,, | Performed by: FAMILY MEDICINE

## 2020-03-25 RX ORDER — FLUTICASONE PROPIONATE 50 MCG
2 SPRAY, SUSPENSION (ML) NASAL DAILY
Qty: 16 G | Refills: 3 | Status: SHIPPED | OUTPATIENT
Start: 2020-03-25 | End: 2022-10-16

## 2020-03-25 RX ORDER — NAPROXEN 500 MG/1
500 TABLET ORAL 2 TIMES DAILY WITH MEALS
Qty: 30 TABLET | Refills: 0 | Status: SHIPPED | OUTPATIENT
Start: 2020-03-25 | End: 2021-01-27

## 2020-03-25 RX ORDER — NEOMYCIN SULFATE, POLYMYXIN B SULFATE AND HYDROCORTISONE 10; 3.5; 1 MG/ML; MG/ML; [USP'U]/ML
3 SUSPENSION/ DROPS AURICULAR (OTIC) 4 TIMES DAILY
Qty: 10 ML | Refills: 0 | Status: SHIPPED | OUTPATIENT
Start: 2020-03-25 | End: 2020-03-30

## 2020-03-25 NOTE — PROGRESS NOTES
Subjective:   Patient ID: Estephania Lyman is a 69 y.o. female.    Chief Complaint: Otalgia (right, sound muffled)      Otalgia    There is pain in the right ear. The current episode started yesterday. The problem occurs constantly. The problem has been unchanged. There has been no fever. The pain is at a severity of 3/10. The pain is mild. Associated symptoms include ear discharge, hearing loss, rhinorrhea and a sore throat. Pertinent negatives include no abdominal pain, coughing, diarrhea, headaches, neck pain, rash or vomiting. She has tried nothing for the symptoms. The treatment provided no relief. There is no history of a chronic ear infection, hearing loss or a tympanostomy tube.       Patient queried and denies any further complaints.    LOCATION  DURATION  SEVERITY  QUALITY  TIMING  CAUSE  ASSOCIATED SYMPTOMS  MODIFIERS.    ALLERGIES AND MEDICATIONS: updated and reviewed.  Review of patient's allergies indicates:   Allergen Reactions    Sulfa (sulfonamide antibiotics) Rash     Other reaction(s): Rash       Current Outpatient Medications:     alendronate (FOSAMAX) 70 MG tablet, Take 1 tablet (70 mg total) by mouth every 7 days., Disp: 12 tablet, Rfl: 3    butalbital-acetaminophen-caffeine -40 mg (FIORICET, ESGIC) -40 mg per tablet, Take 1 tablet by mouth every 6 (six) hours as needed for Headaches., Disp: 15 tablet, Rfl: 2    citalopram (CELEXA) 20 MG tablet, TAKE 1 TABLET BY MOUTH ONCE DAILY, Disp: 90 tablet, Rfl: 3    felodipine (PLENDIL) 10 MG 24 hr tablet, Take 1 tablet by mouth once daily, Disp: 90 tablet, Rfl: 0    fluticasone propionate (FLONASE) 50 mcg/actuation nasal spray, 2 sprays (100 mcg total) by Each Nostril route once daily., Disp: 16 g, Rfl: 3    levocetirizine (XYZAL) 5 MG tablet, Take 1 tablet (5 mg total) by mouth every evening., Disp: 30 tablet, Rfl: 11    naproxen (NAPROSYN) 500 MG tablet, Take 1 tablet (500 mg total) by mouth 2 (two) times daily with meals. For  "3-10 days, Disp: 30 tablet, Rfl: 0    pantoprazole (PROTONIX) 40 MG tablet, TAKE 1 TABLET BY MOUTH ONCE DAILY FOR  ACID  REFLUX, Disp: 90 tablet, Rfl: 3    pravastatin (PRAVACHOL) 40 MG tablet, Take 1 tablet (40 mg total) by mouth once daily., Disp: 90 tablet, Rfl: 3    neomycin-polymyxin-hydrocortisone (CORTISPORIN) 3.5-10,000-1 mg/mL-unit/mL-% otic suspension, Place 3 drops into the right ear 4 (four) times daily. for 5 days, Disp: 10 mL, Rfl: 0    Review of Systems   Constitutional: Negative for chills, diaphoresis, fatigue and fever.   HENT: Positive for ear discharge, ear pain, hearing loss, rhinorrhea and sore throat. Negative for congestion, postnasal drip and sinus pressure.    Respiratory: Negative for cough and shortness of breath.    Cardiovascular: Negative for chest pain, palpitations and leg swelling.   Gastrointestinal: Negative for abdominal pain, diarrhea, nausea and vomiting.   Genitourinary: Negative for dysuria.   Musculoskeletal: Negative for neck pain.   Skin: Negative for rash.   Neurological: Negative for dizziness, light-headedness and headaches.       Objective:     Vitals:    03/25/20 1327   BP: 110/70   Pulse: 76   Temp: 98.3 °F (36.8 °C)   Weight: 90.7 kg (199 lb 15.3 oz)   Height: 5' 4" (1.626 m)     Body mass index is 34.32 kg/m².    Physical Exam   Constitutional: She appears well-developed and well-nourished.   HENT:   Head: Normocephalic and atraumatic.   Right Ear: Tympanic membrane is erythematous and retracted. Right ear injected TM: evidence of otitis externa right ear.   Left Ear: External ear normal.   Nose: Nose normal.   Mouth/Throat: Oropharynx is clear and moist.   Eyes: Pupils are equal, round, and reactive to light. EOM are normal.   Cardiovascular: Normal rate, regular rhythm and normal heart sounds.   Pulmonary/Chest: Effort normal and breath sounds normal.   Nursing note and vitals reviewed.      Assessment and Plan:   Estephania was seen today for " otalgia.    Diagnoses and all orders for this visit:    Otitis externa, unspecified chronicity, unspecified laterality, unspecified type    Upper respiratory tract infection, unspecified type    Dysfunction of Eustachian tube, unspecified laterality    Allergic rhinitis with postnasal drip  -     fluticasone propionate (FLONASE) 50 mcg/actuation nasal spray; 2 sprays (100 mcg total) by Each Nostril route once daily.    Congestion of nasal sinus  -     fluticasone propionate (FLONASE) 50 mcg/actuation nasal spray; 2 sprays (100 mcg total) by Each Nostril route once daily.    Osteoarthritis of right hip, unspecified osteoarthritis type  -     naproxen (NAPROSYN) 500 MG tablet; Take 1 tablet (500 mg total) by mouth 2 (two) times daily with meals. For 3-10 days    Paroxysmal ventricular tachycardia    PVC's (premature ventricular contractions)    Other orders  -     neomycin-polymyxin-hydrocortisone (CORTISPORIN) 3.5-10,000-1 mg/mL-unit/mL-% otic suspension; Place 3 drops into the right ear 4 (four) times daily. for 5 days    Hydrate, rest, OTC Mucinex Expectorant as directed, Nasal saline as needed.  OTC Zyrtec as directed.      Follow up in about 2 weeks (around 4/8/2020) for lack of improvement.    THIS NOTE WILL BE SHARED WITH THE PATIENT.

## 2020-03-25 NOTE — TELEPHONE ENCOUNTER
----- Message from Sarbjit Guan MD sent at 3/25/2020  1:06 PM CDT -----  Strongly recommend virtual visit

## 2020-05-07 ENCOUNTER — TELEPHONE (OUTPATIENT)
Dept: GASTROENTEROLOGY | Facility: CLINIC | Age: 70
End: 2020-05-07

## 2020-05-07 NOTE — TELEPHONE ENCOUNTER
----- Message from Naa Stevens sent at 5/7/2020  3:16 PM CDT -----  Please give pt a call to reschedule appt from March 2020. Call back [phone]

## 2020-05-27 ENCOUNTER — TELEPHONE (OUTPATIENT)
Dept: GASTROENTEROLOGY | Facility: CLINIC | Age: 70
End: 2020-05-27

## 2020-05-27 NOTE — TELEPHONE ENCOUNTER
----- Message from Lisa Ceballos sent at 5/27/2020 10:06 AM CDT -----  Contact: pt   Returning call

## 2020-06-01 ENCOUNTER — OFFICE VISIT (OUTPATIENT)
Dept: GASTROENTEROLOGY | Facility: CLINIC | Age: 70
End: 2020-06-01
Payer: COMMERCIAL

## 2020-06-01 ENCOUNTER — TELEPHONE (OUTPATIENT)
Dept: ENDOSCOPY | Facility: HOSPITAL | Age: 70
End: 2020-06-01

## 2020-06-01 VITALS
DIASTOLIC BLOOD PRESSURE: 82 MMHG | HEART RATE: 72 BPM | SYSTOLIC BLOOD PRESSURE: 128 MMHG | BODY MASS INDEX: 33.06 KG/M2 | WEIGHT: 198.44 LBS | HEIGHT: 65 IN

## 2020-06-01 DIAGNOSIS — Z80.0 FAMILY HISTORY OF COLON CANCER: ICD-10-CM

## 2020-06-01 DIAGNOSIS — K21.9 GASTROESOPHAGEAL REFLUX DISEASE, ESOPHAGITIS PRESENCE NOT SPECIFIED: Primary | ICD-10-CM

## 2020-06-01 PROCEDURE — 1101F PR PT FALLS ASSESS DOC 0-1 FALLS W/OUT INJ PAST YR: ICD-10-PCS | Mod: CPTII,S$GLB,, | Performed by: INTERNAL MEDICINE

## 2020-06-01 PROCEDURE — 1126F PR PAIN SEVERITY QUANTIFIED, NO PAIN PRESENT: ICD-10-PCS | Mod: S$GLB,,, | Performed by: INTERNAL MEDICINE

## 2020-06-01 PROCEDURE — 1126F AMNT PAIN NOTED NONE PRSNT: CPT | Mod: S$GLB,,, | Performed by: INTERNAL MEDICINE

## 2020-06-01 PROCEDURE — 1101F PT FALLS ASSESS-DOCD LE1/YR: CPT | Mod: CPTII,S$GLB,, | Performed by: INTERNAL MEDICINE

## 2020-06-01 PROCEDURE — 1159F PR MEDICATION LIST DOCUMENTED IN MEDICAL RECORD: ICD-10-PCS | Mod: S$GLB,,, | Performed by: INTERNAL MEDICINE

## 2020-06-01 PROCEDURE — 3074F SYST BP LT 130 MM HG: CPT | Mod: CPTII,S$GLB,, | Performed by: INTERNAL MEDICINE

## 2020-06-01 PROCEDURE — 99999 PR PBB SHADOW E&M-EST. PATIENT-LVL III: ICD-10-PCS | Mod: PBBFAC,,, | Performed by: INTERNAL MEDICINE

## 2020-06-01 PROCEDURE — 3074F PR MOST RECENT SYSTOLIC BLOOD PRESSURE < 130 MM HG: ICD-10-PCS | Mod: CPTII,S$GLB,, | Performed by: INTERNAL MEDICINE

## 2020-06-01 PROCEDURE — 99204 OFFICE O/P NEW MOD 45 MIN: CPT | Mod: S$GLB,,, | Performed by: INTERNAL MEDICINE

## 2020-06-01 PROCEDURE — 3079F DIAST BP 80-89 MM HG: CPT | Mod: CPTII,S$GLB,, | Performed by: INTERNAL MEDICINE

## 2020-06-01 PROCEDURE — 1159F MED LIST DOCD IN RCRD: CPT | Mod: S$GLB,,, | Performed by: INTERNAL MEDICINE

## 2020-06-01 PROCEDURE — 3079F PR MOST RECENT DIASTOLIC BLOOD PRESSURE 80-89 MM HG: ICD-10-PCS | Mod: CPTII,S$GLB,, | Performed by: INTERNAL MEDICINE

## 2020-06-01 PROCEDURE — 99999 PR PBB SHADOW E&M-EST. PATIENT-LVL III: CPT | Mod: PBBFAC,,, | Performed by: INTERNAL MEDICINE

## 2020-06-01 PROCEDURE — 99204 PR OFFICE/OUTPT VISIT, NEW, LEVL IV, 45-59 MIN: ICD-10-PCS | Mod: S$GLB,,, | Performed by: INTERNAL MEDICINE

## 2020-06-01 NOTE — LETTER
June 1, 2020      Aidee Lora MD  [address]  [address]  [facility] [address]           [facility]  [address]  [place] [address]  Phone: [phone]  Fax: [phone]          Patient: Estephania Lyman   MR Number: [id number]   YOB: 1950   Date of Visit: 6/1/2020       Dear Dr. Aidee Lora:    Thank you for referring Estephania Lyman to me for evaluation. Attached you will find relevant portions of my assessment and plan of care.    If you have questions, please do not hesitate to call me. I look forward to following Estephania Lyman along with you.    Sincerely,    Russell Durand MD    Enclosure  CC:  No Recipients    If you would like to receive this communication electronically, please contact [email or web address] or [phone] to request more information on [employer] Link access.    For providers and/or their staff who would like to refer a patient to [facility], please contact us through our one-stop-shop provider referral line, [facility], at [phone].    If you feel you have received this communication in error or would no longer like to receive these types of communications, please e-mail [email or web address]

## 2020-06-01 NOTE — H&P (VIEW-ONLY)
Subjective:       Patient ID: Estephania Lyman is a 69 y.o. female.    Chief Complaint: Gastroesophageal Reflux    HPI  Review of Systems   Constitutional: Negative for activity change, appetite change, chills, diaphoresis, fatigue, fever and unexpected weight change.   HENT: Negative for congestion, ear pain, mouth sores, nosebleeds, postnasal drip, rhinorrhea, sinus pressure, sore throat, trouble swallowing and voice change.    Eyes: Negative for pain.   Respiratory: Positive for cough. Negative for shortness of breath and wheezing.    Cardiovascular: Negative for chest pain, palpitations and leg swelling.   Genitourinary: Negative for difficulty urinating, dysuria, flank pain, hematuria and menstrual problem.   Musculoskeletal: Negative for arthralgias, back pain, gait problem, joint swelling, myalgias and neck pain.   Skin: Negative for rash.   Neurological: Negative for dizziness, tremors, syncope, numbness and headaches.   Hematological: Negative for adenopathy. Does not bruise/bleed easily.   Psychiatric/Behavioral: Negative for agitation, behavioral problems, confusion, decreased concentration and dysphoric mood. The patient is not nervous/anxious.        Objective:      Physical Exam   Constitutional: She is oriented to person, place, and time. She appears well-developed and well-nourished. No distress.   HENT:   Head: Normocephalic and atraumatic.   Right Ear: External ear normal.   Left Ear: External ear normal.   Nose: Nose normal.   Mouth/Throat: Oropharynx is clear and moist. No oropharyngeal exudate.   Eyes: Pupils are equal, round, and reactive to light. Conjunctivae are normal. No scleral icterus.   Neck: Normal range of motion. Neck supple. No thyromegaly present.   Cardiovascular: Normal rate, regular rhythm, normal heart sounds and intact distal pulses. Exam reveals no gallop.   No murmur heard.  Pulmonary/Chest: Effort normal and breath sounds normal. She has no wheezes. She has no rales.    Abdominal: Soft. Bowel sounds are normal. She exhibits no distension and no mass. There is no tenderness. There is no rebound and no guarding.   Musculoskeletal: Normal range of motion. She exhibits no edema or tenderness.   Lymphadenopathy:     She has no cervical adenopathy.   Neurological: She is alert and oriented to person, place, and time. No cranial nerve deficit.   Skin: Skin is warm and dry. No rash noted.   Psychiatric: She has a normal mood and affect. Her behavior is normal. Judgment and thought content normal.       Assessment:       1. Gastroesophageal reflux disease, esophagitis presence not specified    2. Family history of colon cancer        Plan:         Mrs. Lyman is a 69-year-old lady referred for reflux.  She has a long history of this which goes back at least 7 years.  She describes typical pyrosis and regurgitation.  Intermittent postprandial belching.  Very good relief with pantoprazole.  She takes that in the morning before breakfast.  However if she misses a dose she will have prompt symptoms.  She does report some solid-food dysphagia.  This is not new.  It is not progressive.  Food always rinse is down.  She has some cough and throat clearing.  She also reports some congestion in general.    She has a tendency towards constipation.  She takes a stool softener and has bowel movement every other day.  She might have abdominal discomfort without that bowel movement.    She does follow with Dr. ballard for sarcoidosis.  She is not on any particular treatment for that.  He is running some tests.    Family history is positive for colon cancer in her mother.  No other gastrointestinal cancer.    Assessment  1.  Gastroesophageal reflux.  Typical symptoms.  The only slightly alarm features would be the dysphagia.  I discussed the natural history of reflux.  I recommended EGD.  One had been ordered before but we will reorder that now.  I discussed therapies for reflux which include medical  therapy which she is on and surgical therapy.  I told the surgical therapy is rarely needed.  I discussed the importance of dietary measures and in her case probably some weight loss.  2.  Family history of colon cancer.  She will be due for colonoscopy in 2 years.    Recommendation  1.  EGD.

## 2020-06-30 ENCOUNTER — HOSPITAL ENCOUNTER (OUTPATIENT)
Facility: HOSPITAL | Age: 70
Discharge: HOME | End: 2020-06-30
Attending: INTERNAL MEDICINE | Admitting: INTERNAL MEDICINE
Payer: COMMERCIAL

## 2020-06-30 ENCOUNTER — ANESTHESIA EVENT (OUTPATIENT)
Dept: ENDOSCOPY | Facility: HOSPITAL | Age: 70
End: 2020-06-30
Payer: COMMERCIAL

## 2020-06-30 ENCOUNTER — ANESTHESIA (OUTPATIENT)
Dept: ENDOSCOPY | Facility: HOSPITAL | Age: 70
End: 2020-06-30
Payer: COMMERCIAL

## 2020-06-30 VITALS
HEIGHT: 63 IN | DIASTOLIC BLOOD PRESSURE: 74 MMHG | SYSTOLIC BLOOD PRESSURE: 119 MMHG | HEART RATE: 56 BPM | OXYGEN SATURATION: 95 % | BODY MASS INDEX: 34.91 KG/M2 | WEIGHT: 197 LBS | TEMPERATURE: 98 F | RESPIRATION RATE: 18 BRPM

## 2020-06-30 DIAGNOSIS — K21.9 GERD (GASTROESOPHAGEAL REFLUX DISEASE): ICD-10-CM

## 2020-06-30 PROCEDURE — 63600175 PHARM REV CODE 636 W HCPCS: Performed by: NURSE ANESTHETIST, CERTIFIED REGISTERED

## 2020-06-30 PROCEDURE — 43249 ESOPH EGD DILATION <30 MM: CPT | Performed by: INTERNAL MEDICINE

## 2020-06-30 PROCEDURE — 43249 ESOPH EGD DILATION <30 MM: CPT | Mod: ,,, | Performed by: INTERNAL MEDICINE

## 2020-06-30 PROCEDURE — E9220 PRA ENDO ANESTHESIA: HCPCS | Mod: CRNA,,, | Performed by: NURSE ANESTHETIST, CERTIFIED REGISTERED

## 2020-06-30 PROCEDURE — E9220 PRA ENDO ANESTHESIA: ICD-10-PCS | Mod: CRNA,,, | Performed by: NURSE ANESTHETIST, CERTIFIED REGISTERED

## 2020-06-30 PROCEDURE — C1726 CATH, BAL DIL, NON-VASCULAR: HCPCS | Performed by: INTERNAL MEDICINE

## 2020-06-30 PROCEDURE — 37000009 HC ANESTHESIA EA ADD 15 MINS: Performed by: INTERNAL MEDICINE

## 2020-06-30 PROCEDURE — 25000003 PHARM REV CODE 250: Performed by: INTERNAL MEDICINE

## 2020-06-30 PROCEDURE — E9220 PRA ENDO ANESTHESIA: HCPCS | Mod: ANES,,, | Performed by: ANESTHESIOLOGY

## 2020-06-30 PROCEDURE — 37000008 HC ANESTHESIA 1ST 15 MINUTES: Performed by: INTERNAL MEDICINE

## 2020-06-30 PROCEDURE — E9220 PRA ENDO ANESTHESIA: ICD-10-PCS | Mod: ANES,,, | Performed by: ANESTHESIOLOGY

## 2020-06-30 PROCEDURE — 43249 PR EGD, FLEX, W/BALL DILATION, < 30MM: ICD-10-PCS | Mod: ,,, | Performed by: INTERNAL MEDICINE

## 2020-06-30 RX ORDER — PROPOFOL 10 MG/ML
VIAL (ML) INTRAVENOUS
Status: DISCONTINUED | OUTPATIENT
Start: 2020-06-30 | End: 2020-06-30

## 2020-06-30 RX ORDER — SODIUM CHLORIDE 9 MG/ML
INJECTION, SOLUTION INTRAVENOUS CONTINUOUS
Status: CANCELLED | OUTPATIENT
Start: 2020-06-30

## 2020-06-30 RX ORDER — SODIUM CHLORIDE 9 MG/ML
INJECTION, SOLUTION INTRAVENOUS CONTINUOUS
Status: DISCONTINUED | OUTPATIENT
Start: 2020-06-30 | End: 2020-06-30 | Stop reason: HOSPADM

## 2020-06-30 RX ORDER — PROPOFOL 10 MG/ML
VIAL (ML) INTRAVENOUS CONTINUOUS PRN
Status: DISCONTINUED | OUTPATIENT
Start: 2020-06-30 | End: 2020-06-30

## 2020-06-30 RX ORDER — LIDOCAINE HCL/PF 100 MG/5ML
SYRINGE (ML) INTRAVENOUS
Status: DISCONTINUED | OUTPATIENT
Start: 2020-06-30 | End: 2020-06-30

## 2020-06-30 RX ADMIN — Medication 100 MG: at 10:06

## 2020-06-30 RX ADMIN — SODIUM CHLORIDE: 0.9 INJECTION, SOLUTION INTRAVENOUS at 09:06

## 2020-06-30 RX ADMIN — PROPOFOL 200 MCG/KG/MIN: 10 INJECTION, EMULSION INTRAVENOUS at 10:06

## 2020-06-30 RX ADMIN — PROPOFOL 100 MG: 10 INJECTION, EMULSION INTRAVENOUS at 10:06

## 2020-06-30 NOTE — ANESTHESIA POSTPROCEDURE EVALUATION
Anesthesia Post Evaluation    Patient: Estephania Lyman    Procedure(s) Performed: Procedure(s) (LRB):  EGD (ESOPHAGOGASTRODUODENOSCOPY) (N/A)    Final Anesthesia Type: general    Patient location during evaluation: PACU  Patient participation: Yes- Able to Participate  Level of consciousness: awake and alert  Post-procedure vital signs: reviewed and stable  Pain management: adequate  Airway patency: patent    PONV status at discharge: No PONV  Anesthetic complications: no      Cardiovascular status: hemodynamically stable  Respiratory status: spontaneous ventilation  Follow-up not needed.          Vitals Value Taken Time   /61 06/30/20 1036   Temp 36.6 °C (97.8 °F) 06/30/20 1036   Pulse 64 06/30/20 1036   Resp 18 06/30/20 1036   SpO2 96 % 06/30/20 1051         No case tracking events are documented in the log.      Pain/Denise Score: Denise Score: 10 (6/30/2020 10:37 AM)

## 2020-06-30 NOTE — [employer]ATION PATIENT INSTRUCTIONS
Discharge Summary/Instructions after an Endoscopic Procedure  Patient Name: Estephania Lyman  Patient MRN: [id number]  Patient YOB: 1950  Tuesday, June 30, 2020  Russell Durand MD  RESTRICTIONS:  During your procedure today, you received medications for sedation.  These   medications may affect your judgment, balance and coordination.  Therefore,   for 24 hours, you have the following restrictions:   - DO NOT drive a car, operate machinery, make legal/financial decisions,   sign important papers or drink alcohol.    ACTIVITY:  Today: no heavy lifting, straining or running due to procedural   sedation/anesthesia.  The following day: return to full activity including work.  DIET:  Eat and drink normally unless instructed otherwise.     TREATMENT FOR COMMON SIDE EFFECTS:  - Mild abdominal pain, nausea, belching, bloating or excessive gas:  rest,   eat lightly and use a heating pad.  - Sore Throat: treat with throat lozenges and/or gargle with warm salt   water.  - Because air was used during the procedure, expelling large amounts of air   from your rectum or belching is normal.  - If a bowel prep was taken, you may not have a bowel movement for 1-3 days.    This is normal.  SYMPTOMS TO WATCH FOR AND REPORT TO YOUR PHYSICIAN:  1. Abdominal pain or bloating, other than gas cramps.  2. Chest pain.  3. Back pain.  4. Signs of infection such as: chills or fever occurring within 24 hours   after the procedure.  5. Rectal bleeding, which would show as bright red, maroon, or black stools.   (A tablespoon of blood from the rectum is not serious, especially if   hemorrhoids are present.)  6. Vomiting.  7. Weakness or dizziness.  GO DIRECTLY TO THE NEAREST EMERGENCY ROOM IF YOU HAVE ANY OF THE FOLLOWING:      Difficulty breathing              Chills and/or fever over 101 F   Persistent vomiting and/or vomiting blood   Severe abdominal pain   Severe chest pain   Black, tarry stools   Bleeding- more than one  tablespoon   Any other symptom or condition that you feel may need urgent attention  Your doctor recommends these additional instructions:  If any biopsies were taken, your doctors clinic will contact you in 1 to 2   weeks with any results.  - Patient has a contact number available for emergencies.  The signs and   symptoms of potential delayed complications were discussed with the   patient.  Return to normal activities tomorrow.  Written discharge   instructions were provided to the patient.   - Discharge patient to home (ambulatory).   - Resume previous diet.   - Continue present medications.   - Return to GI clinic PRN.  For questions, problems or results please call your physician - Russell Durand MD at Work:  [phone].  [facility] [place], EMERGENCY ROOM PHONE NUMBER: [phone]  IF A COMPLICATION OR EMERGENCY SITUATION ARISES AND YOU ARE UNABLE TO REACH   YOUR PHYSICIAN - GO DIRECTLY TO THE EMERGENCY ROOM.  Russell Durand MD  6/30/2020 10:23:17 AM  This report has been verified and signed electronically.  [employer]

## 2020-06-30 NOTE — PLAN OF CARE
ID applied and verified. Plan of care initiated with Estephania Lyman. Understanding verbalized.

## 2020-06-30 NOTE — ANESTHESIA PREPROCEDURE EVALUATION
06/30/2020  Estephania Lyman is a 69 y.o., female.  Pre-operative evaluation for Procedure(s) (LRB):  EGD (ESOPHAGOGASTRODUODENOSCOPY) (N/A)    Estephania Lyman is a 69 y.o. female     Patient Active Problem List   Diagnosis    HTN (hypertension), benign    Hypercholesterolemia    Hematuria    Abnormal urine    Bilateral impacted cerumen    Osteoarthritis of both hips    Osteopenia    PVC's (premature ventricular contractions)    Paroxysmal ventricular tachycardia    Sarcoid uveitis    Screening for colon cancer    Sialadenitis - left sided    Cellulitis of submandibular region    Depression    History of sarcoidosis    Incomplete tear of right rotator cuff    Gastroesophageal reflux disease    Family history of colon cancer       Review of patient's allergies indicates:   Allergen Reactions    Sulfa (sulfonamide antibiotics) Rash     Other reaction(s): Rash       No current facility-administered medications on file prior to encounter.      Current Outpatient Medications on File Prior to Encounter   Medication Sig Dispense Refill    alendronate (FOSAMAX) 70 MG tablet Take 1 tablet (70 mg total) by mouth every 7 days. 12 tablet 3    butalbital-acetaminophen-caffeine -40 mg (FIORICET, ESGIC) -40 mg per tablet Take 1 tablet by mouth every 6 (six) hours as needed for Headaches. 15 tablet 2    citalopram (CELEXA) 20 MG tablet TAKE 1 TABLET BY MOUTH ONCE DAILY 90 tablet 3    fluticasone propionate (FLONASE) 50 mcg/actuation nasal spray 2 sprays (100 mcg total) by Each Nostril route once daily. 16 g 3    levocetirizine (XYZAL) 5 MG tablet Take 1 tablet (5 mg total) by mouth every evening. 30 tablet 11    naproxen (NAPROSYN) 500 MG tablet Take 1 tablet (500 mg total) by mouth 2 (two) times daily with meals. For 3-10 days 30 tablet 0    pantoprazole (PROTONIX) 40 MG tablet  TAKE 1 TABLET BY MOUTH ONCE DAILY FOR  ACID  REFLUX 90 tablet 3    pravastatin (PRAVACHOL) 40 MG tablet Take 1 tablet (40 mg total) by mouth once daily. 90 tablet 3       Past Surgical History:   Procedure Laterality Date    ANKLE FRACTURE SURGERY      ARTHROSCOPIC REPAIR OF ROTATOR CUFF OF SHOULDER Right 7/23/2018    Procedure: REPAIR, ROTATOR CUFF, ARTHROSCOPIC;  Surgeon: YELENA Olivera MD;  Location: [facility] OR 1ST FLR;  Service: Orthopedics;  Laterality: Right;  regional with catheter, interscalene  Vladimir/Linvatec & Ann/Arthrex notified 7-18 LO    ARTHROSCOPY OF SHOULDER WITH DECOMPRESSION OF SUBACROMIAL SPACE Right 7/23/2018    Procedure: ARTHROSCOPY, SHOULDER, WITH SUBACROMIAL SPACE DECOMPRESSION;  Surgeon: YELENA Olivera MD;  Location: [facility] OR 1ST FLR;  Service: Orthopedics;  Laterality: Right;    ARTHROSCOPY OF SHOULDER WITH REMOVAL OF DISTAL CLAVICLE Right 7/23/2018    Procedure: ARTHROSCOPY, SHOULDER, WITH DISTAL CLAVICLE EXCISION;  Surgeon: YELENA Olivera MD;  [facility];  Service: Orthopedics;  Laterality: Right;    COLONOSCOPY N/A 5/30/2017    Procedure: COLONOSCOPY;  Surgeon: Jed Garcia MD;  Location: [facility] ENDO (4TH FLR);  Service: Endoscopy;  Laterality: N/A;    FIXATION OF TENDON Right 7/23/2018    Procedure: FIXATION, TENDON;  Surgeon: YELENA Olivera MD;  Location: [facility] OR 1ST FLR;  Service: Orthopedics;  Laterality: Right;    HYSTERECTOMY  age 39    endo    LEG SURGERY      tibia    OOPHORECTOMY  age 39    endo    TONSILLECTOMY         Social History     Socioeconomic History    Marital status:      Spouse name: Not on file    Number of children: Not on file    Years of education: Not on file    Highest education level: Not on file   Occupational History     Employer: [employer]   Social Needs    Financial resource strain: Not on file    Food insecurity     Worry: Not on file     Inability: Not on file    Transportation needs      Medical: Not on file     Non-medical: Not on file   Tobacco Use    Smoking status: Former Smoker     Packs/day: 1.00     Years: 3.00     Pack years: 3.00     Types: Cigarettes     Quit date: 2010     Years since quittin.9    Smokeless tobacco: Never Used   Substance and Sexual Activity    Alcohol use: No    Drug use: No    Sexual activity: Yes     Partners: Male     Birth control/protection: Surgical     Comment: Hysterectomy   Lifestyle    Physical activity     Days per week: Not on file     Minutes per session: Not on file    Stress: Not on file   Relationships    Social connections     Talks on phone: Not on file     Gets together: Not on file     Attends Rastafarian service: Not on file     Active member of club or organization: Not on file     Attends meetings of clubs or organizations: Not on file     Relationship status: Not on file   Other Topics Concern    Not on file   Social History Narrative    Not on file         CBC: No results for input(s): WBC, RBC, HGB, HCT, PLT, MCV, MCH, MCHC in the last 72 hours.    CMP: No results for input(s): NA, K, CL, CO2, BUN, CREATININE, GLU, MG, PHOS, CALCIUM, ALBUMIN, PROT, ALKPHOS, ALT, AST, BILITOT in the last 72 hours.    INR  No results for input(s): PT, INR, PROTIME, APTT in the last 72 hours.        Diagnostic Studies:      EKD Echo:  No results found for this or any previous visit.      Anesthesia Evaluation    I have reviewed the Patient Summary Reports.    I have reviewed the Nursing Notes. I have reviewed the NPO Status.   I have reviewed the Medications.     Review of Systems  Anesthesia Hx:  No problems with previous Anesthesia  Denies Family Hx of Anesthesia complications.   Denies Personal Hx of Anesthesia complications.   Cardiovascular:   Hypertension    Pulmonary:  Pulmonary Normal    Hepatic/GI:   GERD    Neurological:  Neurology Normal    Endocrine:  Endocrine Normal        Physical Exam  General:  Well nourished     Airway/Jaw/Neck:  Airway Findings: Mouth Opening: Normal Tongue: Normal  General Airway Assessment: Adult, Good  Mallampati: II  TM Distance: Normal, at least 6 cm  Jaw/Neck Findings:  Neck ROM: Normal ROM       Chest/Lungs:  Chest/Lungs Findings: Clear to auscultation     Heart/Vascular:  Heart Findings: Rate: Normal  Rhythm: Regular Rhythm        Mental Status:  Mental Status Findings:  Alert and Oriented, Cooperative         Anesthesia Plan  Type of Anesthesia, risks & benefits discussed:  Anesthesia Type:  general, MAC  Patient's Preference:   Intra-op Monitoring Plan: standard ASA monitors  Intra-op Monitoring Plan Comments:   Post Op Pain Control Plan: per primary service following discharge from PACU  Post Op Pain Control Plan Comments:   Induction:   IV  Beta Blocker:         Informed Consent: Patient understands risks and agrees with Anesthesia plan.  Questions answered. Anesthesia consent signed with patient.  ASA Score: 3     Day of Surgery Review of History & Physical:    H&P update referred to the surgeon.         Ready For Surgery From Anesthesia Perspective.

## 2020-06-30 NOTE — TRANSFER OF CARE
"Anesthesia Transfer of Care Note    Patient: Estephania Lyman    Procedure(s) Performed: Procedure(s) (LRB):  EGD (ESOPHAGOGASTRODUODENOSCOPY) (N/A)    Patient location: PACU    Anesthesia Type: general    Transport from OR: Transported from OR on room air with adequate spontaneous ventilation    Post pain: adequate analgesia    Post assessment: no apparent anesthetic complications    Post vital signs: stable    Level of consciousness: awake    Nausea/Vomiting: no nausea/vomiting    Complications: none    Transfer of care protocol was followed      Last vitals:   Visit Vitals  /61 (BP Location: Left arm, Patient Position: Lying)   Pulse 64   Temp 36.6 °C (97.8 °F) (Temporal)   Resp 18   Ht 5' 3" (1.6 m)   Wt 89.4 kg (197 lb)   LMP  (LMP Unknown)   SpO2 (!) 93%   Breastfeeding No   BMI 34.90 kg/m²     "

## 2020-06-30 NOTE — DISCHARGE INSTRUCTIONS

## 2020-07-15 ENCOUNTER — TELEPHONE (OUTPATIENT)
Dept: FAMILY MEDICINE | Facility: CLINIC | Age: 70
End: 2020-07-15

## 2020-10-09 ENCOUNTER — TELEPHONE (OUTPATIENT)
Dept: FAMILY MEDICINE | Facility: CLINIC | Age: 70
End: 2020-10-09

## 2020-10-09 NOTE — TELEPHONE ENCOUNTER
Spoke with patient. Patient states she's asymptomatic. Patient was advised that a covid screening is not needed, but to see medical attention at ED or urgent care if she starts to display any covid symptoms.

## 2020-10-09 NOTE — TELEPHONE ENCOUNTER
----- Message from Cathy Bro sent at 10/9/2020 12:09 PM CDT -----  Who Called: LAURENCE CUELLAR    What is the reqeust in detail: Would like to know if she needs to be tested for COVID-19 due to her  being exposed by their grandson. Please advise.     Can the clinic reply by [facility]?: No    Best Call Back Number: [phone]

## 2020-10-14 RX ORDER — PANTOPRAZOLE SODIUM 40 MG/1
TABLET, DELAYED RELEASE ORAL
Qty: 90 TABLET | Refills: 3 | Status: SHIPPED | OUTPATIENT
Start: 2020-10-14 | End: 2021-01-26 | Stop reason: SDUPTHER

## 2020-11-27 DIAGNOSIS — Z12.31 OTHER SCREENING MAMMOGRAM: ICD-10-CM

## 2021-01-26 ENCOUNTER — LAB VISIT (OUTPATIENT)
Dept: LAB | Facility: HOSPITAL | Age: 71
End: 2021-01-26
Attending: FAMILY MEDICINE
Payer: COMMERCIAL

## 2021-01-26 ENCOUNTER — PATIENT OUTREACH (OUTPATIENT)
Dept: ADMINISTRATIVE | Facility: OTHER | Age: 71
End: 2021-01-26

## 2021-01-26 ENCOUNTER — OFFICE VISIT (OUTPATIENT)
Dept: FAMILY MEDICINE | Facility: CLINIC | Age: 71
End: 2021-01-26
Attending: FAMILY MEDICINE
Payer: COMMERCIAL

## 2021-01-26 VITALS
SYSTOLIC BLOOD PRESSURE: 124 MMHG | OXYGEN SATURATION: 98 % | BODY MASS INDEX: 35.26 KG/M2 | HEART RATE: 78 BPM | WEIGHT: 199 LBS | DIASTOLIC BLOOD PRESSURE: 82 MMHG | HEIGHT: 63 IN

## 2021-01-26 DIAGNOSIS — I10 ESSENTIAL HYPERTENSION: ICD-10-CM

## 2021-01-26 DIAGNOSIS — E78.2 MIXED HYPERLIPIDEMIA: ICD-10-CM

## 2021-01-26 DIAGNOSIS — K21.9 GASTROESOPHAGEAL REFLUX DISEASE, UNSPECIFIED WHETHER ESOPHAGITIS PRESENT: ICD-10-CM

## 2021-01-26 DIAGNOSIS — R06.09 DOE (DYSPNEA ON EXERTION): ICD-10-CM

## 2021-01-26 DIAGNOSIS — Z00.00 ANNUAL PHYSICAL EXAM: ICD-10-CM

## 2021-01-26 DIAGNOSIS — Z00.00 ANNUAL PHYSICAL EXAM: Primary | ICD-10-CM

## 2021-01-26 DIAGNOSIS — Z12.11 SCREEN FOR COLON CANCER: ICD-10-CM

## 2021-01-26 LAB
BACTERIA #/AREA URNS AUTO: ABNORMAL /HPF
BASOPHILS # BLD AUTO: 0.07 K/UL (ref 0–0.2)
BASOPHILS NFR BLD: 1.3 % (ref 0–1.9)
BILIRUB UR QL STRIP: NEGATIVE
CLARITY UR REFRACT.AUTO: CLEAR
COLOR UR AUTO: YELLOW
DIFFERENTIAL METHOD: ABNORMAL
EOSINOPHIL # BLD AUTO: 0.6 K/UL (ref 0–0.5)
EOSINOPHIL NFR BLD: 9.9 % (ref 0–8)
ERYTHROCYTE [DISTWIDTH] IN BLOOD BY AUTOMATED COUNT: 12.5 % (ref 11.5–14.5)
GLUCOSE UR QL STRIP: NEGATIVE
HCT VFR BLD AUTO: 39.8 % (ref 37–48.5)
HGB BLD-MCNC: 12 G/DL (ref 12–16)
HGB UR QL STRIP: ABNORMAL
IMM GRANULOCYTES # BLD AUTO: 0.01 K/UL (ref 0–0.04)
IMM GRANULOCYTES NFR BLD AUTO: 0.2 % (ref 0–0.5)
KETONES UR QL STRIP: NEGATIVE
LEUKOCYTE ESTERASE UR QL STRIP: NEGATIVE
LYMPHOCYTES # BLD AUTO: 1.6 K/UL (ref 1–4.8)
LYMPHOCYTES NFR BLD: 29.4 % (ref 18–48)
MCH RBC QN AUTO: 31.3 PG (ref 27–31)
MCHC RBC AUTO-ENTMCNC: 30.2 G/DL (ref 32–36)
MCV RBC AUTO: 104 FL (ref 82–98)
MICROSCOPIC COMMENT: ABNORMAL
MONOCYTES # BLD AUTO: 0.5 K/UL (ref 0.3–1)
MONOCYTES NFR BLD: 9.6 % (ref 4–15)
NEUTROPHILS # BLD AUTO: 2.8 K/UL (ref 1.8–7.7)
NEUTROPHILS NFR BLD: 49.6 % (ref 38–73)
NITRITE UR QL STRIP: NEGATIVE
NRBC BLD-RTO: 0 /100 WBC
PH UR STRIP: 7 [PH] (ref 5–8)
PLATELET # BLD AUTO: 223 K/UL (ref 150–350)
PMV BLD AUTO: 11.8 FL (ref 9.2–12.9)
PROT UR QL STRIP: NEGATIVE
RBC # BLD AUTO: 3.84 M/UL (ref 4–5.4)
RBC #/AREA URNS AUTO: 9 /HPF (ref 0–4)
SP GR UR STRIP: 1.02 (ref 1–1.03)
SQUAMOUS #/AREA URNS AUTO: 0 /HPF
URN SPEC COLLECT METH UR: ABNORMAL
WBC # BLD AUTO: 5.54 K/UL (ref 3.9–12.7)
WBC #/AREA URNS AUTO: 1 /HPF (ref 0–5)

## 2021-01-26 PROCEDURE — 3079F DIAST BP 80-89 MM HG: CPT | Mod: CPTII,S$GLB,, | Performed by: FAMILY MEDICINE

## 2021-01-26 PROCEDURE — 80061 LIPID PANEL: CPT

## 2021-01-26 PROCEDURE — 3008F PR BODY MASS INDEX (BMI) DOCUMENTED: ICD-10-PCS | Mod: CPTII,S$GLB,, | Performed by: FAMILY MEDICINE

## 2021-01-26 PROCEDURE — 1159F PR MEDICATION LIST DOCUMENTED IN MEDICAL RECORD: ICD-10-PCS | Mod: S$GLB,,, | Performed by: FAMILY MEDICINE

## 2021-01-26 PROCEDURE — 99999 PR PBB SHADOW E&M-EST. PATIENT-LVL III: CPT | Mod: PBBFAC,,, | Performed by: FAMILY MEDICINE

## 2021-01-26 PROCEDURE — 3079F PR MOST RECENT DIASTOLIC BLOOD PRESSURE 80-89 MM HG: ICD-10-PCS | Mod: CPTII,S$GLB,, | Performed by: FAMILY MEDICINE

## 2021-01-26 PROCEDURE — 3008F BODY MASS INDEX DOCD: CPT | Mod: CPTII,S$GLB,, | Performed by: FAMILY MEDICINE

## 2021-01-26 PROCEDURE — 3288F FALL RISK ASSESSMENT DOCD: CPT | Mod: CPTII,S$GLB,, | Performed by: FAMILY MEDICINE

## 2021-01-26 PROCEDURE — 1126F PR PAIN SEVERITY QUANTIFIED, NO PAIN PRESENT: ICD-10-PCS | Mod: S$GLB,,, | Performed by: FAMILY MEDICINE

## 2021-01-26 PROCEDURE — 84443 ASSAY THYROID STIM HORMONE: CPT

## 2021-01-26 PROCEDURE — 81001 URINALYSIS AUTO W/SCOPE: CPT

## 2021-01-26 PROCEDURE — 3074F SYST BP LT 130 MM HG: CPT | Mod: CPTII,S$GLB,, | Performed by: FAMILY MEDICINE

## 2021-01-26 PROCEDURE — 99999 PR PBB SHADOW E&M-EST. PATIENT-LVL III: ICD-10-PCS | Mod: PBBFAC,,, | Performed by: FAMILY MEDICINE

## 2021-01-26 PROCEDURE — 1101F PT FALLS ASSESS-DOCD LE1/YR: CPT | Mod: CPTII,S$GLB,, | Performed by: FAMILY MEDICINE

## 2021-01-26 PROCEDURE — 85025 COMPLETE CBC W/AUTO DIFF WBC: CPT

## 2021-01-26 PROCEDURE — 3288F PR FALLS RISK ASSESSMENT DOCUMENTED: ICD-10-PCS | Mod: CPTII,S$GLB,, | Performed by: FAMILY MEDICINE

## 2021-01-26 PROCEDURE — 99214 PR OFFICE/OUTPT VISIT, EST, LEVL IV, 30-39 MIN: ICD-10-PCS | Mod: S$GLB,,, | Performed by: FAMILY MEDICINE

## 2021-01-26 PROCEDURE — 1126F AMNT PAIN NOTED NONE PRSNT: CPT | Mod: S$GLB,,, | Performed by: FAMILY MEDICINE

## 2021-01-26 PROCEDURE — 80053 COMPREHEN METABOLIC PANEL: CPT

## 2021-01-26 PROCEDURE — 3074F PR MOST RECENT SYSTOLIC BLOOD PRESSURE < 130 MM HG: ICD-10-PCS | Mod: CPTII,S$GLB,, | Performed by: FAMILY MEDICINE

## 2021-01-26 PROCEDURE — 99214 OFFICE O/P EST MOD 30 MIN: CPT | Mod: S$GLB,,, | Performed by: FAMILY MEDICINE

## 2021-01-26 PROCEDURE — 36415 COLL VENOUS BLD VENIPUNCTURE: CPT | Mod: PO

## 2021-01-26 PROCEDURE — 1101F PR PT FALLS ASSESS DOC 0-1 FALLS W/OUT INJ PAST YR: ICD-10-PCS | Mod: CPTII,S$GLB,, | Performed by: FAMILY MEDICINE

## 2021-01-26 PROCEDURE — 1159F MED LIST DOCD IN RCRD: CPT | Mod: S$GLB,,, | Performed by: FAMILY MEDICINE

## 2021-01-26 RX ORDER — PANTOPRAZOLE SODIUM 40 MG/1
TABLET, DELAYED RELEASE ORAL
Qty: 90 TABLET | Refills: 3 | Status: SHIPPED | OUTPATIENT
Start: 2021-01-26 | End: 2021-01-26 | Stop reason: ALTCHOICE

## 2021-01-26 RX ORDER — PRAVASTATIN SODIUM 40 MG/1
40 TABLET ORAL DAILY
Qty: 90 TABLET | Refills: 3 | Status: SHIPPED | OUTPATIENT
Start: 2021-01-26 | End: 2021-04-26 | Stop reason: SDUPTHER

## 2021-01-26 RX ORDER — FELODIPINE 10 MG/1
10 TABLET, EXTENDED RELEASE ORAL DAILY
Qty: 90 TABLET | Refills: 3 | Status: SHIPPED | OUTPATIENT
Start: 2021-01-26 | End: 2022-04-29

## 2021-01-26 RX ORDER — RABEPRAZOLE SODIUM 20 MG/1
20 TABLET, DELAYED RELEASE ORAL DAILY
Qty: 90 TABLET | Refills: 0 | Status: SHIPPED | OUTPATIENT
Start: 2021-01-26 | End: 2021-04-26

## 2021-01-27 ENCOUNTER — TELEPHONE (OUTPATIENT)
Dept: ENDOSCOPY | Facility: HOSPITAL | Age: 71
End: 2021-01-27

## 2021-01-27 ENCOUNTER — HOSPITAL ENCOUNTER (OUTPATIENT)
Dept: RADIOLOGY | Facility: HOSPITAL | Age: 71
Discharge: HOME | End: 2021-01-27
Attending: FAMILY MEDICINE
Payer: COMMERCIAL

## 2021-01-27 ENCOUNTER — OFFICE VISIT (OUTPATIENT)
Dept: GASTROENTEROLOGY | Facility: CLINIC | Age: 71
End: 2021-01-27
Payer: COMMERCIAL

## 2021-01-27 VITALS
HEART RATE: 69 BPM | WEIGHT: 198.19 LBS | DIASTOLIC BLOOD PRESSURE: 77 MMHG | SYSTOLIC BLOOD PRESSURE: 118 MMHG | HEIGHT: 64 IN | BODY MASS INDEX: 33.84 KG/M2

## 2021-01-27 DIAGNOSIS — R13.10 DYSPHAGIA, UNSPECIFIED TYPE: ICD-10-CM

## 2021-01-27 DIAGNOSIS — Z51.81 ENCOUNTER FOR MONITORING LONG-TERM PROTON PUMP INHIBITOR THERAPY: Primary | ICD-10-CM

## 2021-01-27 DIAGNOSIS — Z79.899 ENCOUNTER FOR MONITORING LONG-TERM PROTON PUMP INHIBITOR THERAPY: Primary | ICD-10-CM

## 2021-01-27 DIAGNOSIS — Z12.31 OTHER SCREENING MAMMOGRAM: ICD-10-CM

## 2021-01-27 DIAGNOSIS — K21.9 GASTROESOPHAGEAL REFLUX DISEASE, UNSPECIFIED WHETHER ESOPHAGITIS PRESENT: ICD-10-CM

## 2021-01-27 LAB
ALBUMIN SERPL BCP-MCNC: 3.9 G/DL (ref 3.5–5.2)
ALP SERPL-CCNC: 121 U/L (ref 55–135)
ALT SERPL W/O P-5'-P-CCNC: 16 U/L (ref 10–44)
ANION GAP SERPL CALC-SCNC: 11 MMOL/L (ref 8–16)
AST SERPL-CCNC: 21 U/L (ref 10–40)
BILIRUB SERPL-MCNC: 0.5 MG/DL (ref 0.1–1)
BUN SERPL-MCNC: 12 MG/DL (ref 8–23)
CALCIUM SERPL-MCNC: 8.8 MG/DL (ref 8.7–10.5)
CHLORIDE SERPL-SCNC: 102 MMOL/L (ref 95–110)
CHOLEST SERPL-MCNC: 185 MG/DL (ref 120–199)
CHOLEST/HDLC SERPL: 3.4 {RATIO} (ref 2–5)
CO2 SERPL-SCNC: 26 MMOL/L (ref 23–29)
CREAT SERPL-MCNC: 0.8 MG/DL (ref 0.5–1.4)
EST. GFR  (AFRICAN AMERICAN): >60 ML/MIN/1.73 M^2
EST. GFR  (NON AFRICAN AMERICAN): >60 ML/MIN/1.73 M^2
GLUCOSE SERPL-MCNC: 77 MG/DL (ref 70–110)
HDLC SERPL-MCNC: 54 MG/DL (ref 40–75)
HDLC SERPL: 29.2 % (ref 20–50)
LDLC SERPL CALC-MCNC: 105.4 MG/DL (ref 63–159)
NONHDLC SERPL-MCNC: 131 MG/DL
POTASSIUM SERPL-SCNC: 4 MMOL/L (ref 3.5–5.1)
PROT SERPL-MCNC: 8 G/DL (ref 6–8.4)
SODIUM SERPL-SCNC: 139 MMOL/L (ref 136–145)
TRIGL SERPL-MCNC: 128 MG/DL (ref 30–150)
TSH SERPL DL<=0.005 MIU/L-ACNC: 1.08 UIU/ML (ref 0.4–4)

## 2021-01-27 PROCEDURE — 99999 PR PBB SHADOW E&M-EST. PATIENT-LVL IV: ICD-10-PCS | Mod: PBBFAC,,, | Performed by: NURSE PRACTITIONER

## 2021-01-27 PROCEDURE — 1101F PT FALLS ASSESS-DOCD LE1/YR: CPT | Mod: CPTII,S$GLB,, | Performed by: NURSE PRACTITIONER

## 2021-01-27 PROCEDURE — 3008F PR BODY MASS INDEX (BMI) DOCUMENTED: ICD-10-PCS | Mod: CPTII,S$GLB,, | Performed by: NURSE PRACTITIONER

## 2021-01-27 PROCEDURE — 3288F FALL RISK ASSESSMENT DOCD: CPT | Mod: CPTII,S$GLB,, | Performed by: NURSE PRACTITIONER

## 2021-01-27 PROCEDURE — 77067 SCR MAMMO BI INCL CAD: CPT | Mod: TC

## 2021-01-27 PROCEDURE — 99999 PR PBB SHADOW E&M-EST. PATIENT-LVL IV: CPT | Mod: PBBFAC,,, | Performed by: NURSE PRACTITIONER

## 2021-01-27 PROCEDURE — 3288F PR FALLS RISK ASSESSMENT DOCUMENTED: ICD-10-PCS | Mod: CPTII,S$GLB,, | Performed by: NURSE PRACTITIONER

## 2021-01-27 PROCEDURE — 1126F AMNT PAIN NOTED NONE PRSNT: CPT | Mod: S$GLB,,, | Performed by: NURSE PRACTITIONER

## 2021-01-27 PROCEDURE — 99214 OFFICE O/P EST MOD 30 MIN: CPT | Mod: S$GLB,,, | Performed by: NURSE PRACTITIONER

## 2021-01-27 PROCEDURE — 3078F PR MOST RECENT DIASTOLIC BLOOD PRESSURE < 80 MM HG: ICD-10-PCS | Mod: CPTII,S$GLB,, | Performed by: NURSE PRACTITIONER

## 2021-01-27 PROCEDURE — 1126F PR PAIN SEVERITY QUANTIFIED, NO PAIN PRESENT: ICD-10-PCS | Mod: S$GLB,,, | Performed by: NURSE PRACTITIONER

## 2021-01-27 PROCEDURE — 77063 BREAST TOMOSYNTHESIS BI: CPT | Mod: 26,,, | Performed by: RADIOLOGY

## 2021-01-27 PROCEDURE — 1101F PR PT FALLS ASSESS DOC 0-1 FALLS W/OUT INJ PAST YR: ICD-10-PCS | Mod: CPTII,S$GLB,, | Performed by: NURSE PRACTITIONER

## 2021-01-27 PROCEDURE — 77063 MAMMO DIGITAL SCREENING BILAT WITH TOMO: ICD-10-PCS | Mod: 26,,, | Performed by: RADIOLOGY

## 2021-01-27 PROCEDURE — 3074F PR MOST RECENT SYSTOLIC BLOOD PRESSURE < 130 MM HG: ICD-10-PCS | Mod: CPTII,S$GLB,, | Performed by: NURSE PRACTITIONER

## 2021-01-27 PROCEDURE — 77067 SCR MAMMO BI INCL CAD: CPT | Mod: 26,,, | Performed by: RADIOLOGY

## 2021-01-27 PROCEDURE — 3074F SYST BP LT 130 MM HG: CPT | Mod: CPTII,S$GLB,, | Performed by: NURSE PRACTITIONER

## 2021-01-27 PROCEDURE — 1159F MED LIST DOCD IN RCRD: CPT | Mod: S$GLB,,, | Performed by: NURSE PRACTITIONER

## 2021-01-27 PROCEDURE — 3008F BODY MASS INDEX DOCD: CPT | Mod: CPTII,S$GLB,, | Performed by: NURSE PRACTITIONER

## 2021-01-27 PROCEDURE — 99214 PR OFFICE/OUTPT VISIT, EST, LEVL IV, 30-39 MIN: ICD-10-PCS | Mod: S$GLB,,, | Performed by: NURSE PRACTITIONER

## 2021-01-27 PROCEDURE — 1159F PR MEDICATION LIST DOCUMENTED IN MEDICAL RECORD: ICD-10-PCS | Mod: S$GLB,,, | Performed by: NURSE PRACTITIONER

## 2021-01-27 PROCEDURE — 3078F DIAST BP <80 MM HG: CPT | Mod: CPTII,S$GLB,, | Performed by: NURSE PRACTITIONER

## 2021-01-27 PROCEDURE — 77067 MAMMO DIGITAL SCREENING BILAT WITH TOMO: ICD-10-PCS | Mod: 26,,, | Performed by: RADIOLOGY

## 2021-01-27 RX ORDER — PANTOPRAZOLE SODIUM 40 MG/1
40 TABLET, DELAYED RELEASE ORAL DAILY
COMMUNITY
End: 2021-01-27 | Stop reason: ALTCHOICE

## 2021-02-01 ENCOUNTER — HOSPITAL ENCOUNTER (OUTPATIENT)
Dept: CARDIOLOGY | Facility: OTHER | Age: 71
Discharge: HOME | End: 2021-02-01
Attending: FAMILY MEDICINE
Payer: COMMERCIAL

## 2021-02-01 ENCOUNTER — HOSPITAL ENCOUNTER (OUTPATIENT)
Dept: RADIOLOGY | Facility: OTHER | Age: 71
Discharge: HOME | End: 2021-02-01
Attending: FAMILY MEDICINE
Payer: COMMERCIAL

## 2021-02-01 VITALS
HEIGHT: 64 IN | HEART RATE: 73 BPM | DIASTOLIC BLOOD PRESSURE: 70 MMHG | BODY MASS INDEX: 33.8 KG/M2 | WEIGHT: 198 LBS | SYSTOLIC BLOOD PRESSURE: 115 MMHG

## 2021-02-01 DIAGNOSIS — R06.09 DOE (DYSPNEA ON EXERTION): ICD-10-CM

## 2021-02-01 DIAGNOSIS — K21.9 GASTROESOPHAGEAL REFLUX DISEASE, UNSPECIFIED WHETHER ESOPHAGITIS PRESENT: ICD-10-CM

## 2021-02-01 DIAGNOSIS — I10 ESSENTIAL HYPERTENSION: ICD-10-CM

## 2021-02-01 DIAGNOSIS — Z00.00 ANNUAL PHYSICAL EXAM: ICD-10-CM

## 2021-02-01 DIAGNOSIS — E78.2 MIXED HYPERLIPIDEMIA: ICD-10-CM

## 2021-02-01 LAB
BSA FOR ECHO PROCEDURE: 2.01 M2
CV ECHO LV RWT: 0.5 CM
CV STRESS BASE HR: 73 BPM
ECHO LV POSTERIOR WALL: 1 CM (ref 0.6–1.1)
FRACTIONAL SHORTENING: 30 % (ref 28–44)
INTERVENTRICULAR SEPTUM: 1 CM (ref 0.6–1.1)
LEFT INTERNAL DIMENSION IN SYSTOLE: 2.8 CM (ref 2.1–4)
LEFT VENTRICLE MASS INDEX: 65 G/M2
LEFT VENTRICULAR INTERNAL DIMENSION IN DIASTOLE: 4 CM (ref 3.5–6)
LEFT VENTRICULAR MASS: 127.06 G
OHS CV CPX 1 MINUTE RECOVERY HEART RATE: 113 BPM
OHS CV CPX 85 PERCENT MAX PREDICTED HEART RATE MALE: 123
OHS CV CPX ESTIMATED METS: 7
OHS CV CPX MAX PREDICTED HEART RATE: 145
OHS CV CPX PATIENT IS FEMALE: 1
OHS CV CPX PATIENT IS MALE: 0
OHS CV CPX PEAK HEAR RATE: 133 BPM
OHS CV CPX PERCENT MAX PREDICTED HEART RATE ACHIEVED: 92
STRESS ECHO POST EXERCISE DUR MIN: 4 MINUTES
STRESS ECHO POST EXERCISE DUR SEC: 30 SECONDS

## 2021-02-01 PROCEDURE — 71046 XR CHEST PA AND LATERAL: ICD-10-PCS | Mod: 26,,, | Performed by: RADIOLOGY

## 2021-02-01 PROCEDURE — 71046 X-RAY EXAM CHEST 2 VIEWS: CPT | Mod: 26,,, | Performed by: RADIOLOGY

## 2021-02-01 PROCEDURE — 93351 STRESS TTE COMPLETE: CPT

## 2021-02-01 PROCEDURE — 93351 STRESS ECHO (CUPID ONLY): ICD-10-PCS | Mod: 26,,, | Performed by: INTERNAL MEDICINE

## 2021-02-01 PROCEDURE — 71046 X-RAY EXAM CHEST 2 VIEWS: CPT | Mod: TC,FY

## 2021-02-01 PROCEDURE — 93351 STRESS TTE COMPLETE: CPT | Mod: 26,,, | Performed by: INTERNAL MEDICINE

## 2021-02-02 DIAGNOSIS — R71.8 ELEVATED MCV: ICD-10-CM

## 2021-02-02 DIAGNOSIS — R82.90 ABNORMAL URINE: Primary | ICD-10-CM

## 2021-02-02 DIAGNOSIS — G43.009 MIGRAINE WITHOUT AURA AND WITHOUT STATUS MIGRAINOSUS, NOT INTRACTABLE: ICD-10-CM

## 2021-02-03 ENCOUNTER — TELEPHONE (OUTPATIENT)
Dept: FAMILY MEDICINE | Facility: CLINIC | Age: 71
End: 2021-02-03

## 2021-02-08 ENCOUNTER — LAB VISIT (OUTPATIENT)
Dept: LAB | Facility: OTHER | Age: 71
End: 2021-02-08
Payer: COMMERCIAL

## 2021-02-08 ENCOUNTER — OFFICE VISIT (OUTPATIENT)
Dept: HEMATOLOGY/ONCOLOGY | Facility: CLINIC | Age: 71
End: 2021-02-08
Attending: FAMILY MEDICINE
Payer: COMMERCIAL

## 2021-02-08 ENCOUNTER — OFFICE VISIT (OUTPATIENT)
Dept: UROLOGY | Facility: CLINIC | Age: 71
End: 2021-02-08
Attending: FAMILY MEDICINE
Payer: COMMERCIAL

## 2021-02-08 VITALS
DIASTOLIC BLOOD PRESSURE: 61 MMHG | HEART RATE: 69 BPM | HEIGHT: 64 IN | WEIGHT: 198 LBS | SYSTOLIC BLOOD PRESSURE: 95 MMHG | BODY MASS INDEX: 33.8 KG/M2

## 2021-02-08 VITALS
OXYGEN SATURATION: 97 % | WEIGHT: 201.06 LBS | DIASTOLIC BLOOD PRESSURE: 56 MMHG | BODY MASS INDEX: 34.33 KG/M2 | HEIGHT: 64 IN | HEART RATE: 66 BPM | SYSTOLIC BLOOD PRESSURE: 108 MMHG | TEMPERATURE: 98 F | RESPIRATION RATE: 16 BRPM

## 2021-02-08 DIAGNOSIS — R71.8 ELEVATED MCV: ICD-10-CM

## 2021-02-08 DIAGNOSIS — Z71.89 COUNSELED ABOUT COVID-19 VIRUS INFECTION: ICD-10-CM

## 2021-02-08 DIAGNOSIS — D53.9 MACROCYTIC ANEMIA: Primary | ICD-10-CM

## 2021-02-08 DIAGNOSIS — I10 HTN (HYPERTENSION), BENIGN: ICD-10-CM

## 2021-02-08 DIAGNOSIS — R35.1 NOCTURIA: ICD-10-CM

## 2021-02-08 DIAGNOSIS — E78.00 HYPERCHOLESTEROLEMIA: ICD-10-CM

## 2021-02-08 DIAGNOSIS — R31.0 GROSS HEMATURIA: Primary | ICD-10-CM

## 2021-02-08 DIAGNOSIS — D72.19 OTHER EOSINOPHILIA: ICD-10-CM

## 2021-02-08 DIAGNOSIS — R82.90 ABNORMAL URINE: ICD-10-CM

## 2021-02-08 DIAGNOSIS — Z86.2 HISTORY OF SARCOIDOSIS: ICD-10-CM

## 2021-02-08 LAB
ALBUMIN SERPL BCP-MCNC: 4 G/DL (ref 3.5–5.2)
ALP SERPL-CCNC: 112 U/L (ref 55–135)
ALT SERPL W/O P-5'-P-CCNC: 14 U/L (ref 10–44)
ANION GAP SERPL CALC-SCNC: 9 MMOL/L (ref 8–16)
AST SERPL-CCNC: 18 U/L (ref 10–40)
BASOPHILS # BLD AUTO: 0.05 K/UL (ref 0–0.2)
BASOPHILS NFR BLD: 0.8 % (ref 0–1.9)
BILIRUB SERPL-MCNC: 0.4 MG/DL (ref 0.1–1)
BUN SERPL-MCNC: 14 MG/DL (ref 8–23)
CALCIUM SERPL-MCNC: 9.3 MG/DL (ref 8.7–10.5)
CHLORIDE SERPL-SCNC: 103 MMOL/L (ref 95–110)
CO2 SERPL-SCNC: 28 MMOL/L (ref 23–29)
CREAT SERPL-MCNC: 0.9 MG/DL (ref 0.5–1.4)
DIFFERENTIAL METHOD: ABNORMAL
EOSINOPHIL # BLD AUTO: 0.5 K/UL (ref 0–0.5)
EOSINOPHIL NFR BLD: 7.5 % (ref 0–8)
ERYTHROCYTE [DISTWIDTH] IN BLOOD BY AUTOMATED COUNT: 12.3 % (ref 11.5–14.5)
ERYTHROCYTE [SEDIMENTATION RATE] IN BLOOD: 60 MM/HR (ref 0–20)
EST. GFR  (AFRICAN AMERICAN): >60 ML/MIN/1.73 M^2
EST. GFR  (NON AFRICAN AMERICAN): >60 ML/MIN/1.73 M^2
GLUCOSE SERPL-MCNC: 88 MG/DL (ref 70–110)
HCT VFR BLD AUTO: 36.9 % (ref 37–48.5)
HGB BLD-MCNC: 11.5 G/DL (ref 12–16)
IMM GRANULOCYTES # BLD AUTO: 0.02 K/UL (ref 0–0.04)
IMM GRANULOCYTES NFR BLD AUTO: 0.3 % (ref 0–0.5)
LDH SERPL L TO P-CCNC: 152 U/L (ref 110–260)
LYMPHOCYTES # BLD AUTO: 1.6 K/UL (ref 1–4.8)
LYMPHOCYTES NFR BLD: 25.2 % (ref 18–48)
MCH RBC QN AUTO: 30.9 PG (ref 27–31)
MCHC RBC AUTO-ENTMCNC: 31.2 G/DL (ref 32–36)
MCV RBC AUTO: 99 FL (ref 82–98)
MONOCYTES # BLD AUTO: 0.6 K/UL (ref 0.3–1)
MONOCYTES NFR BLD: 8.9 % (ref 4–15)
NEUTROPHILS # BLD AUTO: 3.5 K/UL (ref 1.8–7.7)
NEUTROPHILS NFR BLD: 57.3 % (ref 38–73)
NRBC BLD-RTO: 0 /100 WBC
PLATELET # BLD AUTO: 228 K/UL (ref 150–350)
PMV BLD AUTO: 10.7 FL (ref 9.2–12.9)
POTASSIUM SERPL-SCNC: 4 MMOL/L (ref 3.5–5.1)
PROT SERPL-MCNC: 8.5 G/DL (ref 6–8.4)
RBC # BLD AUTO: 3.72 M/UL (ref 4–5.4)
RETICS/RBC NFR AUTO: 2.7 % (ref 0.5–2.5)
SODIUM SERPL-SCNC: 140 MMOL/L (ref 136–145)
TSH SERPL DL<=0.005 MIU/L-ACNC: 1.88 UIU/ML (ref 0.4–4)
WBC # BLD AUTO: 6.16 K/UL (ref 3.9–12.7)

## 2021-02-08 PROCEDURE — 1126F AMNT PAIN NOTED NONE PRSNT: CPT | Mod: S$GLB,,, | Performed by: NURSE PRACTITIONER

## 2021-02-08 PROCEDURE — 86803 HEPATITIS C AB TEST: CPT

## 2021-02-08 PROCEDURE — 86703 HIV-1/HIV-2 1 RESULT ANTBDY: CPT

## 2021-02-08 PROCEDURE — 1101F PR PT FALLS ASSESS DOC 0-1 FALLS W/OUT INJ PAST YR: ICD-10-PCS | Mod: CPTII,S$GLB,, | Performed by: STUDENT IN AN ORGANIZED HEALTH CARE EDUCATION/TRAINING PROGRAM

## 2021-02-08 PROCEDURE — 3008F BODY MASS INDEX DOCD: CPT | Mod: CPTII,S$GLB,, | Performed by: STUDENT IN AN ORGANIZED HEALTH CARE EDUCATION/TRAINING PROGRAM

## 2021-02-08 PROCEDURE — 85025 COMPLETE CBC W/AUTO DIFF WBC: CPT

## 2021-02-08 PROCEDURE — 1126F PR PAIN SEVERITY QUANTIFIED, NO PAIN PRESENT: ICD-10-PCS | Mod: S$GLB,,, | Performed by: STUDENT IN AN ORGANIZED HEALTH CARE EDUCATION/TRAINING PROGRAM

## 2021-02-08 PROCEDURE — 3008F PR BODY MASS INDEX (BMI) DOCUMENTED: ICD-10-PCS | Mod: CPTII,S$GLB,, | Performed by: NURSE PRACTITIONER

## 2021-02-08 PROCEDURE — 86706 HEP B SURFACE ANTIBODY: CPT

## 2021-02-08 PROCEDURE — 87186 SC STD MICRODIL/AGAR DIL: CPT

## 2021-02-08 PROCEDURE — 80053 COMPREHEN METABOLIC PANEL: CPT

## 2021-02-08 PROCEDURE — 1159F PR MEDICATION LIST DOCUMENTED IN MEDICAL RECORD: ICD-10-PCS | Mod: S$GLB,,, | Performed by: STUDENT IN AN ORGANIZED HEALTH CARE EDUCATION/TRAINING PROGRAM

## 2021-02-08 PROCEDURE — 85045 AUTOMATED RETICULOCYTE COUNT: CPT

## 2021-02-08 PROCEDURE — 1159F PR MEDICATION LIST DOCUMENTED IN MEDICAL RECORD: ICD-10-PCS | Mod: S$GLB,,, | Performed by: NURSE PRACTITIONER

## 2021-02-08 PROCEDURE — 99203 OFFICE O/P NEW LOW 30 MIN: CPT | Mod: S$GLB,,, | Performed by: STUDENT IN AN ORGANIZED HEALTH CARE EDUCATION/TRAINING PROGRAM

## 2021-02-08 PROCEDURE — 82746 ASSAY OF FOLIC ACID SERUM: CPT

## 2021-02-08 PROCEDURE — 3074F SYST BP LT 130 MM HG: CPT | Mod: CPTII,S$GLB,, | Performed by: STUDENT IN AN ORGANIZED HEALTH CARE EDUCATION/TRAINING PROGRAM

## 2021-02-08 PROCEDURE — 87086 URINE CULTURE/COLONY COUNT: CPT

## 2021-02-08 PROCEDURE — 3078F DIAST BP <80 MM HG: CPT | Mod: CPTII,S$GLB,, | Performed by: STUDENT IN AN ORGANIZED HEALTH CARE EDUCATION/TRAINING PROGRAM

## 2021-02-08 PROCEDURE — 1101F PT FALLS ASSESS-DOCD LE1/YR: CPT | Mod: CPTII,S$GLB,, | Performed by: STUDENT IN AN ORGANIZED HEALTH CARE EDUCATION/TRAINING PROGRAM

## 2021-02-08 PROCEDURE — 3008F BODY MASS INDEX DOCD: CPT | Mod: CPTII,S$GLB,, | Performed by: NURSE PRACTITIONER

## 2021-02-08 PROCEDURE — 1126F PR PAIN SEVERITY QUANTIFIED, NO PAIN PRESENT: ICD-10-PCS | Mod: S$GLB,,, | Performed by: NURSE PRACTITIONER

## 2021-02-08 PROCEDURE — 82607 VITAMIN B-12: CPT

## 2021-02-08 PROCEDURE — 3074F SYST BP LT 130 MM HG: CPT | Mod: CPTII,S$GLB,, | Performed by: NURSE PRACTITIONER

## 2021-02-08 PROCEDURE — 1159F MED LIST DOCD IN RCRD: CPT | Mod: S$GLB,,, | Performed by: NURSE PRACTITIONER

## 2021-02-08 PROCEDURE — 1101F PT FALLS ASSESS-DOCD LE1/YR: CPT | Mod: CPTII,S$GLB,, | Performed by: NURSE PRACTITIONER

## 2021-02-08 PROCEDURE — 1126F AMNT PAIN NOTED NONE PRSNT: CPT | Mod: S$GLB,,, | Performed by: STUDENT IN AN ORGANIZED HEALTH CARE EDUCATION/TRAINING PROGRAM

## 2021-02-08 PROCEDURE — 36415 COLL VENOUS BLD VENIPUNCTURE: CPT

## 2021-02-08 PROCEDURE — 3288F PR FALLS RISK ASSESSMENT DOCUMENTED: ICD-10-PCS | Mod: CPTII,S$GLB,, | Performed by: NURSE PRACTITIONER

## 2021-02-08 PROCEDURE — 3288F FALL RISK ASSESSMENT DOCD: CPT | Mod: CPTII,S$GLB,, | Performed by: STUDENT IN AN ORGANIZED HEALTH CARE EDUCATION/TRAINING PROGRAM

## 2021-02-08 PROCEDURE — 85651 RBC SED RATE NONAUTOMATED: CPT

## 2021-02-08 PROCEDURE — 1101F PR PT FALLS ASSESS DOC 0-1 FALLS W/OUT INJ PAST YR: ICD-10-PCS | Mod: CPTII,S$GLB,, | Performed by: NURSE PRACTITIONER

## 2021-02-08 PROCEDURE — 99999 PR PBB SHADOW E&M-EST. PATIENT-LVL IV: CPT | Mod: PBBFAC,,, | Performed by: STUDENT IN AN ORGANIZED HEALTH CARE EDUCATION/TRAINING PROGRAM

## 2021-02-08 PROCEDURE — 82728 ASSAY OF FERRITIN: CPT

## 2021-02-08 PROCEDURE — 99204 OFFICE O/P NEW MOD 45 MIN: CPT | Mod: S$GLB,,, | Performed by: NURSE PRACTITIONER

## 2021-02-08 PROCEDURE — 3074F PR MOST RECENT SYSTOLIC BLOOD PRESSURE < 130 MM HG: ICD-10-PCS | Mod: CPTII,S$GLB,, | Performed by: STUDENT IN AN ORGANIZED HEALTH CARE EDUCATION/TRAINING PROGRAM

## 2021-02-08 PROCEDURE — 3078F PR MOST RECENT DIASTOLIC BLOOD PRESSURE < 80 MM HG: ICD-10-PCS | Mod: CPTII,S$GLB,, | Performed by: STUDENT IN AN ORGANIZED HEALTH CARE EDUCATION/TRAINING PROGRAM

## 2021-02-08 PROCEDURE — 83540 ASSAY OF IRON: CPT

## 2021-02-08 PROCEDURE — 99203 PR OFFICE/OUTPT VISIT, NEW, LEVL III, 30-44 MIN: ICD-10-PCS | Mod: S$GLB,,, | Performed by: STUDENT IN AN ORGANIZED HEALTH CARE EDUCATION/TRAINING PROGRAM

## 2021-02-08 PROCEDURE — 3078F DIAST BP <80 MM HG: CPT | Mod: CPTII,S$GLB,, | Performed by: NURSE PRACTITIONER

## 2021-02-08 PROCEDURE — 3078F PR MOST RECENT DIASTOLIC BLOOD PRESSURE < 80 MM HG: ICD-10-PCS | Mod: CPTII,S$GLB,, | Performed by: NURSE PRACTITIONER

## 2021-02-08 PROCEDURE — 84443 ASSAY THYROID STIM HORMONE: CPT

## 2021-02-08 PROCEDURE — 99204 PR OFFICE/OUTPT VISIT, NEW, LEVL IV, 45-59 MIN: ICD-10-PCS | Mod: S$GLB,,, | Performed by: NURSE PRACTITIONER

## 2021-02-08 PROCEDURE — 84630 ASSAY OF ZINC: CPT

## 2021-02-08 PROCEDURE — 81001 URINALYSIS AUTO W/SCOPE: CPT

## 2021-02-08 PROCEDURE — 99999 PR PBB SHADOW E&M-EST. PATIENT-LVL IV: ICD-10-PCS | Mod: PBBFAC,,, | Performed by: STUDENT IN AN ORGANIZED HEALTH CARE EDUCATION/TRAINING PROGRAM

## 2021-02-08 PROCEDURE — 87088 URINE BACTERIA CULTURE: CPT

## 2021-02-08 PROCEDURE — 3288F PR FALLS RISK ASSESSMENT DOCUMENTED: ICD-10-PCS | Mod: CPTII,S$GLB,, | Performed by: STUDENT IN AN ORGANIZED HEALTH CARE EDUCATION/TRAINING PROGRAM

## 2021-02-08 PROCEDURE — 87077 CULTURE AEROBIC IDENTIFY: CPT

## 2021-02-08 PROCEDURE — 83921 ORGANIC ACID SINGLE QUANT: CPT

## 2021-02-08 PROCEDURE — 1159F MED LIST DOCD IN RCRD: CPT | Mod: S$GLB,,, | Performed by: STUDENT IN AN ORGANIZED HEALTH CARE EDUCATION/TRAINING PROGRAM

## 2021-02-08 PROCEDURE — 3008F PR BODY MASS INDEX (BMI) DOCUMENTED: ICD-10-PCS | Mod: CPTII,S$GLB,, | Performed by: STUDENT IN AN ORGANIZED HEALTH CARE EDUCATION/TRAINING PROGRAM

## 2021-02-08 PROCEDURE — 82525 ASSAY OF COPPER: CPT

## 2021-02-08 PROCEDURE — 86704 HEP B CORE ANTIBODY TOTAL: CPT

## 2021-02-08 PROCEDURE — 3074F PR MOST RECENT SYSTOLIC BLOOD PRESSURE < 130 MM HG: ICD-10-PCS | Mod: CPTII,S$GLB,, | Performed by: NURSE PRACTITIONER

## 2021-02-08 PROCEDURE — 87340 HEPATITIS B SURFACE AG IA: CPT

## 2021-02-08 PROCEDURE — 83615 LACTATE (LD) (LDH) ENZYME: CPT

## 2021-02-08 PROCEDURE — 3288F FALL RISK ASSESSMENT DOCD: CPT | Mod: CPTII,S$GLB,, | Performed by: NURSE PRACTITIONER

## 2021-02-09 LAB
FERRITIN SERPL-MCNC: 97 NG/ML (ref 20–300)
FOLATE SERPL-MCNC: 5.4 NG/ML (ref 4–24)
HBV CORE AB SERPL QL IA: NEGATIVE
HBV SURFACE AB SER-ACNC: NEGATIVE M[IU]/ML
HBV SURFACE AG SERPL QL IA: NEGATIVE
HCV AB SERPL QL IA: NEGATIVE
HIV 1+2 AB+HIV1 P24 AG SERPL QL IA: NEGATIVE
IRON SERPL-MCNC: 49 UG/DL (ref 30–160)
MICROSCOPIC COMMENT: NORMAL
RBC #/AREA URNS AUTO: 2 /HPF (ref 0–4)
SATURATED IRON: 13 % (ref 20–50)
SQUAMOUS #/AREA URNS AUTO: 0 /HPF
TOTAL IRON BINDING CAPACITY: 382 UG/DL (ref 250–450)
TRANSFERRIN SERPL-MCNC: 258 MG/DL (ref 200–375)
VIT B12 SERPL-MCNC: 300 PG/ML (ref 210–950)
WBC #/AREA URNS AUTO: 1 /HPF (ref 0–5)

## 2021-02-10 LAB
COPPER SERPL-MCNC: 1258 UG/L (ref 810–1990)
ZINC SERPL-MCNC: 76 UG/DL (ref 60–130)

## 2021-02-11 ENCOUNTER — PATIENT MESSAGE (OUTPATIENT)
Dept: UROLOGY | Facility: CLINIC | Age: 71
End: 2021-02-11

## 2021-02-11 DIAGNOSIS — R93.89 ABNORMAL FINDING ON CT SCAN: ICD-10-CM

## 2021-02-11 DIAGNOSIS — N30.01 ACUTE CYSTITIS WITH HEMATURIA: Primary | ICD-10-CM

## 2021-02-11 LAB
BACTERIA UR CULT: ABNORMAL
METHYLMALONATE SERPL-SCNC: 0.24 UMOL/L

## 2021-02-11 RX ORDER — NITROFURANTOIN 25; 75 MG/1; MG/1
100 CAPSULE ORAL 2 TIMES DAILY
Qty: 14 CAPSULE | Refills: 0 | Status: SHIPPED | OUTPATIENT
Start: 2021-02-11 | End: 2021-02-18

## 2021-02-14 PROBLEM — D53.9 MACROCYTIC ANEMIA: Status: ACTIVE | Noted: 2021-02-14

## 2021-02-15 ENCOUNTER — HOSPITAL ENCOUNTER (OUTPATIENT)
Dept: RADIOLOGY | Facility: OTHER | Age: 71
Discharge: HOME | End: 2021-02-15
Attending: NURSE PRACTITIONER
Payer: COMMERCIAL

## 2021-02-15 ENCOUNTER — TELEPHONE (OUTPATIENT)
Dept: UROLOGY | Facility: CLINIC | Age: 71
End: 2021-02-15

## 2021-02-15 DIAGNOSIS — N30.01 ACUTE CYSTITIS WITH HEMATURIA: ICD-10-CM

## 2021-02-15 DIAGNOSIS — N20.0 NEPHROLITHIASIS: Primary | ICD-10-CM

## 2021-02-15 DIAGNOSIS — R93.89 ABNORMAL FINDING ON CT SCAN: ICD-10-CM

## 2021-02-15 PROCEDURE — 76770 US RETROPERITONEAL COMPLETE: ICD-10-PCS | Mod: 26,,, | Performed by: RADIOLOGY

## 2021-02-15 PROCEDURE — 76770 US EXAM ABDO BACK WALL COMP: CPT | Mod: TC

## 2021-02-15 PROCEDURE — 76770 US EXAM ABDO BACK WALL COMP: CPT | Mod: 26,,, | Performed by: RADIOLOGY

## 2021-02-22 ENCOUNTER — OFFICE VISIT (OUTPATIENT)
Dept: HEMATOLOGY/ONCOLOGY | Facility: CLINIC | Age: 71
End: 2021-02-22
Payer: COMMERCIAL

## 2021-02-22 DIAGNOSIS — R71.8 ELEVATED MCV: ICD-10-CM

## 2021-02-22 DIAGNOSIS — I10 HTN (HYPERTENSION), BENIGN: ICD-10-CM

## 2021-02-22 DIAGNOSIS — R79.89 LOW VITAMIN B12 LEVEL: ICD-10-CM

## 2021-02-22 DIAGNOSIS — R53.82 CHRONIC FATIGUE: ICD-10-CM

## 2021-02-22 DIAGNOSIS — D72.19 OTHER EOSINOPHILIA: ICD-10-CM

## 2021-02-22 DIAGNOSIS — D53.9 MACROCYTIC ANEMIA: Primary | ICD-10-CM

## 2021-02-22 PROCEDURE — 99214 OFFICE O/P EST MOD 30 MIN: CPT | Mod: 95,,, | Performed by: STUDENT IN AN ORGANIZED HEALTH CARE EDUCATION/TRAINING PROGRAM

## 2021-02-22 PROCEDURE — 99214 PR OFFICE/OUTPT VISIT, EST, LEVL IV, 30-39 MIN: ICD-10-PCS | Mod: 95,,, | Performed by: STUDENT IN AN ORGANIZED HEALTH CARE EDUCATION/TRAINING PROGRAM

## 2021-02-22 PROCEDURE — 1159F MED LIST DOCD IN RCRD: CPT | Mod: ,,, | Performed by: STUDENT IN AN ORGANIZED HEALTH CARE EDUCATION/TRAINING PROGRAM

## 2021-02-22 PROCEDURE — 1159F PR MEDICATION LIST DOCUMENTED IN MEDICAL RECORD: ICD-10-PCS | Mod: ,,, | Performed by: STUDENT IN AN ORGANIZED HEALTH CARE EDUCATION/TRAINING PROGRAM

## 2021-02-23 ENCOUNTER — OFFICE VISIT (OUTPATIENT)
Dept: HEMATOLOGY/ONCOLOGY | Facility: CLINIC | Age: 71
End: 2021-02-23
Payer: COMMERCIAL

## 2021-02-23 VITALS
HEART RATE: 71 BPM | WEIGHT: 197.75 LBS | DIASTOLIC BLOOD PRESSURE: 67 MMHG | TEMPERATURE: 98 F | HEIGHT: 64 IN | SYSTOLIC BLOOD PRESSURE: 152 MMHG | RESPIRATION RATE: 16 BRPM | OXYGEN SATURATION: 97 % | BODY MASS INDEX: 33.76 KG/M2

## 2021-02-23 DIAGNOSIS — R79.89 LOW VITAMIN B12 LEVEL: ICD-10-CM

## 2021-02-23 DIAGNOSIS — D53.9 MACROCYTIC ANEMIA: Primary | ICD-10-CM

## 2021-02-23 DIAGNOSIS — Z71.89 COUNSELED ABOUT COVID-19 VIRUS INFECTION: ICD-10-CM

## 2021-02-23 DIAGNOSIS — R53.82 CHRONIC FATIGUE: ICD-10-CM

## 2021-02-23 DIAGNOSIS — D72.19 OTHER EOSINOPHILIA: ICD-10-CM

## 2021-02-23 DIAGNOSIS — Z86.2 HISTORY OF SARCOIDOSIS: ICD-10-CM

## 2021-02-23 PROCEDURE — 1126F AMNT PAIN NOTED NONE PRSNT: CPT | Mod: S$GLB,,, | Performed by: STUDENT IN AN ORGANIZED HEALTH CARE EDUCATION/TRAINING PROGRAM

## 2021-02-23 PROCEDURE — 3288F FALL RISK ASSESSMENT DOCD: CPT | Mod: CPTII,S$GLB,, | Performed by: STUDENT IN AN ORGANIZED HEALTH CARE EDUCATION/TRAINING PROGRAM

## 2021-02-23 PROCEDURE — 3078F DIAST BP <80 MM HG: CPT | Mod: CPTII,S$GLB,, | Performed by: STUDENT IN AN ORGANIZED HEALTH CARE EDUCATION/TRAINING PROGRAM

## 2021-02-23 PROCEDURE — 1126F PR PAIN SEVERITY QUANTIFIED, NO PAIN PRESENT: ICD-10-PCS | Mod: S$GLB,,, | Performed by: STUDENT IN AN ORGANIZED HEALTH CARE EDUCATION/TRAINING PROGRAM

## 2021-02-23 PROCEDURE — 1159F MED LIST DOCD IN RCRD: CPT | Mod: S$GLB,,, | Performed by: STUDENT IN AN ORGANIZED HEALTH CARE EDUCATION/TRAINING PROGRAM

## 2021-02-23 PROCEDURE — 99999 PR PBB SHADOW E&M-EST. PATIENT-LVL IV: ICD-10-PCS | Mod: PBBFAC,,, | Performed by: STUDENT IN AN ORGANIZED HEALTH CARE EDUCATION/TRAINING PROGRAM

## 2021-02-23 PROCEDURE — 3008F BODY MASS INDEX DOCD: CPT | Mod: CPTII,S$GLB,, | Performed by: STUDENT IN AN ORGANIZED HEALTH CARE EDUCATION/TRAINING PROGRAM

## 2021-02-23 PROCEDURE — 3288F PR FALLS RISK ASSESSMENT DOCUMENTED: ICD-10-PCS | Mod: CPTII,S$GLB,, | Performed by: STUDENT IN AN ORGANIZED HEALTH CARE EDUCATION/TRAINING PROGRAM

## 2021-02-23 PROCEDURE — 99213 OFFICE O/P EST LOW 20 MIN: CPT | Mod: S$GLB,,, | Performed by: STUDENT IN AN ORGANIZED HEALTH CARE EDUCATION/TRAINING PROGRAM

## 2021-02-23 PROCEDURE — 3077F PR MOST RECENT SYSTOLIC BLOOD PRESSURE >= 140 MM HG: ICD-10-PCS | Mod: CPTII,S$GLB,, | Performed by: STUDENT IN AN ORGANIZED HEALTH CARE EDUCATION/TRAINING PROGRAM

## 2021-02-23 PROCEDURE — 3008F PR BODY MASS INDEX (BMI) DOCUMENTED: ICD-10-PCS | Mod: CPTII,S$GLB,, | Performed by: STUDENT IN AN ORGANIZED HEALTH CARE EDUCATION/TRAINING PROGRAM

## 2021-02-23 PROCEDURE — 1159F PR MEDICATION LIST DOCUMENTED IN MEDICAL RECORD: ICD-10-PCS | Mod: S$GLB,,, | Performed by: STUDENT IN AN ORGANIZED HEALTH CARE EDUCATION/TRAINING PROGRAM

## 2021-02-23 PROCEDURE — 1101F PT FALLS ASSESS-DOCD LE1/YR: CPT | Mod: CPTII,S$GLB,, | Performed by: STUDENT IN AN ORGANIZED HEALTH CARE EDUCATION/TRAINING PROGRAM

## 2021-02-23 PROCEDURE — 99999 PR PBB SHADOW E&M-EST. PATIENT-LVL IV: CPT | Mod: PBBFAC,,, | Performed by: STUDENT IN AN ORGANIZED HEALTH CARE EDUCATION/TRAINING PROGRAM

## 2021-02-23 PROCEDURE — 99213 PR OFFICE/OUTPT VISIT, EST, LEVL III, 20-29 MIN: ICD-10-PCS | Mod: S$GLB,,, | Performed by: STUDENT IN AN ORGANIZED HEALTH CARE EDUCATION/TRAINING PROGRAM

## 2021-02-23 PROCEDURE — 3077F SYST BP >= 140 MM HG: CPT | Mod: CPTII,S$GLB,, | Performed by: STUDENT IN AN ORGANIZED HEALTH CARE EDUCATION/TRAINING PROGRAM

## 2021-02-23 PROCEDURE — 3078F PR MOST RECENT DIASTOLIC BLOOD PRESSURE < 80 MM HG: ICD-10-PCS | Mod: CPTII,S$GLB,, | Performed by: STUDENT IN AN ORGANIZED HEALTH CARE EDUCATION/TRAINING PROGRAM

## 2021-02-23 PROCEDURE — 1101F PR PT FALLS ASSESS DOC 0-1 FALLS W/OUT INJ PAST YR: ICD-10-PCS | Mod: CPTII,S$GLB,, | Performed by: STUDENT IN AN ORGANIZED HEALTH CARE EDUCATION/TRAINING PROGRAM

## 2021-02-23 RX ORDER — CYANOCOBALAMIN 1000 UG/ML
1000 INJECTION, SOLUTION INTRAMUSCULAR; SUBCUTANEOUS
Qty: 1 ML | Refills: 11 | Status: SHIPPED | OUTPATIENT
Start: 2021-02-23 | End: 2022-02-28 | Stop reason: SDUPTHER

## 2021-02-23 RX ORDER — SYRINGE W-NEEDLE,DISPOSAB,3 ML 25GX5/8"
SYRINGE, EMPTY DISPOSABLE MISCELLANEOUS
Qty: 16 EACH | Refills: 1 | Status: SHIPPED | OUTPATIENT
Start: 2021-02-23 | End: 2022-02-28 | Stop reason: SDUPTHER

## 2021-02-27 PROBLEM — D72.19 OTHER EOSINOPHILIA: Status: ACTIVE | Noted: 2021-02-27

## 2021-04-26 ENCOUNTER — LAB VISIT (OUTPATIENT)
Dept: LAB | Facility: HOSPITAL | Age: 71
End: 2021-04-26
Attending: FAMILY MEDICINE
Payer: COMMERCIAL

## 2021-04-26 ENCOUNTER — OFFICE VISIT (OUTPATIENT)
Dept: FAMILY MEDICINE | Facility: CLINIC | Age: 71
End: 2021-04-26
Attending: FAMILY MEDICINE
Payer: COMMERCIAL

## 2021-04-26 VITALS
SYSTOLIC BLOOD PRESSURE: 137 MMHG | WEIGHT: 199.38 LBS | BODY MASS INDEX: 34.23 KG/M2 | DIASTOLIC BLOOD PRESSURE: 72 MMHG | HEART RATE: 72 BPM | OXYGEN SATURATION: 97 %

## 2021-04-26 DIAGNOSIS — K21.9 GASTROESOPHAGEAL REFLUX DISEASE, UNSPECIFIED WHETHER ESOPHAGITIS PRESENT: ICD-10-CM

## 2021-04-26 DIAGNOSIS — E78.2 MIXED HYPERLIPIDEMIA: ICD-10-CM

## 2021-04-26 DIAGNOSIS — Z78.0 ASYMPTOMATIC MENOPAUSE: ICD-10-CM

## 2021-04-26 DIAGNOSIS — I10 ESSENTIAL HYPERTENSION: Primary | ICD-10-CM

## 2021-04-26 DIAGNOSIS — R71.8 ELEVATED MCV: ICD-10-CM

## 2021-04-26 LAB
BASOPHILS # BLD AUTO: 0.04 K/UL (ref 0–0.2)
BASOPHILS NFR BLD: 0.8 % (ref 0–1.9)
CHOLEST SERPL-MCNC: 211 MG/DL (ref 120–199)
CHOLEST/HDLC SERPL: 3.6 {RATIO} (ref 2–5)
DIFFERENTIAL METHOD: ABNORMAL
EOSINOPHIL # BLD AUTO: 0.4 K/UL (ref 0–0.5)
EOSINOPHIL NFR BLD: 7.2 % (ref 0–8)
ERYTHROCYTE [DISTWIDTH] IN BLOOD BY AUTOMATED COUNT: 12.8 % (ref 11.5–14.5)
HCT VFR BLD AUTO: 38.4 % (ref 37–48.5)
HDLC SERPL-MCNC: 59 MG/DL (ref 40–75)
HDLC SERPL: 28 % (ref 20–50)
HGB BLD-MCNC: 12.1 G/DL (ref 12–16)
IMM GRANULOCYTES # BLD AUTO: 0.01 K/UL (ref 0–0.04)
IMM GRANULOCYTES NFR BLD AUTO: 0.2 % (ref 0–0.5)
LDLC SERPL CALC-MCNC: 130.2 MG/DL (ref 63–159)
LYMPHOCYTES # BLD AUTO: 1.3 K/UL (ref 1–4.8)
LYMPHOCYTES NFR BLD: 25.5 % (ref 18–48)
MCH RBC QN AUTO: 31.9 PG (ref 27–31)
MCHC RBC AUTO-ENTMCNC: 31.5 G/DL (ref 32–36)
MCV RBC AUTO: 101 FL (ref 82–98)
MONOCYTES # BLD AUTO: 0.5 K/UL (ref 0.3–1)
MONOCYTES NFR BLD: 8.7 % (ref 4–15)
NEUTROPHILS # BLD AUTO: 3 K/UL (ref 1.8–7.7)
NEUTROPHILS NFR BLD: 57.6 % (ref 38–73)
NONHDLC SERPL-MCNC: 152 MG/DL
NRBC BLD-RTO: 0 /100 WBC
PLATELET # BLD AUTO: 249 K/UL (ref 150–450)
PMV BLD AUTO: 11.5 FL (ref 9.2–12.9)
RBC # BLD AUTO: 3.79 M/UL (ref 4–5.4)
TRIGL SERPL-MCNC: 109 MG/DL (ref 30–150)
WBC # BLD AUTO: 5.26 K/UL (ref 3.9–12.7)

## 2021-04-26 PROCEDURE — 1159F PR MEDICATION LIST DOCUMENTED IN MEDICAL RECORD: ICD-10-PCS | Mod: S$GLB,,, | Performed by: FAMILY MEDICINE

## 2021-04-26 PROCEDURE — 99214 OFFICE O/P EST MOD 30 MIN: CPT | Mod: S$GLB,,, | Performed by: FAMILY MEDICINE

## 2021-04-26 PROCEDURE — 3288F FALL RISK ASSESSMENT DOCD: CPT | Mod: CPTII,S$GLB,, | Performed by: FAMILY MEDICINE

## 2021-04-26 PROCEDURE — 1126F AMNT PAIN NOTED NONE PRSNT: CPT | Mod: S$GLB,,, | Performed by: FAMILY MEDICINE

## 2021-04-26 PROCEDURE — 3288F PR FALLS RISK ASSESSMENT DOCUMENTED: ICD-10-PCS | Mod: CPTII,S$GLB,, | Performed by: FAMILY MEDICINE

## 2021-04-26 PROCEDURE — 1101F PT FALLS ASSESS-DOCD LE1/YR: CPT | Mod: CPTII,S$GLB,, | Performed by: FAMILY MEDICINE

## 2021-04-26 PROCEDURE — 1126F PR PAIN SEVERITY QUANTIFIED, NO PAIN PRESENT: ICD-10-PCS | Mod: S$GLB,,, | Performed by: FAMILY MEDICINE

## 2021-04-26 PROCEDURE — 85025 COMPLETE CBC W/AUTO DIFF WBC: CPT | Performed by: FAMILY MEDICINE

## 2021-04-26 PROCEDURE — 1159F MED LIST DOCD IN RCRD: CPT | Mod: S$GLB,,, | Performed by: FAMILY MEDICINE

## 2021-04-26 PROCEDURE — 1101F PR PT FALLS ASSESS DOC 0-1 FALLS W/OUT INJ PAST YR: ICD-10-PCS | Mod: CPTII,S$GLB,, | Performed by: FAMILY MEDICINE

## 2021-04-26 PROCEDURE — 80061 LIPID PANEL: CPT | Performed by: FAMILY MEDICINE

## 2021-04-26 PROCEDURE — 36415 COLL VENOUS BLD VENIPUNCTURE: CPT | Mod: PO | Performed by: FAMILY MEDICINE

## 2021-04-26 PROCEDURE — 99999 PR PBB SHADOW E&M-EST. PATIENT-LVL III: CPT | Mod: PBBFAC,,, | Performed by: FAMILY MEDICINE

## 2021-04-26 PROCEDURE — 99214 PR OFFICE/OUTPT VISIT, EST, LEVL IV, 30-39 MIN: ICD-10-PCS | Mod: S$GLB,,, | Performed by: FAMILY MEDICINE

## 2021-04-26 PROCEDURE — 3008F BODY MASS INDEX DOCD: CPT | Mod: CPTII,S$GLB,, | Performed by: FAMILY MEDICINE

## 2021-04-26 PROCEDURE — 99999 PR PBB SHADOW E&M-EST. PATIENT-LVL III: ICD-10-PCS | Mod: PBBFAC,,, | Performed by: FAMILY MEDICINE

## 2021-04-26 PROCEDURE — 3008F PR BODY MASS INDEX (BMI) DOCUMENTED: ICD-10-PCS | Mod: CPTII,S$GLB,, | Performed by: FAMILY MEDICINE

## 2021-04-26 RX ORDER — LEVOCETIRIZINE DIHYDROCHLORIDE 5 MG/1
5 TABLET, FILM COATED ORAL NIGHTLY
Qty: 30 TABLET | Refills: 3 | Status: SHIPPED | OUTPATIENT
Start: 2021-04-26 | End: 2022-10-16

## 2021-04-26 RX ORDER — PRAVASTATIN SODIUM 40 MG/1
40 TABLET ORAL DAILY
Qty: 90 TABLET | Refills: 3 | Status: SHIPPED | OUTPATIENT
Start: 2021-04-26 | End: 2022-08-09 | Stop reason: SDUPTHER

## 2021-04-26 RX ORDER — CLOTRIMAZOLE AND BETAMETHASONE DIPROPIONATE 10; .64 MG/G; MG/G
CREAM TOPICAL 2 TIMES DAILY
Qty: 45 G | Refills: 0 | Status: SHIPPED | OUTPATIENT
Start: 2021-04-26 | End: 2023-11-08 | Stop reason: SDUPTHER

## 2021-04-26 RX ORDER — OMEPRAZOLE 40 MG/1
40 CAPSULE, DELAYED RELEASE ORAL DAILY
Qty: 30 CAPSULE | Refills: 0 | Status: SHIPPED | OUTPATIENT
Start: 2021-04-26 | End: 2021-06-18

## 2021-04-28 ENCOUNTER — TELEPHONE (OUTPATIENT)
Dept: FAMILY MEDICINE | Facility: CLINIC | Age: 71
End: 2021-04-28

## 2021-04-29 RX ORDER — METHYLPREDNISOLONE 4 MG/1
TABLET ORAL
Qty: 1 PACKAGE | Refills: 0 | Status: SHIPPED | OUTPATIENT
Start: 2021-04-29 | End: 2021-05-20

## 2021-05-24 ENCOUNTER — LAB VISIT (OUTPATIENT)
Dept: LAB | Facility: OTHER | Age: 71
End: 2021-05-24
Attending: STUDENT IN AN ORGANIZED HEALTH CARE EDUCATION/TRAINING PROGRAM
Payer: COMMERCIAL

## 2021-05-24 DIAGNOSIS — D53.9 MACROCYTIC ANEMIA: ICD-10-CM

## 2021-05-24 LAB
ALBUMIN SERPL BCP-MCNC: 3.8 G/DL (ref 3.5–5.2)
ALP SERPL-CCNC: 107 U/L (ref 55–135)
ALT SERPL W/O P-5'-P-CCNC: 17 U/L (ref 10–44)
ANION GAP SERPL CALC-SCNC: 9 MMOL/L (ref 8–16)
AST SERPL-CCNC: 18 U/L (ref 10–40)
BASOPHILS # BLD AUTO: 0.04 K/UL (ref 0–0.2)
BASOPHILS NFR BLD: 0.5 % (ref 0–1.9)
BILIRUB SERPL-MCNC: 0.7 MG/DL (ref 0.1–1)
BUN SERPL-MCNC: 10 MG/DL (ref 8–23)
CALCIUM SERPL-MCNC: 8.9 MG/DL (ref 8.7–10.5)
CHLORIDE SERPL-SCNC: 104 MMOL/L (ref 95–110)
CO2 SERPL-SCNC: 27 MMOL/L (ref 23–29)
CREAT SERPL-MCNC: 0.9 MG/DL (ref 0.5–1.4)
DIFFERENTIAL METHOD: ABNORMAL
EOSINOPHIL # BLD AUTO: 0.1 K/UL (ref 0–0.5)
EOSINOPHIL NFR BLD: 1.2 % (ref 0–8)
ERYTHROCYTE [DISTWIDTH] IN BLOOD BY AUTOMATED COUNT: 12.3 % (ref 11.5–14.5)
ERYTHROCYTE [SEDIMENTATION RATE] IN BLOOD: 38 MM/HR (ref 0–20)
EST. GFR  (AFRICAN AMERICAN): >60 ML/MIN/1.73 M^2
EST. GFR  (NON AFRICAN AMERICAN): >60 ML/MIN/1.73 M^2
GLUCOSE SERPL-MCNC: 79 MG/DL (ref 70–110)
HCT VFR BLD AUTO: 37.8 % (ref 37–48.5)
HGB BLD-MCNC: 12.1 G/DL (ref 12–16)
IGA SERPL-MCNC: 728 MG/DL (ref 40–350)
IGG SERPL-MCNC: 1178 MG/DL (ref 650–1600)
IGM SERPL-MCNC: 41 MG/DL (ref 50–300)
IMM GRANULOCYTES # BLD AUTO: 0.02 K/UL (ref 0–0.04)
IMM GRANULOCYTES NFR BLD AUTO: 0.2 % (ref 0–0.5)
LDH SERPL L TO P-CCNC: 173 U/L (ref 110–260)
LYMPHOCYTES # BLD AUTO: 1.6 K/UL (ref 1–4.8)
LYMPHOCYTES NFR BLD: 19.4 % (ref 18–48)
MCH RBC QN AUTO: 31.8 PG (ref 27–31)
MCHC RBC AUTO-ENTMCNC: 32 G/DL (ref 32–36)
MCV RBC AUTO: 99 FL (ref 82–98)
MONOCYTES # BLD AUTO: 0.6 K/UL (ref 0.3–1)
MONOCYTES NFR BLD: 7.3 % (ref 4–15)
NEUTROPHILS # BLD AUTO: 5.8 K/UL (ref 1.8–7.7)
NEUTROPHILS NFR BLD: 71.4 % (ref 38–73)
NRBC BLD-RTO: 0 /100 WBC
PLATELET # BLD AUTO: 234 K/UL (ref 150–450)
PMV BLD AUTO: 11.1 FL (ref 9.2–12.9)
POTASSIUM SERPL-SCNC: 3.8 MMOL/L (ref 3.5–5.1)
PROT SERPL-MCNC: 8 G/DL (ref 6–8.4)
RBC # BLD AUTO: 3.81 M/UL (ref 4–5.4)
SODIUM SERPL-SCNC: 140 MMOL/L (ref 136–145)
WBC # BLD AUTO: 8.18 K/UL (ref 3.9–12.7)

## 2021-05-24 PROCEDURE — 82784 ASSAY IGA/IGD/IGG/IGM EACH: CPT | Mod: 59 | Performed by: STUDENT IN AN ORGANIZED HEALTH CARE EDUCATION/TRAINING PROGRAM

## 2021-05-24 PROCEDURE — 86334 IMMUNOFIX E-PHORESIS SERUM: CPT | Mod: 26,,, | Performed by: PATHOLOGY

## 2021-05-24 PROCEDURE — 85025 COMPLETE CBC W/AUTO DIFF WBC: CPT | Performed by: STUDENT IN AN ORGANIZED HEALTH CARE EDUCATION/TRAINING PROGRAM

## 2021-05-24 PROCEDURE — 84165 PATHOLOGIST INTERPRETATION SPE: ICD-10-PCS | Mod: 26,,, | Performed by: PATHOLOGY

## 2021-05-24 PROCEDURE — 84165 PROTEIN E-PHORESIS SERUM: CPT | Performed by: STUDENT IN AN ORGANIZED HEALTH CARE EDUCATION/TRAINING PROGRAM

## 2021-05-24 PROCEDURE — 83520 IMMUNOASSAY QUANT NOS NONAB: CPT | Performed by: STUDENT IN AN ORGANIZED HEALTH CARE EDUCATION/TRAINING PROGRAM

## 2021-05-24 PROCEDURE — 86334 PATHOLOGIST INTERPRETATION IFE: ICD-10-PCS | Mod: 26,,, | Performed by: PATHOLOGY

## 2021-05-24 PROCEDURE — 36415 COLL VENOUS BLD VENIPUNCTURE: CPT | Performed by: STUDENT IN AN ORGANIZED HEALTH CARE EDUCATION/TRAINING PROGRAM

## 2021-05-24 PROCEDURE — 86334 IMMUNOFIX E-PHORESIS SERUM: CPT | Performed by: STUDENT IN AN ORGANIZED HEALTH CARE EDUCATION/TRAINING PROGRAM

## 2021-05-24 PROCEDURE — 84165 PROTEIN E-PHORESIS SERUM: CPT | Mod: 26,,, | Performed by: PATHOLOGY

## 2021-05-24 PROCEDURE — 80053 COMPREHEN METABOLIC PANEL: CPT | Performed by: STUDENT IN AN ORGANIZED HEALTH CARE EDUCATION/TRAINING PROGRAM

## 2021-05-24 PROCEDURE — 83615 LACTATE (LD) (LDH) ENZYME: CPT | Performed by: STUDENT IN AN ORGANIZED HEALTH CARE EDUCATION/TRAINING PROGRAM

## 2021-05-24 PROCEDURE — 85651 RBC SED RATE NONAUTOMATED: CPT | Performed by: STUDENT IN AN ORGANIZED HEALTH CARE EDUCATION/TRAINING PROGRAM

## 2021-05-25 LAB
ALBUMIN SERPL ELPH-MCNC: 4.07 G/DL (ref 3.35–5.55)
ALPHA1 GLOB SERPL ELPH-MCNC: 0.3 G/DL (ref 0.17–0.41)
ALPHA2 GLOB SERPL ELPH-MCNC: 0.81 G/DL (ref 0.43–0.99)
B-GLOBULIN SERPL ELPH-MCNC: 0.83 G/DL (ref 0.5–1.1)
GAMMA GLOB SERPL ELPH-MCNC: 1.59 G/DL (ref 0.67–1.58)
INTERPRETATION SERPL IFE-IMP: NORMAL
KAPPA LC SER QL IA: 3.34 MG/DL (ref 0.33–1.94)
KAPPA LC/LAMBDA SER IA: 2.61 (ref 0.26–1.65)
LAMBDA LC SER QL IA: 1.28 MG/DL (ref 0.57–2.63)
PROT SERPL-MCNC: 7.6 G/DL (ref 6–8.4)

## 2021-05-26 LAB
PATHOLOGIST INTERPRETATION IFE: NORMAL
PATHOLOGIST INTERPRETATION SPE: NORMAL

## 2021-05-31 ENCOUNTER — OFFICE VISIT (OUTPATIENT)
Dept: HEMATOLOGY/ONCOLOGY | Facility: CLINIC | Age: 71
End: 2021-05-31
Payer: COMMERCIAL

## 2021-05-31 ENCOUNTER — HOSPITAL ENCOUNTER (OUTPATIENT)
Dept: RADIOLOGY | Facility: OTHER | Age: 71
Discharge: HOME | End: 2021-05-31
Attending: STUDENT IN AN ORGANIZED HEALTH CARE EDUCATION/TRAINING PROGRAM
Payer: COMMERCIAL

## 2021-05-31 VITALS
HEIGHT: 64 IN | WEIGHT: 201.06 LBS | TEMPERATURE: 99 F | OXYGEN SATURATION: 96 % | DIASTOLIC BLOOD PRESSURE: 73 MMHG | RESPIRATION RATE: 18 BRPM | HEART RATE: 76 BPM | BODY MASS INDEX: 34.33 KG/M2 | SYSTOLIC BLOOD PRESSURE: 111 MMHG

## 2021-05-31 DIAGNOSIS — Z86.2 HISTORY OF SARCOIDOSIS: ICD-10-CM

## 2021-05-31 DIAGNOSIS — R53.82 CHRONIC FATIGUE: ICD-10-CM

## 2021-05-31 DIAGNOSIS — D47.2 MGUS (MONOCLONAL GAMMOPATHY OF UNKNOWN SIGNIFICANCE): ICD-10-CM

## 2021-05-31 DIAGNOSIS — R79.89 LOW VITAMIN B12 LEVEL: ICD-10-CM

## 2021-05-31 DIAGNOSIS — D72.19 OTHER EOSINOPHILIA: ICD-10-CM

## 2021-05-31 DIAGNOSIS — D47.2 MGUS (MONOCLONAL GAMMOPATHY OF UNKNOWN SIGNIFICANCE): Primary | ICD-10-CM

## 2021-05-31 PROCEDURE — 1101F PR PT FALLS ASSESS DOC 0-1 FALLS W/OUT INJ PAST YR: ICD-10-PCS | Mod: CPTII,S$GLB,, | Performed by: STUDENT IN AN ORGANIZED HEALTH CARE EDUCATION/TRAINING PROGRAM

## 2021-05-31 PROCEDURE — 77075 RADEX OSSEOUS SURVEY COMPL: CPT | Mod: 26,,, | Performed by: RADIOLOGY

## 2021-05-31 PROCEDURE — 3008F BODY MASS INDEX DOCD: CPT | Mod: CPTII,S$GLB,, | Performed by: STUDENT IN AN ORGANIZED HEALTH CARE EDUCATION/TRAINING PROGRAM

## 2021-05-31 PROCEDURE — 1125F PR PAIN SEVERITY QUANTIFIED, PAIN PRESENT: ICD-10-PCS | Mod: S$GLB,,, | Performed by: STUDENT IN AN ORGANIZED HEALTH CARE EDUCATION/TRAINING PROGRAM

## 2021-05-31 PROCEDURE — 77075 XR METASTATIC SURVEY: ICD-10-PCS | Mod: 26,,, | Performed by: RADIOLOGY

## 2021-05-31 PROCEDURE — 3288F FALL RISK ASSESSMENT DOCD: CPT | Mod: CPTII,S$GLB,, | Performed by: STUDENT IN AN ORGANIZED HEALTH CARE EDUCATION/TRAINING PROGRAM

## 2021-05-31 PROCEDURE — 3008F PR BODY MASS INDEX (BMI) DOCUMENTED: ICD-10-PCS | Mod: CPTII,S$GLB,, | Performed by: STUDENT IN AN ORGANIZED HEALTH CARE EDUCATION/TRAINING PROGRAM

## 2021-05-31 PROCEDURE — 99999 PR PBB SHADOW E&M-EST. PATIENT-LVL IV: ICD-10-PCS | Mod: PBBFAC,,, | Performed by: STUDENT IN AN ORGANIZED HEALTH CARE EDUCATION/TRAINING PROGRAM

## 2021-05-31 PROCEDURE — 3288F PR FALLS RISK ASSESSMENT DOCUMENTED: ICD-10-PCS | Mod: CPTII,S$GLB,, | Performed by: STUDENT IN AN ORGANIZED HEALTH CARE EDUCATION/TRAINING PROGRAM

## 2021-05-31 PROCEDURE — 1125F AMNT PAIN NOTED PAIN PRSNT: CPT | Mod: S$GLB,,, | Performed by: STUDENT IN AN ORGANIZED HEALTH CARE EDUCATION/TRAINING PROGRAM

## 2021-05-31 PROCEDURE — 1159F PR MEDICATION LIST DOCUMENTED IN MEDICAL RECORD: ICD-10-PCS | Mod: S$GLB,,, | Performed by: STUDENT IN AN ORGANIZED HEALTH CARE EDUCATION/TRAINING PROGRAM

## 2021-05-31 PROCEDURE — 99214 OFFICE O/P EST MOD 30 MIN: CPT | Mod: S$GLB,,, | Performed by: STUDENT IN AN ORGANIZED HEALTH CARE EDUCATION/TRAINING PROGRAM

## 2021-05-31 PROCEDURE — 1101F PT FALLS ASSESS-DOCD LE1/YR: CPT | Mod: CPTII,S$GLB,, | Performed by: STUDENT IN AN ORGANIZED HEALTH CARE EDUCATION/TRAINING PROGRAM

## 2021-05-31 PROCEDURE — 99214 PR OFFICE/OUTPT VISIT, EST, LEVL IV, 30-39 MIN: ICD-10-PCS | Mod: S$GLB,,, | Performed by: STUDENT IN AN ORGANIZED HEALTH CARE EDUCATION/TRAINING PROGRAM

## 2021-05-31 PROCEDURE — 99999 PR PBB SHADOW E&M-EST. PATIENT-LVL IV: CPT | Mod: PBBFAC,,, | Performed by: STUDENT IN AN ORGANIZED HEALTH CARE EDUCATION/TRAINING PROGRAM

## 2021-05-31 PROCEDURE — 77075 RADEX OSSEOUS SURVEY COMPL: CPT | Mod: TC

## 2021-05-31 PROCEDURE — 1159F MED LIST DOCD IN RCRD: CPT | Mod: S$GLB,,, | Performed by: STUDENT IN AN ORGANIZED HEALTH CARE EDUCATION/TRAINING PROGRAM

## 2021-05-31 RX ORDER — ECHINACEA 400 MG
1 CAPSULE ORAL DAILY
COMMUNITY
End: 2022-10-16

## 2021-05-31 RX ORDER — CHOLECALCIFEROL (VITAMIN D3) 25 MCG
1000 TABLET ORAL DAILY
COMMUNITY
End: 2022-10-16

## 2021-05-31 RX ORDER — ZINC SULFATE 66 MG
1 TABLET ORAL DAILY
COMMUNITY
End: 2022-10-16

## 2021-06-03 ENCOUNTER — LAB VISIT (OUTPATIENT)
Dept: LAB | Facility: OTHER | Age: 71
End: 2021-06-03
Attending: STUDENT IN AN ORGANIZED HEALTH CARE EDUCATION/TRAINING PROGRAM
Payer: COMMERCIAL

## 2021-06-03 DIAGNOSIS — D47.2 MGUS (MONOCLONAL GAMMOPATHY OF UNKNOWN SIGNIFICANCE): ICD-10-CM

## 2021-06-03 LAB
PROT 24H UR-MRATE: NORMAL MG/SPEC (ref 0–100)
PROT UR-MCNC: <7 MG/DL (ref 0–15)
URINE COLLECTION DURATION: 24 HR
URINE VOLUME: 2000 ML

## 2021-06-03 PROCEDURE — 86335 PATHOLOGIST INTERPRETATION UIFE: ICD-10-PCS | Mod: 26,,, | Performed by: PATHOLOGY

## 2021-06-03 PROCEDURE — 86335 IMMUNFIX E-PHORSIS/URINE/CSF: CPT | Mod: 26,,, | Performed by: PATHOLOGY

## 2021-06-03 PROCEDURE — 86335 IMMUNFIX E-PHORSIS/URINE/CSF: CPT | Performed by: STUDENT IN AN ORGANIZED HEALTH CARE EDUCATION/TRAINING PROGRAM

## 2021-06-03 PROCEDURE — 84156 ASSAY OF PROTEIN URINE: CPT | Performed by: STUDENT IN AN ORGANIZED HEALTH CARE EDUCATION/TRAINING PROGRAM

## 2021-06-04 LAB — INTERPRETATION UR IFE-IMP: NORMAL

## 2021-06-07 ENCOUNTER — PATIENT MESSAGE (OUTPATIENT)
Dept: ENDOSCOPY | Facility: HOSPITAL | Age: 71
End: 2021-06-07

## 2021-06-07 LAB — PATHOLOGIST INTERPRETATION UIFE: NORMAL

## 2021-07-01 ENCOUNTER — ANESTHESIA EVENT (OUTPATIENT)
Dept: ENDOSCOPY | Facility: HOSPITAL | Age: 71
End: 2021-07-01
Payer: COMMERCIAL

## 2021-07-01 ENCOUNTER — HOSPITAL ENCOUNTER (OUTPATIENT)
Facility: HOSPITAL | Age: 71
Discharge: HOME | End: 2021-07-01
Attending: INTERNAL MEDICINE | Admitting: INTERNAL MEDICINE
Payer: COMMERCIAL

## 2021-07-01 ENCOUNTER — ANESTHESIA (OUTPATIENT)
Dept: ENDOSCOPY | Facility: HOSPITAL | Age: 71
End: 2021-07-01
Payer: COMMERCIAL

## 2021-07-01 VITALS
SYSTOLIC BLOOD PRESSURE: 132 MMHG | WEIGHT: 199 LBS | TEMPERATURE: 98 F | DIASTOLIC BLOOD PRESSURE: 78 MMHG | OXYGEN SATURATION: 93 % | HEIGHT: 64 IN | BODY MASS INDEX: 33.97 KG/M2 | HEART RATE: 69 BPM | RESPIRATION RATE: 16 BRPM

## 2021-07-01 DIAGNOSIS — K21.9 GASTROESOPHAGEAL REFLUX DISEASE, UNSPECIFIED WHETHER ESOPHAGITIS PRESENT: Primary | ICD-10-CM

## 2021-07-01 PROCEDURE — 37000009 HC ANESTHESIA EA ADD 15 MINS: Performed by: INTERNAL MEDICINE

## 2021-07-01 PROCEDURE — C1726 CATH, BAL DIL, NON-VASCULAR: HCPCS | Performed by: INTERNAL MEDICINE

## 2021-07-01 PROCEDURE — E9220 PRA ENDO ANESTHESIA: ICD-10-PCS | Mod: ,,, | Performed by: NURSE ANESTHETIST, CERTIFIED REGISTERED

## 2021-07-01 PROCEDURE — E9220 PRA ENDO ANESTHESIA: HCPCS | Mod: ,,, | Performed by: NURSE ANESTHETIST, CERTIFIED REGISTERED

## 2021-07-01 PROCEDURE — 37000008 HC ANESTHESIA 1ST 15 MINUTES: Performed by: INTERNAL MEDICINE

## 2021-07-01 PROCEDURE — 43249 PR EGD, FLEX, W/BALL DILATION, < 30MM: ICD-10-PCS | Mod: ,,, | Performed by: INTERNAL MEDICINE

## 2021-07-01 PROCEDURE — 43249 ESOPH EGD DILATION <30 MM: CPT | Performed by: INTERNAL MEDICINE

## 2021-07-01 PROCEDURE — 25000003 PHARM REV CODE 250: Performed by: NURSE ANESTHETIST, CERTIFIED REGISTERED

## 2021-07-01 PROCEDURE — 63600175 PHARM REV CODE 636 W HCPCS: Performed by: NURSE ANESTHETIST, CERTIFIED REGISTERED

## 2021-07-01 PROCEDURE — 25000003 PHARM REV CODE 250: Performed by: INTERNAL MEDICINE

## 2021-07-01 PROCEDURE — 43249 ESOPH EGD DILATION <30 MM: CPT | Mod: ,,, | Performed by: INTERNAL MEDICINE

## 2021-07-01 RX ORDER — PROPOFOL 10 MG/ML
VIAL (ML) INTRAVENOUS CONTINUOUS PRN
Status: DISCONTINUED | OUTPATIENT
Start: 2021-07-01 | End: 2021-07-01

## 2021-07-01 RX ORDER — LIDOCAINE HYDROCHLORIDE 20 MG/ML
INJECTION INTRAVENOUS
Status: DISCONTINUED | OUTPATIENT
Start: 2021-07-01 | End: 2021-07-01

## 2021-07-01 RX ORDER — PROPOFOL 10 MG/ML
VIAL (ML) INTRAVENOUS
Status: DISCONTINUED | OUTPATIENT
Start: 2021-07-01 | End: 2021-07-01

## 2021-07-01 RX ORDER — SODIUM CHLORIDE 9 MG/ML
INJECTION, SOLUTION INTRAVENOUS CONTINUOUS
Status: DISCONTINUED | OUTPATIENT
Start: 2021-07-01 | End: 2021-07-01 | Stop reason: HOSPADM

## 2021-07-01 RX ADMIN — SODIUM CHLORIDE: 0.9 INJECTION, SOLUTION INTRAVENOUS at 03:07

## 2021-07-01 RX ADMIN — PROPOFOL 20 MG: 10 INJECTION, EMULSION INTRAVENOUS at 03:07

## 2021-07-01 RX ADMIN — PROPOFOL 50 MG: 10 INJECTION, EMULSION INTRAVENOUS at 03:07

## 2021-07-01 RX ADMIN — GLYCOPYRROLATE 0.2 MG: 0.2 INJECTION, SOLUTION INTRAMUSCULAR; INTRAVITREAL at 03:07

## 2021-07-01 RX ADMIN — PROPOFOL 30 MG: 10 INJECTION, EMULSION INTRAVENOUS at 03:07

## 2021-07-01 RX ADMIN — LIDOCAINE HYDROCHLORIDE 100 MG: 20 INJECTION, SOLUTION INTRAVENOUS at 03:07

## 2021-07-01 RX ADMIN — Medication 150 MCG/KG/MIN: at 03:07

## 2021-08-07 ENCOUNTER — HOSPITAL ENCOUNTER (OUTPATIENT)
Dept: RADIOLOGY | Facility: OTHER | Age: 71
Discharge: HOME | End: 2021-08-07
Attending: NURSE PRACTITIONER
Payer: COMMERCIAL

## 2021-08-07 DIAGNOSIS — N20.0 NEPHROLITHIASIS: ICD-10-CM

## 2021-08-07 PROCEDURE — 74018 XR KUB: ICD-10-PCS | Mod: 26,,, | Performed by: RADIOLOGY

## 2021-08-07 PROCEDURE — 74018 RADEX ABDOMEN 1 VIEW: CPT | Mod: TC,FY

## 2021-08-07 PROCEDURE — 74018 RADEX ABDOMEN 1 VIEW: CPT | Mod: 26,,, | Performed by: RADIOLOGY

## 2021-08-09 ENCOUNTER — HOSPITAL ENCOUNTER (OUTPATIENT)
Dept: RADIOLOGY | Facility: OTHER | Age: 71
Discharge: HOME | End: 2021-08-09
Attending: NURSE PRACTITIONER
Payer: COMMERCIAL

## 2021-08-09 ENCOUNTER — TELEPHONE (OUTPATIENT)
Dept: UROLOGY | Facility: CLINIC | Age: 71
End: 2021-08-09

## 2021-08-09 DIAGNOSIS — N20.0 NEPHROLITHIASIS: ICD-10-CM

## 2021-08-09 PROCEDURE — 76770 US EXAM ABDO BACK WALL COMP: CPT | Mod: 26,,, | Performed by: RADIOLOGY

## 2021-08-09 PROCEDURE — 76770 US RETROPERITONEAL COMPLETE: ICD-10-PCS | Mod: 26,,, | Performed by: RADIOLOGY

## 2021-08-09 PROCEDURE — 76770 US EXAM ABDO BACK WALL COMP: CPT | Mod: TC

## 2021-08-16 ENCOUNTER — LAB VISIT (OUTPATIENT)
Dept: LAB | Facility: OTHER | Age: 71
End: 2021-08-16
Attending: STUDENT IN AN ORGANIZED HEALTH CARE EDUCATION/TRAINING PROGRAM
Payer: COMMERCIAL

## 2021-08-16 ENCOUNTER — OFFICE VISIT (OUTPATIENT)
Dept: UROLOGY | Facility: CLINIC | Age: 71
End: 2021-08-16
Payer: COMMERCIAL

## 2021-08-16 VITALS
BODY MASS INDEX: 33.98 KG/M2 | HEIGHT: 64 IN | DIASTOLIC BLOOD PRESSURE: 77 MMHG | SYSTOLIC BLOOD PRESSURE: 120 MMHG | WEIGHT: 199.06 LBS | HEART RATE: 72 BPM

## 2021-08-16 DIAGNOSIS — R10.9 FLANK PAIN: ICD-10-CM

## 2021-08-16 DIAGNOSIS — N20.0 NEPHROLITHIASIS: ICD-10-CM

## 2021-08-16 DIAGNOSIS — D47.2 MGUS (MONOCLONAL GAMMOPATHY OF UNKNOWN SIGNIFICANCE): ICD-10-CM

## 2021-08-16 DIAGNOSIS — R31.29 MICROHEMATURIA: Primary | ICD-10-CM

## 2021-08-16 DIAGNOSIS — R39.15 URINARY URGENCY: ICD-10-CM

## 2021-08-16 DIAGNOSIS — D53.9 MACROCYTIC ANEMIA: ICD-10-CM

## 2021-08-16 DIAGNOSIS — R71.8 ELEVATED MCV: ICD-10-CM

## 2021-08-16 LAB
ALBUMIN SERPL BCP-MCNC: 3.8 G/DL (ref 3.5–5.2)
ALP SERPL-CCNC: 110 U/L (ref 55–135)
ALT SERPL W/O P-5'-P-CCNC: 17 U/L (ref 10–44)
ANION GAP SERPL CALC-SCNC: 9 MMOL/L (ref 8–16)
AST SERPL-CCNC: 20 U/L (ref 10–40)
B2 MICROGLOB SERPL-MCNC: 2.4 UG/ML (ref 0–2.5)
BACTERIA #/AREA URNS AUTO: NORMAL /HPF
BASOPHILS # BLD AUTO: 0.05 K/UL (ref 0–0.2)
BASOPHILS NFR BLD: 0.9 % (ref 0–1.9)
BILIRUB SERPL-MCNC: 0.6 MG/DL (ref 0.1–1)
BUN SERPL-MCNC: 12 MG/DL (ref 8–23)
CALCIUM SERPL-MCNC: 9.2 MG/DL (ref 8.7–10.5)
CHLORIDE SERPL-SCNC: 106 MMOL/L (ref 95–110)
CO2 SERPL-SCNC: 27 MMOL/L (ref 23–29)
CREAT SERPL-MCNC: 0.9 MG/DL (ref 0.5–1.4)
DIFFERENTIAL METHOD: ABNORMAL
EOSINOPHIL # BLD AUTO: 0.5 K/UL (ref 0–0.5)
EOSINOPHIL NFR BLD: 8.8 % (ref 0–8)
ERYTHROCYTE [DISTWIDTH] IN BLOOD BY AUTOMATED COUNT: 12.4 % (ref 11.5–14.5)
ERYTHROCYTE [SEDIMENTATION RATE] IN BLOOD: 46 MM/HR (ref 0–20)
EST. GFR  (AFRICAN AMERICAN): >60 ML/MIN/1.73 M^2
EST. GFR  (NON AFRICAN AMERICAN): >60 ML/MIN/1.73 M^2
GLUCOSE SERPL-MCNC: 86 MG/DL (ref 70–110)
HAPTOGLOB SERPL-MCNC: 193 MG/DL (ref 30–250)
HCT VFR BLD AUTO: 38.2 % (ref 37–48.5)
HGB BLD-MCNC: 12.2 G/DL (ref 12–16)
IGA SERPL-MCNC: 789 MG/DL (ref 40–350)
IGG SERPL-MCNC: 1218 MG/DL (ref 650–1600)
IGM SERPL-MCNC: 40 MG/DL (ref 50–300)
IMM GRANULOCYTES # BLD AUTO: 0.01 K/UL (ref 0–0.04)
IMM GRANULOCYTES NFR BLD AUTO: 0.2 % (ref 0–0.5)
LDH SERPL L TO P-CCNC: 167 U/L (ref 110–260)
LYMPHOCYTES # BLD AUTO: 1.4 K/UL (ref 1–4.8)
LYMPHOCYTES NFR BLD: 25.5 % (ref 18–48)
MCH RBC QN AUTO: 31.5 PG (ref 27–31)
MCHC RBC AUTO-ENTMCNC: 31.9 G/DL (ref 32–36)
MCV RBC AUTO: 99 FL (ref 82–98)
MICROSCOPIC COMMENT: NORMAL
MONOCYTES # BLD AUTO: 0.5 K/UL (ref 0.3–1)
MONOCYTES NFR BLD: 9 % (ref 4–15)
NEUTROPHILS # BLD AUTO: 3 K/UL (ref 1.8–7.7)
NEUTROPHILS NFR BLD: 55.6 % (ref 38–73)
NRBC BLD-RTO: 0 /100 WBC
PLATELET # BLD AUTO: 242 K/UL (ref 150–450)
PMV BLD AUTO: 10.7 FL (ref 9.2–12.9)
POTASSIUM SERPL-SCNC: 4.1 MMOL/L (ref 3.5–5.1)
PROT SERPL-MCNC: 8.2 G/DL (ref 6–8.4)
RBC # BLD AUTO: 3.87 M/UL (ref 4–5.4)
RBC #/AREA URNS AUTO: 3 /HPF (ref 0–4)
SODIUM SERPL-SCNC: 142 MMOL/L (ref 136–145)
SQUAMOUS #/AREA URNS AUTO: 2 /HPF
WBC # BLD AUTO: 5.33 K/UL (ref 3.9–12.7)
WBC #/AREA URNS AUTO: 2 /HPF (ref 0–5)

## 2021-08-16 PROCEDURE — 83010 ASSAY OF HAPTOGLOBIN QUANT: CPT | Performed by: STUDENT IN AN ORGANIZED HEALTH CARE EDUCATION/TRAINING PROGRAM

## 2021-08-16 PROCEDURE — 1126F AMNT PAIN NOTED NONE PRSNT: CPT | Mod: CPTII,S$GLB,, | Performed by: NURSE PRACTITIONER

## 2021-08-16 PROCEDURE — 3074F SYST BP LT 130 MM HG: CPT | Mod: CPTII,S$GLB,, | Performed by: NURSE PRACTITIONER

## 2021-08-16 PROCEDURE — 83615 LACTATE (LD) (LDH) ENZYME: CPT | Performed by: STUDENT IN AN ORGANIZED HEALTH CARE EDUCATION/TRAINING PROGRAM

## 2021-08-16 PROCEDURE — 36415 COLL VENOUS BLD VENIPUNCTURE: CPT | Performed by: STUDENT IN AN ORGANIZED HEALTH CARE EDUCATION/TRAINING PROGRAM

## 2021-08-16 PROCEDURE — 99213 OFFICE O/P EST LOW 20 MIN: CPT | Mod: S$GLB,,, | Performed by: NURSE PRACTITIONER

## 2021-08-16 PROCEDURE — 82784 ASSAY IGA/IGD/IGG/IGM EACH: CPT | Performed by: STUDENT IN AN ORGANIZED HEALTH CARE EDUCATION/TRAINING PROGRAM

## 2021-08-16 PROCEDURE — 3078F PR MOST RECENT DIASTOLIC BLOOD PRESSURE < 80 MM HG: ICD-10-PCS | Mod: CPTII,S$GLB,, | Performed by: NURSE PRACTITIONER

## 2021-08-16 PROCEDURE — 84165 PATHOLOGIST INTERPRETATION SPE: ICD-10-PCS | Mod: 26,,, | Performed by: PATHOLOGY

## 2021-08-16 PROCEDURE — 3008F PR BODY MASS INDEX (BMI) DOCUMENTED: ICD-10-PCS | Mod: CPTII,S$GLB,, | Performed by: NURSE PRACTITIONER

## 2021-08-16 PROCEDURE — 1160F RVW MEDS BY RX/DR IN RCRD: CPT | Mod: CPTII,S$GLB,, | Performed by: NURSE PRACTITIONER

## 2021-08-16 PROCEDURE — 87077 CULTURE AEROBIC IDENTIFY: CPT | Performed by: NURSE PRACTITIONER

## 2021-08-16 PROCEDURE — 3288F PR FALLS RISK ASSESSMENT DOCUMENTED: ICD-10-PCS | Mod: CPTII,S$GLB,, | Performed by: NURSE PRACTITIONER

## 2021-08-16 PROCEDURE — 1101F PR PT FALLS ASSESS DOC 0-1 FALLS W/OUT INJ PAST YR: ICD-10-PCS | Mod: CPTII,S$GLB,, | Performed by: NURSE PRACTITIONER

## 2021-08-16 PROCEDURE — 80053 COMPREHEN METABOLIC PANEL: CPT | Performed by: STUDENT IN AN ORGANIZED HEALTH CARE EDUCATION/TRAINING PROGRAM

## 2021-08-16 PROCEDURE — 85651 RBC SED RATE NONAUTOMATED: CPT | Performed by: STUDENT IN AN ORGANIZED HEALTH CARE EDUCATION/TRAINING PROGRAM

## 2021-08-16 PROCEDURE — 87088 URINE BACTERIA CULTURE: CPT | Performed by: NURSE PRACTITIONER

## 2021-08-16 PROCEDURE — 81001 URINALYSIS AUTO W/SCOPE: CPT | Performed by: NURSE PRACTITIONER

## 2021-08-16 PROCEDURE — 82232 ASSAY OF BETA-2 PROTEIN: CPT | Performed by: STUDENT IN AN ORGANIZED HEALTH CARE EDUCATION/TRAINING PROGRAM

## 2021-08-16 PROCEDURE — 1126F PR PAIN SEVERITY QUANTIFIED, NO PAIN PRESENT: ICD-10-PCS | Mod: CPTII,S$GLB,, | Performed by: NURSE PRACTITIONER

## 2021-08-16 PROCEDURE — 84165 PROTEIN E-PHORESIS SERUM: CPT | Mod: 26,,, | Performed by: PATHOLOGY

## 2021-08-16 PROCEDURE — 1159F PR MEDICATION LIST DOCUMENTED IN MEDICAL RECORD: ICD-10-PCS | Mod: CPTII,S$GLB,, | Performed by: NURSE PRACTITIONER

## 2021-08-16 PROCEDURE — 3074F PR MOST RECENT SYSTOLIC BLOOD PRESSURE < 130 MM HG: ICD-10-PCS | Mod: CPTII,S$GLB,, | Performed by: NURSE PRACTITIONER

## 2021-08-16 PROCEDURE — 1101F PT FALLS ASSESS-DOCD LE1/YR: CPT | Mod: CPTII,S$GLB,, | Performed by: NURSE PRACTITIONER

## 2021-08-16 PROCEDURE — 83520 IMMUNOASSAY QUANT NOS NONAB: CPT | Performed by: STUDENT IN AN ORGANIZED HEALTH CARE EDUCATION/TRAINING PROGRAM

## 2021-08-16 PROCEDURE — 1159F MED LIST DOCD IN RCRD: CPT | Mod: CPTII,S$GLB,, | Performed by: NURSE PRACTITIONER

## 2021-08-16 PROCEDURE — 84165 PROTEIN E-PHORESIS SERUM: CPT | Performed by: STUDENT IN AN ORGANIZED HEALTH CARE EDUCATION/TRAINING PROGRAM

## 2021-08-16 PROCEDURE — 99213 PR OFFICE/OUTPT VISIT, EST, LEVL III, 20-29 MIN: ICD-10-PCS | Mod: S$GLB,,, | Performed by: NURSE PRACTITIONER

## 2021-08-16 PROCEDURE — 3288F FALL RISK ASSESSMENT DOCD: CPT | Mod: CPTII,S$GLB,, | Performed by: NURSE PRACTITIONER

## 2021-08-16 PROCEDURE — 85025 COMPLETE CBC W/AUTO DIFF WBC: CPT | Performed by: STUDENT IN AN ORGANIZED HEALTH CARE EDUCATION/TRAINING PROGRAM

## 2021-08-16 PROCEDURE — 87186 SC STD MICRODIL/AGAR DIL: CPT | Performed by: NURSE PRACTITIONER

## 2021-08-16 PROCEDURE — 3078F DIAST BP <80 MM HG: CPT | Mod: CPTII,S$GLB,, | Performed by: NURSE PRACTITIONER

## 2021-08-16 PROCEDURE — 1160F PR REVIEW ALL MEDS BY PRESCRIBER/CLIN PHARMACIST DOCUMENTED: ICD-10-PCS | Mod: CPTII,S$GLB,, | Performed by: NURSE PRACTITIONER

## 2021-08-16 PROCEDURE — 87086 URINE CULTURE/COLONY COUNT: CPT | Performed by: NURSE PRACTITIONER

## 2021-08-16 PROCEDURE — 3008F BODY MASS INDEX DOCD: CPT | Mod: CPTII,S$GLB,, | Performed by: NURSE PRACTITIONER

## 2021-08-17 LAB
ALBUMIN SERPL ELPH-MCNC: 4.2 G/DL (ref 3.35–5.55)
ALPHA1 GLOB SERPL ELPH-MCNC: 0.29 G/DL (ref 0.17–0.41)
ALPHA2 GLOB SERPL ELPH-MCNC: 0.81 G/DL (ref 0.43–0.99)
B-GLOBULIN SERPL ELPH-MCNC: 0.83 G/DL (ref 0.5–1.1)
GAMMA GLOB SERPL ELPH-MCNC: 1.66 G/DL (ref 0.67–1.58)
KAPPA LC SER QL IA: 3.51 MG/DL (ref 0.33–1.94)
KAPPA LC/LAMBDA SER IA: 2.81 (ref 0.26–1.65)
LAMBDA LC SER QL IA: 1.25 MG/DL (ref 0.57–2.63)
PROT SERPL-MCNC: 7.8 G/DL (ref 6–8.4)

## 2021-08-18 LAB — PATHOLOGIST INTERPRETATION SPE: NORMAL

## 2021-08-19 DIAGNOSIS — N30.00 ACUTE CYSTITIS WITHOUT HEMATURIA: Primary | ICD-10-CM

## 2021-08-19 LAB — BACTERIA UR CULT: ABNORMAL

## 2021-08-19 RX ORDER — CEPHALEXIN 500 MG/1
500 CAPSULE ORAL EVERY 12 HOURS
Qty: 14 CAPSULE | Refills: 0 | Status: SHIPPED | OUTPATIENT
Start: 2021-08-19 | End: 2021-08-26

## 2021-08-26 ENCOUNTER — OFFICE VISIT (OUTPATIENT)
Dept: HEMATOLOGY/ONCOLOGY | Facility: CLINIC | Age: 71
End: 2021-08-26
Payer: COMMERCIAL

## 2021-08-26 VITALS
SYSTOLIC BLOOD PRESSURE: 112 MMHG | TEMPERATURE: 98 F | HEIGHT: 64 IN | RESPIRATION RATE: 16 BRPM | BODY MASS INDEX: 33.84 KG/M2 | HEART RATE: 78 BPM | WEIGHT: 198.19 LBS | DIASTOLIC BLOOD PRESSURE: 68 MMHG | OXYGEN SATURATION: 97 %

## 2021-08-26 DIAGNOSIS — R53.82 CHRONIC FATIGUE: ICD-10-CM

## 2021-08-26 DIAGNOSIS — E78.00 HYPERCHOLESTEROLEMIA: ICD-10-CM

## 2021-08-26 DIAGNOSIS — R79.89 LOW VITAMIN B12 LEVEL: ICD-10-CM

## 2021-08-26 DIAGNOSIS — I10 HTN (HYPERTENSION), BENIGN: ICD-10-CM

## 2021-08-26 DIAGNOSIS — D47.2 MGUS (MONOCLONAL GAMMOPATHY OF UNKNOWN SIGNIFICANCE): Primary | ICD-10-CM

## 2021-08-26 PROCEDURE — 99999 PR PBB SHADOW E&M-EST. PATIENT-LVL IV: CPT | Mod: PBBFAC,,, | Performed by: STUDENT IN AN ORGANIZED HEALTH CARE EDUCATION/TRAINING PROGRAM

## 2021-08-26 PROCEDURE — 1101F PT FALLS ASSESS-DOCD LE1/YR: CPT | Mod: CPTII,S$GLB,, | Performed by: STUDENT IN AN ORGANIZED HEALTH CARE EDUCATION/TRAINING PROGRAM

## 2021-08-26 PROCEDURE — 1159F PR MEDICATION LIST DOCUMENTED IN MEDICAL RECORD: ICD-10-PCS | Mod: CPTII,S$GLB,, | Performed by: STUDENT IN AN ORGANIZED HEALTH CARE EDUCATION/TRAINING PROGRAM

## 2021-08-26 PROCEDURE — 3288F FALL RISK ASSESSMENT DOCD: CPT | Mod: CPTII,S$GLB,, | Performed by: STUDENT IN AN ORGANIZED HEALTH CARE EDUCATION/TRAINING PROGRAM

## 2021-08-26 PROCEDURE — 3074F PR MOST RECENT SYSTOLIC BLOOD PRESSURE < 130 MM HG: ICD-10-PCS | Mod: CPTII,S$GLB,, | Performed by: STUDENT IN AN ORGANIZED HEALTH CARE EDUCATION/TRAINING PROGRAM

## 2021-08-26 PROCEDURE — 3078F DIAST BP <80 MM HG: CPT | Mod: CPTII,S$GLB,, | Performed by: STUDENT IN AN ORGANIZED HEALTH CARE EDUCATION/TRAINING PROGRAM

## 2021-08-26 PROCEDURE — 3288F PR FALLS RISK ASSESSMENT DOCUMENTED: ICD-10-PCS | Mod: CPTII,S$GLB,, | Performed by: STUDENT IN AN ORGANIZED HEALTH CARE EDUCATION/TRAINING PROGRAM

## 2021-08-26 PROCEDURE — 3008F BODY MASS INDEX DOCD: CPT | Mod: CPTII,S$GLB,, | Performed by: STUDENT IN AN ORGANIZED HEALTH CARE EDUCATION/TRAINING PROGRAM

## 2021-08-26 PROCEDURE — 1160F RVW MEDS BY RX/DR IN RCRD: CPT | Mod: CPTII,S$GLB,, | Performed by: STUDENT IN AN ORGANIZED HEALTH CARE EDUCATION/TRAINING PROGRAM

## 2021-08-26 PROCEDURE — 3074F SYST BP LT 130 MM HG: CPT | Mod: CPTII,S$GLB,, | Performed by: STUDENT IN AN ORGANIZED HEALTH CARE EDUCATION/TRAINING PROGRAM

## 2021-08-26 PROCEDURE — 1159F MED LIST DOCD IN RCRD: CPT | Mod: CPTII,S$GLB,, | Performed by: STUDENT IN AN ORGANIZED HEALTH CARE EDUCATION/TRAINING PROGRAM

## 2021-08-26 PROCEDURE — 1126F AMNT PAIN NOTED NONE PRSNT: CPT | Mod: CPTII,S$GLB,, | Performed by: STUDENT IN AN ORGANIZED HEALTH CARE EDUCATION/TRAINING PROGRAM

## 2021-08-26 PROCEDURE — 1126F PR PAIN SEVERITY QUANTIFIED, NO PAIN PRESENT: ICD-10-PCS | Mod: CPTII,S$GLB,, | Performed by: STUDENT IN AN ORGANIZED HEALTH CARE EDUCATION/TRAINING PROGRAM

## 2021-08-26 PROCEDURE — 1101F PR PT FALLS ASSESS DOC 0-1 FALLS W/OUT INJ PAST YR: ICD-10-PCS | Mod: CPTII,S$GLB,, | Performed by: STUDENT IN AN ORGANIZED HEALTH CARE EDUCATION/TRAINING PROGRAM

## 2021-08-26 PROCEDURE — 99214 OFFICE O/P EST MOD 30 MIN: CPT | Mod: S$GLB,,, | Performed by: STUDENT IN AN ORGANIZED HEALTH CARE EDUCATION/TRAINING PROGRAM

## 2021-08-26 PROCEDURE — 1160F PR REVIEW ALL MEDS BY PRESCRIBER/CLIN PHARMACIST DOCUMENTED: ICD-10-PCS | Mod: CPTII,S$GLB,, | Performed by: STUDENT IN AN ORGANIZED HEALTH CARE EDUCATION/TRAINING PROGRAM

## 2021-08-26 PROCEDURE — 3078F PR MOST RECENT DIASTOLIC BLOOD PRESSURE < 80 MM HG: ICD-10-PCS | Mod: CPTII,S$GLB,, | Performed by: STUDENT IN AN ORGANIZED HEALTH CARE EDUCATION/TRAINING PROGRAM

## 2021-08-26 PROCEDURE — 3008F PR BODY MASS INDEX (BMI) DOCUMENTED: ICD-10-PCS | Mod: CPTII,S$GLB,, | Performed by: STUDENT IN AN ORGANIZED HEALTH CARE EDUCATION/TRAINING PROGRAM

## 2021-08-26 PROCEDURE — 99999 PR PBB SHADOW E&M-EST. PATIENT-LVL IV: ICD-10-PCS | Mod: PBBFAC,,, | Performed by: STUDENT IN AN ORGANIZED HEALTH CARE EDUCATION/TRAINING PROGRAM

## 2021-08-26 PROCEDURE — 99214 PR OFFICE/OUTPT VISIT, EST, LEVL IV, 30-39 MIN: ICD-10-PCS | Mod: S$GLB,,, | Performed by: STUDENT IN AN ORGANIZED HEALTH CARE EDUCATION/TRAINING PROGRAM

## 2021-10-04 ENCOUNTER — IMMUNIZATION (OUTPATIENT)
Dept: FAMILY MEDICINE | Facility: CLINIC | Age: 71
End: 2021-10-04
Payer: COMMERCIAL

## 2021-10-04 PROCEDURE — 90694 VACC AIIV4 NO PRSRV 0.5ML IM: CPT | Mod: S$GLB,,, | Performed by: FAMILY MEDICINE

## 2021-10-04 PROCEDURE — 90694 FLU VACCINE - QUADRIVALENT - ADJUVANTED: ICD-10-PCS | Mod: S$GLB,,, | Performed by: FAMILY MEDICINE

## 2021-10-04 PROCEDURE — 90471 IMMUNIZATION ADMIN: CPT | Mod: S$GLB,,, | Performed by: FAMILY MEDICINE

## 2021-10-04 PROCEDURE — 90471 FLU VACCINE - QUADRIVALENT - ADJUVANTED: ICD-10-PCS | Mod: S$GLB,,, | Performed by: FAMILY MEDICINE

## 2021-10-14 ENCOUNTER — OFFICE VISIT (OUTPATIENT)
Dept: PODIATRY | Facility: CLINIC | Age: 71
End: 2021-10-14
Payer: COMMERCIAL

## 2021-10-14 VITALS — BODY MASS INDEX: 33.84 KG/M2 | WEIGHT: 198.19 LBS | HEIGHT: 64 IN

## 2021-10-14 DIAGNOSIS — L60.0 INGROWN NAIL: Primary | ICD-10-CM

## 2021-10-14 DIAGNOSIS — M79.675 PAIN OF LEFT GREAT TOE: ICD-10-CM

## 2021-10-14 PROCEDURE — 3288F FALL RISK ASSESSMENT DOCD: CPT | Mod: CPTII,S$GLB,, | Performed by: PODIATRIST

## 2021-10-14 PROCEDURE — 3008F BODY MASS INDEX DOCD: CPT | Mod: CPTII,S$GLB,, | Performed by: PODIATRIST

## 2021-10-14 PROCEDURE — 1159F PR MEDICATION LIST DOCUMENTED IN MEDICAL RECORD: ICD-10-PCS | Mod: CPTII,S$GLB,, | Performed by: PODIATRIST

## 2021-10-14 PROCEDURE — 1125F AMNT PAIN NOTED PAIN PRSNT: CPT | Mod: CPTII,S$GLB,, | Performed by: PODIATRIST

## 2021-10-14 PROCEDURE — 99203 OFFICE O/P NEW LOW 30 MIN: CPT | Mod: S$GLB,,, | Performed by: PODIATRIST

## 2021-10-14 PROCEDURE — 1101F PT FALLS ASSESS-DOCD LE1/YR: CPT | Mod: CPTII,S$GLB,, | Performed by: PODIATRIST

## 2021-10-14 PROCEDURE — 1125F PR PAIN SEVERITY QUANTIFIED, PAIN PRESENT: ICD-10-PCS | Mod: CPTII,S$GLB,, | Performed by: PODIATRIST

## 2021-10-14 PROCEDURE — 99999 PR PBB SHADOW E&M-EST. PATIENT-LVL III: CPT | Mod: PBBFAC,,, | Performed by: PODIATRIST

## 2021-10-14 PROCEDURE — 99203 PR OFFICE/OUTPT VISIT, NEW, LEVL III, 30-44 MIN: ICD-10-PCS | Mod: S$GLB,,, | Performed by: PODIATRIST

## 2021-10-14 PROCEDURE — 1101F PR PT FALLS ASSESS DOC 0-1 FALLS W/OUT INJ PAST YR: ICD-10-PCS | Mod: CPTII,S$GLB,, | Performed by: PODIATRIST

## 2021-10-14 PROCEDURE — 3008F PR BODY MASS INDEX (BMI) DOCUMENTED: ICD-10-PCS | Mod: CPTII,S$GLB,, | Performed by: PODIATRIST

## 2021-10-14 PROCEDURE — 99999 PR PBB SHADOW E&M-EST. PATIENT-LVL III: ICD-10-PCS | Mod: PBBFAC,,, | Performed by: PODIATRIST

## 2021-10-14 PROCEDURE — 3288F PR FALLS RISK ASSESSMENT DOCUMENTED: ICD-10-PCS | Mod: CPTII,S$GLB,, | Performed by: PODIATRIST

## 2021-10-14 PROCEDURE — 1159F MED LIST DOCD IN RCRD: CPT | Mod: CPTII,S$GLB,, | Performed by: PODIATRIST

## 2021-10-20 ENCOUNTER — OFFICE VISIT (OUTPATIENT)
Dept: PODIATRY | Facility: CLINIC | Age: 71
End: 2021-10-20
Payer: COMMERCIAL

## 2021-10-20 VITALS — HEIGHT: 64 IN | WEIGHT: 198.19 LBS | BODY MASS INDEX: 33.84 KG/M2

## 2021-10-20 DIAGNOSIS — M79.675 PAIN OF LEFT GREAT TOE: ICD-10-CM

## 2021-10-20 DIAGNOSIS — L60.0 INGROWN NAIL: Primary | ICD-10-CM

## 2021-10-20 PROCEDURE — 1101F PT FALLS ASSESS-DOCD LE1/YR: CPT | Mod: CPTII,S$GLB,, | Performed by: PODIATRIST

## 2021-10-20 PROCEDURE — 1159F MED LIST DOCD IN RCRD: CPT | Mod: CPTII,S$GLB,, | Performed by: PODIATRIST

## 2021-10-20 PROCEDURE — 99999 PR PBB SHADOW E&M-EST. PATIENT-LVL III: ICD-10-PCS | Mod: PBBFAC,,, | Performed by: PODIATRIST

## 2021-10-20 PROCEDURE — 1126F PR PAIN SEVERITY QUANTIFIED, NO PAIN PRESENT: ICD-10-PCS | Mod: CPTII,S$GLB,, | Performed by: PODIATRIST

## 2021-10-20 PROCEDURE — 11730 NAIL REMOVAL: ICD-10-PCS | Mod: TA,S$GLB,, | Performed by: PODIATRIST

## 2021-10-20 PROCEDURE — 1101F PR PT FALLS ASSESS DOC 0-1 FALLS W/OUT INJ PAST YR: ICD-10-PCS | Mod: CPTII,S$GLB,, | Performed by: PODIATRIST

## 2021-10-20 PROCEDURE — 99499 UNLISTED E&M SERVICE: CPT | Mod: S$GLB,,, | Performed by: PODIATRIST

## 2021-10-20 PROCEDURE — 99999 PR PBB SHADOW E&M-EST. PATIENT-LVL III: CPT | Mod: PBBFAC,,, | Performed by: PODIATRIST

## 2021-10-20 PROCEDURE — 1126F AMNT PAIN NOTED NONE PRSNT: CPT | Mod: CPTII,S$GLB,, | Performed by: PODIATRIST

## 2021-10-20 PROCEDURE — 99499 NO LOS: ICD-10-PCS | Mod: S$GLB,,, | Performed by: PODIATRIST

## 2021-10-20 PROCEDURE — 3288F FALL RISK ASSESSMENT DOCD: CPT | Mod: CPTII,S$GLB,, | Performed by: PODIATRIST

## 2021-10-20 PROCEDURE — 3008F PR BODY MASS INDEX (BMI) DOCUMENTED: ICD-10-PCS | Mod: CPTII,S$GLB,, | Performed by: PODIATRIST

## 2021-10-20 PROCEDURE — 3008F BODY MASS INDEX DOCD: CPT | Mod: CPTII,S$GLB,, | Performed by: PODIATRIST

## 2021-10-20 PROCEDURE — 3288F PR FALLS RISK ASSESSMENT DOCUMENTED: ICD-10-PCS | Mod: CPTII,S$GLB,, | Performed by: PODIATRIST

## 2021-10-20 PROCEDURE — 11730 AVULSION NAIL PLATE SIMPLE 1: CPT | Mod: TA,S$GLB,, | Performed by: PODIATRIST

## 2021-10-20 PROCEDURE — 1159F PR MEDICATION LIST DOCUMENTED IN MEDICAL RECORD: ICD-10-PCS | Mod: CPTII,S$GLB,, | Performed by: PODIATRIST

## 2021-10-20 RX ORDER — ACETAMINOPHEN AND CODEINE PHOSPHATE 300; 30 MG/1; MG/1
1 TABLET ORAL EVERY 6 HOURS PRN
Qty: 10 TABLET | Refills: 0 | Status: SHIPPED | OUTPATIENT
Start: 2021-10-20 | End: 2021-10-30

## 2021-10-20 RX ORDER — CICLOPIROX 80 MG/ML
SOLUTION TOPICAL NIGHTLY
Qty: 1 EACH | Refills: 3 | Status: SHIPPED | OUTPATIENT
Start: 2021-10-20 | End: 2022-08-09

## 2021-10-25 ENCOUNTER — OFFICE VISIT (OUTPATIENT)
Dept: PODIATRY | Facility: CLINIC | Age: 71
End: 2021-10-25
Payer: COMMERCIAL

## 2021-10-25 VITALS — BODY MASS INDEX: 33.84 KG/M2 | HEIGHT: 64 IN | WEIGHT: 198.19 LBS

## 2021-10-25 DIAGNOSIS — Z98.890 S/P NAIL SURGERY: Primary | ICD-10-CM

## 2021-10-25 PROCEDURE — 1101F PR PT FALLS ASSESS DOC 0-1 FALLS W/OUT INJ PAST YR: ICD-10-PCS | Mod: CPTII,S$GLB,, | Performed by: PODIATRIST

## 2021-10-25 PROCEDURE — 1101F PT FALLS ASSESS-DOCD LE1/YR: CPT | Mod: CPTII,S$GLB,, | Performed by: PODIATRIST

## 2021-10-25 PROCEDURE — 1126F PR PAIN SEVERITY QUANTIFIED, NO PAIN PRESENT: ICD-10-PCS | Mod: CPTII,S$GLB,, | Performed by: PODIATRIST

## 2021-10-25 PROCEDURE — 3288F PR FALLS RISK ASSESSMENT DOCUMENTED: ICD-10-PCS | Mod: CPTII,S$GLB,, | Performed by: PODIATRIST

## 2021-10-25 PROCEDURE — 1126F AMNT PAIN NOTED NONE PRSNT: CPT | Mod: CPTII,S$GLB,, | Performed by: PODIATRIST

## 2021-10-25 PROCEDURE — 3008F BODY MASS INDEX DOCD: CPT | Mod: CPTII,S$GLB,, | Performed by: PODIATRIST

## 2021-10-25 PROCEDURE — 99999 PR PBB SHADOW E&M-EST. PATIENT-LVL III: ICD-10-PCS | Mod: PBBFAC,,, | Performed by: PODIATRIST

## 2021-10-25 PROCEDURE — 99212 OFFICE O/P EST SF 10 MIN: CPT | Mod: S$GLB,,, | Performed by: PODIATRIST

## 2021-10-25 PROCEDURE — 99212 PR OFFICE/OUTPT VISIT, EST, LEVL II, 10-19 MIN: ICD-10-PCS | Mod: S$GLB,,, | Performed by: PODIATRIST

## 2021-10-25 PROCEDURE — 1159F PR MEDICATION LIST DOCUMENTED IN MEDICAL RECORD: ICD-10-PCS | Mod: CPTII,S$GLB,, | Performed by: PODIATRIST

## 2021-10-25 PROCEDURE — 3008F PR BODY MASS INDEX (BMI) DOCUMENTED: ICD-10-PCS | Mod: CPTII,S$GLB,, | Performed by: PODIATRIST

## 2021-10-25 PROCEDURE — 99999 PR PBB SHADOW E&M-EST. PATIENT-LVL III: CPT | Mod: PBBFAC,,, | Performed by: PODIATRIST

## 2021-10-25 PROCEDURE — 3288F FALL RISK ASSESSMENT DOCD: CPT | Mod: CPTII,S$GLB,, | Performed by: PODIATRIST

## 2021-10-25 PROCEDURE — 1159F MED LIST DOCD IN RCRD: CPT | Mod: CPTII,S$GLB,, | Performed by: PODIATRIST

## 2022-01-13 ENCOUNTER — LAB VISIT (OUTPATIENT)
Dept: PRIMARY CARE CLINIC | Facility: CLINIC | Age: 72
End: 2022-01-13
Payer: COMMERCIAL

## 2022-01-13 DIAGNOSIS — Z20.822 CONTACT WITH AND (SUSPECTED) EXPOSURE TO COVID-19: ICD-10-CM

## 2022-01-13 LAB
CTP QC/QA: YES
SARS-COV-2 AG RESP QL IA.RAPID: NEGATIVE

## 2022-01-13 PROCEDURE — 87811 SARS-COV-2 COVID19 W/OPTIC: CPT

## 2022-01-17 ENCOUNTER — TELEPHONE (OUTPATIENT)
Dept: FAMILY MEDICINE | Facility: CLINIC | Age: 72
End: 2022-01-17
Payer: COMMERCIAL

## 2022-01-17 DIAGNOSIS — Z12.31 ENCOUNTER FOR SCREENING MAMMOGRAM FOR BREAST CANCER: Primary | ICD-10-CM

## 2022-01-18 NOTE — TELEPHONE ENCOUNTER
Please enter mammogram order. thanks   No wheezing/clear to mild end expiratory wheeze or scattered expiratory wheeze

## 2022-01-31 ENCOUNTER — HOSPITAL ENCOUNTER (OUTPATIENT)
Dept: RADIOLOGY | Facility: HOSPITAL | Age: 72
Discharge: HOME | End: 2022-01-31
Attending: FAMILY MEDICINE
Payer: COMMERCIAL

## 2022-01-31 VITALS — HEIGHT: 64 IN | BODY MASS INDEX: 33.63 KG/M2 | WEIGHT: 197 LBS

## 2022-01-31 DIAGNOSIS — Z12.31 ENCOUNTER FOR SCREENING MAMMOGRAM FOR BREAST CANCER: ICD-10-CM

## 2022-01-31 PROCEDURE — 77063 BREAST TOMOSYNTHESIS BI: CPT | Mod: TC,PN

## 2022-01-31 PROCEDURE — 77067 SCR MAMMO BI INCL CAD: CPT | Mod: TC,PN

## 2022-01-31 PROCEDURE — 77063 MAMMO DIGITAL SCREENING BILAT WITH TOMO: ICD-10-PCS | Mod: 26,,, | Performed by: RADIOLOGY

## 2022-01-31 PROCEDURE — 77067 SCR MAMMO BI INCL CAD: CPT | Mod: 26,,, | Performed by: RADIOLOGY

## 2022-01-31 PROCEDURE — 77067 MAMMO DIGITAL SCREENING BILAT WITH TOMO: ICD-10-PCS | Mod: 26,,, | Performed by: RADIOLOGY

## 2022-01-31 PROCEDURE — 77063 BREAST TOMOSYNTHESIS BI: CPT | Mod: 26,,, | Performed by: RADIOLOGY

## 2022-02-02 ENCOUNTER — PATIENT MESSAGE (OUTPATIENT)
Dept: FAMILY MEDICINE | Facility: CLINIC | Age: 72
End: 2022-02-02
Payer: COMMERCIAL

## 2022-02-28 ENCOUNTER — OFFICE VISIT (OUTPATIENT)
Dept: HEMATOLOGY/ONCOLOGY | Facility: CLINIC | Age: 72
End: 2022-02-28
Payer: COMMERCIAL

## 2022-02-28 VITALS
SYSTOLIC BLOOD PRESSURE: 108 MMHG | TEMPERATURE: 98 F | HEART RATE: 75 BPM | DIASTOLIC BLOOD PRESSURE: 64 MMHG | HEIGHT: 64 IN | RESPIRATION RATE: 16 BRPM | BODY MASS INDEX: 34.36 KG/M2 | WEIGHT: 201.25 LBS | OXYGEN SATURATION: 99 %

## 2022-02-28 DIAGNOSIS — D72.19 OTHER EOSINOPHILIA: ICD-10-CM

## 2022-02-28 DIAGNOSIS — Z86.2 HISTORY OF SARCOIDOSIS: ICD-10-CM

## 2022-02-28 DIAGNOSIS — R79.89 LOW VITAMIN B12 LEVEL: ICD-10-CM

## 2022-02-28 DIAGNOSIS — I10 HTN (HYPERTENSION), BENIGN: ICD-10-CM

## 2022-02-28 DIAGNOSIS — D53.9 MACROCYTIC ANEMIA: ICD-10-CM

## 2022-02-28 DIAGNOSIS — D47.2 MGUS (MONOCLONAL GAMMOPATHY OF UNKNOWN SIGNIFICANCE): Primary | ICD-10-CM

## 2022-02-28 PROCEDURE — 99999 PR PBB SHADOW E&M-EST. PATIENT-LVL IV: CPT | Mod: PBBFAC,,, | Performed by: STUDENT IN AN ORGANIZED HEALTH CARE EDUCATION/TRAINING PROGRAM

## 2022-02-28 PROCEDURE — 1160F PR REVIEW ALL MEDS BY PRESCRIBER/CLIN PHARMACIST DOCUMENTED: ICD-10-PCS | Mod: CPTII,S$GLB,, | Performed by: STUDENT IN AN ORGANIZED HEALTH CARE EDUCATION/TRAINING PROGRAM

## 2022-02-28 PROCEDURE — 1159F MED LIST DOCD IN RCRD: CPT | Mod: CPTII,S$GLB,, | Performed by: STUDENT IN AN ORGANIZED HEALTH CARE EDUCATION/TRAINING PROGRAM

## 2022-02-28 PROCEDURE — 3074F SYST BP LT 130 MM HG: CPT | Mod: CPTII,S$GLB,, | Performed by: STUDENT IN AN ORGANIZED HEALTH CARE EDUCATION/TRAINING PROGRAM

## 2022-02-28 PROCEDURE — 3008F PR BODY MASS INDEX (BMI) DOCUMENTED: ICD-10-PCS | Mod: CPTII,S$GLB,, | Performed by: STUDENT IN AN ORGANIZED HEALTH CARE EDUCATION/TRAINING PROGRAM

## 2022-02-28 PROCEDURE — 99213 PR OFFICE/OUTPT VISIT, EST, LEVL III, 20-29 MIN: ICD-10-PCS | Mod: S$GLB,,, | Performed by: STUDENT IN AN ORGANIZED HEALTH CARE EDUCATION/TRAINING PROGRAM

## 2022-02-28 PROCEDURE — 3288F PR FALLS RISK ASSESSMENT DOCUMENTED: ICD-10-PCS | Mod: CPTII,S$GLB,, | Performed by: STUDENT IN AN ORGANIZED HEALTH CARE EDUCATION/TRAINING PROGRAM

## 2022-02-28 PROCEDURE — 1126F PR PAIN SEVERITY QUANTIFIED, NO PAIN PRESENT: ICD-10-PCS | Mod: CPTII,S$GLB,, | Performed by: STUDENT IN AN ORGANIZED HEALTH CARE EDUCATION/TRAINING PROGRAM

## 2022-02-28 PROCEDURE — 3288F FALL RISK ASSESSMENT DOCD: CPT | Mod: CPTII,S$GLB,, | Performed by: STUDENT IN AN ORGANIZED HEALTH CARE EDUCATION/TRAINING PROGRAM

## 2022-02-28 PROCEDURE — 1159F PR MEDICATION LIST DOCUMENTED IN MEDICAL RECORD: ICD-10-PCS | Mod: CPTII,S$GLB,, | Performed by: STUDENT IN AN ORGANIZED HEALTH CARE EDUCATION/TRAINING PROGRAM

## 2022-02-28 PROCEDURE — 3008F BODY MASS INDEX DOCD: CPT | Mod: CPTII,S$GLB,, | Performed by: STUDENT IN AN ORGANIZED HEALTH CARE EDUCATION/TRAINING PROGRAM

## 2022-02-28 PROCEDURE — 3074F PR MOST RECENT SYSTOLIC BLOOD PRESSURE < 130 MM HG: ICD-10-PCS | Mod: CPTII,S$GLB,, | Performed by: STUDENT IN AN ORGANIZED HEALTH CARE EDUCATION/TRAINING PROGRAM

## 2022-02-28 PROCEDURE — 3078F PR MOST RECENT DIASTOLIC BLOOD PRESSURE < 80 MM HG: ICD-10-PCS | Mod: CPTII,S$GLB,, | Performed by: STUDENT IN AN ORGANIZED HEALTH CARE EDUCATION/TRAINING PROGRAM

## 2022-02-28 PROCEDURE — 99213 OFFICE O/P EST LOW 20 MIN: CPT | Mod: S$GLB,,, | Performed by: STUDENT IN AN ORGANIZED HEALTH CARE EDUCATION/TRAINING PROGRAM

## 2022-02-28 PROCEDURE — 3078F DIAST BP <80 MM HG: CPT | Mod: CPTII,S$GLB,, | Performed by: STUDENT IN AN ORGANIZED HEALTH CARE EDUCATION/TRAINING PROGRAM

## 2022-02-28 PROCEDURE — 1101F PT FALLS ASSESS-DOCD LE1/YR: CPT | Mod: CPTII,S$GLB,, | Performed by: STUDENT IN AN ORGANIZED HEALTH CARE EDUCATION/TRAINING PROGRAM

## 2022-02-28 PROCEDURE — 1160F RVW MEDS BY RX/DR IN RCRD: CPT | Mod: CPTII,S$GLB,, | Performed by: STUDENT IN AN ORGANIZED HEALTH CARE EDUCATION/TRAINING PROGRAM

## 2022-02-28 PROCEDURE — 1126F AMNT PAIN NOTED NONE PRSNT: CPT | Mod: CPTII,S$GLB,, | Performed by: STUDENT IN AN ORGANIZED HEALTH CARE EDUCATION/TRAINING PROGRAM

## 2022-02-28 PROCEDURE — 1101F PR PT FALLS ASSESS DOC 0-1 FALLS W/OUT INJ PAST YR: ICD-10-PCS | Mod: CPTII,S$GLB,, | Performed by: STUDENT IN AN ORGANIZED HEALTH CARE EDUCATION/TRAINING PROGRAM

## 2022-02-28 PROCEDURE — 99999 PR PBB SHADOW E&M-EST. PATIENT-LVL IV: ICD-10-PCS | Mod: PBBFAC,,, | Performed by: STUDENT IN AN ORGANIZED HEALTH CARE EDUCATION/TRAINING PROGRAM

## 2022-02-28 RX ORDER — SYRINGE W-NEEDLE,DISPOSAB,3 ML 25GX5/8"
SYRINGE, EMPTY DISPOSABLE MISCELLANEOUS
Qty: 16 EACH | Refills: 1 | Status: SHIPPED | OUTPATIENT
Start: 2022-02-28 | End: 2022-08-11 | Stop reason: SDUPTHER

## 2022-02-28 RX ORDER — CYANOCOBALAMIN 1000 UG/ML
1000 INJECTION, SOLUTION INTRAMUSCULAR; SUBCUTANEOUS
Qty: 1 ML | Refills: 11 | Status: SHIPPED | OUTPATIENT
Start: 2022-02-28 | End: 2022-08-11 | Stop reason: SDUPTHER

## 2022-02-28 NOTE — PROGRESS NOTES
Hematology- Oncology Clinic Note :      2/28/2022    RFV / chief complaint- Follow-up (MGUS)        HPI  Pt is a 71 y.o. female who  has a past medical history of GERD (gastroesophageal reflux disease), Headache, High cholesterol, Hyperlipidemia, Hypertension, and Sarcoidosis.   Pt presents to the clinic today for macrocytic anemia.  Follow-up     No new complaints  Fatigue +   Denied alcohol intake.     Pt reports to be in usual state of health. Appetite good. No weight loss.     2 episodes of UTI in 2021    Reviewed past medical/surgical/social history    Past Medical History:   Diagnosis Date    GERD (gastroesophageal reflux disease)     Headache     High cholesterol     Hyperlipidemia     Hypertension     Sarcoidosis       Past Surgical History:   Procedure Laterality Date    ANKLE FRACTURE SURGERY      ARTHROSCOPIC REPAIR OF ROTATOR CUFF OF SHOULDER Right 7/23/2018    Procedure: REPAIR, ROTATOR CUFF, ARTHROSCOPIC;  Surgeon: YELENA Olivera MD;  [facility];  Service: Orthopedics;  Laterality: Right;  regional with catheter, interscalene  Vladimir/Linvatec & Ann/Arthrex notified 7-18 LO    ARTHROSCOPY OF SHOULDER WITH DECOMPRESSION OF SUBACROMIAL SPACE Right 7/23/2018    Procedure: ARTHROSCOPY, SHOULDER, WITH SUBACROMIAL SPACE DECOMPRESSION;  Surgeon: YELENA Olivera MD;  Location: [facility] OR 1ST FLR;  Service: Orthopedics;  Laterality: Right;    ARTHROSCOPY OF SHOULDER WITH REMOVAL OF DISTAL CLAVICLE Right 7/23/2018    Procedure: ARTHROSCOPY, SHOULDER, WITH DISTAL CLAVICLE EXCISION;  Surgeon: YELENA Olivera MD;  Location: [facility] OR 1ST FLR;  Service: Orthopedics;  Laterality: Right;    COLONOSCOPY N/A 5/30/2017    Procedure: COLONOSCOPY;  Surgeon: Jed Garcia MD;  Location: [facility] (4TH FLR);  Service: Endoscopy;  Laterality: N/A;    ESOPHAGOGASTRODUODENOSCOPY N/A 6/30/2020    Procedure: EGD (ESOPHAGOGASTRODUODENOSCOPY);  Surgeon: Russell Durand MD;  Location: [facility]  (4TH FLR);  Service: Endoscopy;  Laterality: N/A;  COVID test on 20 at [facility]    ESOPHAGOGASTRODUODENOSCOPY N/A 2021    Procedure: EGD (ESOPHAGOGASTRODUODENOSCOPY);  Surgeon: Chucho Mooney MD;  Location: [facility] (4TH FLR);  Service: Endoscopy;  Laterality: N/A;  vaccinated 2/10/21 see media tab 21, instr portal and mailed -ml    FIXATION OF TENDON Right 2018    Procedure: FIXATION, TENDON;  Surgeon: YELENA Olivera MD;  Location: [facility] OR 1ST FLR;  Service: Orthopedics;  Laterality: Right;    HYSTERECTOMY  age 39    endo    LEG SURGERY      tibia    OOPHORECTOMY  age 39    endo    TONSILLECTOMY        (Not in a hospital admission)    Review of patient's allergies indicates:   Allergen Reactions    Sulfa (sulfonamide antibiotics) Rash     Other reaction(s): Rash      Social History     Tobacco Use    Smoking status: Former Smoker     Packs/day: 1.00     Years: 3.00     Pack years: 3.00     Types: Cigarettes     Quit date: 2010     Years since quittin.6    Smokeless tobacco: Never Used    Tobacco comment: last smoked 15 years ago   Substance Use Topics    Alcohol use: Not Currently     Comment: occasional       Family History   Problem Relation Age of Onset    Cancer Mother 61        colon    Cancer Father         throat    Hypertension Sister     Breast cancer Sister 59    Migraines Maternal Grandmother     Hypertension Brother     Diabetes Sister     Hypertension Sister     Stroke Neg Hx     Ovarian cancer Neg Hx     Miscarriages / Stillbirths Neg Hx     Eclampsia Neg Hx           Review of Systems :  Review of Systems   Constitutional: Positive for malaise/fatigue. Negative for chills, diaphoresis, fever and weight loss.   HENT: Negative.  Negative for congestion, hearing loss, nosebleeds, sore throat and tinnitus.    Eyes: Negative.  Negative for blurred vision and discharge.   Respiratory: Negative for cough, hemoptysis, sputum production, shortness  "of breath and wheezing.    Cardiovascular: Negative.  Negative for chest pain, palpitations and leg swelling.   Gastrointestinal: Negative.  Negative for abdominal pain, blood in stool, constipation, diarrhea, heartburn, melena, nausea and vomiting.   Genitourinary: Negative.    Musculoskeletal: Negative.  Negative for back pain, falls, joint pain and myalgias.   Skin: Negative.  Negative for itching and rash.   Neurological: Negative.  Negative for dizziness, tingling, sensory change, speech change, focal weakness, seizures, loss of consciousness, weakness and headaches.   Endo/Heme/Allergies: Negative.  Does not bruise/bleed easily.   Psychiatric/Behavioral: Negative.  Negative for depression. The patient is not nervous/anxious and does not have insomnia.                Physical Exam :  /64 (BP Location: Right arm, Patient Position: Sitting, BP Method: Medium (Automatic))   Pulse 75   Temp 98.2 °F (36.8 °C) (Oral)   Resp 16   Ht 5' 4" (1.626 m)   Wt 91.3 kg (201 lb 4.5 oz)   LMP  (LMP Unknown)   SpO2 99%   BMI 34.55 kg/m²   Wt Readings from Last 3 Encounters:   02/28/22 91.3 kg (201 lb 4.5 oz)   01/31/22 89.4 kg (197 lb)   10/25/21 89.9 kg (198 lb 3.1 oz)       Body mass index is 34.55 kg/m².      Physical Exam  Vitals and nursing note reviewed.   Constitutional:       General: She is not in acute distress.     Appearance: She is well-developed.   HENT:      Head: Normocephalic and atraumatic.      Mouth/Throat:      Pharynx: No oropharyngeal exudate.   Eyes:      General: No scleral icterus.     Conjunctiva/sclera: Conjunctivae normal.   Neck:      Thyroid: No thyromegaly.      Trachea: No tracheal deviation.   Cardiovascular:      Rate and Rhythm: Normal rate and regular rhythm.      Heart sounds: Normal heart sounds. No murmur heard.  Pulmonary:      Effort: Pulmonary effort is normal. No respiratory distress.      Breath sounds: Normal breath sounds. No wheezing or rales.   Abdominal:      " "General: Bowel sounds are normal. There is no distension.      Palpations: Abdomen is soft. There is no mass.      Tenderness: There is no abdominal tenderness. There is no rebound.   Musculoskeletal:         General: No tenderness. Normal range of motion.      Cervical back: Normal range of motion and neck supple.   Lymphadenopathy:      Cervical: No cervical adenopathy.   Skin:     General: Skin is warm.      Findings: No erythema or rash.   Neurological:      Mental Status: She is alert and oriented to person, place, and time.      Cranial Nerves: No cranial nerve deficit.   Psychiatric:         Behavior: Behavior normal.             Current Outpatient Medications   Medication Sig Dispense Refill    citalopram (CELEXA) 20 MG tablet Take 1 tablet (20 mg total) by mouth once daily. 90 tablet 0    clotrimazole-betamethasone 1-0.05% (LOTRISONE) cream Apply topically 2 (two) times daily. 45 g 0    cyanocobalamin 1,000 mcg/mL injection Inject 1 mL (1,000 mcg total) into the muscle every 30 days. 1 mL 11    elderberry fruit and flower 460-115 mg Cap Take 1 tablet by mouth Daily.      felodipine (PLENDIL) 10 MG 24 hr tablet Take 1 tablet (10 mg total) by mouth once daily. 90 tablet 3    fluticasone propionate (FLONASE) 50 mcg/actuation nasal spray 2 sprays (100 mcg total) by Each Nostril route once daily. 16 g 3    mv-mn/folic acid/vit K/asht031 (ALIVE ONCE DAILY WOMEN 50 PLUS ORAL) Take 1 tablet by mouth Daily.      pravastatin (PRAVACHOL) 40 MG tablet Take 1 tablet (40 mg total) by mouth once daily. 90 tablet 3    syringe with needle 3 mL 22 x 1 1/2" Syrg Use to inject B12 shots intramuscularly as directed. 16 each 1    vitamin D (VITAMIN D3) 1000 units Tab Take 1,000 Units by mouth once daily.      zinc sulfate (ZINC-15) 66 mg Tab Take 1 tablet by mouth Daily.      ciclopirox (PENLAC) 8 % Soln Apply topically nightly. 1 each 3    levocetirizine (XYZAL) 5 MG tablet Take 1 tablet (5 mg total) by mouth every " evening. 30 tablet 3    omeprazole (PRILOSEC) 40 MG capsule Take 1 capsule by mouth once daily 30 capsule 0     No current facility-administered medications for this visit.       Pertinent Diagnostic studies:      Lab Visit on 02/25/2022   Component Date Value Ref Range Status    WBC 02/25/2022 5.31  3.90 - 12.70 K/uL Final    RBC 02/25/2022 3.93 (A) 4.00 - 5.40 M/uL Final    Hemoglobin 02/25/2022 12.3  12.0 - 16.0 g/dL Final    Hematocrit 02/25/2022 39.9  37.0 - 48.5 % Final    MCV 02/25/2022 102 (A) 82 - 98 fL Final    MCH 02/25/2022 31.3 (A) 27.0 - 31.0 pg Final    MCHC 02/25/2022 30.8 (A) 32.0 - 36.0 g/dL Final    RDW 02/25/2022 12.3  11.5 - 14.5 % Final    Platelets 02/25/2022 216  150 - 450 K/uL Final    MPV 02/25/2022 11.0  9.2 - 12.9 fL Final    Immature Granulocytes 02/25/2022 0.2  0.0 - 0.5 % Final    Gran # (ANC) 02/25/2022 2.7  1.8 - 7.7 K/uL Final    Immature Grans (Abs) 02/25/2022 0.01  0.00 - 0.04 K/uL Final    Comment: Mild elevation in immature granulocytes is non specific and   can be seen in a variety of conditions including stress response,   acute inflammation, trauma and pregnancy. Correlation with other   laboratory and clinical findings is essential.      Lymph # 02/25/2022 1.4  1.0 - 4.8 K/uL Final    Mono # 02/25/2022 0.5  0.3 - 1.0 K/uL Final    Eos # 02/25/2022 0.6 (A) 0.0 - 0.5 K/uL Final    Baso # 02/25/2022 0.06  0.00 - 0.20 K/uL Final    nRBC 02/25/2022 0  0 /100 WBC Final    Gran % 02/25/2022 51.5  38.0 - 73.0 % Final    Lymph % 02/25/2022 27.1  18.0 - 48.0 % Final    Mono % 02/25/2022 9.6  4.0 - 15.0 % Final    Eosinophil % 02/25/2022 10.5 (A) 0.0 - 8.0 % Final    Basophil % 02/25/2022 1.1  0.0 - 1.9 % Final    Differential Method 02/25/2022 Automated   Final    Sodium 02/25/2022 141  136 - 145 mmol/L Final    Potassium 02/25/2022 4.3  3.5 - 5.1 mmol/L Final    Chloride 02/25/2022 104  95 - 110 mmol/L Final    CO2 02/25/2022 25  23 - 29 mmol/L Final     Glucose 02/25/2022 111 (A) 70 - 110 mg/dL Final    BUN 02/25/2022 15  8 - 23 mg/dL Final    Creatinine 02/25/2022 0.9  0.5 - 1.4 mg/dL Final    Calcium 02/25/2022 8.8  8.7 - 10.5 mg/dL Final    Total Protein 02/25/2022 8.1  6.0 - 8.4 g/dL Final    Albumin 02/25/2022 3.9  3.5 - 5.2 g/dL Final    Total Bilirubin 02/25/2022 0.5  0.1 - 1.0 mg/dL Final    Comment: For infants and newborns, interpretation of results should be based  on gestational age, weight and in agreement with clinical  observations.    Premature Infant recommended reference ranges:  Up to 24 hours.............<8.0 mg/dL  Up to 48 hours............<12.0 mg/dL  3-5 days..................<15.0 mg/dL  6-29 days.................<15.0 mg/dL      Alkaline Phosphatase 02/25/2022 113  55 - 135 U/L Final    AST 02/25/2022 17  10 - 40 U/L Final    ALT 02/25/2022 12  10 - 44 U/L Final    Anion Gap 02/25/2022 12  8 - 16 mmol/L Final    eGFR if African American 02/25/2022 >60.0  >60 mL/min/1.73 m^2 Final    eGFR if non African American 02/25/2022 >60.0  >60 mL/min/1.73 m^2 Final    Comment: Calculation used to obtain the estimated glomerular filtration  rate (eGFR) is the CKD-EPI equation.       Kappa Free Light Chains 02/25/2022 3.44 (A) 0.33 - 1.94 mg/dL Final    Lambda Free Light Chains 02/25/2022 1.22  0.57 - 2.63 mg/dL Final    Kappa/Lambda FLC Ratio 02/25/2022 2.82 (A) 0.26 - 1.65 Final    Comment: Undetected antigen excess is a rare event but cannot   be excluded. If these free light chain results do not   agree with other clinical or laboratory findings or   if the sample is from a patient that has previously   demonstrated antigen excess, discuss with the testing   laboratory.   Results should always be interpreted in conjunction   with other laboratory tests and clinical evidence.      Protein, Serum 02/25/2022 7.5  6.0 - 8.4 g/dL Final    Comment: Serum protein electrophoresis and immunofixation results should be   interpreted in  clinical context in that some therapeutic agents can   result   in false positive results (example, daratumumab). Correlation with   the   patient s therapeutic regimen is required.      Albumin 02/25/2022 3.97  3.35 - 5.55 g/dL Final    Alpha-1 02/25/2022 0.28  0.17 - 0.41 g/dL Final    Alpha-2 02/25/2022 0.79  0.43 - 0.99 g/dL Final    Beta 02/25/2022 0.80  0.50 - 1.10 g/dL Final    Gamma 02/25/2022 1.67 (A) 0.67 - 1.58 g/dL Final    IgM 02/25/2022 34 (A) 50 - 300 mg/dL Final    IgM Cord Blood Reference Range: <25 mg/dL.    IgA 02/25/2022 807 (A) 40 - 350 mg/dL Final    IgA Cord Blood Reference Range: <5 mg/dL.    IgG 02/25/2022 1223  650 - 1600 mg/dL Final    IgG Cord Blood Reference Range: 650-1600 mg/dL.         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    PVC's (premature ventricular contractions) 03/11/2015    Osteoarthritis of both hips 10/14/2013    Osteopenia 10/14/2013    Bilateral impacted cerumen 03/26/2013    Hypercholesterolemia 08/07/2012    Hematuria 08/07/2012    Abnormal urine 08/07/2012    HTN (hypertension), benign 07/09/2012     Active Problem List with Overview Notes    Diagnosis Date Noted    Other eosinophilia 02/27/2021    Chronic fatigue 02/23/2021    Low vitamin B12 level 02/23/2021    Macrocytic anemia 02/14/2021    GERD (gastroesophageal reflux disease) 06/30/2020    Gastroesophageal reflux disease  06/01/2020    Family history of colon cancer 06/01/2020    Incomplete tear of right rotator cuff 07/23/2018    Cellulitis of submandibular region 08/20/2017    Depression 08/20/2017    History of sarcoidosis 08/20/2017    Sialadenitis - left sided 08/19/2017    Screening for colon cancer 05/30/2017    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     Assessment :       1. MGUS (monoclonal gammopathy of unknown significance)    2. Low vitamin B12 level    3. Macrocytic anemia    4. History of sarcoidosis    5. HTN (hypertension), benign    6. Other eosinophilia        Plan :       Macrocytic anemia/B12 low  Zinc, copper, TSH within normal limits  B12 low normal  B12 supplementation option with oral versus parenteral was discussed with patient patient refers parenteral supplementation   patient's daughter is an RN and assists patient with injections    MGUS    5/24/2021 8/16/2021    [place] Free Light Chains 3.34 (H) 3.51 (H)   Lambda Free Light Chains 1.28 1.25   Kappa/Lambda FLC Ratio 2.61 (H) 2.81 (H)     SPEP 8/16/21   Paraprotein bands at the beta-2/near gamma junction = 0.66 g/dL (previously, 0.5 g/dL) and in the gamma region = 0.4 g/dL (previously, 0.62 g/dL).  Labs reviewed no major changes. Continue to monitor    Chronic fatigue  -no acute worsening,-follow-up with PCP    Sarcoidosis  -stable, per PCP    Eosinophilia  -likely due to sarcoidosis, monitor    Covid-19:  Patient has completed the vaccination regimen      No problem-specific Assessment & Plan notes found for this encounter.      Problem List Items Addressed This Visit     History of  sarcoidosis    HTN (hypertension), benign    Low vitamin B12 level    Macrocytic anemia    Other eosinophilia      Other Visit Diagnoses     MGUS (monoclonal gammopathy of unknown significance)    -  Primary            Electronically signed by Nathaly Vigil          Future Appointments   Date Time Provider Department Center   3/23/2022  1:00 PM Aidee Lora MD [facility]            This note was created with voice recognition software.  Grammatical, syntax and spelling errors may be inevitable.

## 2022-04-28 DIAGNOSIS — I10 ESSENTIAL HYPERTENSION: ICD-10-CM

## 2022-04-28 NOTE — TELEPHONE ENCOUNTER
Care Due:                  Date            Visit Type   Department     Provider  --------------------------------------------------------------------------------                                EP -                              PRIMARY      [facility] FAM[facility]  Last Visit: 04-      CARE ([facility])   MEDICINE       Aidee Lora                              MYCHART                              ANNUAL                              CHECKUP/PHY  [facility]  Next Visit: 05-      S            MEDICINE       Aidee Lora                                                            Last  Test          Frequency    Reason                     Performed    Due Date  --------------------------------------------------------------------------------    Lipid Panel.  12 months..  pravastatin..............  04- 04-    Powered by [employer]. Reference number: [id number].   4/28/2022 12:56:25 PM CDT

## 2022-04-28 NOTE — TELEPHONE ENCOUNTER
Refill Routing Note   Medication(s) are not appropriate for processing by [facility] for the following reason(s):      - Patient has been seen in the ED/Hospital since the last PCP visit    [facility] action(s):  Defer Medication-related problems identified: Requires labs     Medication Therapy Plan: Labs (lipid panel) overdue; Last PCP Visit: 4/26/2021 Last Admission: 7/1/2021  Medication reconciliation completed: No     Appointments  past 12m or future 3m with PCP    Date Provider   Last Visit   4/26/2021 Aidee Lora MD   Next Visit   5/11/2022 Aidee Lora MD   ED visits in past 90 days: 0        Note composed:3:20 PM 04/28/2022

## 2022-04-29 RX ORDER — FELODIPINE 10 MG/1
TABLET, EXTENDED RELEASE ORAL
Qty: 90 TABLET | Refills: 0 | Status: SHIPPED | OUTPATIENT
Start: 2022-04-29 | End: 2022-08-09 | Stop reason: SDUPTHER

## 2022-06-03 ENCOUNTER — PATIENT MESSAGE (OUTPATIENT)
Dept: ADMINISTRATIVE | Facility: HOSPITAL | Age: 72
End: 2022-06-03
Payer: COMMERCIAL

## 2022-08-09 ENCOUNTER — LAB VISIT (OUTPATIENT)
Dept: LAB | Facility: HOSPITAL | Age: 72
End: 2022-08-09
Attending: STUDENT IN AN ORGANIZED HEALTH CARE EDUCATION/TRAINING PROGRAM
Payer: COMMERCIAL

## 2022-08-09 ENCOUNTER — OFFICE VISIT (OUTPATIENT)
Dept: FAMILY MEDICINE | Facility: CLINIC | Age: 72
End: 2022-08-09
Attending: FAMILY MEDICINE
Payer: COMMERCIAL

## 2022-08-09 VITALS
OXYGEN SATURATION: 98 % | WEIGHT: 196 LBS | HEART RATE: 65 BPM | SYSTOLIC BLOOD PRESSURE: 138 MMHG | DIASTOLIC BLOOD PRESSURE: 84 MMHG | BODY MASS INDEX: 33.46 KG/M2 | HEIGHT: 64 IN

## 2022-08-09 DIAGNOSIS — D47.2 MGUS (MONOCLONAL GAMMOPATHY OF UNKNOWN SIGNIFICANCE): ICD-10-CM

## 2022-08-09 DIAGNOSIS — Z00.00 ANNUAL PHYSICAL EXAM: ICD-10-CM

## 2022-08-09 DIAGNOSIS — D81.9 COMBINED IMMUNODEFICIENCY DISORDER: ICD-10-CM

## 2022-08-09 DIAGNOSIS — D86.0 SARCOIDOSIS OF LUNG: ICD-10-CM

## 2022-08-09 DIAGNOSIS — E78.2 MIXED HYPERLIPIDEMIA: ICD-10-CM

## 2022-08-09 DIAGNOSIS — Z00.00 ANNUAL PHYSICAL EXAM: Primary | ICD-10-CM

## 2022-08-09 DIAGNOSIS — K21.9 GASTROESOPHAGEAL REFLUX DISEASE, UNSPECIFIED WHETHER ESOPHAGITIS PRESENT: ICD-10-CM

## 2022-08-09 DIAGNOSIS — I10 ESSENTIAL HYPERTENSION: ICD-10-CM

## 2022-08-09 DIAGNOSIS — Z12.11 SCREEN FOR COLON CANCER: ICD-10-CM

## 2022-08-09 LAB
ALBUMIN SERPL BCP-MCNC: 3.9 G/DL (ref 3.5–5.2)
ALP SERPL-CCNC: 97 U/L (ref 55–135)
ALT SERPL W/O P-5'-P-CCNC: 13 U/L (ref 10–44)
ANION GAP SERPL CALC-SCNC: 11 MMOL/L (ref 8–16)
AST SERPL-CCNC: 21 U/L (ref 10–40)
BACTERIA #/AREA URNS AUTO: NORMAL /HPF
BASOPHILS # BLD AUTO: 0.04 K/UL (ref 0–0.2)
BASOPHILS NFR BLD: 0.9 % (ref 0–1.9)
BILIRUB SERPL-MCNC: 0.6 MG/DL (ref 0.1–1)
BILIRUB UR QL STRIP: NEGATIVE
BUN SERPL-MCNC: 12 MG/DL (ref 8–23)
CALCIUM SERPL-MCNC: 9.4 MG/DL (ref 8.7–10.5)
CHLORIDE SERPL-SCNC: 104 MMOL/L (ref 95–110)
CHOLEST SERPL-MCNC: 273 MG/DL (ref 120–199)
CHOLEST/HDLC SERPL: 5.2 {RATIO} (ref 2–5)
CLARITY UR REFRACT.AUTO: ABNORMAL
CO2 SERPL-SCNC: 25 MMOL/L (ref 23–29)
COLOR UR AUTO: YELLOW
CREAT SERPL-MCNC: 0.9 MG/DL (ref 0.5–1.4)
DIFFERENTIAL METHOD: ABNORMAL
EOSINOPHIL # BLD AUTO: 0.3 K/UL (ref 0–0.5)
EOSINOPHIL NFR BLD: 7 % (ref 0–8)
ERYTHROCYTE [DISTWIDTH] IN BLOOD BY AUTOMATED COUNT: 12.4 % (ref 11.5–14.5)
EST. GFR  (NO RACE VARIABLE): >60 ML/MIN/1.73 M^2
GLUCOSE SERPL-MCNC: 69 MG/DL (ref 70–110)
GLUCOSE UR QL STRIP: NEGATIVE
HCT VFR BLD AUTO: 39.7 % (ref 37–48.5)
HDLC SERPL-MCNC: 53 MG/DL (ref 40–75)
HDLC SERPL: 19.4 % (ref 20–50)
HGB BLD-MCNC: 12.7 G/DL (ref 12–16)
HGB UR QL STRIP: ABNORMAL
IMM GRANULOCYTES # BLD AUTO: 0.01 K/UL (ref 0–0.04)
IMM GRANULOCYTES NFR BLD AUTO: 0.2 % (ref 0–0.5)
KETONES UR QL STRIP: NEGATIVE
LDLC SERPL CALC-MCNC: 191.4 MG/DL (ref 63–159)
LEUKOCYTE ESTERASE UR QL STRIP: ABNORMAL
LYMPHOCYTES # BLD AUTO: 1.3 K/UL (ref 1–4.8)
LYMPHOCYTES NFR BLD: 29.3 % (ref 18–48)
MCH RBC QN AUTO: 32.2 PG (ref 27–31)
MCHC RBC AUTO-ENTMCNC: 32 G/DL (ref 32–36)
MCV RBC AUTO: 101 FL (ref 82–98)
MICROSCOPIC COMMENT: NORMAL
MONOCYTES # BLD AUTO: 0.5 K/UL (ref 0.3–1)
MONOCYTES NFR BLD: 9.8 % (ref 4–15)
NEUTROPHILS # BLD AUTO: 2.4 K/UL (ref 1.8–7.7)
NEUTROPHILS NFR BLD: 52.8 % (ref 38–73)
NITRITE UR QL STRIP: NEGATIVE
NONHDLC SERPL-MCNC: 220 MG/DL
NRBC BLD-RTO: 0 /100 WBC
PH UR STRIP: 6 [PH] (ref 5–8)
PLATELET # BLD AUTO: 222 K/UL (ref 150–450)
PMV BLD AUTO: 11.7 FL (ref 9.2–12.9)
POTASSIUM SERPL-SCNC: 4 MMOL/L (ref 3.5–5.1)
PROT SERPL-MCNC: 8.3 G/DL (ref 6–8.4)
PROT UR QL STRIP: NEGATIVE
RBC # BLD AUTO: 3.95 M/UL (ref 4–5.4)
RBC #/AREA URNS AUTO: 3 /HPF (ref 0–4)
SODIUM SERPL-SCNC: 140 MMOL/L (ref 136–145)
SP GR UR STRIP: 1.01 (ref 1–1.03)
SQUAMOUS #/AREA URNS AUTO: 4 /HPF
TRIGL SERPL-MCNC: 143 MG/DL (ref 30–150)
TSH SERPL DL<=0.005 MIU/L-ACNC: 1.01 UIU/ML (ref 0.4–4)
URN SPEC COLLECT METH UR: ABNORMAL
WBC # BLD AUTO: 4.57 K/UL (ref 3.9–12.7)
WBC #/AREA URNS AUTO: 1 /HPF (ref 0–5)

## 2022-08-09 PROCEDURE — 83520 IMMUNOASSAY QUANT NOS NONAB: CPT | Mod: 59 | Performed by: STUDENT IN AN ORGANIZED HEALTH CARE EDUCATION/TRAINING PROGRAM

## 2022-08-09 PROCEDURE — 1126F PR PAIN SEVERITY QUANTIFIED, NO PAIN PRESENT: ICD-10-PCS | Mod: CPTII,S$GLB,, | Performed by: FAMILY MEDICINE

## 2022-08-09 PROCEDURE — 3075F SYST BP GE 130 - 139MM HG: CPT | Mod: CPTII,S$GLB,, | Performed by: FAMILY MEDICINE

## 2022-08-09 PROCEDURE — 3075F PR MOST RECENT SYSTOLIC BLOOD PRESS GE 130-139MM HG: ICD-10-PCS | Mod: CPTII,S$GLB,, | Performed by: FAMILY MEDICINE

## 2022-08-09 PROCEDURE — 3008F BODY MASS INDEX DOCD: CPT | Mod: CPTII,S$GLB,, | Performed by: FAMILY MEDICINE

## 2022-08-09 PROCEDURE — 3008F PR BODY MASS INDEX (BMI) DOCUMENTED: ICD-10-PCS | Mod: CPTII,S$GLB,, | Performed by: FAMILY MEDICINE

## 2022-08-09 PROCEDURE — 84443 ASSAY THYROID STIM HORMONE: CPT | Performed by: FAMILY MEDICINE

## 2022-08-09 PROCEDURE — 3079F DIAST BP 80-89 MM HG: CPT | Mod: CPTII,S$GLB,, | Performed by: FAMILY MEDICINE

## 2022-08-09 PROCEDURE — 84165 PROTEIN E-PHORESIS SERUM: CPT | Performed by: STUDENT IN AN ORGANIZED HEALTH CARE EDUCATION/TRAINING PROGRAM

## 2022-08-09 PROCEDURE — 99999 PR PBB SHADOW E&M-EST. PATIENT-LVL V: ICD-10-PCS | Mod: PBBFAC,,, | Performed by: FAMILY MEDICINE

## 2022-08-09 PROCEDURE — 99999 PR PBB SHADOW E&M-EST. PATIENT-LVL V: CPT | Mod: PBBFAC,,, | Performed by: FAMILY MEDICINE

## 2022-08-09 PROCEDURE — 1159F MED LIST DOCD IN RCRD: CPT | Mod: CPTII,S$GLB,, | Performed by: FAMILY MEDICINE

## 2022-08-09 PROCEDURE — 84165 PATHOLOGIST INTERPRETATION SPE: ICD-10-PCS | Mod: 26,,, | Performed by: PATHOLOGY

## 2022-08-09 PROCEDURE — 1126F AMNT PAIN NOTED NONE PRSNT: CPT | Mod: CPTII,S$GLB,, | Performed by: FAMILY MEDICINE

## 2022-08-09 PROCEDURE — 80053 COMPREHEN METABOLIC PANEL: CPT | Performed by: FAMILY MEDICINE

## 2022-08-09 PROCEDURE — 81001 URINALYSIS AUTO W/SCOPE: CPT | Performed by: FAMILY MEDICINE

## 2022-08-09 PROCEDURE — 85025 COMPLETE CBC W/AUTO DIFF WBC: CPT | Performed by: FAMILY MEDICINE

## 2022-08-09 PROCEDURE — 99397 PR PREVENTIVE VISIT,EST,65 & OVER: ICD-10-PCS | Mod: S$GLB,,, | Performed by: FAMILY MEDICINE

## 2022-08-09 PROCEDURE — 3288F PR FALLS RISK ASSESSMENT DOCUMENTED: ICD-10-PCS | Mod: CPTII,S$GLB,, | Performed by: FAMILY MEDICINE

## 2022-08-09 PROCEDURE — 3288F FALL RISK ASSESSMENT DOCD: CPT | Mod: CPTII,S$GLB,, | Performed by: FAMILY MEDICINE

## 2022-08-09 PROCEDURE — 80061 LIPID PANEL: CPT | Performed by: FAMILY MEDICINE

## 2022-08-09 PROCEDURE — 99397 PER PM REEVAL EST PAT 65+ YR: CPT | Mod: S$GLB,,, | Performed by: FAMILY MEDICINE

## 2022-08-09 PROCEDURE — 36415 COLL VENOUS BLD VENIPUNCTURE: CPT | Mod: PO | Performed by: STUDENT IN AN ORGANIZED HEALTH CARE EDUCATION/TRAINING PROGRAM

## 2022-08-09 PROCEDURE — 3079F PR MOST RECENT DIASTOLIC BLOOD PRESSURE 80-89 MM HG: ICD-10-PCS | Mod: CPTII,S$GLB,, | Performed by: FAMILY MEDICINE

## 2022-08-09 PROCEDURE — 1101F PR PT FALLS ASSESS DOC 0-1 FALLS W/OUT INJ PAST YR: ICD-10-PCS | Mod: CPTII,S$GLB,, | Performed by: FAMILY MEDICINE

## 2022-08-09 PROCEDURE — 1101F PT FALLS ASSESS-DOCD LE1/YR: CPT | Mod: CPTII,S$GLB,, | Performed by: FAMILY MEDICINE

## 2022-08-09 PROCEDURE — 84165 PROTEIN E-PHORESIS SERUM: CPT | Mod: 26,,, | Performed by: PATHOLOGY

## 2022-08-09 PROCEDURE — 1159F PR MEDICATION LIST DOCUMENTED IN MEDICAL RECORD: ICD-10-PCS | Mod: CPTII,S$GLB,, | Performed by: FAMILY MEDICINE

## 2022-08-09 RX ORDER — CETIRIZINE HYDROCHLORIDE 10 MG/1
10 TABLET ORAL DAILY
COMMUNITY
Start: 2022-04-27 | End: 2023-05-05 | Stop reason: ALTCHOICE

## 2022-08-09 RX ORDER — FELODIPINE 10 MG/1
10 TABLET, EXTENDED RELEASE ORAL DAILY
Qty: 90 TABLET | Refills: 3 | Status: SHIPPED | OUTPATIENT
Start: 2022-08-09 | End: 2023-08-08 | Stop reason: SDUPTHER

## 2022-08-09 RX ORDER — CITALOPRAM 20 MG/1
20 TABLET, FILM COATED ORAL DAILY
Qty: 90 TABLET | Refills: 3 | Status: SHIPPED | OUTPATIENT
Start: 2022-08-09 | End: 2023-08-08 | Stop reason: SDUPTHER

## 2022-08-09 RX ORDER — PANTOPRAZOLE SODIUM 40 MG/1
40 TABLET, DELAYED RELEASE ORAL DAILY
Qty: 90 TABLET | Refills: 3 | Status: SHIPPED | OUTPATIENT
Start: 2022-08-09 | End: 2022-10-07 | Stop reason: SDUPTHER

## 2022-08-09 RX ORDER — PRAVASTATIN SODIUM 40 MG/1
40 TABLET ORAL DAILY
Qty: 90 TABLET | Refills: 3 | Status: SHIPPED | OUTPATIENT
Start: 2022-08-09 | End: 2023-08-08 | Stop reason: SDUPTHER

## 2022-08-09 NOTE — PROGRESS NOTES
"Subjective:       Patient ID: Estephania Lyman is a 71 y.o. female.    Chief Complaint: Annual Exam    HPI   Pt with h/o mgus sarcoidosis followed in hem/onc is here for annual exam pt is generally well   No sob/cp no change in bowel habits no brbpr  Pt denies vaginal bleeding no discharge itching or burning  Pt denies n/v/f/c/d/c no cough chest congestion no sore throat  Pt has hypercholesterolemia no muscle aches on statin  Pt has htn bp fine on felodipine no sob/cp   Pt has gerd with break through on prilosec no exertional sob/cp   Review of Systems   Constitutional: Negative for activity change, chills, diaphoresis, fatigue and fever.   HENT: Negative for congestion, ear discharge, ear pain, hearing loss, postnasal drip, rhinorrhea, sinus pressure, sneezing, sore throat, trouble swallowing and voice change.    Eyes: Negative for photophobia, discharge, redness, itching and visual disturbance.   Respiratory: Negative for cough, chest tightness, shortness of breath and wheezing.    Cardiovascular: Negative for chest pain, palpitations and leg swelling.   Gastrointestinal: Negative for abdominal pain, anal bleeding, blood in stool, constipation, diarrhea, nausea, rectal pain and vomiting.   Genitourinary: Negative for dyspareunia, dysuria, flank pain, frequency, hematuria, menstrual problem, pelvic pain, urgency, vaginal bleeding and vaginal discharge.   Musculoskeletal: Negative for arthralgias, back pain, joint swelling and neck pain.   Skin: Negative for color change and rash.   Neurological: Negative for dizziness, speech difficulty, weakness, light-headedness, numbness and headaches.   Hematological: Does not bruise/bleed easily.   Psychiatric/Behavioral: Negative for agitation, confusion, decreased concentration, sleep disturbance and suicidal ideas. The patient is not nervous/anxious.        Objective:     /84   Pulse 65   Ht 5' 4" (1.626 m)   Wt 88.9 kg (196 lb)   LMP  (LMP Unknown)   SpO2 " 98%   BMI 33.64 kg/m²     Physical Exam  Constitutional:       Appearance: Normal appearance. She is well-developed. She is not ill-appearing.   HENT:      Head: Normocephalic and atraumatic.      Right Ear: Tympanic membrane and external ear normal.      Left Ear: Tympanic membrane and external ear normal.      Nose: Nose normal.   Eyes:      General:         Right eye: No discharge.         Left eye: No discharge.      Conjunctiva/sclera: Conjunctivae normal.      Pupils: Pupils are equal, round, and reactive to light.   Neck:      Thyroid: No thyromegaly.   Cardiovascular:      Rate and Rhythm: Normal rate and regular rhythm.      Heart sounds: Normal heart sounds. No murmur heard.    No friction rub. No gallop.   Pulmonary:      Effort: Pulmonary effort is normal.      Breath sounds: Normal breath sounds. No wheezing or rales.   Abdominal:      General: Bowel sounds are normal. There is no distension.      Palpations: Abdomen is soft.      Tenderness: There is no abdominal tenderness. There is no guarding or rebound.   Genitourinary:     Vagina: Normal.   Musculoskeletal:         General: No tenderness. Normal range of motion.      Cervical back: Normal range of motion and neck supple.   Lymphadenopathy:      Cervical: No cervical adenopathy.   Skin:     General: Skin is warm and dry.      Findings: No erythema or rash.   Neurological:      General: No focal deficit present.      Mental Status: She is alert and oriented to person, place, and time.      Cranial Nerves: No cranial nerve deficit.      Motor: No abnormal muscle tone.      Coordination: Coordination normal.   Psychiatric:         Behavior: Behavior normal.         Thought Content: Thought content normal.         Judgment: Judgment normal.         Assessment:       1. Annual physical exam    2. Mixed hyperlipidemia    3. Screen for colon cancer    4. Essential hypertension    5. Gastroesophageal reflux disease, unspecified whether esophagitis present  "   6. Sarcoidosis of lung    7. Combined immunodeficiency disorder        Plan:     orders cmp cbc lipid tsh urine  Cont meds  Low fat low salt diet  F/u oncology  Graded exercise  rtc 6 months    Health maintenance  Lipid ordered  Flu in fall  Tetanus q 10 years  Mammogram discussed  covid discussed       "This note will not be shared with the patient."     "

## 2022-08-10 LAB
ALBUMIN SERPL ELPH-MCNC: 4.16 G/DL (ref 3.35–5.55)
ALPHA1 GLOB SERPL ELPH-MCNC: 0.25 G/DL (ref 0.17–0.41)
ALPHA2 GLOB SERPL ELPH-MCNC: 0.79 G/DL (ref 0.43–0.99)
B-GLOBULIN SERPL ELPH-MCNC: 0.78 G/DL (ref 0.5–1.1)
GAMMA GLOB SERPL ELPH-MCNC: 1.73 G/DL (ref 0.67–1.58)
KAPPA LC SER QL IA: 3.79 MG/DL (ref 0.33–1.94)
KAPPA LC/LAMBDA SER IA: 3.03 (ref 0.26–1.65)
LAMBDA LC SER QL IA: 1.25 MG/DL (ref 0.57–2.63)
PATHOLOGIST INTERPRETATION SPE: NORMAL
PROT SERPL-MCNC: 7.7 G/DL (ref 6–8.4)

## 2022-08-11 ENCOUNTER — HOSPITAL ENCOUNTER (OUTPATIENT)
Dept: CARDIOLOGY | Facility: OTHER | Age: 72
Discharge: HOME | End: 2022-08-11
Attending: FAMILY MEDICINE
Payer: COMMERCIAL

## 2022-08-11 ENCOUNTER — OFFICE VISIT (OUTPATIENT)
Dept: HEMATOLOGY/ONCOLOGY | Facility: CLINIC | Age: 72
End: 2022-08-11
Payer: COMMERCIAL

## 2022-08-11 ENCOUNTER — HOSPITAL ENCOUNTER (OUTPATIENT)
Dept: RADIOLOGY | Facility: OTHER | Age: 72
Discharge: HOME | End: 2022-08-11
Attending: FAMILY MEDICINE
Payer: COMMERCIAL

## 2022-08-11 VITALS
OXYGEN SATURATION: 97 % | RESPIRATION RATE: 14 BRPM | HEART RATE: 62 BPM | SYSTOLIC BLOOD PRESSURE: 118 MMHG | TEMPERATURE: 98 F | DIASTOLIC BLOOD PRESSURE: 69 MMHG | BODY MASS INDEX: 33.49 KG/M2 | HEIGHT: 64 IN | WEIGHT: 196.19 LBS

## 2022-08-11 DIAGNOSIS — I10 ESSENTIAL HYPERTENSION: ICD-10-CM

## 2022-08-11 DIAGNOSIS — D53.9 MACROCYTIC ANEMIA: ICD-10-CM

## 2022-08-11 DIAGNOSIS — D47.2 MGUS (MONOCLONAL GAMMOPATHY OF UNKNOWN SIGNIFICANCE): Primary | ICD-10-CM

## 2022-08-11 DIAGNOSIS — D72.19 OTHER EOSINOPHILIA: ICD-10-CM

## 2022-08-11 DIAGNOSIS — R79.89 LOW VITAMIN B12 LEVEL: ICD-10-CM

## 2022-08-11 DIAGNOSIS — Z86.2 HISTORY OF SARCOIDOSIS: ICD-10-CM

## 2022-08-11 DIAGNOSIS — I10 HTN (HYPERTENSION), BENIGN: ICD-10-CM

## 2022-08-11 PROCEDURE — 3008F PR BODY MASS INDEX (BMI) DOCUMENTED: ICD-10-PCS | Mod: CPTII,S$GLB,, | Performed by: STUDENT IN AN ORGANIZED HEALTH CARE EDUCATION/TRAINING PROGRAM

## 2022-08-11 PROCEDURE — 93010 ELECTROCARDIOGRAM REPORT: CPT | Mod: ,,, | Performed by: INTERNAL MEDICINE

## 2022-08-11 PROCEDURE — 3078F PR MOST RECENT DIASTOLIC BLOOD PRESSURE < 80 MM HG: ICD-10-PCS | Mod: CPTII,S$GLB,, | Performed by: STUDENT IN AN ORGANIZED HEALTH CARE EDUCATION/TRAINING PROGRAM

## 2022-08-11 PROCEDURE — 93010 EKG 12-LEAD: ICD-10-PCS | Mod: ,,, | Performed by: INTERNAL MEDICINE

## 2022-08-11 PROCEDURE — 71046 X-RAY EXAM CHEST 2 VIEWS: CPT | Mod: TC,FY

## 2022-08-11 PROCEDURE — 93005 ELECTROCARDIOGRAM TRACING: CPT

## 2022-08-11 PROCEDURE — 3078F DIAST BP <80 MM HG: CPT | Mod: CPTII,S$GLB,, | Performed by: STUDENT IN AN ORGANIZED HEALTH CARE EDUCATION/TRAINING PROGRAM

## 2022-08-11 PROCEDURE — 3288F FALL RISK ASSESSMENT DOCD: CPT | Mod: CPTII,S$GLB,, | Performed by: STUDENT IN AN ORGANIZED HEALTH CARE EDUCATION/TRAINING PROGRAM

## 2022-08-11 PROCEDURE — 1126F PR PAIN SEVERITY QUANTIFIED, NO PAIN PRESENT: ICD-10-PCS | Mod: CPTII,S$GLB,, | Performed by: STUDENT IN AN ORGANIZED HEALTH CARE EDUCATION/TRAINING PROGRAM

## 2022-08-11 PROCEDURE — 1101F PR PT FALLS ASSESS DOC 0-1 FALLS W/OUT INJ PAST YR: ICD-10-PCS | Mod: CPTII,S$GLB,, | Performed by: STUDENT IN AN ORGANIZED HEALTH CARE EDUCATION/TRAINING PROGRAM

## 2022-08-11 PROCEDURE — 3288F PR FALLS RISK ASSESSMENT DOCUMENTED: ICD-10-PCS | Mod: CPTII,S$GLB,, | Performed by: STUDENT IN AN ORGANIZED HEALTH CARE EDUCATION/TRAINING PROGRAM

## 2022-08-11 PROCEDURE — 3008F BODY MASS INDEX DOCD: CPT | Mod: CPTII,S$GLB,, | Performed by: STUDENT IN AN ORGANIZED HEALTH CARE EDUCATION/TRAINING PROGRAM

## 2022-08-11 PROCEDURE — 71046 X-RAY EXAM CHEST 2 VIEWS: CPT | Mod: 26,,, | Performed by: RADIOLOGY

## 2022-08-11 PROCEDURE — 3074F PR MOST RECENT SYSTOLIC BLOOD PRESSURE < 130 MM HG: ICD-10-PCS | Mod: CPTII,S$GLB,, | Performed by: STUDENT IN AN ORGANIZED HEALTH CARE EDUCATION/TRAINING PROGRAM

## 2022-08-11 PROCEDURE — 3074F SYST BP LT 130 MM HG: CPT | Mod: CPTII,S$GLB,, | Performed by: STUDENT IN AN ORGANIZED HEALTH CARE EDUCATION/TRAINING PROGRAM

## 2022-08-11 PROCEDURE — 1160F RVW MEDS BY RX/DR IN RCRD: CPT | Mod: CPTII,S$GLB,, | Performed by: STUDENT IN AN ORGANIZED HEALTH CARE EDUCATION/TRAINING PROGRAM

## 2022-08-11 PROCEDURE — 99999 PR PBB SHADOW E&M-EST. PATIENT-LVL III: ICD-10-PCS | Mod: PBBFAC,,, | Performed by: STUDENT IN AN ORGANIZED HEALTH CARE EDUCATION/TRAINING PROGRAM

## 2022-08-11 PROCEDURE — 1101F PT FALLS ASSESS-DOCD LE1/YR: CPT | Mod: CPTII,S$GLB,, | Performed by: STUDENT IN AN ORGANIZED HEALTH CARE EDUCATION/TRAINING PROGRAM

## 2022-08-11 PROCEDURE — 1159F PR MEDICATION LIST DOCUMENTED IN MEDICAL RECORD: ICD-10-PCS | Mod: CPTII,S$GLB,, | Performed by: STUDENT IN AN ORGANIZED HEALTH CARE EDUCATION/TRAINING PROGRAM

## 2022-08-11 PROCEDURE — 99214 PR OFFICE/OUTPT VISIT, EST, LEVL IV, 30-39 MIN: ICD-10-PCS | Mod: S$GLB,,, | Performed by: STUDENT IN AN ORGANIZED HEALTH CARE EDUCATION/TRAINING PROGRAM

## 2022-08-11 PROCEDURE — 1126F AMNT PAIN NOTED NONE PRSNT: CPT | Mod: CPTII,S$GLB,, | Performed by: STUDENT IN AN ORGANIZED HEALTH CARE EDUCATION/TRAINING PROGRAM

## 2022-08-11 PROCEDURE — 71046 XR CHEST PA AND LATERAL: ICD-10-PCS | Mod: 26,,, | Performed by: RADIOLOGY

## 2022-08-11 PROCEDURE — 1159F MED LIST DOCD IN RCRD: CPT | Mod: CPTII,S$GLB,, | Performed by: STUDENT IN AN ORGANIZED HEALTH CARE EDUCATION/TRAINING PROGRAM

## 2022-08-11 PROCEDURE — 99999 PR PBB SHADOW E&M-EST. PATIENT-LVL III: CPT | Mod: PBBFAC,,, | Performed by: STUDENT IN AN ORGANIZED HEALTH CARE EDUCATION/TRAINING PROGRAM

## 2022-08-11 PROCEDURE — 1160F PR REVIEW ALL MEDS BY PRESCRIBER/CLIN PHARMACIST DOCUMENTED: ICD-10-PCS | Mod: CPTII,S$GLB,, | Performed by: STUDENT IN AN ORGANIZED HEALTH CARE EDUCATION/TRAINING PROGRAM

## 2022-08-11 PROCEDURE — 99214 OFFICE O/P EST MOD 30 MIN: CPT | Mod: S$GLB,,, | Performed by: STUDENT IN AN ORGANIZED HEALTH CARE EDUCATION/TRAINING PROGRAM

## 2022-08-11 RX ORDER — CYANOCOBALAMIN 1000 UG/ML
1000 INJECTION, SOLUTION INTRAMUSCULAR; SUBCUTANEOUS
Qty: 1 ML | Refills: 11 | Status: SHIPPED | OUTPATIENT
Start: 2022-08-11 | End: 2023-03-02

## 2022-08-11 RX ORDER — SYRINGE W-NEEDLE,DISPOSAB,3 ML 25GX5/8"
SYRINGE, EMPTY DISPOSABLE MISCELLANEOUS
Qty: 16 EACH | Refills: 1 | Status: ON HOLD | OUTPATIENT
Start: 2022-08-11 | End: 2023-11-02 | Stop reason: CLARIF

## 2022-08-11 NOTE — PROGRESS NOTES
Hematology- Oncology Clinic Note :      8/11/2022    RFV / chief complaint- Follow-up and MGUS (monoclonal gammopathy of unknown significance)        HPI  Pt is a 71 y.o. female who  has a past medical history of GERD (gastroesophageal reflux disease), Headache, High cholesterol, Hyperlipidemia, Hypertension, and Sarcoidosis.   Pt presents to the clinic today for macrocytic anemia.  Follow-up     No new complaints  Fatigue + is much better with b12 shots.   Denied alcohol intake.     Pt reports to be in usual state of health. Appetite good. No weight loss.     2 episodes of UTI in 2021    Reviewed past medical/surgical/social history    Past Medical History:   Diagnosis Date    GERD (gastroesophageal reflux disease)     Headache     High cholesterol     Hyperlipidemia     Hypertension     Sarcoidosis       Past Surgical History:   Procedure Laterality Date    ANKLE FRACTURE SURGERY      ARTHROSCOPIC REPAIR OF ROTATOR CUFF OF SHOULDER Right 7/23/2018    Procedure: REPAIR, ROTATOR CUFF, ARTHROSCOPIC;  Surgeon: YELENA Olivera MD;  Location: [facility] OR 1ST FLR;  Service: Orthopedics;  Laterality: Right;  regional with catheter, interscalene  Vladimir/Linvatec & Ann/Arthrex notified 7-18 LO    ARTHROSCOPY OF SHOULDER WITH DECOMPRESSION OF SUBACROMIAL SPACE Right 7/23/2018    Procedure: ARTHROSCOPY, SHOULDER, WITH SUBACROMIAL SPACE DECOMPRESSION;  Surgeon: YELENA Olivera MD;  Location: [facility] OR 1ST FLR;  Service: Orthopedics;  Laterality: Right;    ARTHROSCOPY OF SHOULDER WITH REMOVAL OF DISTAL CLAVICLE Right 7/23/2018    Procedure: ARTHROSCOPY, SHOULDER, WITH DISTAL CLAVICLE EXCISION;  Surgeon: YELENA Olivera MD;  Location: [facility] OR 1ST FLR;  Service: Orthopedics;  Laterality: Right;    COLONOSCOPY N/A 5/30/2017    Procedure: COLONOSCOPY;  Surgeon: Jed Garcia MD;  Location: [facility] ENDO (4TH FLR);  Service: Endoscopy;  Laterality: N/A;    ESOPHAGOGASTRODUODENOSCOPY N/A 6/30/2020    Procedure: EGD  (ESOPHAGOGASTRODUODENOSCOPY);  Surgeon: Russell Durand MD;  Location: [facility] ENDO (4TH FLR);  Service: Endoscopy;  Laterality: N/A;  COVID test on 20 at [facility]    ESOPHAGOGASTRODUODENOSCOPY N/A 2021    Procedure: EGD (ESOPHAGOGASTRODUODENOSCOPY);  Surgeon: Chucho Mooney MD;  Location: [facility] ENDO (4TH FLR);  Service: Endoscopy;  Laterality: N/A;  vaccinated 2/10/21 see media tab 21, instr portal and mailed -ml    FIXATION OF TENDON Right 2018    Procedure: FIXATION, TENDON;  Surgeon: YELENA Olivera MD;  Location: [facility] OR 1ST FLR;  Service: Orthopedics;  Laterality: Right;    HYSTERECTOMY  age 39    endo    LEG SURGERY      tibia    OOPHORECTOMY  age 39    endo    TONSILLECTOMY        (Not in a hospital admission)    Review of patient's allergies indicates:   Allergen Reactions    Sulfa (sulfonamide antibiotics) Rash     Other reaction(s): Rash      Social History     Tobacco Use    Smoking status: Former Smoker     Packs/day: 1.00     Years: 3.00     Pack years: 3.00     Types: Cigarettes     Quit date: 2010     Years since quittin.0    Smokeless tobacco: Never Used    Tobacco comment: last smoked 15 years ago   Substance Use Topics    Alcohol use: Not Currently     Comment: occasional       Family History   Problem Relation Age of Onset    Cancer Mother 61        colon    Cancer Father         throat    Hypertension Sister     Breast cancer Sister 59    Migraines Maternal Grandmother     Hypertension Brother     Diabetes Sister     Hypertension Sister     Stroke Neg Hx     Ovarian cancer Neg Hx     Miscarriages / Stillbirths Neg Hx     Eclampsia Neg Hx           Review of Systems :  Review of Systems   Constitutional: Positive for malaise/fatigue. Negative for chills, diaphoresis, fever and weight loss.   HENT: Negative.  Negative for congestion, hearing loss, nosebleeds, sore throat and tinnitus.    Eyes: Negative.  Negative for blurred vision and  "discharge.   Respiratory: Negative for cough, hemoptysis, sputum production, shortness of breath and wheezing.    Cardiovascular: Negative.  Negative for chest pain, palpitations and leg swelling.   Gastrointestinal: Negative.  Negative for abdominal pain, blood in stool, constipation, diarrhea, heartburn, melena, nausea and vomiting.   Genitourinary: Negative.    Musculoskeletal: Negative.  Negative for back pain, falls, joint pain and myalgias.   Skin: Negative.  Negative for itching and rash.   Neurological: Negative.  Negative for dizziness, tingling, sensory change, speech change, focal weakness, seizures, loss of consciousness, weakness and headaches.   Endo/Heme/Allergies: Negative.  Does not bruise/bleed easily.   Psychiatric/Behavioral: Negative.  Negative for depression. The patient is not nervous/anxious and does not have insomnia.                Physical Exam :  /69 (BP Location: Left arm, Patient Position: Sitting, BP Method: Large (Automatic))   Pulse 62   Temp 98.1 °F (36.7 °C) (Oral)   Resp 14   Ht 5' 4" (1.626 m)   Wt 89 kg (196 lb 3.4 oz)   LMP  (LMP Unknown)   SpO2 97%   BMI 33.68 kg/m²   Wt Readings from Last 3 Encounters:   08/11/22 89 kg (196 lb 3.4 oz)   08/09/22 88.9 kg (196 lb)   02/28/22 91.3 kg (201 lb 4.5 oz)       Body mass index is 33.68 kg/m².      Physical Exam  Vitals and nursing note reviewed.   Constitutional:       General: She is not in acute distress.     Appearance: She is well-developed.   HENT:      Head: Normocephalic and atraumatic.      Mouth/Throat:      Pharynx: No oropharyngeal exudate.   Eyes:      General: No scleral icterus.     Conjunctiva/sclera: Conjunctivae normal.   Neck:      Thyroid: No thyromegaly.      Trachea: No tracheal deviation.   Cardiovascular:      Rate and Rhythm: Normal rate and regular rhythm.      Heart sounds: Normal heart sounds. No murmur heard.  Pulmonary:      Effort: Pulmonary effort is normal. No respiratory distress.      " "Breath sounds: Normal breath sounds. No wheezing or rales.   Abdominal:      General: Bowel sounds are normal. There is no distension.      Palpations: Abdomen is soft. There is no mass.      Tenderness: There is no abdominal tenderness. There is no rebound.   Musculoskeletal:         General: No tenderness. Normal range of motion.      Cervical back: Normal range of motion and neck supple.   Lymphadenopathy:      Cervical: No cervical adenopathy.   Skin:     General: Skin is warm.      Findings: No erythema or rash.   Neurological:      Mental Status: She is alert and oriented to person, place, and time.      Cranial Nerves: No cranial nerve deficit.   Psychiatric:         Behavior: Behavior normal.             Current Outpatient Medications   Medication Sig Dispense Refill    cetirizine (ZYRTEC) 10 MG tablet Take 10 mg by mouth once daily.      citalopram (CELEXA) 20 MG tablet Take 1 tablet (20 mg total) by mouth once daily. 90 tablet 3    clotrimazole-betamethasone 1-0.05% (LOTRISONE) cream Apply topically 2 (two) times daily. 45 g 0    cyanocobalamin 1,000 mcg/mL injection Inject 1 mL (1,000 mcg total) into the muscle every 30 days. 1 mL 11    elderberry fruit and flower 460-115 mg Cap Take 1 tablet by mouth Daily.      felodipine (PLENDIL) 10 MG 24 hr tablet Take 1 tablet (10 mg total) by mouth once daily. 90 tablet 3    fluticasone propionate (FLONASE) 50 mcg/actuation nasal spray 2 sprays (100 mcg total) by Each Nostril route once daily. 16 g 3    mv-mn/folic acid/vit [id number] (ALIVE ONCE DAILY WOMEN 50 PLUS ORAL) Take 1 tablet by mouth Daily.      pantoprazole (PROTONIX) 40 MG tablet Take 1 tablet (40 mg total) by mouth once daily. 90 tablet 3    pravastatin (PRAVACHOL) 40 MG tablet Take 1 tablet (40 mg total) by mouth once daily. 90 tablet 3    syringe with needle 3 mL 22 x 1 1/2" Syrg Use to inject B12 shots intramuscularly as directed. 16 each 1    vitamin D (VITAMIN D3) 1000 units Tab Take " 1,000 Units by mouth once daily.      zinc sulfate (ZINC-15) 66 mg Tab Take 1 tablet by mouth Daily.      levocetirizine (XYZAL) 5 MG tablet Take 1 tablet (5 mg total) by mouth every evening. 30 tablet 3     No current facility-administered medications for this visit.       Pertinent Diagnostic studies:      Office Visit on 08/09/2022   Component Date Value Ref Range Status    Specimen UA 08/09/2022 Urine, Clean Catch   Final    Color, UA 08/09/2022 Yellow  Yellow, Straw, Umu Final    Appearance, UA 08/09/2022 Hazy (A) Clear Final    pH, UA 08/09/2022 6.0  5.0 - 8.0 Final    Specific Gravity, UA 08/09/2022 1.010  1.005 - 1.030 Final    Protein, UA 08/09/2022 Negative  Negative Final    Comment: Recommend a 24 hour urine protein or a urine   protein/creatinine ratio if globulin induced proteinuria is  clinically suspected.      Glucose, UA 08/09/2022 Negative  Negative Final    Ketones, UA 08/09/2022 Negative  Negative Final    Bilirubin (UA) 08/09/2022 Negative  Negative Final    Occult Blood UA 08/09/2022 1+ (A) Negative Final    Nitrite, UA 08/09/2022 Negative  Negative Final    Leukocytes, UA 08/09/2022 2+ (A) Negative Final    RBC, UA 08/09/2022 3  0 - 4 /hpf Final    WBC, UA 08/09/2022 1  0 - 5 /hpf Final    Bacteria 08/09/2022 Rare  None-Occ /hpf Final    Squam Epithel, UA 08/09/2022 4  /hpf Final    Microscopic Comment 08/09/2022 SEE COMMENT   Final    Comment: Other formed elements not mentioned in the report are not   present in the microscopic examination.      Lab Visit on 08/09/2022   Component Date Value Ref Range Status    Kappa Free Light Chains 08/09/2022 3.79 (A) 0.33 - 1.94 mg/dL Final    Lambda Free Light Chains 08/09/2022 1.25  0.57 - 2.63 mg/dL Final    Kappa/Lambda FLC Ratio 08/09/2022 3.03 (A) 0.26 - 1.65 Final    Comment: Undetected antigen excess is a rare event but cannot   be excluded. If these free light chain results do not   agree with other clinical or  laboratory findings or   if the sample is from a patient that has previously   demonstrated antigen excess, discuss with the testing   laboratory.   Results should always be interpreted in conjunction   with other laboratory tests and clinical evidence.      Protein, Serum 08/09/2022 7.7  6.0 - 8.4 g/dL Final    Comment: Serum protein electrophoresis and immunofixation results should be   interpreted in clinical context in that some therapeutic agents can   result   in false positive results (example, daratumumab). Correlation with   the   patient s therapeutic regimen is required.      Albumin 08/09/2022 4.16  3.35 - 5.55 g/dL Final    Alpha-1 08/09/2022 0.25  0.17 - 0.41 g/dL Final    Alpha-2 08/09/2022 0.79  0.43 - 0.99 g/dL Final    Beta 08/09/2022 0.78  0.50 - 1.10 g/dL Final    Gamma 08/09/2022 1.73 (A) 0.67 - 1.58 g/dL Final    WBC 08/09/2022 4.57  3.90 - 12.70 K/uL Final    RBC 08/09/2022 3.95 (A) 4.00 - 5.40 M/uL Final    Hemoglobin 08/09/2022 12.7  12.0 - 16.0 g/dL Final    Hematocrit 08/09/2022 39.7  37.0 - 48.5 % Final    MCV 08/09/2022 101 (A) 82 - 98 fL Final    MCH 08/09/2022 32.2 (A) 27.0 - 31.0 pg Final    MCHC 08/09/2022 32.0  32.0 - 36.0 g/dL Final    RDW 08/09/2022 12.4  11.5 - 14.5 % Final    Platelets 08/09/2022 222  150 - 450 K/uL Final    MPV 08/09/2022 11.7  9.2 - 12.9 fL Final    Immature Granulocytes 08/09/2022 0.2  0.0 - 0.5 % Final    Gran # (ANC) 08/09/2022 2.4  1.8 - 7.7 K/uL Final    Immature Grans (Abs) 08/09/2022 0.01  0.00 - 0.04 K/uL Final    Comment: Mild elevation in immature granulocytes is non specific and   can be seen in a variety of conditions including stress response,   acute inflammation, trauma and pregnancy. Correlation with other   laboratory and clinical findings is essential.      Lymph # 08/09/2022 1.3  1.0 - 4.8 K/uL Final    Mono # 08/09/2022 0.5  0.3 - 1.0 K/uL Final    Eos # 08/09/2022 0.3  0.0 - 0.5 K/uL Final    Baso # 08/09/2022 0.04   0.00 - 0.20 K/uL Final    nRBC 08/09/2022 0  0 /100 WBC Final    Gran % 08/09/2022 52.8  38.0 - 73.0 % Final    Lymph % 08/09/2022 29.3  18.0 - 48.0 % Final    Mono % 08/09/2022 9.8  4.0 - 15.0 % Final    Eosinophil % 08/09/2022 7.0  0.0 - 8.0 % Final    Basophil % 08/09/2022 0.9  0.0 - 1.9 % Final    Differential Method 08/09/2022 Automated   Final    Sodium 08/09/2022 140  136 - 145 mmol/L Final    Potassium 08/09/2022 4.0  3.5 - 5.1 mmol/L Final    Chloride 08/09/2022 104  95 - 110 mmol/L Final    CO2 08/09/2022 25  23 - 29 mmol/L Final    Glucose 08/09/2022 69 (A) 70 - 110 mg/dL Final    BUN 08/09/2022 12  8 - 23 mg/dL Final    Creatinine 08/09/2022 0.9  0.5 - 1.4 mg/dL Final    Calcium 08/09/2022 9.4  8.7 - 10.5 mg/dL Final    Total Protein 08/09/2022 8.3  6.0 - 8.4 g/dL Final    Albumin 08/09/2022 3.9  3.5 - 5.2 g/dL Final    Total Bilirubin 08/09/2022 0.6  0.1 - 1.0 mg/dL Final    Comment: For infants and newborns, interpretation of results should be based  on gestational age, weight and in agreement with clinical  observations.    Premature Infant recommended reference ranges:  Up to 24 hours.............<8.0 mg/dL  Up to 48 hours............<12.0 mg/dL  3-5 days..................<15.0 mg/dL  6-29 days.................<15.0 mg/dL      Alkaline Phosphatase 08/09/2022 97  55 - 135 U/L Final    AST 08/09/2022 21  10 - 40 U/L Final    ALT 08/09/2022 13  10 - 44 U/L Final    Anion Gap 08/09/2022 11  8 - 16 mmol/L Final    eGFR 08/09/2022 >60.0  >60 mL/min/1.73 m^2 Final    TSH 08/09/2022 1.010  0.400 - 4.000 uIU/mL Final    Cholesterol 08/09/2022 273 (A) 120 - 199 mg/dL Final    Comment: The National Cholesterol Education Program (NCEP) has set the  following guidelines (reference ranges) for Cholesterol:  Optimal.....................<200 mg/dL  Borderline High.............200-239 mg/dL  High........................> or = 240 mg/dL      Triglycerides 08/09/2022 143  30 - 150 mg/dL Final     Comment: The National Cholesterol Education Program (NCEP) has set the  following guidelines (reference values) for triglycerides:  Normal......................<150 mg/dL  Borderline High.............150-199 mg/dL  High........................200-499 mg/dL      HDL 08/09/2022 53  40 - 75 mg/dL Final    Comment: The National Cholesterol Education Program (NCEP) has set the  following guidelines (reference values) for HDL Cholesterol:  Low...............<40 mg/dL  Optimal...........>60 mg/dL      LDL Cholesterol 08/09/2022 191.4 (A) 63.0 - 159.0 mg/dL Final    Comment: The National Cholesterol Education Program (NCEP) has set the  following guidelines (reference values) for LDL Cholesterol:  Optimal.......................<130 mg/dL  Borderline High...............130-159 mg/dL  High..........................160-189 mg/dL  Very High.....................>190 mg/dL      HDL/Cholesterol Ratio 08/09/2022 19.4 (A) 20.0 - 50.0 % Final    Total Cholesterol/HDL Ratio 08/09/2022 5.2 (A) 2.0 - 5.0 Final    Non-HDL Cholesterol 08/09/2022 220  mg/dL Final    Comment: Risk category and Non-HDL cholesterol goals:  Coronary heart disease (CHD)or equivalent (10-year risk of CHD >20%):  Non-HDL cholesterol goal     <130 mg/dL  Two or more CHD risk factors and 10-year risk of CHD <= 20%:  Non-HDL cholesterol goal     <160 mg/dL  0 to 1 CHD risk factor:  Non-HDL cholesterol goal     <190 mg/dL      Pathologist Interpretation SPE 08/09/2022 REVIEWED   Final    Comment:   Electronically reviewed and signed by:  Arben Edwards M.D.  Signed on 08/10/22 at 14:21  Normal total protein. Normal gamma globulins are decreased.   Paraprotein bands:   1.  0.52 g/dL at the beta-2/near gamma junction   (previously 0.55 g/dL );2.  0.60 g/dL in the  gamma region = (   previously 0.59 g/dL).                    Oncology History    No history exists.     Assessment :       1. MGUS (monoclonal gammopathy of unknown significance)    2. Low vitamin B12  level    3. Macrocytic anemia    4. History of sarcoidosis    5. HTN (hypertension), benign    6. Other eosinophilia        Plan :       Macrocytic anemia/B12 low  Zinc, copper, TSH within normal limits  B12 low normal  B12 supplementation option with oral versus parenteral was discussed with patient patient refers parenteral supplementation   patient's daughter is an RN and assists patient with injections  Hb wnl , monitor    MGUS   No CRAB features.    05/24/21 09:26 08/16/21 10:28 02/25/22 08:48 08/09/22 11:03   [place] Free Light Chains 3.34 (H) 3.51 (H) 3.44 (H) 3.79 (H)   Lambda Free Light Chains 1.28 1.25 1.22 1.25   Kappa/Lambda FLC Ratio 2.61 (H) 2.81 (H) 2.82 (H) 3.03 (H)       SPEP 8/16/21   Paraprotein bands at the beta-2/near gamma junction = 0.66 g/dL (previously, 0.5 g/dL) and in the gamma region = 0.4 g/dL (previously, 0.62 g/dL).  Labs reviewed no major changes. Continue to monitor    Spep 8/9/22- Normal total protein. Normal gamma globulins are decreased.   Paraprotein bands:   1.  0.52 g/dL at the beta-2/near gamma junction (previously 0.55 g/dL );2.  0.60 g/dL in the  gamma region = ( previously 0.59 g/dL).     UPEP 2021- negative  Stable, Continue to monitor      Chronic fatigue  -no acute worsening,-follow-up with PCP    Sarcoidosis  -stable, per PCP    Eosinophilia  -likely due to sarcoidosis, monitor    Covid-19:  Due for booster      No problem-specific Assessment & Plan notes found for this encounter.      Problem List Items Addressed This Visit     History of sarcoidosis    HTN (hypertension), benign    Low vitamin B12 level    Macrocytic anemia    Other eosinophilia      Other Visit Diagnoses     MGUS (monoclonal gammopathy of unknown significance)    -  Primary    Relevant Orders    CBC Auto Differential    Comprehensive Metabolic Panel    Protein Electrophoresis, Serum    Immunoglobulin Free LT Chains Blood    Protein electrophoresis, timed urine    Immunofixation, 24 hour Urine             Electronically signed by Nathaly Vigil          Future Appointments   Date Time Provider Department Center   8/11/2022 11:15 AM [facility] XROP DR [facility]ELSON [facility]   8/11/2022  2:15 PM CARDIOLOGY, TESTS [facility]   10/7/2022  1:30 PM Caitlin Marinelli NP [facility]STR[facility]            This note was created with voice recognition software.  Grammatical, syntax and spelling errors may be inevitable.

## 2022-09-03 DIAGNOSIS — Z12.11 COLON CANCER SCREENING: Primary | ICD-10-CM

## 2022-10-07 ENCOUNTER — TELEPHONE (OUTPATIENT)
Dept: SURGERY | Facility: CLINIC | Age: 72
End: 2022-10-07
Payer: COMMERCIAL

## 2022-10-07 ENCOUNTER — OFFICE VISIT (OUTPATIENT)
Dept: GASTROENTEROLOGY | Facility: CLINIC | Age: 72
End: 2022-10-07
Payer: COMMERCIAL

## 2022-10-07 VITALS
OXYGEN SATURATION: 96 % | DIASTOLIC BLOOD PRESSURE: 65 MMHG | HEART RATE: 85 BPM | BODY MASS INDEX: 33.69 KG/M2 | HEIGHT: 64 IN | SYSTOLIC BLOOD PRESSURE: 118 MMHG | WEIGHT: 197.31 LBS

## 2022-10-07 DIAGNOSIS — Z12.11 SCREENING FOR COLON CANCER: ICD-10-CM

## 2022-10-07 DIAGNOSIS — R13.10 DYSPHAGIA, UNSPECIFIED TYPE: Primary | ICD-10-CM

## 2022-10-07 DIAGNOSIS — K21.9 GASTROESOPHAGEAL REFLUX DISEASE, UNSPECIFIED WHETHER ESOPHAGITIS PRESENT: ICD-10-CM

## 2022-10-07 PROCEDURE — 1100F PR PT FALLS ASSESS DOC 2+ FALLS/FALL W/INJURY/YR: ICD-10-PCS | Mod: CPTII,S$GLB,, | Performed by: NURSE PRACTITIONER

## 2022-10-07 PROCEDURE — 99999 PR PBB SHADOW E&M-EST. PATIENT-LVL IV: CPT | Mod: PBBFAC,,, | Performed by: NURSE PRACTITIONER

## 2022-10-07 PROCEDURE — 1125F PR PAIN SEVERITY QUANTIFIED, PAIN PRESENT: ICD-10-PCS | Mod: CPTII,S$GLB,, | Performed by: NURSE PRACTITIONER

## 2022-10-07 PROCEDURE — 1159F PR MEDICATION LIST DOCUMENTED IN MEDICAL RECORD: ICD-10-PCS | Mod: CPTII,S$GLB,, | Performed by: NURSE PRACTITIONER

## 2022-10-07 PROCEDURE — 3078F DIAST BP <80 MM HG: CPT | Mod: CPTII,S$GLB,, | Performed by: NURSE PRACTITIONER

## 2022-10-07 PROCEDURE — 99214 PR OFFICE/OUTPT VISIT, EST, LEVL IV, 30-39 MIN: ICD-10-PCS | Mod: S$GLB,,, | Performed by: NURSE PRACTITIONER

## 2022-10-07 PROCEDURE — 1100F PTFALLS ASSESS-DOCD GE2>/YR: CPT | Mod: CPTII,S$GLB,, | Performed by: NURSE PRACTITIONER

## 2022-10-07 PROCEDURE — 1125F AMNT PAIN NOTED PAIN PRSNT: CPT | Mod: CPTII,S$GLB,, | Performed by: NURSE PRACTITIONER

## 2022-10-07 PROCEDURE — 1159F MED LIST DOCD IN RCRD: CPT | Mod: CPTII,S$GLB,, | Performed by: NURSE PRACTITIONER

## 2022-10-07 PROCEDURE — 3078F PR MOST RECENT DIASTOLIC BLOOD PRESSURE < 80 MM HG: ICD-10-PCS | Mod: CPTII,S$GLB,, | Performed by: NURSE PRACTITIONER

## 2022-10-07 PROCEDURE — 3288F FALL RISK ASSESSMENT DOCD: CPT | Mod: CPTII,S$GLB,, | Performed by: NURSE PRACTITIONER

## 2022-10-07 PROCEDURE — 3288F PR FALLS RISK ASSESSMENT DOCUMENTED: ICD-10-PCS | Mod: CPTII,S$GLB,, | Performed by: NURSE PRACTITIONER

## 2022-10-07 PROCEDURE — 99214 OFFICE O/P EST MOD 30 MIN: CPT | Mod: S$GLB,,, | Performed by: NURSE PRACTITIONER

## 2022-10-07 PROCEDURE — 3074F SYST BP LT 130 MM HG: CPT | Mod: CPTII,S$GLB,, | Performed by: NURSE PRACTITIONER

## 2022-10-07 PROCEDURE — 99999 PR PBB SHADOW E&M-EST. PATIENT-LVL IV: ICD-10-PCS | Mod: PBBFAC,,, | Performed by: NURSE PRACTITIONER

## 2022-10-07 PROCEDURE — 3074F PR MOST RECENT SYSTOLIC BLOOD PRESSURE < 130 MM HG: ICD-10-PCS | Mod: CPTII,S$GLB,, | Performed by: NURSE PRACTITIONER

## 2022-10-07 RX ORDER — PANTOPRAZOLE SODIUM 40 MG/1
40 TABLET, DELAYED RELEASE ORAL 2 TIMES DAILY
Qty: 60 TABLET | Refills: 2 | Status: SHIPPED | OUTPATIENT
Start: 2022-10-07 | End: 2023-08-08 | Stop reason: SDUPTHER

## 2022-10-07 RX ORDER — TRAMADOL HYDROCHLORIDE 50 MG/1
50 TABLET ORAL EVERY 6 HOURS PRN
COMMUNITY
Start: 2022-10-03 | End: 2023-06-15

## 2022-10-07 RX ORDER — SODIUM, POTASSIUM,MAG SULFATES 17.5-3.13G
1 SOLUTION, RECONSTITUTED, ORAL ORAL DAILY
Qty: 1 KIT | Refills: 0 | Status: ON HOLD | OUTPATIENT
Start: 2022-10-07 | End: 2023-11-02 | Stop reason: CLARIF

## 2022-10-07 NOTE — PROGRESS NOTES
GASTROENTEROLOGY CLINIC NOTE    Chief Complaint: The primary encounter diagnosis was Dysphagia, unspecified type. Diagnoses of Gastroesophageal reflux disease, unspecified whether esophagitis present and Screening for colon cancer were also pertinent to this visit.  Referring provider/PCP: Aidee Lora MD    HPI:  Estephania Lyman is a 72 y.o. female who is a new patient to me with a PMH that's significant for sarcoidosis, family h/o colon cancer, macrocytic anemia, and GERD. She was last seen at [facility] in 2021 by KYLEE Valdez NP for GERD and dysphagia and is here today to follow up for same complaint. She underwent EGD with dilation with Dr. Mooney in 7/2021.  She c/o reflux and dysphagia that have been ongoing for about one month and is occurring at least three times per week.  Feels food is sticking in her chest. Improved following dilation but has recurred.  Dysphagia accompanied by pyrosis, water brash, nausea, and nocturnal reflux.  Water will clear food when it sticks.  Denies choking or coughing with eating.      Treatments Tried: Nexium, Sandra Whitefish, Protonix (currently taking)  NSAIDs: No  Anticoagulation or Antiplatelet: No    History of H.pylori:  H.pylori Treatment:  Prior Upper Endoscopy: 7/2021 with Dr. Mooney  Impression:     - Tortuous lower third of the esophagus.                          - Benign-appearing esophageal stenosis at GE junction, dilated up to 18 mm.                          - Medium-sized hiatal hernia.     Recommendation: - Discharge patient to home.                          - Resume previous diet.                          - Continue present medications.                          - Observe patient's clinical course.     Prior Colonoscopy: 5/2017  Impression:           - The entire examined colon is normal.                         - No specimens collected.     Recommendation:       - Repeat colonoscopy in 5 years for screening                         purposes.                          - Resume previous diet indefinitely.                         - Continue present medications.                         - Discharge patient to home (ambulatory).     Family h/o Colon Cancer: Mother  Family h/o Crohn's Disease or Ulcerative Colitis: No  Family h/o Celiac Sprue: No      Review of Systems   Constitutional:  Negative for weight loss.   HENT:  Negative for sore throat.    Eyes:  Negative for blurred vision.   Respiratory:  Negative for cough.    Cardiovascular:  Negative for chest pain.   Gastrointestinal:  Positive for heartburn and nausea. Negative for abdominal pain, blood in stool, constipation, diarrhea, melena and vomiting.   Genitourinary:  Negative for dysuria.   Musculoskeletal:  Negative for myalgias.   Skin:  Negative for rash.   Neurological:  Negative for headaches.   Endo/Heme/Allergies:  Negative for environmental allergies.   Psychiatric/Behavioral:  Negative for suicidal ideas. The patient is not nervous/anxious.      Past Medical History: has a past medical history of GERD (gastroesophageal reflux disease), Headache, High cholesterol, Hyperlipidemia, Hypertension, and Sarcoidosis.    Past Surgical History: has a past surgical history that includes Tonsillectomy; Ankle fracture surgery; Leg Surgery; Hysterectomy (age 39); Oophorectomy (age 39); Colonoscopy (N/A, 5/30/2017); Arthroscopic repair of rotator cuff of shoulder (Right, 7/23/2018); Arthroscopy of shoulder with removal of distal clavicle (Right, 7/23/2018); Fixation of tendon (Right, 7/23/2018); Arthroscopy of shoulder with decompression of subacromial space (Right, 7/23/2018); Esophagogastroduodenoscopy (N/A, 6/30/2020); and Esophagogastroduodenoscopy (N/A, 7/1/2021).    Family History:family history includes Breast cancer (age of onset: 59) in her sister; Cancer in her father; Cancer (age of onset: 61) in her mother; Diabetes in her sister; Hypertension in her brother, sister, and sister; Migraines in her maternal  "grandmother.    Allergies:   Review of patient's allergies indicates:   Allergen Reactions    Sulfa (sulfonamide antibiotics) Rash     Other reaction(s): Rash       Social History: reports that she quit smoking about 12 years ago. Her smoking use included cigarettes. She has a 3.00 pack-year smoking history. She has never used smokeless tobacco. She reports that she does not currently use alcohol. She reports that she does not use drugs.    Home medications:   Current Outpatient Medications on File Prior to Visit   Medication Sig Dispense Refill    cetirizine (ZYRTEC) 10 MG tablet Take 10 mg by mouth once daily.      citalopram (CELEXA) 20 MG tablet Take 1 tablet (20 mg total) by mouth once daily. 90 tablet 3    clotrimazole-betamethasone 1-0.05% (LOTRISONE) cream Apply topically 2 (two) times daily. 45 g 0    cyanocobalamin 1,000 mcg/mL injection Inject 1 mL (1,000 mcg total) into the muscle every 30 days. 1 mL 11    felodipine (PLENDIL) 10 MG 24 hr tablet Take 1 tablet (10 mg total) by mouth once daily. 90 tablet 3    pravastatin (PRAVACHOL) 40 MG tablet Take 1 tablet (40 mg total) by mouth once daily. 90 tablet 3    syringe with needle 3 mL 22 x 1 1/2" Syrg Use to inject B12 shots intramuscularly as directed. 16 each 1    elderberry fruit and flower 460-115 mg Cap Take 1 tablet by mouth Daily.      fluticasone propionate (FLONASE) 50 mcg/actuation nasal spray 2 sprays (100 mcg total) by Each Nostril route once daily. 16 g 3    levocetirizine (XYZAL) 5 MG tablet Take 1 tablet (5 mg total) by mouth every evening. (Patient not taking: Reported on 10/7/2022) 30 tablet 3    mv-mn/folic acid/vit K/olje014 (ALIVE ONCE DAILY WOMEN 50 PLUS ORAL) Take 1 tablet by mouth Daily.      traMADoL (ULTRAM) 50 mg tablet Take 50 mg by mouth every 6 (six) hours as needed.      vitamin D (VITAMIN D3) 1000 units Tab Take 1,000 Units by mouth once daily.      zinc sulfate (ZINC-15) 66 mg Tab Take 1 tablet by mouth Daily.       No " "current facility-administered medications on file prior to visit.       Vital signs:  /65 (BP Location: Right arm, Patient Position: Sitting, BP Method: Large (Automatic))   Pulse 85   Ht 5' 4" (1.626 m)   Wt 89.5 kg (197 lb 5 oz)   LMP  (LMP Unknown)   SpO2 96%   BMI 33.87 kg/m²     Physical Exam  Vitals reviewed.   Constitutional:       General: She is not in acute distress.     Appearance: Normal appearance. She is not ill-appearing.   HENT:      Head: Normocephalic.   Cardiovascular:      Rate and Rhythm: Normal rate and regular rhythm.      Heart sounds: Normal heart sounds. No murmur heard.  Pulmonary:      Effort: Pulmonary effort is normal. No respiratory distress.      Breath sounds: Normal breath sounds.   Chest:      Chest wall: No tenderness.   Abdominal:      General: Bowel sounds are normal. There is no distension.      Palpations: Abdomen is soft.      Tenderness: There is no abdominal tenderness. Negative signs include Troncoso's sign.      Hernia: No hernia is present.   Skin:     General: Skin is warm.   Neurological:      Mental Status: She is alert and oriented to person, place, and time.   Psychiatric:         Mood and Affect: Mood normal.         Behavior: Behavior normal.       Routine labs:  Lab Results   Component Value Date    WBC 4.57 08/09/2022    HGB 12.7 08/09/2022    HCT 39.7 08/09/2022     (H) 08/09/2022     08/09/2022     No results found for: INR  Lab Results   Component Value Date    IRON 49 02/08/2021    FERRITIN 97 02/08/2021    TIBC 382 02/08/2021    FESATURATED 13 (L) 02/08/2021     Lab Results   Component Value Date     08/09/2022    K 4.0 08/09/2022     08/09/2022    CO2 25 08/09/2022    BUN 12 08/09/2022    CREATININE 0.9 08/09/2022     Lab Results   Component Value Date    ALBUMIN 3.9 08/09/2022    ALT 13 08/09/2022    AST 21 08/09/2022    ALKPHOS 97 08/09/2022    BILITOT 0.6 08/09/2022     No results found for: GLUCOSE  Lab Results "   Component Value Date    TSH 1.010 08/09/2022     Lab Results   Component Value Date    CALCIUM 9.4 08/09/2022    PHOS 3.2 08/20/2017       Imaging:  X-Ray Chest PA And Lateral  Narrative: EXAMINATION:  XR CHEST PA AND LATERAL    CLINICAL HISTORY:  Essential (primary) hypertension    TECHNIQUE:  PA and lateral views of the chest were performed.    COMPARISON:  02/01/2021.    FINDINGS:  The heart and mediastinal structures are unremarkable.  Pulmonary vasculature is within normal limits.  The lungs are well aerated and free of focal consolidations.  There is no evidence for pneumothorax or pleural effusions.  Bony structures are grossly intact.  Impression: No acute chest disease identified.  No detrimental change noted when compared to 02/01/2021.    Electronically signed by: Nghia Ureña MD  Date:    08/11/2022  Time:    10:46      I have reviewed prior labs, imaging, and notes.      Assessment:  1. Dysphagia, unspecified type    2. Gastroesophageal reflux disease, unspecified whether esophagitis present    3. Screening for colon cancer        Plan:  Orders Placed This Encounter    FL Esophagram With Barium Tablet    pantoprazole (PROTONIX) 40 MG tablet    sodium,potassium,mag sulfates (SUPREP BOWEL PREP KIT) 17.5-3.13-1.6 gram SolR    Case Request Endoscopy: COLONOSCOPY     Esophagram with Barium Tablet  Increase Protonix to 40mg BID  Colonoscopy for colon cancer screening. Suprep.         Plan of care discussed with patient who is in agreement and verbalized understanding.     I have explained the planned procedures to the patient.The risks, benefits and alternatives of the procedure were also explained in detail. Patient verbalized understanding, all questions were answered. The patient agrees to proceed as planned    Follow Up: Pending Above Workup          Caitlin Donnelly, ELISSA,FNP-BC  [facility]

## 2022-10-07 NOTE — TELEPHONE ENCOUNTER
Called patient in reference to a referral to Colorectal Surgery for colon cancer screening. Patient verbally consented to a Colonoscopy and requested to be scheduled for a Colonoscopy on 12/12/2022 Patient was advised a designated  is required on the day of the Colonoscopy to drive the patient home and the  must be at least. 18 years old.Colonoscopy Prep instructions were thoroughly explained and discussed with the patient.   It was emphasized, and reiterated to the patient, not to follow the prep instructions that comes with the Prep Kit. However, to please follow the prep instructions that will be received in the mail from the [facility] L[facility]   Patient acknowledges understanding Prep instructions as explained and discussed on the phone.. Patient was advised the Colonoscopy Prep instructions discussed and explained on the phone,are being mailed out to the patient's verified address on file. Patient's address on file was verified with the patient for accuracy of mailing. Patient's medications on file was reviewed with the patient for accuracy of information. Patient denies taking  any other medications other than those listed and verified on medication profile.Patient was explained the Colonoscopy will be performed here at [facility]. Patient was further explained the Pre-Op will call one day prior to the procedure date, to discuss Pre-Op instructions;and what time to report for the Colonoscopy. The patient was given the opportunity to ask any questions about the Colonoscopy. No further issues were discussed.           Bowel Prep/SUPREP instructions                                               [facility]    [address] [place] [address]      You are scheduled for a Colonoscopy with _______________________ on ____________________   At [facility] in [place].    Check in at the hospital on 1st floor Registration area next to Emergency room.     Please call [phone] to reschedule or if you have any questions.    An adult friend/family member must come with you to drive you home.  You cannot drive, take a taxi, Uber/Lyft or bus to leave the Hospital alone. If you do not have someone with you to drive you home, your test will be cancelled.       Please follow the directions of your doctor if you take any pills that thin your blood.  If you take these meds: Aggrenox, Brilinta, Effient, Eliquis, Lovenox, Plavix, Pletal Pradaxa ticilid, Xarelto, or Coumadin, let the doctor's office know.    Don't: On the morning of the test do not take insulin or pills for diabetes.   Do: On the morning of the test, do take any pills for blood pressure, heart, anti-rejection and or seizures with a small sip of water. Bring any inhalers with you day of procedure.    To have a good prep, you must follow these instructions- please do not use the directions from the pharmacy!      The doctor will send a prescription for the SUPREP   The day Before the test:   You can only drink CLEAR LIQUIDS the whole day before your test. You can't eat any food for the whole day.    You CAN have:   Water,Coffee or decaf coffee (no milk or cream)    Tea   Soft drinks- regular and sugar free   Jell-O (green or yellow)   Apple Juice, grape juice, white cranberry juice   Gatorade, Power Aid, Crystal Light, Eusebio Aid   Lemonade and Limeade   Bouillon, clear soup   Snowball, popsicles   YOU CAN'T DRINK ANYTHING RED   YOU CAN'T DRINK ALCOHOL   ONLY DRINK WHAT IS ON THIS List      At 5pm the night before your test:   Pour the 1st bottle of SUPREP into the cup provided in the box.  Add water to the line on the cup and mix well. Drink the whole cup and then drink 2 more full cups of water over the 1 hour.    This can be easier to drink if it is cold.  You can mix it 20 minutes ahead of time and place in the refrigerator before you drink it.  You must drink it within 30-45 minutes of mixing it. Do NOT  pour the drink over ice. You can drink it with a straw.    The Day of the test- We will call you 1 day before your test to tell you what time to get there.    5 hours before you come to the hospital (this may be the middle of the night)   Pour the 2nd bottle of SUPREP into to the cup provided in the box. Add water to the line on the cup and mix well. Drink the whole cup and then drink 2 more full cups of water over 1 hour.    It might be easier to drink if it is cold. You can mix it 20 minutes ahead of time and place in the refrigerator before you drink it. You must drink it within 30-45 minutes of mixing it. Do NOT pour the drink over ice. You can drink it with a straw.   YOU CAN'T EAT OR DRINK ANYTHING ELSE ONCE YOU FINISH THE PREP.   Leave all valuables and jewelry at home. You will be at the hospital for 2-4 hours.    Call the Endoscopy Scheduling Department at [phone] with any questions about your procedure.    Please  your medication from your local pharmacy. If you are unable to  the SUPREP Kit please contact our office.   Thank you   Endo Scheduling Dept   [facility]

## 2022-10-10 ENCOUNTER — TELEPHONE (OUTPATIENT)
Dept: GASTROENTEROLOGY | Facility: CLINIC | Age: 72
End: 2022-10-10
Payer: COMMERCIAL

## 2022-10-10 NOTE — TELEPHONE ENCOUNTER
Left voicemail informing patient that FL Esophagram is scheduled at [facility] on 10/19/22 @ 9:00 AM and she will need to be NPO 2-4 hours prior.  Call back number left on voicemail for any questions.

## 2022-10-13 ENCOUNTER — TELEPHONE (OUTPATIENT)
Dept: GASTROENTEROLOGY | Facility: CLINIC | Age: 72
End: 2022-10-13
Payer: COMMERCIAL

## 2022-10-13 NOTE — TELEPHONE ENCOUNTER
----- Message from Rachael Mcgill LPN sent at 10/13/2022  3:21 PM CDT -----    ----- Message -----  From: Santos Pérez  Sent: 10/13/2022   1:18 PM CDT  To: Andrze[facility] Tucker Staff    Pt call to speak with Angelique    Call

## 2022-10-19 ENCOUNTER — HOSPITAL ENCOUNTER (OUTPATIENT)
Dept: RADIOLOGY | Facility: HOSPITAL | Age: 72
Discharge: HOME | End: 2022-10-19
Attending: NURSE PRACTITIONER
Payer: COMMERCIAL

## 2022-10-19 DIAGNOSIS — R13.10 DYSPHAGIA, UNSPECIFIED TYPE: ICD-10-CM

## 2022-10-19 PROCEDURE — 74220 X-RAY XM ESOPHAGUS 1CNTRST: CPT | Mod: TC

## 2022-10-19 PROCEDURE — A9698 NON-RAD CONTRAST MATERIALNOC: HCPCS | Performed by: NURSE PRACTITIONER

## 2022-10-19 PROCEDURE — 74220 X-RAY XM ESOPHAGUS 1CNTRST: CPT | Mod: 26,,, | Performed by: RADIOLOGY

## 2022-10-19 PROCEDURE — 25500020 PHARM REV CODE 255: Performed by: NURSE PRACTITIONER

## 2022-10-19 PROCEDURE — 74220 FL ESOPHAGRAM WITH BARIUM TABLET: ICD-10-PCS | Mod: 26,,, | Performed by: RADIOLOGY

## 2022-10-19 RX ADMIN — BARIUM SULFATE 185 ML: 0.6 SUSPENSION ORAL at 09:10

## 2022-10-20 ENCOUNTER — IMMUNIZATION (OUTPATIENT)
Dept: PHARMACY | Facility: CLINIC | Age: 72
End: 2022-10-20
Payer: COMMERCIAL

## 2022-10-27 ENCOUNTER — PATIENT MESSAGE (OUTPATIENT)
Dept: GASTROENTEROLOGY | Facility: CLINIC | Age: 72
End: 2022-10-27
Payer: COMMERCIAL

## 2022-10-27 NOTE — TELEPHONE ENCOUNTER
Discussed esophagram results with patient via telephone. Moderate dysmotility. Small to moderate hiatal hernia. Patient had improvement in symptoms with EGD in past. She would like to repeat EGD with empiric dilation. Currently taking Protonix 40mg BID. EGD will be added to Colonoscopy. All questions answered. Patient verbalized understanding. Plan of care discussed with Dr. Perez.

## 2022-11-11 ENCOUNTER — HOSPITAL ENCOUNTER (EMERGENCY)
Facility: HOSPITAL | Age: 72
Discharge: HOME | End: 2022-11-11
Attending: EMERGENCY MEDICINE
Payer: COMMERCIAL

## 2022-11-11 VITALS
SYSTOLIC BLOOD PRESSURE: 109 MMHG | DIASTOLIC BLOOD PRESSURE: 75 MMHG | OXYGEN SATURATION: 95 % | WEIGHT: 197 LBS | HEIGHT: 64 IN | RESPIRATION RATE: 18 BRPM | BODY MASS INDEX: 33.63 KG/M2 | TEMPERATURE: 99 F | HEART RATE: 75 BPM

## 2022-11-11 DIAGNOSIS — U07.1 COVID-19 VIRUS INFECTION: Primary | ICD-10-CM

## 2022-11-11 DIAGNOSIS — R52 PAIN: ICD-10-CM

## 2022-11-11 LAB
ALBUMIN SERPL BCP-MCNC: 3.3 G/DL (ref 3.5–5.2)
ALP SERPL-CCNC: 97 U/L (ref 55–135)
ALT SERPL W/O P-5'-P-CCNC: 13 U/L (ref 10–44)
ANION GAP SERPL CALC-SCNC: 10 MMOL/L (ref 8–16)
AST SERPL-CCNC: 15 U/L (ref 10–40)
BASOPHILS # BLD AUTO: 0.05 K/UL (ref 0–0.2)
BASOPHILS NFR BLD: 0.4 % (ref 0–1.9)
BILIRUB SERPL-MCNC: 0.7 MG/DL (ref 0.1–1)
BUN SERPL-MCNC: 11 MG/DL (ref 8–23)
CALCIUM SERPL-MCNC: 8.6 MG/DL (ref 8.7–10.5)
CHLORIDE SERPL-SCNC: 101 MMOL/L (ref 95–110)
CO2 SERPL-SCNC: 24 MMOL/L (ref 23–29)
CREAT SERPL-MCNC: 0.8 MG/DL (ref 0.5–1.4)
DIFFERENTIAL METHOD: ABNORMAL
EOSINOPHIL # BLD AUTO: 0.4 K/UL (ref 0–0.5)
EOSINOPHIL NFR BLD: 3.2 % (ref 0–8)
ERYTHROCYTE [DISTWIDTH] IN BLOOD BY AUTOMATED COUNT: 12.5 % (ref 11.5–14.5)
EST. GFR  (NO RACE VARIABLE): >60 ML/MIN/1.73 M^2
GLUCOSE SERPL-MCNC: 78 MG/DL (ref 70–110)
HCT VFR BLD AUTO: 35.6 % (ref 37–48.5)
HCV AB SERPL QL IA: NORMAL
HGB BLD-MCNC: 11.5 G/DL (ref 12–16)
HIV 1+2 AB+HIV1 P24 AG SERPL QL IA: NORMAL
IMM GRANULOCYTES # BLD AUTO: 0.26 K/UL (ref 0–0.04)
IMM GRANULOCYTES NFR BLD AUTO: 2 % (ref 0–0.5)
LYMPHOCYTES # BLD AUTO: 1.1 K/UL (ref 1–4.8)
LYMPHOCYTES NFR BLD: 8.6 % (ref 18–48)
MCH RBC QN AUTO: 31.4 PG (ref 27–31)
MCHC RBC AUTO-ENTMCNC: 32.3 G/DL (ref 32–36)
MCV RBC AUTO: 97 FL (ref 82–98)
MONOCYTES # BLD AUTO: 0.6 K/UL (ref 0.3–1)
MONOCYTES NFR BLD: 4.9 % (ref 4–15)
NEUTROPHILS # BLD AUTO: 10.3 K/UL (ref 1.8–7.7)
NEUTROPHILS NFR BLD: 80.9 % (ref 38–73)
NRBC BLD-RTO: 0 /100 WBC
PLATELET # BLD AUTO: 251 K/UL (ref 150–450)
PMV BLD AUTO: 10.1 FL (ref 9.2–12.9)
POTASSIUM SERPL-SCNC: 3.5 MMOL/L (ref 3.5–5.1)
PROT SERPL-MCNC: 7.5 G/DL (ref 6–8.4)
RBC # BLD AUTO: 3.66 M/UL (ref 4–5.4)
SODIUM SERPL-SCNC: 135 MMOL/L (ref 136–145)
WBC # BLD AUTO: 12.76 K/UL (ref 3.9–12.7)

## 2022-11-11 PROCEDURE — 99284 EMERGENCY DEPT VISIT MOD MDM: CPT | Mod: CR,,,

## 2022-11-11 PROCEDURE — 80053 COMPREHEN METABOLIC PANEL: CPT

## 2022-11-11 PROCEDURE — 85025 COMPLETE CBC W/AUTO DIFF WBC: CPT

## 2022-11-11 PROCEDURE — 86803 HEPATITIS C AB TEST: CPT | Performed by: PHYSICIAN ASSISTANT

## 2022-11-11 PROCEDURE — 99284 PR EMERGENCY DEPT VISIT,LEVEL IV: ICD-10-PCS | Mod: CR,,,

## 2022-11-11 PROCEDURE — 87389 HIV-1 AG W/HIV-1&-2 AB AG IA: CPT | Performed by: PHYSICIAN ASSISTANT

## 2022-11-11 PROCEDURE — 99284 EMERGENCY DEPT VISIT MOD MDM: CPT | Mod: 25

## 2022-11-11 RX ORDER — BENZONATATE 100 MG/1
100 CAPSULE ORAL 3 TIMES DAILY PRN
Qty: 20 CAPSULE | Refills: 0 | Status: SHIPPED | OUTPATIENT
Start: 2022-11-11 | End: 2022-11-21

## 2022-11-11 RX ORDER — GUAIFENESIN 100 MG/5ML
100-200 SOLUTION ORAL EVERY 4 HOURS PRN
Qty: 60 ML | Refills: 0 | Status: SHIPPED | OUTPATIENT
Start: 2022-11-11 | End: 2022-11-21

## 2022-11-11 NOTE — DISCHARGE INSTRUCTIONS
You tested positive for COVID. You should isolate yourself so as not to spread illness to others. After five full days, if you are feeling better and you have not had fever for 24 hours, you can return to your typical daily activities, but you must wear a mask around others for an additional 5 days.     As discussed, there is no specific treatment for this for people who do not require oxygen or ICU care.    Take ibuprofen (also called Advil, Motrin) for your fevers or pain. This medicine is available over-the-counter in 200 mg tablets.  - You may take 600 mg every 6 hours, or 800 mg every 8 hours as needed   - Do not take more than this amount, as it can cause kidney problems, bleeding in your stomach, and other serious problems.   - Do not also take naproxen (Aleve) at the same time or on the same day  - If you have heart problems or uncontrolled high blood pressure, you should not take ibuprofen for more than 3 days without discussing with your doctor    If your fevers or pain are not controlled with ibuprofen, you may also take acetaminophen (also called Tylenol).  - You may take up to 1,000 mg of Tylenol every 6 hours as needed  - Do not take more than 4,000 mg in 24 hours (1 day) as this may cause liver damage  - Many other medicines include acetaminophen (Tylenol) such as: Norco, Vicodin, Tylenol #3, many cold medicines, etc.  - Please read all labels carefully and do not combine medicines that include acetaminophen.  - If you have a history of liver disease or drink alcohol heavily, do not take acetaminophen (Tylenol) since it can damage your liver    Return to the Emergency Department for symptoms including but not limited to: worsening symptoms, shortness of breath or chest pain, vomiting with inability to hold down fluids, passing out/fainting/unconsciousness, or other concerning symptoms.    Below are websites you may reference for information regarding COVID-19 isolation guidelines, home management, and  testing sites.   CDC isolation guidelines:  https://www.cdc.gov/media/releases/2021/s1227-isolation-quarantine-guidance.html  [facility] Testing Sites:  https://ldh.[place].gov/page/3934  [facility] website with testing locations and guidance:  https://www.[facility].org/selfcare  [employer] [place]  https://ready.[place].gov/home/

## 2022-11-11 NOTE — ED NOTES
Pt ambulated around [facility] waiting room per MD order. Ambulatory pulse ox 95% throughout ambulation. Pt denies any symptomsother than fatigue. MD aware.

## 2022-11-11 NOTE — ED PROVIDER NOTES
Encounter Date: 11/11/2022       History     Chief Complaint   Patient presents with    Covid Positive     Test +++ sent here for xray  and infusion     72-year-old female with history of hypertension and sarcoidosis presents from urgent care due to positive COVID test today and concern due to age and comorbidity.  Patient reports experiencing fever cough weakness shortness of breath diarrhea since last Friday.  Patient states the cough is nonproductive. She has been taking Tylenol and Motrin to manage her symptoms at home with relief of fever.  However she does not feel her symptoms are improving.  She denies headaches chest pain abdominal pain dysuria.     Review of patient's allergies indicates:   Allergen Reactions    Sulfa (sulfonamide antibiotics) Rash     Other reaction(s): Rash     Past Medical History:   Diagnosis Date    GERD (gastroesophageal reflux disease)     Headache     High cholesterol     Hyperlipidemia     Hypertension     Sarcoidosis      Past Surgical History:   Procedure Laterality Date    ANKLE FRACTURE SURGERY      ARTHROSCOPIC REPAIR OF ROTATOR CUFF OF SHOULDER Right 7/23/2018    Procedure: REPAIR, ROTATOR CUFF, ARTHROSCOPIC;  Surgeon: YELENA Olivera MD;  Location: [facility] OR [address] [place] [address];  Service: Orthopedics;  Laterality: Right;  regional with catheter, interscalene  Vladimir/Linvatec & Ann/Arthrex notified 7-18 LO    ARTHROSCOPY OF SHOULDER WITH DECOMPRESSION OF SUBACROMIAL SPACE Right 7/23/2018    Procedure: ARTHROSCOPY, SHOULDER, WITH SUBACROMIAL SPACE DECOMPRESSION;  Surgeon: YELENA Olivera MD;  [facility];  Service: Orthopedics;  Laterality: Right;    ARTHROSCOPY OF SHOULDER WITH REMOVAL OF DISTAL CLAVICLE Right 7/23/2018    Procedure: ARTHROSCOPY, SHOULDER, WITH DISTAL CLAVICLE EXCISION;  Surgeon: YELENA Olivera MD;  Location: [facility] OR [address] [place] [address];  Service: Orthopedics;  Laterality: Right;    COLONOSCOPY N/A 5/30/2017    Procedure: COLONOSCOPY;  Surgeon: Jed KC  MD Radha;  Location: [facility] (4TH FLR);  Service: Endoscopy;  Laterality: N/A;    ESOPHAGOGASTRODUODENOSCOPY N/A 2020    Procedure: EGD (ESOPHAGOGASTRODUODENOSCOPY);  Surgeon: Russell Durand MD;  Location: [facility] ENDO (4TH FLR);  Service: Endoscopy;  Laterality: N/A;  COVID test on 20 at [facility]    ESOPHAGOGASTRODUODENOSCOPY N/A 2021    Procedure: EGD (ESOPHAGOGASTRODUODENOSCOPY);  Surgeon: Chucho Mooney MD;  Location: [facility] ENDO (4TH FLR);  Service: Endoscopy;  Laterality: N/A;  vaccinated 2/10/21 see media tab 21, instr portal and mailed -ml    FIXATION OF TENDON Right 2018    Procedure: FIXATION, TENDON;  Surgeon: YELENA Olivera MD;  [facility];  Service: Orthopedics;  Laterality: Right;    HYSTERECTOMY  age 39    endo    LEG SURGERY      tibia    OOPHORECTOMY  age 39    endo    TONSILLECTOMY       Family History   Problem Relation Age of Onset    Cancer Mother 61        colon    Cancer Father         throat    Hypertension Sister     Breast cancer Sister 59    Migraines Maternal Grandmother     Hypertension Brother     Diabetes Sister     Hypertension Sister     Stroke Neg Hx     Ovarian cancer Neg Hx     Miscarriages / Stillbirths Neg Hx     Eclampsia Neg Hx      Social History     Tobacco Use    Smoking status: Former     Packs/day: 1.00     Years: 3.00     Pack years: 3.00     Types: Cigarettes     Quit date: 2010     Years since quittin.3    Smokeless tobacco: Never    Tobacco comments:     last smoked 15 years ago   Substance Use Topics    Alcohol use: Not Currently     Comment: occasional     Drug use: No     Review of Systems   Constitutional:  Positive for fever. Negative for activity change.   HENT:  Negative for congestion, sore throat and trouble swallowing.    Eyes:  Negative for photophobia and visual disturbance.   Respiratory:  Positive for cough and shortness of breath.    Cardiovascular:  Negative for chest pain and leg swelling.    Gastrointestinal:  Positive for diarrhea and nausea. Negative for abdominal pain and vomiting.   Genitourinary:  Negative for dysuria.   Musculoskeletal:  Negative for back pain, myalgias and neck pain.   Skin:  Negative for rash.   Allergic/Immunologic: Negative for immunocompromised state.   Neurological:  Positive for weakness (Generalized). Negative for dizziness.   Hematological:  Does not bruise/bleed easily.     Physical Exam     Initial Vitals [11/11/22 1309]   BP Pulse Resp Temp SpO2   115/72 82 18 98.4 °F (36.9 °C) 99 %      MAP       --         Physical Exam    Nursing note and vitals reviewed.  Constitutional: She appears well-developed and well-nourished.   HENT:   Head: Normocephalic and atraumatic.   Eyes: Conjunctivae and EOM are normal.   Neck:   Normal range of motion.  Cardiovascular:  Normal rate and regular rhythm.           Pulmonary/Chest: Breath sounds normal. No respiratory distress. She has no wheezes. She has no rales.   Abdominal: Abdomen is soft. She exhibits no distension. There is no abdominal tenderness. There is no rebound.   Musculoskeletal:         General: No tenderness. Normal range of motion.      Cervical back: Normal range of motion.     Neurological: She is alert and oriented to person, place, and time. She has normal strength.   Skin: Skin is warm and dry.   Psychiatric: She has a normal mood and affect. Thought content normal.       ED Course   Procedures  Labs Reviewed   CBC W/ AUTO DIFFERENTIAL - Abnormal; Notable for the following components:       Result Value    WBC 12.76 (*)     RBC 3.66 (*)     Hemoglobin 11.5 (*)     Hematocrit 35.6 (*)     MCH 31.4 (*)     Immature Granulocytes 2.0 (*)     Gran # (ANC) 10.3 (*)     Immature Grans (Abs) 0.26 (*)     Gran % 80.9 (*)     Lymph % 8.6 (*)     All other components within normal limits   COMPREHENSIVE METABOLIC PANEL - Abnormal; Notable for the following components:    Sodium 135 (*)     Calcium 8.6 (*)     Albumin 3.3  (*)     All other components within normal limits   HIV 1 / 2 ANTIBODY    Narrative:     Release to patient->Immediate   HEPATITIS C ANTIBODY    Narrative:     Release to patient->Immediate          Imaging Results              X-Ray Chest PA And Lateral (Final result)  Result time 11/11/22 16:07:07      Final result by Alejandro Noguera MD (11/11/22 16:07:07)                   Impression:      No active cardiac or pulmonary findings      Electronically signed by: Alejandro Noguera  Date:    11/11/2022  Time:    16:07               Narrative:    EXAMINATION:  XR CHEST PA AND LATERAL    CLINICAL HISTORY:  Pain, unspecified    TECHNIQUE:  PA and lateral views of the chest were performed.    COMPARISON:  August 11, 2022    FINDINGS:  Normal heart size.  Normal pulmonary vasculature.  No focal infiltrates.  No pleural fluid or pneumothorax.  Mild scoliosis and degenerative changes spine.  No acute bony abnormalities.                                       Medications - No data to display        APC / Resident Notes:   72 y.o. year old female COVID positive since to emergency department by urgent care     On exam patient is afebrile and nontoxic. HEENT exam wnl. Heart rate and rhythm are regular. Lungs with clear breath sounds throughout. Abdomen is soft, nontender. No edema.    DDx includes but is not limited to COVID, pneumonia    ED workup reveals WBC of 12.76 likely due to COVID infection hemoglobin is stable at 11.5  Chest x-ray benign not concerning of pneumonia.  It is likely her symptoms continued due to her viral syndrome being affected with COVID.   Patient will be signed out to oncoming team for discharge after we observation of symptoms     Patient Discussed findings and plan with patient who verbalized understanding and agrees with the plan and course of treatment. Return to ED precautions discussed. Patient is stable for discharge. I discussed the care of this patient with my supervising physician.         Attending Attestation:     Physician Attestation Statement for NP/PA:   I discussed this assessment and plan of this patient with the NP/PA, but I did not personally examine the patient. The face to face encounter was performed by the NP/PA.                         Clinical Impression:   Final diagnoses:  [R52] Pain  [U07.1] COVID-19 virus infection (Primary)        ED Disposition Condition    Discharge Stable          ED Prescriptions       Medication Sig Dispense Start Date End Date Auth. Provider    benzonatate (TESSALON) 100 MG capsule Take 1 capsule (100 mg total) by mouth 3 (three) times daily as needed for Cough. 20 capsule 11/11/2022 11/21/2022 Mario Anders PA-C    guaiFENesin 100 mg/5 ml (ROBITUSSIN) 100 mg/5 mL syrup Take 5-10 mLs (100-200 mg total) by mouth every 4 (four) hours as needed for Cough. 60 mL 11/11/2022 11/21/2022 Mario Anders PA-C          Follow-up Information       Follow up With Specialties Details Why Contact Info    Aidee Lora MD Family Medicine   [address]  [address]  [facility] [address]  [phone]               Angela Carranza PA-C  11/11/22 2130       Sarah Bro MD  11/12/22 1584

## 2022-11-11 NOTE — PROVIDER PROGRESS NOTES - EMERGENCY DEPT.
Encounter Date: 11/11/2022    ED Physician Progress Notes          Patient signed out to me by my colleague with instructions to follow-up pending work-up. Please see main ED note for previous ED stay documentation. Patient signed out to me with CXR and ambulatory POx pending.    CXR without large consolidation, pleural effusion, or pneumothorax on my read. Ambulatory POx 95%. Okay for outpatient follow-up with PCP. RX for Tessalon and Robitussin provided. COVID home management discussed. POx meter provided. Patient expresses understanding and agreeable to the plan. Return to ED precautions given for new, worsening, or concerning symptoms.    ED Diagnosis:  Final diagnoses:  [R52] Pain  [U07.1] COVID-19 virus infection (Primary)    ED Disposition:   ED Disposition Condition    Discharge Stable

## 2022-11-14 ENCOUNTER — PATIENT MESSAGE (OUTPATIENT)
Dept: FAMILY MEDICINE | Facility: CLINIC | Age: 72
End: 2022-11-14
Payer: COMMERCIAL

## 2022-11-14 ENCOUNTER — TELEPHONE (OUTPATIENT)
Dept: INFECTIOUS DISEASES | Facility: HOSPITAL | Age: 72
End: 2022-11-14
Payer: COMMERCIAL

## 2022-11-14 ENCOUNTER — NURSE TRIAGE (OUTPATIENT)
Dept: ADMINISTRATIVE | Facility: CLINIC | Age: 72
End: 2022-11-14
Payer: COMMERCIAL

## 2022-11-14 DIAGNOSIS — U07.1 COVID-19: Primary | ICD-10-CM

## 2022-11-14 DIAGNOSIS — U07.1 COVID: ICD-10-CM

## 2022-11-14 RX ORDER — METHYLPREDNISOLONE 4 MG/1
TABLET ORAL
Qty: 1 EACH | Refills: 0 | Status: SHIPPED | OUTPATIENT
Start: 2022-11-14 | End: 2022-12-05

## 2022-11-14 NOTE — TELEPHONE ENCOUNTER
Pt called due to [facility]Sm response and she said that she has a worsening of cough she has tessalon pearls RX and she said that cough is worse and congestion. Pt denies any SOB but she said that she cant stop coughing. Pt has HTN so she is limited on the cough medication. Pt triaged and care advice is to see MD today or tomorrow. If they can do a virtual she would love this if not pt told can do OCA , RR or UC and pt will see if Md can assist she can call back as needed or SOB, questions or concerns                Reason for Disposition   Continuous (nonstop) coughing interferes with work or school and no improvement using cough treatment per Care Advice    Additional Information   Negative: Bluish (or gray) lips or face   Negative: SEVERE difficulty breathing (e.g., struggling for each breath, speaks in single words)   Negative: Rapid onset of cough and has hives   Negative: Coughing started suddenly after medicine, an allergic food or bee sting   Negative: Difficulty breathing after exposure to flames, smoke, or fumes   Negative: Sounds like a life-threatening emergency to the triager   Negative: MODERATE difficulty breathing (e.g., speaks in phrases, SOB even at rest, pulse 100-120) and still present when not coughing   Negative: Chest pain present when not coughing   Negative: Passed out (i.e., fainted, collapsed and was not responding)   Negative: Patient sounds very sick or weak to the triager   Negative: MILD difficulty breathing (e.g., minimal/no SOB at rest, SOB with walking, pulse <100) and still present when not coughing   Negative: Coughed up > 1 tablespoon (15 ml) blood (Exception: Blood-tinged sputum.)   Negative: Fever > 103 F (39.4 C)   Negative: Fever > 101 F (38.3 C) and over 60 years of age   Negative: Fever > 100.0 F (37.8 C) and has diabetes mellitus or a weak immune system (e.g., HIV positive, cancer chemotherapy, organ transplant, splenectomy, chronic steroids)   Negative: Fever > 100.0 F (37.8 C)  and bedridden (e.g., nursing home patient, stroke, chronic illness, recovering from surgery)   Negative: Increasing ankle swelling   Negative: Wheezing is present   Negative: SEVERE coughing spells (e.g., whooping sound after coughing, vomiting after coughing)   Negative: Coughing up karen-colored (reddish-brown) or blood-tinged sputum   Negative: Fever present > 3 days (72 hours)   Negative: Fever returns after gone for over 24 hours and symptoms worse or not improved   Negative: Using nasal washes and pain medicine > 24 hours and sinus pain persists   Negative: Known COPD or other severe lung disease (i.e., bronchiectasis, cystic fibrosis, lung surgery) and worsening symptoms (i.e., increased sputum purulence or amount, increased breathing difficulty)    Protocols used: Cough-A-OH

## 2022-11-17 ENCOUNTER — INFUSION (OUTPATIENT)
Dept: INFECTIOUS DISEASES | Facility: HOSPITAL | Age: 72
End: 2022-11-17
Attending: INTERNAL MEDICINE
Payer: COMMERCIAL

## 2022-11-17 VITALS
HEART RATE: 70 BPM | DIASTOLIC BLOOD PRESSURE: 58 MMHG | OXYGEN SATURATION: 95 % | WEIGHT: 195 LBS | TEMPERATURE: 99 F | BODY MASS INDEX: 33.29 KG/M2 | HEIGHT: 64 IN | SYSTOLIC BLOOD PRESSURE: 111 MMHG

## 2022-11-17 DIAGNOSIS — U07.1 COVID-19: Primary | ICD-10-CM

## 2022-11-17 PROCEDURE — 63600175 PHARM REV CODE 636 W HCPCS: Performed by: INTERNAL MEDICINE

## 2022-11-17 PROCEDURE — M0222 HC IV INJECTION, BEBTELOVIMAB, INCL POST ADMIN MONIT: HCPCS | Performed by: INTERNAL MEDICINE

## 2022-11-17 RX ORDER — ONDANSETRON 4 MG/1
4 TABLET, ORALLY DISINTEGRATING ORAL
Status: ACTIVE | OUTPATIENT
Start: 2022-11-17 | End: 2022-11-18

## 2022-11-17 RX ORDER — ALBUTEROL SULFATE 90 UG/1
2 AEROSOL, METERED RESPIRATORY (INHALATION)
Status: ACTIVE | OUTPATIENT
Start: 2022-11-17 | End: 2022-11-20

## 2022-11-17 RX ORDER — ACETAMINOPHEN 325 MG/1
650 TABLET ORAL
Status: ACTIVE | OUTPATIENT
Start: 2022-11-17 | End: 2022-11-18

## 2022-11-17 RX ORDER — DIPHENHYDRAMINE HYDROCHLORIDE 50 MG/ML
25 INJECTION INTRAMUSCULAR; INTRAVENOUS
Status: ACTIVE | OUTPATIENT
Start: 2022-11-17 | End: 2022-11-18

## 2022-11-17 RX ORDER — EPINEPHRINE 0.3 MG/.3ML
0.3 INJECTION SUBCUTANEOUS
Status: ACTIVE | OUTPATIENT
Start: 2022-11-17 | End: 2022-11-20

## 2022-11-17 RX ORDER — BEBTELOVIMAB 87.5 MG/ML
175 INJECTION, SOLUTION INTRAVENOUS
Status: COMPLETED | OUTPATIENT
Start: 2022-11-17 | End: 2022-11-17

## 2022-11-17 RX ADMIN — BEBTELOVIMAB 175 MG: 87.5 INJECTION, SOLUTION INTRAVENOUS at 09:11

## 2022-11-17 NOTE — PROGRESS NOTES
1015  Patient remains with no signs of complications noted. Patient received Bebtelovimab IV Injection according to FDA recommendations and [facility] SOP without complications noted and left with mask in place. Drug fact sheet provided. Pt discharged home. Ambulatory. Remains AAox3. No distress noted. RR even and unlabored.

## 2022-11-17 NOTE — PROGRESS NOTES
Patient arrives for Bebtelovimab IV Injection. Ambulatory. Pt AAox3. No distress noted. RR even and unlabored.     Symptoms and onset date:  11/11/22    Tested COVID + on 11/11/22

## 2022-12-09 ENCOUNTER — TELEPHONE (OUTPATIENT)
Dept: GASTROENTEROLOGY | Facility: CLINIC | Age: 72
End: 2022-12-09
Payer: COMMERCIAL

## 2022-12-09 NOTE — TELEPHONE ENCOUNTER
----- Message from Latia Cameron sent at 12/9/2022 10:52 AM CST -----  Type:  Needs Medical Advice    Who Called: Pt  Would the patient rather a call back or a response via [facility]? call  Best Call Back Number: [phone]  Additional Information: Pt would like to CANCEL procedure for a colonoscopy appt date 12.12.22.  Will call back to reschedule.

## 2022-12-09 NOTE — TELEPHONE ENCOUNTER
Patient cancelled her colonoscopy and egd for 12/12/2022 because her  is in the hospital. The patient will call us back to rescheduled.

## 2023-01-01 NOTE — HPI
Estephania Lyman is a 67F with GERD, HTN, HLD, sarcoid who presents with non-radiating substernal chest pain/burning. The patient states she has not taken her PPI in 2 days. She reports an episode of emesis yesterday and her CP awoke her from her sleep. It has occurred intermittently since then. She denies any abdominal pain, SOB, dizziness, syncope, diarrhea, fever, HA, cough, dysuria. She was given GI cocktail in ED and experienced complete relief. She denies prior MI history, denies family cardiac history, no DM. Negative stress test in 2015.    In ED, trop 0.006>0.009, EKG without ischemic changes, CXR negative.   Progress Note - NICU   Baby Nile Garcia White 13 days male MRN: [id number]  Unit/Bed#: NICU 15 Encounter: [id number]      Patient Active Problem List   Diagnosis   • Prematurity, 2,000-2,499 grams, 33-34 completed weeks   • Slow feeding of    • Apnea of prematurity       Subjective/Objective     SUBJECTIVE: Baby Boy (Minal Villagran) Max Morales is now 15days old, currently adjusted at 36w 1d weeks gestation. Vital signs remain stable in open crib, comfortable in RA. Last B/D events, on  @ 2211 pm  and was self resolved-5 day event watch . No weight change overnight, but already above birth weight. Tolerating MBM 22cal with HHMF. PO intake barely above the shift minimum in the last 24 hours. No labs for review today.          OBJECTIVE:     Vitals:   BP (!) 93/40 (BP Location: Right leg)   Pulse 147   Temp 98.3 °F (36.8 °C) (Axillary)   Resp 40   Ht 18.5" (47 cm)   Wt 2425 g (5 lb 5.5 oz)   HC 33 cm (12.99")   SpO2 99%   BMI 10.98 kg/m²   71 %ile (Z= 0.57) based on Ar (Boys, 22-50 Weeks) head circumference-for-age based on Head Circumference recorded on 2023. Weight change: 0 g (0 lb)    I/O:  I/O        0701   0700  0701   0700    P. O. 218 308    Feedings 132 92    Total Intake(mL/kg) 350 (148.94) 400 (164.95)    Net +350 +400          Unmeasured Urine Occurrence 8 x 8 x    Unmeasured Stool Occurrence 6 x 7 x            Feeding:        FEEDING TYPE: Feeding Type: Breast milk    BREASTMILK STACEY/OZ (IF FORTIFIED): Breast Milk stacey/oz: 22 Kcal   FORTIFICATION (IF ANY): Fortification of Breast Milk/Formula: Neosure   FEEDING ROUTE: Feeding Route: Bottle   WRITTEN FEEDING VOLUME: Breast Milk Dose (ml): 38 mL   LAST FEEDING VOLUME GIVEN PO: Breast Milk - P.O. (mL): 38 mL   LAST FEEDING VOLUME GIVEN NG: Breast Milk - Tube (mL): 10 mL       IVF: none      Respiratory settings: O2 Device: None (Room air)            ABD events: 0 ABDs, 0 self resolved, 0 stimulation last event  at 2211 5 day event watch    Current Facility-Administered Medications   Medication Dose Route Frequency Provider Last Rate Last Admin   • Poly-Vi-Sol/Iron (POLY-VI-SOL WITH IRON) oral solution 1 mL  1 mL Oral Daily CHESTER Albert   1 mL at 23 0975   • sucrose 24 % oral solution 1 mL  1 mL Oral Q5 Min PRN CHESTER Jama           Physical Exam:   General Appearance:  Alert, active, no distress  Head:  Normocephalic, AFOF                             Eyes:  Conjunctiva clear  Ears:  Normally placed, no anomalies  Nose: Nares patent                 Respiratory:  No grunting, flaring, retractions, breath sounds clear and equal    Cardiovascular:  Regular rate and rhythm. No murmur. Adequate perfusion/capillary refill. Abdomen:   Soft, non-distended, no masses, bowel sounds present  Genitourinary:  Normal genitalia  Musculoskeletal:  Moves all extremities equally  Skin/Hair/Nails:   Skin warm, dry, and intact, no rashes               Neurologic:   Normal tone and reflexes    ----------------------------------------------------------------------------------------------------------------------  IMAGING/LABS/OTHER TESTS    Lab Results: No results found for this or any previous visit (from the past 24 hour(s)). Imaging: No results found. Other Studies: none    ----------------------------------------------------------------------------------------------------------------------    Assessment/Plan:    GESTATIONAL AGE: late , AGA infant born via repeat C/S due to category II FHT (tachycardia), poor BPP 4/10, and suspected sepsis in mother.  Maternal UTI diagnosed morning prior to delivery, suspected progression to pyelonephritis and possible sepsis.   7/10 Hep B given on admission to NICU   passed CCHD   screen normal.   Hearing screen passed     Requires intensive monitoring for respiratory distress and rule out sepsis. High probability of life threatening clinical deterioration in infant's condition without treatment.      PLAN:  - Monitor temps in open crib  - Mother requests circumcision  - Car seat test before discharge  - Determine PCP     RESPIRATORY: infant required CPAP 5cm and O2 as high as 80% in the delivery room, admitted to NICU on CPAP 5cm and 40% O2. Initial CXR well inflated to 9 ribs, no pneumothorax, and fairly clear. Initial blood gas with mixed respiratory and metabolic acidosis: 6.43/59/51/06.9/-57.   7/11 repeat gas improved 7.35/37/14//21/-4; weaned to RA after board rounds  7/13 has had multiple ABD events (s/p amp/gent for sepsis eval, blood culture remains negative), is on 7-day watch with earliest d/c 7/20, no distress or increased WOB  7/14 BD event, does not change earliest discharge date   7/20 BD event -5 day event watch   Requires intensive monitoring for respiratory distress. High probability of life threatening clinical deterioration in infant's condition without treatment.      PLAN:  - Monitor in RA  - Goal saturations > 90%     CARDIAC: good perfusion, no murmur, 4 extremity BP well correlated.     Requires intensive monitoring for PDA, deterioration in perfusion. High probability of life threatening clinical deterioration in infant's condition without treatment.      PLAN:  - Continuous cardio/respiratory monitoring  - Monitor clinically     FEN/GI: initial glucose 47. Initially NPO with D10W infusing via PIV. Mother will be pumping for breastmilk, and agrees to donor milk as a bridge. Plan to start trophic feeds in the morning.   7/11 working on advancing feeds; BMP in AM WNL aside from Ca 8    7/22 77 % PO feeds will make ad shanice with shift jordan     Requires intensive monitoring for hypoglycemia and nutritional deficiency. High probability of life threatening clinical deterioration in infant's condition without treatment.      PLAN:  - Continue 22 stacey MBM/DBM with Neosure, ad shanice on demand with shift jordan 172 ml   - Monitor I/O, adjust TF PRN  - Monitor weight  - Encourage maternal lactation  - continue vitamin DPoly-vi-Sol with iron daily     ID: Sepsis eval indicated due to maternal pyelonephritis and suspected maternal sepsis. Blood culture sent upon admission, and antibiotics started. Maternal hepatitis B status negative. Infant was given hep B vaccine at birth. S/p antibiotics, blood culture remained negative x5 days.     Requires intensive monitoring for sepsis. High probability of life threatening clinical deterioration in infant's condition without treatment.      PLAN:  - Monitor clinically     HEME: no evidence of blood loss. At risk of anemia of prematurity.  H/H on CBC 15/43%.     Requires intensive monitoring for anemia. High probability of life threatening clinical deterioration in infant's condition without treatment.      PLAN:  - Monitor clinically  - Trend Hct on CBG, CBC periodically  - continue iron supplement daily     JAUNDICE: Mom O+, Ab negative.  Baby O+, ANA/Elizabeth negative  7/11 Tbili at 30 HOL 5  7/13 Tbili 8.43  7/15 Tbili 7.79, spontaneous decline     NEURO: Tone and activity level appropriate for gestational age. No concerns.     PLAN:  - Monitor clinically  - Speech, OT/PT if needed     SOCIAL: FOB involved and present in the delivery room.   Maternal history of marijuana use, but it has been several years since she used.     COMMUNICATION: I updated the mother at bedside on the progress, and the plan of care.

## 2023-02-24 DIAGNOSIS — Z12.31 OTHER SCREENING MAMMOGRAM: ICD-10-CM

## 2023-04-03 ENCOUNTER — TELEPHONE (OUTPATIENT)
Dept: SURGERY | Facility: CLINIC | Age: 73
End: 2023-04-03
Payer: COMMERCIAL

## 2023-04-03 ENCOUNTER — PATIENT MESSAGE (OUTPATIENT)
Dept: SURGERY | Facility: CLINIC | Age: 73
End: 2023-04-03
Payer: COMMERCIAL

## 2023-04-03 NOTE — TELEPHONE ENCOUNTER
A call was received from this patient re: case request referral orders to schedule this patient for both an EGD,and a Colonoscopy. The patient verbally consented to be scheduled to have both procedures for 05/24/2023.  The patient was explained as follows:   Patient was advised a designated  is required on the day of the Colonoscopy to drive the patient home and the  must be at least. 18 years old.Colonoscopy Prep instructions were thoroughly explained and discussed with the patient.It was emphasized, and reiterated to the patient, to please not to follow the bowel prep instructions that comes with the bowel prep package.However, to please follow the prep instructions that will be received in the mail,or via the [facility] portal, or by both modes of delivery, which ever method of delivery the patient prefers,from the [facility] LPN   Patient acknowledges understanding Prep instructions as explained and discussed on the phone.. Patient was advised the Colonoscopy Prep instructions discussed and explained on the phone,are being mailed out to the patient's verified address on file,or put onto the [facility] portal,or both methods of delivery, whichever the patient prefers. Patient's address on file was verified with the patient for accuracy of mailing. Patient's medications on file was reviewed with the patient for accuracy of information. Patient denies taking  any other medications other than those listed and verified on medication profile.Patient was explained the Colonoscopy will be performed here at [facility]. Patient was further explained the Pre-Op will call one day prior to the procedure date, to discuss Pre-Op instructions;and what time to report for the Colonoscopy. The patient was given the opportunity to ask any questions about the Colonoscopy. No further issues were discussed.

## 2023-05-05 ENCOUNTER — OFFICE VISIT (OUTPATIENT)
Dept: FAMILY MEDICINE | Facility: CLINIC | Age: 73
End: 2023-05-05
Payer: COMMERCIAL

## 2023-05-05 VITALS
DIASTOLIC BLOOD PRESSURE: 73 MMHG | WEIGHT: 190.69 LBS | OXYGEN SATURATION: 96 % | SYSTOLIC BLOOD PRESSURE: 146 MMHG | HEART RATE: 75 BPM | BODY MASS INDEX: 32.56 KG/M2 | HEIGHT: 64 IN

## 2023-05-05 DIAGNOSIS — G89.29 CHRONIC RIGHT-SIDED LOW BACK PAIN WITH RIGHT-SIDED SCIATICA: Primary | ICD-10-CM

## 2023-05-05 DIAGNOSIS — M54.41 CHRONIC RIGHT-SIDED LOW BACK PAIN WITH RIGHT-SIDED SCIATICA: Primary | ICD-10-CM

## 2023-05-05 DIAGNOSIS — J30.9 ALLERGIC RHINITIS, UNSPECIFIED SEASONALITY, UNSPECIFIED TRIGGER: ICD-10-CM

## 2023-05-05 LAB
BILIRUB UR QL STRIP: NEGATIVE
CLARITY UR REFRACT.AUTO: CLEAR
COLOR UR AUTO: YELLOW
GLUCOSE UR QL STRIP: NEGATIVE
HGB UR QL STRIP: ABNORMAL
KETONES UR QL STRIP: NEGATIVE
LEUKOCYTE ESTERASE UR QL STRIP: NEGATIVE
NITRITE UR QL STRIP: NEGATIVE
PH UR STRIP: 6 [PH] (ref 5–8)
PROT UR QL STRIP: NEGATIVE
SP GR UR STRIP: 1.01 (ref 1–1.03)
URN SPEC COLLECT METH UR: ABNORMAL

## 2023-05-05 PROCEDURE — 3077F PR MOST RECENT SYSTOLIC BLOOD PRESSURE >= 140 MM HG: ICD-10-PCS | Mod: CPTII,S$GLB,, | Performed by: NURSE PRACTITIONER

## 2023-05-05 PROCEDURE — 99214 PR OFFICE/OUTPT VISIT, EST, LEVL IV, 30-39 MIN: ICD-10-PCS | Mod: 25,S$GLB,, | Performed by: NURSE PRACTITIONER

## 2023-05-05 PROCEDURE — 87086 URINE CULTURE/COLONY COUNT: CPT | Performed by: NURSE PRACTITIONER

## 2023-05-05 PROCEDURE — 96372 THER/PROPH/DIAG INJ SC/IM: CPT | Mod: S$GLB,,, | Performed by: NURSE PRACTITIONER

## 2023-05-05 PROCEDURE — 99214 OFFICE O/P EST MOD 30 MIN: CPT | Mod: 25,S$GLB,, | Performed by: NURSE PRACTITIONER

## 2023-05-05 PROCEDURE — 3008F PR BODY MASS INDEX (BMI) DOCUMENTED: ICD-10-PCS | Mod: CPTII,S$GLB,, | Performed by: NURSE PRACTITIONER

## 2023-05-05 PROCEDURE — 1159F MED LIST DOCD IN RCRD: CPT | Mod: CPTII,S$GLB,, | Performed by: NURSE PRACTITIONER

## 2023-05-05 PROCEDURE — 96372 PR INJECTION,THERAP/PROPH/DIAG2ST, IM OR SUBCUT: ICD-10-PCS | Mod: S$GLB,,, | Performed by: NURSE PRACTITIONER

## 2023-05-05 PROCEDURE — 3078F PR MOST RECENT DIASTOLIC BLOOD PRESSURE < 80 MM HG: ICD-10-PCS | Mod: CPTII,S$GLB,, | Performed by: NURSE PRACTITIONER

## 2023-05-05 PROCEDURE — 3288F PR FALLS RISK ASSESSMENT DOCUMENTED: ICD-10-PCS | Mod: CPTII,S$GLB,, | Performed by: NURSE PRACTITIONER

## 2023-05-05 PROCEDURE — 1101F PR PT FALLS ASSESS DOC 0-1 FALLS W/OUT INJ PAST YR: ICD-10-PCS | Mod: CPTII,S$GLB,, | Performed by: NURSE PRACTITIONER

## 2023-05-05 PROCEDURE — 81003 URINALYSIS AUTO W/O SCOPE: CPT | Performed by: NURSE PRACTITIONER

## 2023-05-05 PROCEDURE — 3008F BODY MASS INDEX DOCD: CPT | Mod: CPTII,S$GLB,, | Performed by: NURSE PRACTITIONER

## 2023-05-05 PROCEDURE — 1101F PT FALLS ASSESS-DOCD LE1/YR: CPT | Mod: CPTII,S$GLB,, | Performed by: NURSE PRACTITIONER

## 2023-05-05 PROCEDURE — 1125F AMNT PAIN NOTED PAIN PRSNT: CPT | Mod: CPTII,S$GLB,, | Performed by: NURSE PRACTITIONER

## 2023-05-05 PROCEDURE — 3288F FALL RISK ASSESSMENT DOCD: CPT | Mod: CPTII,S$GLB,, | Performed by: NURSE PRACTITIONER

## 2023-05-05 PROCEDURE — 1159F PR MEDICATION LIST DOCUMENTED IN MEDICAL RECORD: ICD-10-PCS | Mod: CPTII,S$GLB,, | Performed by: NURSE PRACTITIONER

## 2023-05-05 PROCEDURE — 3078F DIAST BP <80 MM HG: CPT | Mod: CPTII,S$GLB,, | Performed by: NURSE PRACTITIONER

## 2023-05-05 PROCEDURE — 99999 PR PBB SHADOW E&M-EST. PATIENT-LVL V: CPT | Mod: PBBFAC,,, | Performed by: NURSE PRACTITIONER

## 2023-05-05 PROCEDURE — 1160F PR REVIEW ALL MEDS BY PRESCRIBER/CLIN PHARMACIST DOCUMENTED: ICD-10-PCS | Mod: CPTII,S$GLB,, | Performed by: NURSE PRACTITIONER

## 2023-05-05 PROCEDURE — 1160F RVW MEDS BY RX/DR IN RCRD: CPT | Mod: CPTII,S$GLB,, | Performed by: NURSE PRACTITIONER

## 2023-05-05 PROCEDURE — 99999 PR PBB SHADOW E&M-EST. PATIENT-LVL V: ICD-10-PCS | Mod: PBBFAC,,, | Performed by: NURSE PRACTITIONER

## 2023-05-05 PROCEDURE — 1125F PR PAIN SEVERITY QUANTIFIED, PAIN PRESENT: ICD-10-PCS | Mod: CPTII,S$GLB,, | Performed by: NURSE PRACTITIONER

## 2023-05-05 PROCEDURE — 3077F SYST BP >= 140 MM HG: CPT | Mod: CPTII,S$GLB,, | Performed by: NURSE PRACTITIONER

## 2023-05-05 RX ORDER — KETOROLAC TROMETHAMINE 30 MG/ML
30 INJECTION, SOLUTION INTRAMUSCULAR; INTRAVENOUS ONCE
Status: COMPLETED | OUTPATIENT
Start: 2023-05-05 | End: 2023-05-05

## 2023-05-05 RX ORDER — LEVOCETIRIZINE DIHYDROCHLORIDE 5 MG/1
5 TABLET, FILM COATED ORAL NIGHTLY
Qty: 30 TABLET | Refills: 11 | Status: SHIPPED | OUTPATIENT
Start: 2023-05-05 | End: 2023-06-15

## 2023-05-05 RX ORDER — FLUTICASONE PROPIONATE 50 MCG
1 SPRAY, SUSPENSION (ML) NASAL DAILY
Qty: 16 G | Refills: 3 | Status: SHIPPED | OUTPATIENT
Start: 2023-05-05

## 2023-05-05 RX ADMIN — KETOROLAC TROMETHAMINE 30 MG: 30 INJECTION, SOLUTION INTRAMUSCULAR; INTRAVENOUS at 12:05

## 2023-05-05 NOTE — PROGRESS NOTES
Subjective:       Patient ID: Estephania Lyman is a 72 y.o. female.    Chief Complaint: Tailbone Pain  Estephania Lyman presents today for Evaluation & management of hip pain. Previous patient of . Last seen in 8/9/2022 by PCP, PIPER Lora MD. This is her first visit with me.     Hip Pain   The injury mechanism is unknown. The pain is present in the right hip and right thigh. The quality of the pain is described as aching and shooting. The pain is moderate. The pain has been Fluctuating since onset. Associated symptoms include an inability to bear weight. Pertinent negatives include no loss of motion, loss of sensation, muscle weakness, numbness or tingling. She reports no foreign bodies present. The symptoms are aggravated by movement, palpation and weight bearing. She has tried ice, NSAIDs and rest for the symptoms. The treatment provided moderate relief.     Patient Active Problem List   Diagnosis    HTN (hypertension), benign    Hypercholesterolemia    Hematuria    Abnormal urine    Bilateral impacted cerumen    Osteoarthritis of both hips    Osteopenia    PVC's (premature ventricular contractions)    Sarcoid uveitis    Screening for colon cancer    Sialadenitis - left sided    Cellulitis of submandibular region    Depression    History of sarcoidosis    Incomplete tear of right rotator cuff    Gastroesophageal reflux disease    Family history of colon cancer    GERD (gastroesophageal reflux disease)    Macrocytic anemia    Chronic fatigue    Low vitamin B12 level    Other eosinophilia    COVID       Current Outpatient Medications:     citalopram (CELEXA) 20 MG tablet, Take 1 tablet (20 mg total) by mouth once daily., Disp: 90 tablet, Rfl: 3    clotrimazole-betamethasone 1-0.05% (LOTRISONE) cream, Apply topically 2 (two) times daily., Disp: 45 g, Rfl: 0    cyanocobalamin 1,000 mcg/mL injection, INJECT 1 ML INTRAMUSCULARLY  ONCE EVERY MONTH, Disp: 1 mL, Rfl: 0    felodipine (PLENDIL) 10 MG 24 hr tablet,  "Take 1 tablet (10 mg total) by mouth once daily., Disp: 90 tablet, Rfl: 3    pravastatin (PRAVACHOL) 40 MG tablet, Take 1 tablet (40 mg total) by mouth once daily., Disp: 90 tablet, Rfl: 3    sodium,potassium,mag sulfates (SUPREP BOWEL PREP KIT) 17.5-3.13-1.6 gram SolR, Take 177 mLs by mouth once daily., Disp: 1 kit, Rfl: 0    syringe with needle 3 mL 22 x 1 1/2" Syrg, Use to inject B12 shots intramuscularly as directed., Disp: 16 each, Rfl: 1    traMADoL (ULTRAM) 50 mg tablet, Take 50 mg by mouth every 6 (six) hours as needed., Disp: , Rfl:     fluticasone propionate (FLONASE) 50 mcg/actuation nasal spray, 1 spray (50 mcg total) by Each Nostril route once daily., Disp: 16 g, Rfl: 3    levocetirizine (XYZAL) 5 MG tablet, Take 1 tablet (5 mg total) by mouth every evening., Disp: 30 tablet, Rfl: 11    pantoprazole (PROTONIX) 40 MG tablet, Take 1 tablet (40 mg total) by mouth 2 (two) times daily., Disp: 60 tablet, Rfl: 2    Current Facility-Administered Medications:     ketorolac injection 30 mg, 30 mg, Intramuscular, Once, Yesenia Del Angel NP    The following portions of the patient's history were reviewed and updated as appropriate: allergies, past family history, past medical history, past social history and past surgical history.    Review of Systems   Constitutional:  Negative for fatigue and fever.   HENT:  Positive for congestion and postnasal drip.    Eyes:  Negative for visual disturbance.   Endocrine: Negative for polydipsia and polyuria.   Genitourinary:  Positive for flank pain (?). Negative for dysuria, frequency and hematuria.   Musculoskeletal:  Positive for arthralgias, back pain and myalgias.   Skin:  Negative for color change and wound.   Neurological:  Negative for tingling, weakness and numbness.     Objective:      BP (!) 146/73 (BP Location: Right arm, Patient Position: Sitting, BP Method: Large (Automatic))   Pulse 75   Ht 5' 4" (1.626 m)   Wt 86.5 kg (190 lb 11.2 oz)   LMP  (LMP " Unknown)   SpO2 96%   BMI 32.73 kg/m²     Physical Exam  Constitutional:       General: She is not in acute distress.     Appearance: Normal appearance.   Cardiovascular:      Rate and Rhythm: Normal rate and regular rhythm.      Pulses: Normal pulses.      Heart sounds: Normal heart sounds.   Pulmonary:      Effort: Pulmonary effort is normal.      Breath sounds: Normal breath sounds.   Musculoskeletal:      Lumbar back: No swelling or tenderness.      Right hip: No tenderness. Decreased range of motion. Decreased strength.   Skin:     General: Skin is warm and dry.   Neurological:      Mental Status: She is alert and oriented to person, place, and time.   Psychiatric:         Mood and Affect: Mood normal.         Behavior: Behavior normal.       Assessment:       1. Chronic right-sided low back pain with right-sided sciatica    2. Allergic rhinitis, unspecified seasonality, unspecified trigger        Plan:   Estephania was seen today for tailbone pain.    Diagnoses and all orders for this visit:    Chronic right-sided low back pain with right-sided sciatica  -     CULTURE, URINE  -     Urinalysis  -     X-Ray Lumbar Spine AP And Lateral; Future  -     X-Ray Hip 2 or 3 views Right (with Pelvis when performed); Future  -     ketorolac injection 30 mg    Allergic rhinitis, unspecified seasonality, unspecified trigger  -     levocetirizine (XYZAL) 5 MG tablet; Take 1 tablet (5 mg total) by mouth every evening.  -     fluticasone propionate (FLONASE) 50 mcg/actuation nasal spray; 1 spray (50 mcg total) by Each Nostril route once daily.

## 2023-05-06 LAB
BACTERIA UR CULT: NORMAL
BACTERIA UR CULT: NORMAL

## 2023-05-08 ENCOUNTER — PATIENT MESSAGE (OUTPATIENT)
Dept: FAMILY MEDICINE | Facility: CLINIC | Age: 73
End: 2023-05-08
Payer: COMMERCIAL

## 2023-05-08 DIAGNOSIS — M54.41 CHRONIC RIGHT-SIDED LOW BACK PAIN WITH RIGHT-SIDED SCIATICA: Primary | ICD-10-CM

## 2023-05-08 DIAGNOSIS — G89.29 CHRONIC RIGHT-SIDED LOW BACK PAIN WITH RIGHT-SIDED SCIATICA: Primary | ICD-10-CM

## 2023-05-09 ENCOUNTER — OFFICE VISIT (OUTPATIENT)
Dept: SPINE | Facility: CLINIC | Age: 73
End: 2023-05-09
Payer: COMMERCIAL

## 2023-05-09 VITALS
WEIGHT: 188.69 LBS | HEIGHT: 64 IN | BODY MASS INDEX: 32.21 KG/M2 | DIASTOLIC BLOOD PRESSURE: 58 MMHG | SYSTOLIC BLOOD PRESSURE: 106 MMHG | HEART RATE: 72 BPM

## 2023-05-09 DIAGNOSIS — M51.36 DDD (DEGENERATIVE DISC DISEASE), LUMBAR: ICD-10-CM

## 2023-05-09 DIAGNOSIS — G89.29 CHRONIC RIGHT-SIDED LOW BACK PAIN WITH RIGHT-SIDED SCIATICA: ICD-10-CM

## 2023-05-09 DIAGNOSIS — M47.816 SPONDYLOSIS OF LUMBAR REGION WITHOUT MYELOPATHY OR RADICULOPATHY: Primary | ICD-10-CM

## 2023-05-09 DIAGNOSIS — M54.41 CHRONIC RIGHT-SIDED LOW BACK PAIN WITH RIGHT-SIDED SCIATICA: ICD-10-CM

## 2023-05-09 DIAGNOSIS — M46.1 SACROILIITIS: ICD-10-CM

## 2023-05-09 PROCEDURE — 3074F PR MOST RECENT SYSTOLIC BLOOD PRESSURE < 130 MM HG: ICD-10-PCS | Mod: CPTII,S$GLB,, | Performed by: NURSE PRACTITIONER

## 2023-05-09 PROCEDURE — 99204 OFFICE O/P NEW MOD 45 MIN: CPT | Mod: S$GLB,,, | Performed by: NURSE PRACTITIONER

## 2023-05-09 PROCEDURE — 3078F PR MOST RECENT DIASTOLIC BLOOD PRESSURE < 80 MM HG: ICD-10-PCS | Mod: CPTII,S$GLB,, | Performed by: NURSE PRACTITIONER

## 2023-05-09 PROCEDURE — 1160F PR REVIEW ALL MEDS BY PRESCRIBER/CLIN PHARMACIST DOCUMENTED: ICD-10-PCS | Mod: CPTII,S$GLB,, | Performed by: NURSE PRACTITIONER

## 2023-05-09 PROCEDURE — 99999 PR PBB SHADOW E&M-EST. PATIENT-LVL IV: ICD-10-PCS | Mod: PBBFAC,,, | Performed by: NURSE PRACTITIONER

## 2023-05-09 PROCEDURE — 1125F AMNT PAIN NOTED PAIN PRSNT: CPT | Mod: CPTII,S$GLB,, | Performed by: NURSE PRACTITIONER

## 2023-05-09 PROCEDURE — 3288F FALL RISK ASSESSMENT DOCD: CPT | Mod: CPTII,S$GLB,, | Performed by: NURSE PRACTITIONER

## 2023-05-09 PROCEDURE — 99204 PR OFFICE/OUTPT VISIT, NEW, LEVL IV, 45-59 MIN: ICD-10-PCS | Mod: S$GLB,,, | Performed by: NURSE PRACTITIONER

## 2023-05-09 PROCEDURE — 1160F RVW MEDS BY RX/DR IN RCRD: CPT | Mod: CPTII,S$GLB,, | Performed by: NURSE PRACTITIONER

## 2023-05-09 PROCEDURE — 3008F PR BODY MASS INDEX (BMI) DOCUMENTED: ICD-10-PCS | Mod: CPTII,S$GLB,, | Performed by: NURSE PRACTITIONER

## 2023-05-09 PROCEDURE — 1101F PR PT FALLS ASSESS DOC 0-1 FALLS W/OUT INJ PAST YR: ICD-10-PCS | Mod: CPTII,S$GLB,, | Performed by: NURSE PRACTITIONER

## 2023-05-09 PROCEDURE — 1101F PT FALLS ASSESS-DOCD LE1/YR: CPT | Mod: CPTII,S$GLB,, | Performed by: NURSE PRACTITIONER

## 2023-05-09 PROCEDURE — 3078F DIAST BP <80 MM HG: CPT | Mod: CPTII,S$GLB,, | Performed by: NURSE PRACTITIONER

## 2023-05-09 PROCEDURE — 1125F PR PAIN SEVERITY QUANTIFIED, PAIN PRESENT: ICD-10-PCS | Mod: CPTII,S$GLB,, | Performed by: NURSE PRACTITIONER

## 2023-05-09 PROCEDURE — 3008F BODY MASS INDEX DOCD: CPT | Mod: CPTII,S$GLB,, | Performed by: NURSE PRACTITIONER

## 2023-05-09 PROCEDURE — 99999 PR PBB SHADOW E&M-EST. PATIENT-LVL IV: CPT | Mod: PBBFAC,,, | Performed by: NURSE PRACTITIONER

## 2023-05-09 PROCEDURE — 1159F MED LIST DOCD IN RCRD: CPT | Mod: CPTII,S$GLB,, | Performed by: NURSE PRACTITIONER

## 2023-05-09 PROCEDURE — 3288F PR FALLS RISK ASSESSMENT DOCUMENTED: ICD-10-PCS | Mod: CPTII,S$GLB,, | Performed by: NURSE PRACTITIONER

## 2023-05-09 PROCEDURE — 1159F PR MEDICATION LIST DOCUMENTED IN MEDICAL RECORD: ICD-10-PCS | Mod: CPTII,S$GLB,, | Performed by: NURSE PRACTITIONER

## 2023-05-09 PROCEDURE — 3074F SYST BP LT 130 MM HG: CPT | Mod: CPTII,S$GLB,, | Performed by: NURSE PRACTITIONER

## 2023-05-09 RX ORDER — MELOXICAM 15 MG/1
15 TABLET ORAL DAILY
Qty: 30 TABLET | Refills: 1 | Status: SHIPPED | OUTPATIENT
Start: 2023-05-09 | End: 2023-06-15

## 2023-05-09 NOTE — PROGRESS NOTES
Subjective:     Patient ID: Estephania Lyman is a 72 y.o. female.    Chief Complaint: Back Pain    HPI Ms. Lyman is a 72 year old here for evaluation of low back pain    C/o right low back pain radiating into the right glute, sometimes down the leg  Notes numbness/tingling to glute  Denies Hx similar pain in the past  No PT or injections in the past  Toradol from PCP 5/5 with relief    Lumbar xray 2023    FINDINGS:  Continued convex right curvature thoracolumbar spine.  Lumbar sagittal alignment similar to prior with trace grade 1 anterolisthesis L5 on S1.  The lumbar vertebral body heights and contours relatively stable allowing for scattered endplate degeneration without evidence for acute fracture. Facet degenerative change lower lumbar levels.  Further evaluation as warranted clinically.     Impression:     Please see above     Right hip xray 2023    FINDINGS:  Allowing for relative similar positioning on both views no evidence for acute fracture or definite dislocation right hip.  Bony pelvis is grossly intact.  Probable gas and fecal material projecting over the visualized colon and rectum.  Further evaluation as warranted clinically.     Impression:     Please see above    Past Medical History:   Diagnosis Date    GERD (gastroesophageal reflux disease)     Headache     High cholesterol     Hyperlipidemia     Hypertension     Sarcoidosis        Past Surgical History:   Procedure Laterality Date    ANKLE FRACTURE SURGERY      ARTHROSCOPIC REPAIR OF ROTATOR CUFF OF SHOULDER Right 7/23/2018    Procedure: REPAIR, ROTATOR CUFF, ARTHROSCOPIC;  Surgeon: YELENA Olivera MD;  Location: [facility] OR [address] [place] [address];  Service: Orthopedics;  Laterality: Right;  regional with catheter, interscalene  Vladimir/Linvatec & Ann/Arthrex notified 7-18 LO    ARTHROSCOPY OF SHOULDER WITH DECOMPRESSION OF SUBACROMIAL SPACE Right 7/23/2018    Procedure: ARTHROSCOPY, SHOULDER, WITH SUBACROMIAL SPACE DECOMPRESSION;  Surgeon: YELENA Mckinley  MD Jarod;  [facility];  Service: Orthopedics;  Laterality: Right;    ARTHROSCOPY OF SHOULDER WITH REMOVAL OF DISTAL CLAVICLE Right 7/23/2018    Procedure: ARTHROSCOPY, SHOULDER, WITH DISTAL CLAVICLE EXCISION;  Surgeon: YELENA Olivera MD;  [facility];  Service: Orthopedics;  Laterality: Right;    COLONOSCOPY N/A 5/30/2017    Procedure: COLONOSCOPY;  Surgeon: Jed Garcia MD;  [facility]  Service: Endoscopy;  Laterality: N/A;    ESOPHAGOGASTRODUODENOSCOPY N/A 6/30/2020    Procedure: EGD (ESOPHAGOGASTRODUODENOSCOPY);  Surgeon: Russell Durand MD;  [facility]  Service: Endoscopy;  Laterality: N/A;  COVID test on 6/28/20 at [facility]    ESOPHAGOGASTRODUODENOSCOPY N/A 7/1/2021    Procedure: EGD (ESOPHAGOGASTRODUODENOSCOPY);  Surgeon: Chucho Mooney MD;  [facility]  Service: Endoscopy;  Laterality: N/A;  vaccinated 2/10/21 see media tab 6/7/21, instr portal and mailed -ml    FIXATION OF TENDON Right 7/23/2018    Procedure: FIXATION, TENDON;  Surgeon: YELENA Olivera MD;  Location: [facility] OR [address] [place] [address];  Service: Orthopedics;  Laterality: Right;    HYSTERECTOMY  age 39    endo    LEG SURGERY      tibia    OOPHORECTOMY  age 39    endo    TONSILLECTOMY         Family History   Problem Relation Age of Onset    Cancer Mother 61        colon    Cancer Father         throat    Hypertension Sister     Breast cancer Sister 59    Migraines Maternal Grandmother     Hypertension Brother     Diabetes Sister     Hypertension Sister     Stroke Neg Hx     Ovarian cancer Neg Hx     Miscarriages / Stillbirths Neg Hx     Eclampsia Neg Hx        Social History     Socioeconomic History    Marital status:    Occupational History     Employer: [employer]   Tobacco Use    Smoking status: Former     Packs/day: 1.00     Years: 3.00     Pack years: 3.00     Types: Cigarettes     Quit date: 7/9/2010     Years since quitting:  "12.8    Smokeless tobacco: Never    Tobacco comments:     last smoked 15 years ago   Substance and Sexual Activity    Alcohol use: Not Currently     Comment: occasional     Drug use: No    Sexual activity: Yes     Partners: Male     Birth control/protection: Surgical     Comment: Hysterectomy       Current Outpatient Medications   Medication Sig Dispense Refill    citalopram (CELEXA) 20 MG tablet Take 1 tablet (20 mg total) by mouth once daily. 90 tablet 3    clotrimazole-betamethasone 1-0.05% (LOTRISONE) cream Apply topically 2 (two) times daily. 45 g 0    cyanocobalamin 1,000 mcg/mL injection INJECT 1 ML INTRAMUSCULARLY  ONCE EVERY MONTH 1 mL 0    felodipine (PLENDIL) 10 MG 24 hr tablet Take 1 tablet (10 mg total) by mouth once daily. 90 tablet 3    fluticasone propionate (FLONASE) 50 mcg/actuation nasal spray 1 spray (50 mcg total) by Each Nostril route once daily. 16 g 3    levocetirizine (XYZAL) 5 MG tablet Take 1 tablet (5 mg total) by mouth every evening. 30 tablet 11    pravastatin (PRAVACHOL) 40 MG tablet Take 1 tablet (40 mg total) by mouth once daily. 90 tablet 3    sodium,potassium,mag sulfates (SUPREP BOWEL PREP KIT) 17.5-3.13-1.6 gram SolR Take 177 mLs by mouth once daily. 1 kit 0    syringe with needle 3 mL 22 x 1 1/2" Syrg Use to inject B12 shots intramuscularly as directed. 16 each 1    traMADoL (ULTRAM) 50 mg tablet Take 50 mg by mouth every 6 (six) hours as needed.      pantoprazole (PROTONIX) 40 MG tablet Take 1 tablet (40 mg total) by mouth 2 (two) times daily. 60 tablet 2     No current facility-administered medications for this visit.       Review of patient's allergies indicates:   Allergen Reactions    Sulfa (sulfonamide antibiotics) Rash     Other reaction(s): Rash         Review of Systems   Constitutional: Negative for fever.   Cardiovascular:  Negative for chest pain.   Respiratory:  Negative for shortness of breath.    Musculoskeletal:  Positive for back pain (right). Negative for " falls.   Gastrointestinal:  Negative for abdominal pain, bowel incontinence, nausea and vomiting.   Genitourinary:  Negative for bladder incontinence.   Neurological:  Positive for numbness and paresthesias.      Objective:     General: Estephania is well-developed, well-nourished, appears stated age, in no acute distress, alert and oriented to time, place and person.     General    Vitals reviewed.  Constitutional: She is oriented to person, place, and time. She appears well-developed and well-nourished.   HENT:   Head: Atraumatic.   Nose: Nose normal.   Eyes: Conjunctivae are normal.   Cardiovascular:  Normal rate.            Pulmonary/Chest: Effort normal.   Neurological: She is alert and oriented to person, place, and time.   Psychiatric: She has a normal mood and affect. Her behavior is normal. Judgment and thought content normal.     General Musculoskeletal Exam   Gait: normal     Back (L-Spine & T-Spine) / Neck (C-Spine) Exam     Back (L-Spine & T-Spine) Range of Motion   Extension:  10   Flexion:  80   Lateral bend right:  10   Lateral bend left:  10     Spinal Sensation   Right Side Sensation  L-Spine Level: normal  S-Spine Level: normal  Left Side Sensation  L-Spine Level: normal  S-Spine Level: normal    Other   She has no scoliosis .  Spinal Kyphosis:  Absent      Muscle Strength   Right Upper Extremity   Biceps: 5/5   Deltoid:  5/5  Triceps:  5/5  Left Upper Extremity  Biceps: 5/5   Deltoid:  5/5  Triceps:  5/5  Right Lower Extremity   Hip Flexion: 5/5   Quadriceps:  5/5   Ankle Dorsiflexion:  5/5   Anterior tibial:  5/5   EHL:  5/5  Left Lower Extremity   Hip Flexion: 5/5   Quadriceps:  5/5   Ankle Dorsiflexion:  5/5   Anterior tibial:  5/5   EHL:  5/5    Reflexes     Left Side  Biceps:  2+  Brachioradialis:  2+  Achilles:  2+    Right Side   Biceps:  2+  Brachioradialis:  2+  Achilles:  2+    Vascular Exam     Right Pulses        Carotid:                  2+    Left Pulses        Carotid:                   2+        Assessment:     1. Spondylosis of lumbar region without myelopathy or radiculopathy    2. Chronic right-sided low back pain with right-sided sciatica    3. DDD (degenerative disc disease), lumbar    4. Sacroiliitis         Plan:        Prior records reviewed today  We discussed back pain and the nature of back pain.  We discussed that it is not one thing that causes the pain but an accumulation of multiple things that we do.    Rx meloxicam 15mg daily as needed for pain. Kidney function WNL  Referral to physical therapy for evaluation and treatment of low back pain and good HEP.   We discussed posture sitting and the importance of trying to sit better.    We discussed the benefits of therapy and exercise and continuing to move.   Consider lumbar MRI if no improvement with PT  RTC for followup in 8 weeks    More than 50% of the total time  of 45 minutes was spent face to face in counseling on diagnosis and treatment options. I also counseled patient  on common and most usual side effect of prescribed medications.  I reviewed Primary care, and other specialty's notes to better coordinate patient's care. All questions were answered, and patient voiced understanding.      Follow-up: Follow up in about 8 weeks (around 7/4/2023). If there are any questions prior to this, the patient was instructed to contact the office.

## 2023-05-25 ENCOUNTER — TELEPHONE (OUTPATIENT)
Dept: GASTROENTEROLOGY | Facility: CLINIC | Age: 73
End: 2023-05-25
Payer: MEDICARE

## 2023-05-25 PROBLEM — M51.369 DDD (DEGENERATIVE DISC DISEASE), LUMBAR: Status: ACTIVE | Noted: 2023-05-25

## 2023-05-25 PROBLEM — G89.29 CHRONIC RIGHT-SIDED LOW BACK PAIN WITH RIGHT-SIDED SCIATICA: Status: ACTIVE | Noted: 2023-05-25

## 2023-05-25 PROBLEM — M54.41 CHRONIC RIGHT-SIDED LOW BACK PAIN WITH RIGHT-SIDED SCIATICA: Status: ACTIVE | Noted: 2023-05-25

## 2023-05-25 PROBLEM — M51.36 DDD (DEGENERATIVE DISC DISEASE), LUMBAR: Status: ACTIVE | Noted: 2023-05-25

## 2023-05-25 PROBLEM — M47.816 LUMBAR SPONDYLOSIS: Status: ACTIVE | Noted: 2023-05-25

## 2023-05-29 ENCOUNTER — OFFICE VISIT (OUTPATIENT)
Dept: URGENT CARE | Facility: CLINIC | Age: 73
End: 2023-05-29
Payer: COMMERCIAL

## 2023-05-29 VITALS
HEIGHT: 65 IN | TEMPERATURE: 98 F | HEART RATE: 81 BPM | SYSTOLIC BLOOD PRESSURE: 124 MMHG | RESPIRATION RATE: 16 BRPM | DIASTOLIC BLOOD PRESSURE: 74 MMHG | BODY MASS INDEX: 31.04 KG/M2 | OXYGEN SATURATION: 96 % | WEIGHT: 186.31 LBS

## 2023-05-29 DIAGNOSIS — B96.89 ACUTE BACTERIAL BRONCHITIS: Primary | ICD-10-CM

## 2023-05-29 DIAGNOSIS — J20.8 ACUTE BACTERIAL BRONCHITIS: Primary | ICD-10-CM

## 2023-05-29 PROCEDURE — 99203 OFFICE O/P NEW LOW 30 MIN: CPT | Mod: S$GLB,,, | Performed by: NURSE PRACTITIONER

## 2023-05-29 PROCEDURE — 99203 PR OFFICE/OUTPT VISIT, NEW, LEVL III, 30-44 MIN: ICD-10-PCS | Mod: S$GLB,,, | Performed by: NURSE PRACTITIONER

## 2023-05-29 PROCEDURE — 71046 XR CHEST PA AND LATERAL: ICD-10-PCS | Mod: S$GLB,,, | Performed by: RADIOLOGY

## 2023-05-29 PROCEDURE — 71046 X-RAY EXAM CHEST 2 VIEWS: CPT | Mod: S$GLB,,, | Performed by: RADIOLOGY

## 2023-05-29 RX ORDER — PREDNISONE 50 MG/1
50 TABLET ORAL DAILY
Qty: 5 TABLET | Refills: 0 | Status: SHIPPED | OUTPATIENT
Start: 2023-05-29 | End: 2023-06-03

## 2023-05-29 RX ORDER — ALBUTEROL SULFATE 90 UG/1
2 AEROSOL, METERED RESPIRATORY (INHALATION) EVERY 6 HOURS PRN
Qty: 18 G | Refills: 0 | Status: SHIPPED | OUTPATIENT
Start: 2023-05-29 | End: 2023-10-03

## 2023-05-29 RX ORDER — AMOXICILLIN AND CLAVULANATE POTASSIUM 875; 125 MG/1; MG/1
1 TABLET, FILM COATED ORAL EVERY 12 HOURS
Qty: 20 TABLET | Refills: 0 | Status: SHIPPED | OUTPATIENT
Start: 2023-05-29 | End: 2023-06-08

## 2023-05-29 RX ORDER — BENZONATATE 200 MG/1
200 CAPSULE ORAL EVERY 8 HOURS PRN
Qty: 30 CAPSULE | Refills: 0 | Status: SHIPPED | OUTPATIENT
Start: 2023-05-29 | End: 2023-06-08

## 2023-05-29 NOTE — PROGRESS NOTES
"Subjective:      Patient ID: Estephania Lyman is a 72 y.o. female.    Vitals:  height is 5' 4.5" (1.638 m) and weight is 84.5 kg (186 lb 4.6 oz). Her oral temperature is 98.4 °F (36.9 °C). Her blood pressure is 124/74 and her pulse is 81. Her respiration is 16 and oxygen saturation is 96%.     Chief Complaint: Cough    73 y/o female presents to  today with c/o daily cough x2 months. She reports the cough is dry, although she can feel chest congestion-in which she is having trouble coughing up. She denies fever. Reports some nasal congestion. Uses albuterol inhaler which helps improve the shortness of breath. She mentions that she had a colonoscopy last Wednesday, in which they later told her they had to suction a lot of mucous from her airway. Cough is worse in the morning and night. Pt is having rib pain due to the cough. Pt hasn't had chest xrays. Pt states that she feels sluggish today. Has h/o chronic fatigue.  Denies fever. Denies blood-tinged sputum. Denies significant respiratory distress.     Cough  This is a new problem. The current episode started more than 1 month ago. The cough is Non-productive. Associated symptoms include shortness of breath. Pertinent negatives include no chest pain, ear pain, fever or sore throat.     Constitution: Positive for fatigue. Negative for fever.   HENT:  Positive for congestion. Negative for ear pain, sinus pain and sore throat.    Cardiovascular:  Negative for chest pain and sob on exertion.   Respiratory:  Positive for chest tightness, cough and shortness of breath.    Gastrointestinal:  Negative for abdominal pain.   Musculoskeletal:  Negative for back pain.    Objective:     Physical Exam   Constitutional: She is oriented to person, place, and time. She appears well-developed. She is cooperative.  Non-toxic appearance. She does not appear ill. No distress.   HENT:   Head: Normocephalic.   Ears:   Right Ear: Hearing normal.   Left Ear: Hearing normal.   Nose: Nose " normal. No mucosal edema, rhinorrhea or nasal deformity. No epistaxis. Right sinus exhibits no maxillary sinus tenderness and no frontal sinus tenderness. Left sinus exhibits no maxillary sinus tenderness and no frontal sinus tenderness.   Mouth/Throat: Uvula is midline, oropharynx is clear and moist and mucous membranes are normal. Mucous membranes are moist. No trismus in the jaw. Normal dentition. No uvula swelling. No oropharyngeal exudate, posterior oropharyngeal edema or posterior oropharyngeal erythema. Oropharynx is clear.   Eyes: Lids are normal. No scleral icterus.   Neck: Trachea normal and phonation normal. Neck supple. No edema present. No erythema present. No neck rigidity present.   Cardiovascular: Normal rate, regular rhythm and normal pulses.   Pulmonary/Chest: Effort normal and breath sounds normal. No respiratory distress. She has no decreased breath sounds. She has no wheezes. She has no rhonchi.         Comments: Clear and equal BBS    Abdominal: Normal appearance.   Musculoskeletal: Normal range of motion.         General: No deformity. Normal range of motion.   Neurological: She is alert and oriented to person, place, and time. She exhibits normal muscle tone. Coordination normal.   Skin: Skin is warm, dry, intact, not diaphoretic and not pale.   Psychiatric: Her speech is normal and behavior is normal. Judgment and thought content normal.   Nursing note and vitals reviewed.    XR CHEST PA AND LATERAL    Result Date: 5/29/2023  EXAMINATION: XR CHEST PA AND LATERAL CLINICAL HISTORY: cough and chest congestion x2 months; fatigue; Cough, unspecified TECHNIQUE: PA and lateral views of the chest were performed. COMPARISON: 11/11/2022 FINDINGS: Heart size is normal.  Mediastinum shows aortic atherosclerosis.  Lungs are expanded and clear.  No lung consolidation or pleural fluid is detected.  Skeletal structures show degenerative spine changes.     No acute cardiopulmonary disease Electronically  signed by: Liang Siddiqui MD Date:    05/29/2023 Time:    14:32    X-Ray Lumbar Spine AP And Lateral      Assessment:     1. Acute bacterial bronchitis      Active Ambulatory Problems     Diagnosis Date Noted    HTN (hypertension), benign 07/09/2012    Hypercholesterolemia 08/07/2012    Hematuria 08/07/2012    Abnormal urine 08/07/2012    Bilateral impacted cerumen 03/26/2013    Osteoarthritis of both hips 10/14/2013    Osteopenia 10/14/2013    PVC's (premature ventricular contractions) 03/11/2015    Sarcoid uveitis 07/14/2015    Screening for colon cancer 05/30/2017    Sialadenitis - left sided 08/19/2017    Cellulitis of submandibular region 08/20/2017    Depression 08/20/2017    History of sarcoidosis 08/20/2017    Incomplete tear of right rotator cuff 07/23/2018    Gastroesophageal reflux disease 06/01/2020    Family history of colon cancer 06/01/2020    GERD (gastroesophageal reflux disease) 06/30/2020    Macrocytic anemia 02/14/2021    Chronic fatigue 02/23/2021    Low vitamin B12 level 02/23/2021    Other eosinophilia 02/27/2021    COVID 11/14/2022    Chronic right-sided low back pain with right-sided sciatica 05/25/2023    Lumbar spondylosis 05/25/2023    DDD (degenerative disc disease), lumbar 05/25/2023     Resolved Ambulatory Problems     Diagnosis Date Noted    Paroxysmal ventricular tachycardia 03/11/2015    Chest pain at rest 03/11/2015    Atypical chest pain 05/19/2018    Pre-op exam     Incomplete rotator cuff tear or rupture of right shoulder, not specified as traumatic 07/24/2018    Arthritis of right shoulder region 07/24/2018    Bicipital tendinitis of right shoulder 07/24/2018     Past Medical History:   Diagnosis Date    Headache     High cholesterol     Hyperlipidemia     Hypertension     Sarcoidosis         Plan:       Acute bacterial bronchitis  -     XR CHEST PA AND LATERAL; Future; Expected date: 05/29/2023  -     amoxicillin-clavulanate 875-125mg (AUGMENTIN) 875-125 mg per tablet; Take  1 tablet by mouth every 12 (twelve) hours. for 10 days  Dispense: 20 tablet; Refill: 0  -     predniSONE (DELTASONE) 50 MG Tab; Take 1 tablet (50 mg total) by mouth once daily. for 5 days  Dispense: 5 tablet; Refill: 0  -     benzonatate (TESSALON) 200 MG capsule; Take 1 capsule (200 mg total) by mouth every 8 (eight) hours as needed for Cough.  Dispense: 30 capsule; Refill: 0  -     albuterol (VENTOLIN HFA) 90 mcg/actuation inhaler; Inhale 2 puffs into the lungs every 6 (six) hours as needed for Wheezing or Shortness of Breath. Rescue  Dispense: 18 g; Refill: 0      Patient Instructions   Oral fluids  Rest  Steam (hot showers, hot tea)  Blow nose often  Avoid circulating air (such as ceiling fans) dries your airway  Avoid drinking cold drinks (worsens cough)  Therapeutic coughing to expel mucous  Sit in upright position often

## 2023-06-14 ENCOUNTER — HOSPITAL ENCOUNTER (OUTPATIENT)
Dept: RADIOLOGY | Facility: OTHER | Age: 73
Discharge: HOME | End: 2023-06-14
Attending: FAMILY MEDICINE
Payer: COMMERCIAL

## 2023-06-14 DIAGNOSIS — Z12.31 OTHER SCREENING MAMMOGRAM: ICD-10-CM

## 2023-06-14 PROCEDURE — 77067 MAMMO DIGITAL SCREENING BILAT WITH TOMO: ICD-10-PCS | Mod: 26,,, | Performed by: RADIOLOGY

## 2023-06-14 PROCEDURE — 77063 BREAST TOMOSYNTHESIS BI: CPT | Mod: 26,,, | Performed by: RADIOLOGY

## 2023-06-14 PROCEDURE — 77063 MAMMO DIGITAL SCREENING BILAT WITH TOMO: ICD-10-PCS | Mod: 26,,, | Performed by: RADIOLOGY

## 2023-06-14 PROCEDURE — 77067 SCR MAMMO BI INCL CAD: CPT | Mod: 26,,, | Performed by: RADIOLOGY

## 2023-06-14 PROCEDURE — 77067 SCR MAMMO BI INCL CAD: CPT | Mod: TC

## 2023-06-14 NOTE — PROGRESS NOTES
DATE: 6/15/2023  PATIENT: Estephania Lyman    Supervising Physician: Bennie Pena M.D.    CHIEF COMPLAINT: low back and bilateral leg pain    HISTORY:  Estephania Lyman is a 72 y.o. female here for initial evaluation of low back and bilateral posterior leg pain (Back - 8, Leg - 9).  The pain in the legs is what bothers her most.  The pain has been present for years but increased in severity over the past 3 months. The patient describes the pain as sharp.  The pain is worse with walking and standing up and improved by nothing. There is associated numbness and tingling. There is subjective weakness. Prior treatments have included tylenol, advil, mobic, and PT, but no LAURO or surgery.  The patient has failed the [facility] spine conditioning program. Exercises include head rolls, kneeling back extension, sitting rotation stretch, modified seated side straddle, knee to chest, bird dog, plank, modified seated plank, hip bridges, abdominal bracing, and abdominal crunch. Pt completed each exercise 5 times daily for 6-8 weeks.  The patient denies myelopathic symptoms such as handwriting changes or difficulty with buttons/coins/keys. Denies perineal paresthesias, bowel/bladder dysfunction.    PAST MEDICAL/SURGICAL HISTORY:  Past Medical History:   Diagnosis Date    GERD (gastroesophageal reflux disease)     Headache     High cholesterol     Hyperlipidemia     Hypertension     Sarcoidosis      Past Surgical History:   Procedure Laterality Date    ANKLE FRACTURE SURGERY      ARTHROSCOPIC REPAIR OF ROTATOR CUFF OF SHOULDER Right 07/23/2018    Procedure: REPAIR, ROTATOR CUFF, ARTHROSCOPIC;  Surgeon: YELENA Olivera MD;  Location: [facility] OR [address] [place] [address];  Service: Orthopedics;  Laterality: Right;  regional with catheter, interscalene  Vladimir/Linvatec & Ann/Arthrex notified 7-18 LO    ARTHROSCOPY OF SHOULDER WITH DECOMPRESSION OF SUBACROMIAL SPACE Right 07/23/2018    Procedure: ARTHROSCOPY, SHOULDER, WITH SUBACROMIAL SPACE  DECOMPRESSION;  Surgeon: YELENA Olivera MD;  Location: [facility] OR 1ST FLR;  Service: Orthopedics;  Laterality: Right;    ARTHROSCOPY OF SHOULDER WITH REMOVAL OF DISTAL CLAVICLE Right 07/23/2018    Procedure: ARTHROSCOPY, SHOULDER, WITH DISTAL CLAVICLE EXCISION;  Surgeon: YELENA Olivera MD;  Location: [facility] OR 1ST FLR;  Service: Orthopedics;  Laterality: Right;    COLONOSCOPY N/A 05/30/2017    Procedure: COLONOSCOPY;  Surgeon: Jed Garcia MD;  Location: [facility] ENDO (4TH FLR);  Service: Endoscopy;  Laterality: N/A;    COLONOSCOPY N/A 5/24/2023    Procedure: COLONOSCOPY;  Surgeon: Beau Bowden MD;  Location: [facility];  Service: Endoscopy;  Laterality: N/A;    COLONOSCOPY W/ POLYPECTOMY  05/24/2023    polyp x3    EGD, WITH BALLOON DILATION  05/24/2023    ESOPHAGEAL DILATION N/A 5/24/2023    Procedure: DILATION, ESOPHAGUS;  Surgeon: Beau Bowden MD;  Location: [facility];  Service: Endoscopy;  Laterality: N/A;    ESOPHAGOGASTRODUODENOSCOPY N/A 06/30/2020    Procedure: EGD (ESOPHAGOGASTRODUODENOSCOPY);  Surgeon: Russell Durand MD;  Location: [facility] (4TH FLR);  Service: Endoscopy;  Laterality: N/A;  COVID test on 6/28/20 at [facility]    ESOPHAGOGASTRODUODENOSCOPY N/A 07/01/2021    Procedure: EGD (ESOPHAGOGASTRODUODENOSCOPY);  Surgeon: Chucho Mooney MD;  Location: [facility] (4TH FLR);  Service: Endoscopy;  Laterality: N/A;  vaccinated 2/10/21 see media tab 6/7/21, instr portal and mailed -ml    ESOPHAGOGASTRODUODENOSCOPY N/A 5/24/2023    Procedure: EGD (ESOPHAGOGASTRODUODENOSCOPY);  Surgeon: Beau Bowden MD;  Location: [facility];  Service: Endoscopy;  Laterality: N/A;    FIXATION OF TENDON Right 07/23/2018    Procedure: FIXATION, TENDON;  Surgeon: YELENA Olivera MD;  [facility];  Service: Orthopedics;  Laterality: Right;    HYSTERECTOMY  age 39    endo    LEG SURGERY      tibia    OOPHORECTOMY  age 39    endo    TONSILLECTOMY         Medications:   Current Outpatient  "Medications on File Prior to Visit   Medication Sig Dispense Refill    albuterol (PROVENTIL/VENTOLIN HFA) 90 mcg/actuation inhaler Inhale 1 puff into the lungs every 6 (six) hours as needed.      albuterol (VENTOLIN HFA) 90 mcg/actuation inhaler Inhale 2 puffs into the lungs every 6 (six) hours as needed for Wheezing or Shortness of Breath. Rescue 18 g 0    cetirizine (ZYRTEC) 1 mg/mL syrup Take by mouth once daily.      citalopram (CELEXA) 20 MG tablet Take 1 tablet (20 mg total) by mouth once daily. 90 tablet 3    clotrimazole-betamethasone 1-0.05% (LOTRISONE) cream Apply topically 2 (two) times daily. 45 g 0    cyanocobalamin 1,000 mcg/mL injection INJECT 1 ML INTRAMUSCULARLY  ONCE EVERY MONTH 1 mL 0    felodipine (PLENDIL) 10 MG 24 hr tablet Take 1 tablet (10 mg total) by mouth once daily. 90 tablet 3    fluticasone propionate (FLONASE) 50 mcg/actuation nasal spray 1 spray (50 mcg total) by Each Nostril route once daily. 16 g 3    ibuprofen (ADVIL,MOTRIN) 800 MG tablet Take 800 mg by mouth.      ipratropium (ATROVENT) 42 mcg (0.06 %) nasal spray USE 2 SPRAY(S) IN EACH NOSTRIL EVERY 6 HOURS AS NEEDED FOR CONGESTION      pravastatin (PRAVACHOL) 40 MG tablet Take 1 tablet (40 mg total) by mouth once daily. 90 tablet 3    sodium,potassium,mag sulfates (SUPREP BOWEL PREP KIT) 17.5-3.13-1.6 gram SolR Take 177 mLs by mouth once daily. 1 kit 0    syringe with needle 3 mL 22 x 1 1/2" Syrg Use to inject B12 shots intramuscularly as directed. 16 each 1    [DISCONTINUED] meloxicam (MOBIC) 15 MG tablet Take 1 tablet (15 mg total) by mouth once daily. 30 tablet 1    [DISCONTINUED] methocarbamoL (ROBAXIN) 500 MG Tab Take 500 mg by mouth.      pantoprazole (PROTONIX) 40 MG tablet Take 1 tablet (40 mg total) by mouth 2 (two) times daily. 60 tablet 2    [DISCONTINUED] levocetirizine (XYZAL) 5 MG tablet Take 1 tablet (5 mg total) by mouth every evening. (Patient not taking: Reported on 5/19/2023) 30 tablet 11    [DISCONTINUED] " "traMADoL (ULTRAM) 50 mg tablet Take 50 mg by mouth every 6 (six) hours as needed.       Current Facility-Administered Medications on File Prior to Visit   Medication Dose Route Frequency Provider Last Rate Last Admin    0.9%  NaCl infusion   Intravenous Continuous Valentino Perez MD        LIDOcaine (PF) 10 mg/ml (1%) injection 10 mg  1 mL Intradermal Once PRN Valentino Perez MD           Social History:   Social History     Socioeconomic History    Marital status:    Occupational History     Employer: [employer]   Tobacco Use    Smoking status: Former     Packs/day: 1.00     Years: 3.00     Pack years: 3.00     Types: Cigarettes     Quit date: 2010     Years since quittin.9    Smokeless tobacco: Never    Tobacco comments:     last smoked 15 years ago   Substance and Sexual Activity    Alcohol use: Not Currently     Comment: occasional     Drug use: No    Sexual activity: Yes     Partners: Male     Birth control/protection: Surgical     Comment: Hysterectomy       REVIEW OF SYSTEMS:  Constitution: Negative. Negative for chills, fever and night sweats.   Cardiovascular: Negative for chest pain and syncope.   Respiratory: Negative for cough and shortness of breath.   Gastrointestinal: See HPI. Negative for nausea/vomiting. Negative for abdominal pain.  Genitourinary: See HPI. Negative for discoloration or dysuria.  Skin: Negative for dry skin, itching and rash.   Hematologic/Lymphatic: Negative for bleeding problem. Does not bruise/bleed easily.   Musculoskeletal: Negative for falls and muscle weakness.   Neurological: See HPI. No seizures.   Endocrine: Negative for polydipsia, polyphagia and polyuria.   Allergic/Immunologic: Negative for hives and persistent infections.     EXAM:  Ht 5' 4.5" (1.638 m)   Wt 85.5 kg (188 lb 7.9 oz)   LMP  (LMP Unknown)   BMI 31.86 kg/m²     General: The patient is a very pleasant 72 y.o. female in no apparent distress, the patient is " oriented to person, place and time.  Psych: Normal mood and affect  HEENT: Vision grossly intact, hearing intact to the spoken word.  Lungs: Respirations unlabored.  Gait: Normal station and gait, no difficulty with toe or heel walk.   Skin: Dorsal lumbar skin negative for rashes, lesions, hairy patches and surgical scars. There is mild lumbar tenderness to palpation.  Range of motion: Lumbar range of motion is acceptable.  Spinal Balance: Global saggital and coronal spinal balance acceptable, not significant for scoliosis and kyphosis.  Musculoskeletal: No pain with the range of motion of the bilateral hips. No trochanteric tenderness to palpation.  Vascular: Bilateral lower extremities warm and well perfused, dorsalis pedis pulses 2+ bilaterally.  Neurological: decreased strength and tone in all major motor groups in the bilateral lower extremities. decreased sensation to light touch in the L2-S1 dermatomes bilaterally.  Deep tendon reflexes symmetric 2+ in the bilateral lower extremities.  Negative Babinski bilaterally. Straight leg raise negative bilaterally.    IMAGING:      Today I personally reviewed AP, Lat and Flex/Ex  upright L-spine films that demonstrate grade 1 anterolisthesis L5 on S1.   moderate DDD throughout.      Body mass index is 31.86 kg/m².    No results found for: HGBA1C        ASSESSMENT/PLAN:    Estephania was seen today for low-back pain.    Diagnoses and all orders for this visit:    Dorsalgia, unspecified  -     MRI Lumbar Spine Without Contrast; Future    DDD (degenerative disc disease), lumbar  -     MRI Lumbar Spine Without Contrast; Future  -     gabapentin (NEURONTIN) 100 MG capsule; Take 1 capsule (100 mg total) by mouth 3 (three) times daily.  -     tiZANidine (ZANAFLEX) 4 MG tablet; Take 1 tablet (4 mg total) by mouth every 8 (eight) hours as needed (muscle spasms).    Lumbar radiculopathy  -     MRI Lumbar Spine Without Contrast; Future  -     gabapentin (NEURONTIN) 100 MG  capsule; Take 1 capsule (100 mg total) by mouth 3 (three) times daily.  -     tiZANidine (ZANAFLEX) 4 MG tablet; Take 1 tablet (4 mg total) by mouth every 8 (eight) hours as needed (muscle spasms).      Today we discussed at length all of the different treatment options including anti-inflammatories, acetaminophen, rest, ice, heat, physical therapy including strengthening and stretching exercises, home exercises, ROM, aerobic conditioning, aqua therapy, other modalities including ultrasound, massage, and dry needling, epidural steroid injections and finally surgical intervention.  lumbar MRI ordered, she will follow up in the clinic for results.

## 2023-06-15 ENCOUNTER — OFFICE VISIT (OUTPATIENT)
Dept: ORTHOPEDICS | Facility: CLINIC | Age: 73
End: 2023-06-15
Payer: MEDICARE

## 2023-06-15 VITALS — BODY MASS INDEX: 31.4 KG/M2 | HEIGHT: 65 IN | WEIGHT: 188.5 LBS

## 2023-06-15 DIAGNOSIS — M51.36 DDD (DEGENERATIVE DISC DISEASE), LUMBAR: ICD-10-CM

## 2023-06-15 DIAGNOSIS — M54.16 LUMBAR RADICULOPATHY: ICD-10-CM

## 2023-06-15 DIAGNOSIS — M54.9 DORSALGIA, UNSPECIFIED: Primary | ICD-10-CM

## 2023-06-15 PROCEDURE — 99204 OFFICE O/P NEW MOD 45 MIN: CPT | Mod: S$PBB,,, | Performed by: REGISTERED NURSE

## 2023-06-15 PROCEDURE — 99214 OFFICE O/P EST MOD 30 MIN: CPT | Mod: PBBFAC | Performed by: REGISTERED NURSE

## 2023-06-15 PROCEDURE — 99999 PR PBB SHADOW E&M-EST. PATIENT-LVL IV: CPT | Mod: PBBFAC,,, | Performed by: REGISTERED NURSE

## 2023-06-15 PROCEDURE — 99999 PR PBB SHADOW E&M-EST. PATIENT-LVL IV: ICD-10-PCS | Mod: PBBFAC,,, | Performed by: REGISTERED NURSE

## 2023-06-15 PROCEDURE — 99204 PR OFFICE/OUTPT VISIT, NEW, LEVL IV, 45-59 MIN: ICD-10-PCS | Mod: S$PBB,,, | Performed by: REGISTERED NURSE

## 2023-06-15 RX ORDER — GABAPENTIN 100 MG/1
100 CAPSULE ORAL 3 TIMES DAILY
Qty: 90 CAPSULE | Refills: 11 | Status: SHIPPED | OUTPATIENT
Start: 2023-06-15 | End: 2023-08-08 | Stop reason: SDUPTHER

## 2023-06-15 RX ORDER — TIZANIDINE 4 MG/1
4 TABLET ORAL EVERY 8 HOURS PRN
Qty: 60 TABLET | Refills: 2 | Status: SHIPPED | OUTPATIENT
Start: 2023-06-15 | End: 2023-10-03

## 2023-06-15 RX ORDER — IPRATROPIUM BROMIDE 42 UG/1
SPRAY, METERED NASAL
COMMUNITY
Start: 2022-11-08

## 2023-06-15 RX ORDER — IBUPROFEN 800 MG/1
800 TABLET ORAL
COMMUNITY
Start: 2023-04-11 | End: 2023-10-03

## 2023-06-15 RX ORDER — METHOCARBAMOL 500 MG/1
500 TABLET, FILM COATED ORAL
COMMUNITY
Start: 2023-04-11 | End: 2023-06-15

## 2023-06-15 RX ORDER — ALBUTEROL SULFATE 90 UG/1
1 AEROSOL, METERED RESPIRATORY (INHALATION) EVERY 6 HOURS PRN
COMMUNITY
Start: 2022-11-14

## 2023-06-22 ENCOUNTER — TELEPHONE (OUTPATIENT)
Dept: ORTHOPEDICS | Facility: CLINIC | Age: 73
End: 2023-06-22
Payer: MEDICARE

## 2023-06-22 NOTE — TELEPHONE ENCOUNTER
Spoke to patient regarding her appointment to be rescheduled.  Patient appointment has been scheduled to see Barby on 07/07/2023. Patient stated thank you. Thanks.

## 2023-07-05 ENCOUNTER — TELEPHONE (OUTPATIENT)
Dept: SPINE | Facility: CLINIC | Age: 73
End: 2023-07-05
Payer: MEDICARE

## 2023-07-05 NOTE — TELEPHONE ENCOUNTER
----- Message from Radha Radford sent at 7/5/2023 11:01 AM CDT -----  Name of Who is Calling:LAURENCE CUELLAR [[id number]]                What is the request in detail: Pt is calling because she states that her visit is before her MRI and wanted to know if it should be reschedule are do you want to see her before MRI.Please call back to further assist.                 Can the clinic reply by [facility]: No                What Number to Call Back if not in [facility]:[phone]

## 2023-07-05 NOTE — TELEPHONE ENCOUNTER
----- Message from Karyn Rainey sent at 7/5/2023  3:37 PM CDT -----  Name of Who is Calling:LAURENCE CUELLAR [[id number]]           What is the request in detail:pt stated that she would like to speak with the office directly in regards to her questions/concerns.Please contact to further discuss and advise.            Can the clinic reply by [facility]:[phone]           [facility]:[phone]

## 2023-07-06 ENCOUNTER — HOSPITAL ENCOUNTER (OUTPATIENT)
Dept: RADIOLOGY | Facility: HOSPITAL | Age: 73
Discharge: HOME | End: 2023-07-06
Attending: REGISTERED NURSE
Payer: COMMERCIAL

## 2023-07-06 DIAGNOSIS — M54.9 DORSALGIA, UNSPECIFIED: ICD-10-CM

## 2023-07-06 DIAGNOSIS — M51.36 DDD (DEGENERATIVE DISC DISEASE), LUMBAR: ICD-10-CM

## 2023-07-06 DIAGNOSIS — M54.16 LUMBAR RADICULOPATHY: ICD-10-CM

## 2023-07-06 PROCEDURE — 72148 MRI LUMBAR SPINE W/O DYE: CPT | Mod: 26,,, | Performed by: RADIOLOGY

## 2023-07-06 PROCEDURE — 72148 MRI LUMBAR SPINE WITHOUT CONTRAST: ICD-10-PCS | Mod: 26,,, | Performed by: RADIOLOGY

## 2023-07-06 PROCEDURE — 72148 MRI LUMBAR SPINE W/O DYE: CPT | Mod: TC

## 2023-07-06 NOTE — TELEPHONE ENCOUNTER
I did not order a MRI. She saw Barby Florez NP who ordered the MRI. I would followup with Barby once the imaging is complete

## 2023-07-10 ENCOUNTER — OFFICE VISIT (OUTPATIENT)
Dept: ORTHOPEDICS | Facility: CLINIC | Age: 73
End: 2023-07-10
Payer: COMMERCIAL

## 2023-07-10 VITALS — WEIGHT: 183.06 LBS | HEIGHT: 64 IN | BODY MASS INDEX: 31.25 KG/M2

## 2023-07-10 DIAGNOSIS — M51.36 DDD (DEGENERATIVE DISC DISEASE), LUMBAR: Primary | ICD-10-CM

## 2023-07-10 DIAGNOSIS — M54.16 LUMBAR RADICULOPATHY: ICD-10-CM

## 2023-07-10 PROCEDURE — 99213 OFFICE O/P EST LOW 20 MIN: CPT | Mod: S$GLB,,, | Performed by: REGISTERED NURSE

## 2023-07-10 PROCEDURE — 99999 PR PBB SHADOW E&M-EST. PATIENT-LVL III: CPT | Mod: PBBFAC,,, | Performed by: REGISTERED NURSE

## 2023-07-10 PROCEDURE — 99213 OFFICE O/P EST LOW 20 MIN: CPT | Mod: PBBFAC | Performed by: REGISTERED NURSE

## 2023-07-10 PROCEDURE — 99213 PR OFFICE/OUTPT VISIT, EST, LEVL III, 20-29 MIN: ICD-10-PCS | Mod: S$GLB,,, | Performed by: REGISTERED NURSE

## 2023-07-10 PROCEDURE — 99999 PR PBB SHADOW E&M-EST. PATIENT-LVL III: ICD-10-PCS | Mod: PBBFAC,,, | Performed by: REGISTERED NURSE

## 2023-07-10 NOTE — PROGRESS NOTES
"DATE: 7/10/2023  PATIENT: Estephania Lyman    Attending Physician: Bennie Pena M.D.    HISTORY:  Estephania Lyman is a 72 y.o. female who returns to me today for MRI results.  She was last seen by me 6/15/2023.  Today she is doing well but notes her pain has significantly decreased since taking Gabapentin and going to PT.    The Patient denies myelopathic symptoms such as handwriting changes or difficulty with buttons/coins/keys. Denies perineal paresthesias, bowel/bladder dysfunction.    PMH/PSH/FamHx/SocHx:  Unchanged from prior visit    ROS:  REVIEW OF SYSTEMS:  Constitution: Negative. Negative for chills, fever and night sweats.   HENT: Negative for congestion and headaches.    Eyes: Negative for blurred vision, left vision loss and right vision loss.   Cardiovascular: Negative for chest pain and syncope.   Respiratory: Negative for cough and shortness of breath.    Endocrine: Negative for polydipsia, polyphagia and polyuria.   Hematologic/Lymphatic: Negative for bleeding problem. Does not bruise/bleed easily.   Skin: Negative for dry skin, itching and rash.   Musculoskeletal: Negative for falls and muscle weakness.   Gastrointestinal: Negative for abdominal pain and bowel incontinence.   Allergic/Immunologic: Negative for hives and persistent infections.  Genitourinary: Negative for urinary retention/incontinence and nocturia.   Neurological: negative for disturbances in coordination, no myelopathic symptoms such as handwriting changes or difficulty with buttons, coins, keys or small objects. No loss of balance and seizures.   Psychiatric/Behavioral: Negative for depression. The patient does not have insomnia.   Denies perineal paresthesias, bowel or bladder incontinence    EXAM:  Ht 5' 4.49" (1.638 m)   Wt 83 kg (183 lb 1.5 oz)   LMP  (LMP Unknown)   BMI 30.95 kg/m²   Stable.     IMAGING:    Today I personally re- reviewed AP, Lat and Flex/Ex  upright L-spine that demonstrate grade 1 " anterolisthesis L5 on S1.   moderate DDD throughout.     Lumbar MRI shows bilateral foraminal narrowing at L5-S1.    Body mass index is 30.95 kg/m².    No results found for: HGBA1C      ASSESSMENT/PLAN:    Diagnoses and all orders for this visit:    DDD (degenerative disc disease), lumbar    Lumbar radiculopathy        We will hold off on an LAURO for now due to the decline in her pain. She may follow up as needed.

## 2023-07-25 ENCOUNTER — PATIENT OUTREACH (OUTPATIENT)
Dept: ADMINISTRATIVE | Facility: HOSPITAL | Age: 73
End: 2023-07-25
Payer: MEDICARE

## 2023-07-25 DIAGNOSIS — Z00.00 ANNUAL PHYSICAL EXAM: ICD-10-CM

## 2023-07-25 DIAGNOSIS — E78.2 MIXED HYPERLIPIDEMIA: Primary | ICD-10-CM

## 2023-08-08 ENCOUNTER — OFFICE VISIT (OUTPATIENT)
Dept: FAMILY MEDICINE | Facility: CLINIC | Age: 73
End: 2023-08-08
Attending: FAMILY MEDICINE
Payer: COMMERCIAL

## 2023-08-08 VITALS
HEIGHT: 64 IN | BODY MASS INDEX: 31.98 KG/M2 | DIASTOLIC BLOOD PRESSURE: 63 MMHG | HEART RATE: 71 BPM | OXYGEN SATURATION: 99 % | SYSTOLIC BLOOD PRESSURE: 119 MMHG | WEIGHT: 187.31 LBS

## 2023-08-08 DIAGNOSIS — Z00.00 ANNUAL PHYSICAL EXAM: Primary | ICD-10-CM

## 2023-08-08 DIAGNOSIS — M51.36 DDD (DEGENERATIVE DISC DISEASE), LUMBAR: ICD-10-CM

## 2023-08-08 DIAGNOSIS — I10 ESSENTIAL HYPERTENSION: ICD-10-CM

## 2023-08-08 DIAGNOSIS — K21.9 GASTROESOPHAGEAL REFLUX DISEASE, UNSPECIFIED WHETHER ESOPHAGITIS PRESENT: ICD-10-CM

## 2023-08-08 DIAGNOSIS — E78.2 MIXED HYPERLIPIDEMIA: ICD-10-CM

## 2023-08-08 DIAGNOSIS — D86.0 SARCOIDOSIS OF LUNG: ICD-10-CM

## 2023-08-08 DIAGNOSIS — M54.16 LUMBAR RADICULOPATHY: ICD-10-CM

## 2023-08-08 DIAGNOSIS — D81.9 COMBINED IMMUNODEFICIENCY DISORDER: ICD-10-CM

## 2023-08-08 PROCEDURE — 99397 PR PREVENTIVE VISIT,EST,65 & OVER: ICD-10-PCS | Mod: S$GLB,,, | Performed by: FAMILY MEDICINE

## 2023-08-08 PROCEDURE — 99213 OFFICE O/P EST LOW 20 MIN: CPT | Mod: PBBFAC,PO | Performed by: FAMILY MEDICINE

## 2023-08-08 PROCEDURE — 99397 PER PM REEVAL EST PAT 65+ YR: CPT | Mod: S$GLB,,, | Performed by: FAMILY MEDICINE

## 2023-08-08 PROCEDURE — 99999 PR PBB SHADOW E&M-EST. PATIENT-LVL III: CPT | Mod: PBBFAC,,, | Performed by: FAMILY MEDICINE

## 2023-08-08 PROCEDURE — 99999 PR PBB SHADOW E&M-EST. PATIENT-LVL III: ICD-10-PCS | Mod: PBBFAC,,, | Performed by: FAMILY MEDICINE

## 2023-08-08 RX ORDER — CETIRIZINE HYDROCHLORIDE 1 MG/ML
SOLUTION ORAL DAILY
Status: CANCELLED | OUTPATIENT
Start: 2023-08-08

## 2023-08-08 RX ORDER — FELODIPINE 10 MG/1
10 TABLET, EXTENDED RELEASE ORAL DAILY
Qty: 90 TABLET | Refills: 3 | Status: SHIPPED | OUTPATIENT
Start: 2023-08-08

## 2023-08-08 RX ORDER — CITALOPRAM 20 MG/1
20 TABLET, FILM COATED ORAL DAILY
Qty: 90 TABLET | Refills: 3 | Status: SHIPPED | OUTPATIENT
Start: 2023-08-08

## 2023-08-08 RX ORDER — PRAVASTATIN SODIUM 80 MG/1
80 TABLET ORAL DAILY
Qty: 90 TABLET | Refills: 3 | Status: SHIPPED | OUTPATIENT
Start: 2023-08-08

## 2023-08-08 RX ORDER — GABAPENTIN 100 MG/1
100 CAPSULE ORAL 3 TIMES DAILY
Qty: 90 CAPSULE | Refills: 11 | Status: ON HOLD | OUTPATIENT
Start: 2023-08-08 | End: 2023-11-02

## 2023-08-08 RX ORDER — PANTOPRAZOLE SODIUM 40 MG/1
40 TABLET, DELAYED RELEASE ORAL 2 TIMES DAILY
Qty: 60 TABLET | Refills: 2 | Status: SHIPPED | OUTPATIENT
Start: 2023-08-08 | End: 2023-12-28

## 2023-08-15 PROBLEM — D86.0 SARCOIDOSIS OF LUNG: Status: ACTIVE | Noted: 2023-08-15

## 2023-08-15 PROBLEM — D81.9: Status: ACTIVE | Noted: 2023-08-15

## 2023-10-02 ENCOUNTER — OFFICE VISIT (OUTPATIENT)
Dept: URGENT CARE | Facility: CLINIC | Age: 73
End: 2023-10-02
Payer: MEDICARE

## 2023-10-02 VITALS
TEMPERATURE: 98 F | HEART RATE: 61 BPM | RESPIRATION RATE: 16 BRPM | WEIGHT: 187.38 LBS | HEIGHT: 64 IN | DIASTOLIC BLOOD PRESSURE: 67 MMHG | BODY MASS INDEX: 31.99 KG/M2 | OXYGEN SATURATION: 98 % | SYSTOLIC BLOOD PRESSURE: 148 MMHG

## 2023-10-02 DIAGNOSIS — R21 RASH OF HAND: Primary | ICD-10-CM

## 2023-10-02 PROCEDURE — 99213 PR OFFICE/OUTPT VISIT, EST, LEVL III, 20-29 MIN: ICD-10-PCS | Mod: S$GLB,,, | Performed by: NURSE PRACTITIONER

## 2023-10-02 PROCEDURE — 99213 OFFICE O/P EST LOW 20 MIN: CPT | Mod: S$GLB,,, | Performed by: NURSE PRACTITIONER

## 2023-10-02 RX ORDER — CLOTRIMAZOLE AND BETAMETHASONE DIPROPIONATE 10; .64 MG/G; MG/G
CREAM TOPICAL 2 TIMES DAILY
Qty: 45 G | Refills: 0 | Status: SHIPPED | OUTPATIENT
Start: 2023-10-02 | End: 2023-10-30

## 2023-10-02 NOTE — PATIENT INSTRUCTIONS
- You must understand that you have received an Urgent Care treatment only and that you may be released before all of your medical problems are known or treated.   - You, the patient, will arrange for follow up care as instructed.   - If your condition worsens or fails to improve we recommend that you receive another evaluation at the ER immediately or contact your PCP to discuss your concerns.   - You can call [phone] or [phone] to help schedule an appointment with the appropriate provider.

## 2023-10-02 NOTE — PROGRESS NOTES
"Subjective:      Patient ID: Estephania Lyman is a 72 y.o. female.    Vitals:  height is 5' 4.49" (1.638 m) and weight is 85 kg (187 lb 6.3 oz). Her oral temperature is 98.2 °F (36.8 °C). Her blood pressure is 148/67 (abnormal) and her pulse is 61. Her respiration is 16 and oxygen saturation is 98%.     Chief Complaint: Rash    Pt is a 73 yo female w/ c/o a possible rash on her right palm that has been present for "a week or two". The skin is dry and flaky and easily cracks if not lubricated. It started small and has grown. It is tender to palpation. She has not been in contact with anything. Pt has tried vaseline and lotions, and sleeping with a sock over her hand with vaseline without improvement.     Rash  This is a new problem. The current episode started 1 to 4 weeks ago. The problem has been gradually worsening since onset. The affected locations include the right hand. The rash is characterized by peeling, scaling, dryness and pain. It is unknown if there was an exposure to a precipitant. Past treatments include moisturizer (vaseline). The treatment provided no relief. There is no history of eczema.     Skin:  Positive for rash.      Objective:     Physical Exam   Constitutional: She is oriented to person, place, and time.   HENT:   Head: Normocephalic.   Ears:   Right Ear: External ear normal.   Left Ear: External ear normal.   Nose: Nose normal.   Mouth/Throat: Mucous membranes are moist.   Eyes: Conjunctivae are normal.   Cardiovascular: Normal rate.   Pulmonary/Chest: Effort normal.   Musculoskeletal: Normal range of motion.         General: Normal range of motion.   Neurological: She is alert and oriented to person, place, and time.   Skin: Skin is dry.   Psychiatric: Her behavior is normal.             Assessment:     1. Rash of hand        Plan:       Rash of hand  -     clotrimazole-betamethasone 1-0.05% (LOTRISONE) cream; Apply topically 2 (two) times daily.  Dispense: 45 g; Refill: 0  -     " Ambulatory referral/consult to Dermatology      Patient Instructions   - You must understand that you have received an Urgent Care treatment only and that you may be released before all of your medical problems are known or treated.   - You, the patient, will arrange for follow up care as instructed.   - If your condition worsens or fails to improve we recommend that you receive another evaluation at the ER immediately or contact your PCP to discuss your concerns.   - You can call [phone] or [phone] to help schedule an appointment with the appropriate provider.

## 2023-10-03 NOTE — PROGRESS NOTES
Subjective:       Patient ID: Estephania Lyman is a 72 y.o. female.    Chief Complaint: annual htn  Hypertension  Pertinent negatives include no chest pain, headaches, neck pain, palpitations or shortness of breath.     Pt with multiple medical problems including sarcoid/mgus is followed in hem/onc is stable presently   Pt is here for annual exam pt is generally well no sob/cp no cough chest congestion no sore throat uri symptoms  Pt denies n/v/f/c/d/c no change in bowel habits no brbpr  Pt denies dysuria hematuria no vaginal bleeding  Pt has htn bp fine on sob/cp on plendil   Pt has hypercholesterolemia on statin no muscle aches however she has chronic musculoskeletal issues followed in back and spine no acute complaints pt is stable currently    Review of Systems   Constitutional:  Negative for activity change, chills, diaphoresis, fatigue and fever.   HENT:  Negative for congestion, ear discharge, ear pain, hearing loss, postnasal drip, rhinorrhea, sinus pressure, sneezing, sore throat, trouble swallowing and voice change.    Eyes:  Negative for photophobia, discharge, redness, itching and visual disturbance.   Respiratory:  Negative for cough, chest tightness, shortness of breath and wheezing.    Cardiovascular:  Negative for chest pain, palpitations and leg swelling.   Gastrointestinal:  Negative for abdominal distention, abdominal pain, anal bleeding, blood in stool, constipation, diarrhea, nausea, rectal pain and vomiting.   Genitourinary:  Negative for dyspareunia, dysuria, flank pain, frequency, hematuria, menstrual problem, pelvic pain, urgency, vaginal bleeding and vaginal discharge.   Musculoskeletal:  Positive for arthralgias and back pain. Negative for joint swelling and neck pain.   Skin:  Negative for color change and rash.   Neurological:  Negative for dizziness, speech difficulty, weakness, light-headedness, numbness and headaches.   Hematological:  Does not bruise/bleed easily.  "  Psychiatric/Behavioral:  Negative for agitation, confusion, decreased concentration, sleep disturbance and suicidal ideas. The patient is not nervous/anxious.        Objective:    /63   Pulse 71   Ht 5' 4.49" (1.638 m)   Wt 85 kg (187 lb 4.8 oz)   LMP  (LMP Unknown)   SpO2 99%   BMI 31.66 kg/m²     Physical Exam  Constitutional:       Appearance: Normal appearance. She is well-developed. She is not ill-appearing.   HENT:      Head: Normocephalic and atraumatic.      Right Ear: External ear normal.      Left Ear: External ear normal.      Nose: Nose normal.   Eyes:      General:         Right eye: No discharge.         Left eye: No discharge.      Conjunctiva/sclera: Conjunctivae normal.      Pupils: Pupils are equal, round, and reactive to light.   Neck:      Thyroid: No thyromegaly.   Cardiovascular:      Rate and Rhythm: Normal rate and regular rhythm.      Heart sounds: Normal heart sounds. No murmur heard.     No friction rub. No gallop.   Pulmonary:      Effort: Pulmonary effort is normal. No respiratory distress.      Breath sounds: Normal breath sounds. No wheezing or rales.   Abdominal:      General: Bowel sounds are normal. There is no distension.      Palpations: Abdomen is soft.      Tenderness: There is no abdominal tenderness. There is no guarding or rebound.   Genitourinary:     Vagina: Normal.   Musculoskeletal:         General: No tenderness. Normal range of motion.      Cervical back: Normal range of motion and neck supple.   Lymphadenopathy:      Cervical: No cervical adenopathy.   Skin:     General: Skin is warm and dry.      Findings: No erythema or rash.   Neurological:      General: No focal deficit present.      Mental Status: She is alert and oriented to person, place, and time.      Cranial Nerves: No cranial nerve deficit.      Motor: No abnormal muscle tone.      Coordination: Coordination normal.   Psychiatric:         Mood and Affect: Mood normal.         Behavior: Behavior " "normal.         Thought Content: Thought content normal.         Judgment: Judgment normal.       Bun/creat 11/0.9 on 8/1/2023  Assessment:       1. Essential hypertension    2. DDD (degenerative disc disease), lumbar    3. Lumbar radiculopathy    4. Mixed hyperlipidemia    5. Gastroesophageal reflux disease, unspecified whether esophagitis present    6. Combined immunodeficiency disorder    7. Sarcoidosis of lung        Plan:     Orders labs pta   Cont meds  F/u hem/onc  F/u back and spine  Low fat low salt diet  Graded exercise  Rtc 6 months     Health maintenance  Discussed with pt     "This note will not be shared with the patient."     "

## 2023-10-05 ENCOUNTER — TELEPHONE (OUTPATIENT)
Dept: SPINE | Facility: CLINIC | Age: 73
End: 2023-10-05
Payer: MEDICARE

## 2023-10-05 ENCOUNTER — TELEPHONE (OUTPATIENT)
Dept: ORTHOPEDICS | Facility: CLINIC | Age: 73
End: 2023-10-05
Payer: MEDICARE

## 2023-10-05 NOTE — TELEPHONE ENCOUNTER
----- Message from Charanjit Morris sent at 10/5/2023 11:34 AM CDT -----  Regarding: Appt  Contact: Pt [phone]  Pt calling to request same day appt for sciatic pain. Pt having trouble walking. Please call [phone]

## 2023-10-05 NOTE — TELEPHONE ENCOUNTER
Spoke to patient regarding an appointment for low back pain. Patient scheduled for 9:30 am on 10/06/23. Patient stated thank you. Thanks.

## 2023-10-05 NOTE — TELEPHONE ENCOUNTER
Staff reached out to the patient regarding patient wanting same day appointment. Patient stated someone had reached out to her to get her scheduled already. Patient appointment is scheduled for 10/6/23 with  in ortho.

## 2023-10-05 NOTE — PROGRESS NOTES
"DATE: 10/6/2023  PATIENT: Estephania Lyman    Attending Physician: Bennie Pena M.D.    HISTORY:  Estephania Lyman is a 72 y.o. female who returns to me today for follow up.  She was last seen by me 7/10/2023.  Today she is doing well but notes low back and bilateral leg pain x2 days after lifting too heavy at the gym.    The Patient denies myelopathic symptoms such as handwriting changes or difficulty with buttons/coins/keys. Denies perineal paresthesias, bowel/bladder dysfunction.    PMH/PSH/FamHx/SocHx:  Unchanged from prior visit    ROS:  REVIEW OF SYSTEMS:  Constitution: Negative. Negative for chills, fever and night sweats.   HENT: Negative for congestion and headaches.    Eyes: Negative for blurred vision, left vision loss and right vision loss.   Cardiovascular: Negative for chest pain and syncope.   Respiratory: Negative for cough and shortness of breath.    Endocrine: Negative for polydipsia, polyphagia and polyuria.   Hematologic/Lymphatic: Negative for bleeding problem. Does not bruise/bleed easily.   Skin: Negative for dry skin, itching and rash.   Musculoskeletal: Negative for falls and muscle weakness.   Gastrointestinal: Negative for abdominal pain and bowel incontinence.   Allergic/Immunologic: Negative for hives and persistent infections.  Genitourinary: Negative for urinary retention/incontinence and nocturia.   Neurological: negative for disturbances in coordination, no myelopathic symptoms such as handwriting changes or difficulty with buttons, coins, keys or small objects. No loss of balance and seizures.   Psychiatric/Behavioral: Negative for depression. The patient does not have insomnia.   Denies perineal paresthesias, bowel or bladder incontinence    EXAM:  Ht 5' 4.49" (1.638 m)   Wt 87.9 kg (193 lb 14.3 oz)   LMP  (LMP Unknown)   BMI 32.78 kg/m²   Stable.     IMAGING:    Today I personally re- reviewed AP, Lat and Flex/Ex  upright L-spine that demonstrate  grade 1 " "anterolisthesis L5 on S1.   moderate DDD throughout.      Lumbar MRI shows bilateral foraminal narrowing at L5-S1.    Body mass index is 32.78 kg/m².    No results found for: "HGBA1C"      ASSESSMENT/PLAN:    Estephania was seen today for low-back pain and leg pain.    Diagnoses and all orders for this visit:    Lumbar radiculopathy  -     gabapentin (NEURONTIN) 300 MG capsule; Take 1 capsule (300 mg total) by mouth 3 (three) times daily.  -     predniSONE (DELTASONE) 20 MG tablet; Take 2 tablets (40 mg total) by mouth once daily.  -     Procedure Order to Pain Management; Future        Tito TF LAURO ordered. She may follow up 2 weeks after if her pain persists.         "

## 2023-10-06 ENCOUNTER — OFFICE VISIT (OUTPATIENT)
Dept: ORTHOPEDICS | Facility: CLINIC | Age: 73
End: 2023-10-06
Payer: MEDICARE

## 2023-10-06 VITALS — HEIGHT: 64 IN | WEIGHT: 193.88 LBS | BODY MASS INDEX: 33.1 KG/M2

## 2023-10-06 DIAGNOSIS — M54.16 LUMBAR RADICULOPATHY: Primary | ICD-10-CM

## 2023-10-06 PROCEDURE — 99213 OFFICE O/P EST LOW 20 MIN: CPT | Mod: PBBFAC | Performed by: REGISTERED NURSE

## 2023-10-06 PROCEDURE — 99213 PR OFFICE/OUTPT VISIT, EST, LEVL III, 20-29 MIN: ICD-10-PCS | Mod: S$PBB,,, | Performed by: REGISTERED NURSE

## 2023-10-06 PROCEDURE — 99999 PR PBB SHADOW E&M-EST. PATIENT-LVL III: CPT | Mod: PBBFAC,,, | Performed by: REGISTERED NURSE

## 2023-10-06 PROCEDURE — 99213 OFFICE O/P EST LOW 20 MIN: CPT | Mod: S$PBB,,, | Performed by: REGISTERED NURSE

## 2023-10-06 PROCEDURE — 99999 PR PBB SHADOW E&M-EST. PATIENT-LVL III: ICD-10-PCS | Mod: PBBFAC,,, | Performed by: REGISTERED NURSE

## 2023-10-06 RX ORDER — PREDNISONE 20 MG/1
40 TABLET ORAL DAILY
Qty: 10 TABLET | Refills: 0 | Status: ON HOLD | OUTPATIENT
Start: 2023-10-06 | End: 2023-11-02

## 2023-10-06 RX ORDER — GABAPENTIN 300 MG/1
300 CAPSULE ORAL 3 TIMES DAILY
Qty: 90 CAPSULE | Refills: 1 | Status: SHIPPED | OUTPATIENT
Start: 2023-10-06 | End: 2023-12-28

## 2023-10-11 ENCOUNTER — TELEPHONE (OUTPATIENT)
Dept: PAIN MEDICINE | Facility: CLINIC | Age: 73
End: 2023-10-11
Payer: MEDICARE

## 2023-10-11 DIAGNOSIS — M54.16 LUMBAR RADICULOPATHY: Primary | ICD-10-CM

## 2023-10-11 DIAGNOSIS — M51.36 DDD (DEGENERATIVE DISC DISEASE), LUMBAR: ICD-10-CM

## 2023-10-11 NOTE — TELEPHONE ENCOUNTER
Spoke with patient. Discussed scheduling a procedure at this location with provider. Patient agreed to date of 11/2/23.     Reviewed pre op instructions with patient:    Please leave jewelry and valuables at home or give them to your escort for safe keeping.  You will be required to have an adult to escort you after the procedure is over. Although we will not be sedating you for your procedure we ask for an escort for safety after the procedure.  If you are taking prescribed thinners (aspirin, Coumadin, warfarin, apixaban, Eliquis, Plavix, clopidogrel, Pletal, etc) we will obtain cardiac clearance from your cardiologist or provider that is monitoring your medications and levels to hold the medications if appropriate.  Cleanse your skin the evening before with an antibacterial soap (Dial or Hibiclens) and then repeat it again the morning of the procedure.  Do not apply lotions, creams, deodorants, perfumes, or colognes the day of the procedure.  You will be contacted the day before the procedure to be given the time to arrive the day of the procedure. If you are not contacted by the afternoon before the procedure you should contact [phone].  You may have a light meal up to 8 hours before the procedure.    Patient verbalized understanding of the instructions provided to them and clinic contact number was provided for any further questions they may have.

## 2023-10-11 NOTE — TELEPHONE ENCOUNTER
----- Message from Sarbjit Hauser Jr., MD sent at 10/10/2023  7:20 AM CDT -----  Regarding: RE: Direct to procedure request  OK to schedule for b/l L5 TFESI (infraneural). Thanks.   ----- Message -----  From: Beau Branch LPN  Sent: 10/6/2023   9:30 AM CDT  To: Sarbjit Hauser Jr., MD  Subject: Direct to procedure request                      Direct to procedure request received from PEDRO Florez NP for a Transforaminal Injection carla L5/S1, Dx: Lumbar radiculopathy. Please, review chart notes and imaging. Advise for scheduling procedure.

## 2023-11-08 ENCOUNTER — OFFICE VISIT (OUTPATIENT)
Dept: FAMILY MEDICINE | Facility: CLINIC | Age: 73
End: 2023-11-08
Attending: FAMILY MEDICINE
Payer: MEDICARE

## 2023-11-08 VITALS
HEART RATE: 59 BPM | RESPIRATION RATE: 16 BRPM | WEIGHT: 192.13 LBS | HEIGHT: 65 IN | DIASTOLIC BLOOD PRESSURE: 63 MMHG | BODY MASS INDEX: 32.01 KG/M2 | SYSTOLIC BLOOD PRESSURE: 116 MMHG

## 2023-11-08 DIAGNOSIS — I10 ESSENTIAL HYPERTENSION: Primary | ICD-10-CM

## 2023-11-08 DIAGNOSIS — E78.2 MIXED HYPERLIPIDEMIA: ICD-10-CM

## 2023-11-08 DIAGNOSIS — R21 RASH OF HAND: ICD-10-CM

## 2023-11-08 PROCEDURE — 99999PBSHW FLU VACCINE - QUADRIVALENT - ADJUVANTED: Mod: PBBFAC,,,

## 2023-11-08 PROCEDURE — 99215 OFFICE O/P EST HI 40 MIN: CPT | Mod: PBBFAC,PO,25 | Performed by: FAMILY MEDICINE

## 2023-11-08 PROCEDURE — 99214 PR OFFICE/OUTPT VISIT, EST, LEVL IV, 30-39 MIN: ICD-10-PCS | Mod: S$PBB,,, | Performed by: FAMILY MEDICINE

## 2023-11-08 PROCEDURE — 99214 OFFICE O/P EST MOD 30 MIN: CPT | Mod: S$PBB,,, | Performed by: FAMILY MEDICINE

## 2023-11-08 PROCEDURE — 99999 PR PBB SHADOW E&M-EST. PATIENT-LVL V: ICD-10-PCS | Mod: PBBFAC,,, | Performed by: FAMILY MEDICINE

## 2023-11-08 PROCEDURE — 99999 PR PBB SHADOW E&M-EST. PATIENT-LVL V: CPT | Mod: PBBFAC,,, | Performed by: FAMILY MEDICINE

## 2023-11-08 PROCEDURE — 99999PBSHW FLU VACCINE - QUADRIVALENT - ADJUVANTED: ICD-10-PCS | Mod: PBBFAC,,,

## 2023-11-08 PROCEDURE — G0008 ADMIN INFLUENZA VIRUS VAC: HCPCS | Mod: PBBFAC,PO

## 2023-11-08 RX ORDER — CLOTRIMAZOLE AND BETAMETHASONE DIPROPIONATE 10; .64 MG/G; MG/G
CREAM TOPICAL 2 TIMES DAILY
Qty: 45 G | Refills: 0 | Status: SHIPPED | OUTPATIENT
Start: 2023-11-08

## 2023-11-08 NOTE — PATIENT INSTRUCTIONS
Estephania,     We are always striving for excellence. Should you receive a patient experience survey via text message, electronically, or by mail, we would appreciate if you would take a few moments to give us your feedback. These surveys let us know our strengths as well as areas of opportunity for improvement to better serve you.    Thank you for your time,  eDlma Rice LPN      Your test results will be communicated to you via : [facility], Telephone or Letter.   If you have not received your test results in one week, please contact the clinic at [phone].

## 2023-11-12 NOTE — PROGRESS NOTES
"Subjective:       Patient ID: Estephania Lyman is a 73 y.o. female.    Chief Complaint: Hypertension    Hypertension  Pertinent negatives include no chest pain, palpitations or shortness of breath.     Pt is here for follow up of htn bp fine no sob/cp on plendil  Pt has hypercholesterolemia on statin no muscle aches  Pt is c/o cracking rash on her hands she applies lotions/creams otc no improvement several months  Review of Systems   Constitutional:  Negative for chills, fatigue and fever.   Respiratory:  Negative for cough, chest tightness and shortness of breath.    Cardiovascular:  Negative for chest pain and palpitations.   Gastrointestinal:  Negative for abdominal distention, abdominal pain and blood in stool.   Skin:  Positive for color change and rash.       Objective:    /63   Pulse (!) 59   Resp 16   Ht 5' 5" (1.651 m)   Wt 87.1 kg (192 lb 1.6 oz)   LMP  (LMP Unknown)   BMI 31.97 kg/m²     Physical Exam  Constitutional:       Appearance: She is obese. She is not ill-appearing.   Cardiovascular:      Rate and Rhythm: Normal rate and regular rhythm.   Pulmonary:      Effort: Pulmonary effort is normal. No respiratory distress.   Skin:     Findings: Erythema and rash present.      Comments: Erythematous cracking fissured skin bilateral hands palms interdigit   Neurological:      General: No focal deficit present.      Mental Status: She is alert and oriented to person, place, and time.      Cranial Nerves: No cranial nerve deficit.      Coordination: Coordination normal.       Ldl 145 in 8/2023  Assessment:       1. Essential hypertension    2. Mixed hyperlipidemia    3. Rash of hand        Plan:     Orders cmp lipid  Start lotrisone cream  F/u derm  Low salt low fat diet  Graded exercise  Rtc 6 months       "This note will not be shared with the patient."     "

## 2023-12-27 ENCOUNTER — TELEPHONE (OUTPATIENT)
Dept: FAMILY MEDICINE | Facility: CLINIC | Age: 73
End: 2023-12-27
Payer: MEDICARE

## 2023-12-27 NOTE — TELEPHONE ENCOUNTER
----- Message from Sil Caldera MA sent at 12/27/2023  8:18 AM CST -----  Name of Who is Calling:LAURENCE CUELLAR [[id number]]                What is the request in detail: Pt is requesting a call back to get scheduled for a BITE ON RIGHT LEG, pt went to urgent care on yesterday and would like to be seen next week, no schedule for next week. NP spot for Tues was on hold and I could not schedule pt with her. Please assist.                Can the clinic reply by [facility]: No                What Number to Call Back if not in [facility]: [phone]

## 2023-12-28 ENCOUNTER — OFFICE VISIT (OUTPATIENT)
Dept: INTERNAL MEDICINE | Facility: CLINIC | Age: 73
End: 2023-12-28
Payer: MEDICARE

## 2023-12-28 VITALS
DIASTOLIC BLOOD PRESSURE: 72 MMHG | OXYGEN SATURATION: 98 % | HEIGHT: 65 IN | BODY MASS INDEX: 31.96 KG/M2 | HEART RATE: 79 BPM | WEIGHT: 191.81 LBS | SYSTOLIC BLOOD PRESSURE: 108 MMHG
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
## 2023-12-28 PROCEDURE — 99999 PR PBB SHADOW E&M-EST. PATIENT-LVL III: CPT | Mod: PBBFAC,,,

## 2023-12-28 PROCEDURE — 99213 PR OFFICE/OUTPT VISIT, EST, LEVL III, 20-29 MIN: ICD-10-PCS | Mod: S$PBB,,,

## 2023-12-28 PROCEDURE — 99213 OFFICE O/P EST LOW 20 MIN: CPT | Mod: PBBFAC

## 2023-12-28 PROCEDURE — 99999 PR PBB SHADOW E&M-EST. PATIENT-LVL III: ICD-10-PCS | Mod: PBBFAC,,,

## 2023-12-28 PROCEDURE — 99213 OFFICE O/P EST LOW 20 MIN: CPT | Mod: S$PBB,,,

## 2023-12-28 RX ORDER — DOXYCYCLINE 100 MG/1
100 CAPSULE ORAL 2 TIMES DAILY
COMMUNITY
Start: 2023-12-26

## 2023-12-28 NOTE — PROGRESS NOTES
"Subjective     Patient ID: Estephania Lyman is a 73 y.o. female.    Chief Complaint: Bite right lower leg    Pt seen in clinic today for a bug bite on her leg. Started as a blister 2 weeks ago. Became infected. Visited  2 days ago and was put on doxy x 7 days. Denies fever. Reports swelling and pain which took her to . Does not know what type of bug bit her.      Review of Systems   Constitutional: Negative.    Respiratory:  Negative for shortness of breath.    Cardiovascular:  Positive for leg swelling. Negative for chest pain.   Integumentary:  Positive for wound.          Objective     Physical Exam  Vitals reviewed.   Constitutional:       Appearance: She is well-developed.   HENT:      Head: Normocephalic and atraumatic.   Eyes:      Conjunctiva/sclera: Conjunctivae normal.   Cardiovascular:      Rate and Rhythm: Normal rate.   Pulmonary:      Effort: Pulmonary effort is normal. No respiratory distress.   Musculoskeletal:      Right lower le+ Pitting Edema present.      Left lower le+ Pitting Edema present.   Skin:     General: Skin is warm and dry.      Findings: Lesion present. No rash.             Comments: 2 small ulcerations, each approx 1cm x 1cm. Mild erythema noted, which pt states is improving with abt. No drainage noted.   Neurological:      Mental Status: She is alert and oriented to person, place, and time.      Coordination: Coordination normal.   Psychiatric:         Behavior: Behavior normal.            Assessment and Plan     1. Bug bite, subsequent encounter    Pt instructed to complete abt and to report to clinic if ulcerations do not heal or get worse. Pt with +1 edema to BLE. "Bug bite" looks like it could be PVD ulcer. Pt declined further eval at this time but would like to discuss at next visit. Pt instructed to call office or message provider for edema that increases prior to next visit. No new orders.    I spent a total of 20 minutes on the day of the visit.  This " includes face to face time and non-face to face time preparing to see the patient (eg, review of tests), obtaining and/or reviewing separately obtained history, documenting clinical information in the electronic or other health record, independently interpreting results and communicating results to the patient/family/caregiver, or care coordinator.             No follow-ups on file.

## 2024-02-20 ENCOUNTER — PATIENT MESSAGE (OUTPATIENT)
Dept: ADMINISTRATIVE | Facility: HOSPITAL | Age: 74
End: 2024-02-20
Payer: MEDICARE

## 2024-02-21 ENCOUNTER — PATIENT OUTREACH (OUTPATIENT)
Dept: ADMINISTRATIVE | Facility: HOSPITAL | Age: 74
End: 2024-02-21
Payer: MEDICARE

## 2024-02-21 DIAGNOSIS — Z78.0 ASYMPTOMATIC MENOPAUSE: Primary | ICD-10-CM

## 2024-04-12 ENCOUNTER — LAB VISIT (OUTPATIENT)
Dept: LAB | Facility: HOSPITAL | Age: 74
End: 2024-04-12
Attending: DERMATOLOGY
Payer: MEDICARE

## 2024-04-12 ENCOUNTER — OFFICE VISIT (OUTPATIENT)
Dept: DERMATOLOGY | Facility: CLINIC | Age: 74
End: 2024-04-12
Payer: MEDICARE

## 2024-04-12 DIAGNOSIS — Z76.89 ENCOUNTER FOR SKIN CARE: ICD-10-CM

## 2024-04-12 DIAGNOSIS — D49.9 NEOPLASM: Primary | ICD-10-CM

## 2024-04-12 DIAGNOSIS — R21 RASH OF FACE: ICD-10-CM

## 2024-04-12 DIAGNOSIS — L81.9 HYPERPIGMENTATION: ICD-10-CM

## 2024-04-12 PROCEDURE — 99204 OFFICE O/P NEW MOD 45 MIN: CPT | Mod: 25,S$PBB,, | Performed by: DERMATOLOGY

## 2024-04-12 PROCEDURE — 88342 IMHCHEM/IMCYTCHM 1ST ANTB: CPT | Performed by: PATHOLOGY

## 2024-04-12 PROCEDURE — 88305 TISSUE EXAM BY PATHOLOGIST: CPT | Performed by: PATHOLOGY

## 2024-04-12 PROCEDURE — 88305 TISSUE EXAM BY PATHOLOGIST: CPT | Mod: 26,,, | Performed by: PATHOLOGY

## 2024-04-12 PROCEDURE — 99213 OFFICE O/P EST LOW 20 MIN: CPT | Mod: PBBFAC,PN,25 | Performed by: DERMATOLOGY

## 2024-04-12 PROCEDURE — 11102 TANGNTL BX SKIN SINGLE LES: CPT | Mod: PBBFAC,PN | Performed by: DERMATOLOGY

## 2024-04-12 PROCEDURE — 86038 ANTINUCLEAR ANTIBODIES: CPT | Performed by: DERMATOLOGY

## 2024-04-12 PROCEDURE — 36415 COLL VENOUS BLD VENIPUNCTURE: CPT | Mod: PN | Performed by: DERMATOLOGY

## 2024-04-12 PROCEDURE — 11102 TANGNTL BX SKIN SINGLE LES: CPT | Mod: S$PBB,,, | Performed by: DERMATOLOGY

## 2024-04-12 PROCEDURE — 99999 PR PBB SHADOW E&M-EST. PATIENT-LVL III: CPT | Mod: PBBFAC,,, | Performed by: DERMATOLOGY

## 2024-04-12 PROCEDURE — 88342 IMHCHEM/IMCYTCHM 1ST ANTB: CPT | Mod: 26,,, | Performed by: PATHOLOGY

## 2024-04-12 RX ORDER — TACROLIMUS 1 MG/G
OINTMENT TOPICAL
Qty: 60 G | Refills: 3 | Status: CANCELLED | OUTPATIENT
Start: 2024-04-12

## 2024-04-12 RX ORDER — TRIAMCINOLONE ACETONIDE 1 MG/G
CREAM TOPICAL
Qty: 45 G | Refills: 1 | Status: SHIPPED | OUTPATIENT
Start: 2024-04-12

## 2024-04-12 NOTE — PROGRESS NOTES
Subjective:      Patient ID:  Estephania Lyman is a 73 y.o. female who presents for   Chief Complaint   Patient presents with    Rash     Bilateral temple     Rash - Initial  Affected locations: Bilateral temples.  Duration: 1 year  Signs / symptoms: itching and dryness  Severity: mild to moderate  Timing: waxes and wanes in intensity.  Aggravated by: sunlight  Relieving factors/Treatments tried: OTC moisturizer  Improvement on treatment: mild    Patient with new complaint of lesion  Location: Right forearm   Duration: about one year   Symptoms: Pt notes the lesion fades away and returns   Relieving factors/Previous treatments: none       Review of Systems   Constitutional:  Negative for fever, chills and fatigue.   HENT:  Negative for sore throat.    Respiratory:  Negative for cough.    Skin:  Positive for itching, rash and dry skin.       Objective:   Physical Exam   Skin:   Areas Examined (abnormalities noted in diagram):   Head / Face Inspection Performed  RUE Inspected                 Diagram Legend     Erythematous scaling macule/papule c/w actinic keratosis       Vascular papule c/w angioma      Pigmented verrucoid papule/plaque c/w seborrheic keratosis      Yellow umbilicated papule c/w sebaceous hyperplasia      Irregularly shaped tan macule c/w lentigo     1-2 mm smooth white papules consistent with Milia      Movable subcutaneous cyst with punctum c/w epidermal inclusion cyst      Subcutaneous movable cyst c/w pilar cyst      Firm pink to brown papule c/w dermatofibroma      Pedunculated fleshy papule(s) c/w skin tag(s)      Evenly pigmented macule c/w junctional nevus     Mildly variegated pigmented, slightly irregular-bordered macule c/w mildly atypical nevus      Flesh colored to evenly pigmented papule c/w intradermal nevus       Pink pearly papule/plaque c/w basal cell carcinoma      Erythematous hyperkeratotic cursted plaque c/w SCC      Surgical scar with no sign of skin cancer recurrence       Open and closed comedones      Inflammatory papules and pustules      Verrucoid papule consistent consistent with wart     Erythematous eczematous patches and plaques     Dystrophic onycholytic nail with subungual debris c/w onychomycosis     Umbilicated papule    Erythematous-base heme-crusted tan verrucoid plaque consistent with inflamed seborrheic keratosis     Erythematous Silvery Scaling Plaque c/w Psoriasis     See annotation            Face with dark patches.  No active rash now.    Assessment / Plan:      Pathology Orders:       Normal Orders This Visit    Specimen to Pathology, Dermatology     Questions:    Procedure Type: Dermatology and skin neoplasms    Number of Specimens: 1    ------------------------: -------------------------    Spec 1 Procedure: Biopsy    Spec 1 Clinical Impression: krishna wilson LM    Spec 1 Source: r arm    Release to patient:           Neoplasm  -     Specimen to Pathology, Dermatology  Shave biopsy procedure note:    Shave biopsy performed after verbal consent including risk of infection, scar, recurrence, need for additional treatment of site. Area prepped with alcohol, anesthetized with approximately 1.0cc of 1% lidocaine with epinephrine. Lesional tissue shaved with razor blade. Hemostasis achieved with application of aluminum chloride followed by hyfrecation. No complications. Dressing applied. Wound care explained.    Encounter for skin care  Patient instructed in importance in daily sun protection. Sun avoidance and topical protection discussed.     Patient encouraged to wear hat for all outdoor exposure.     Also discussed sun protective clothing.  No hot water bathing reviewed.  Recommend sun block with plain zinc oxide or sun block products with some zinc or titanium as their base with an spf of at least 30 to be applied every 2-3 hours of outdoor exposure.  Seek an oil free version if dealing with acne.    Hyperpigmentation  Discussed with the patient the risk of  color scars, erythema, or hyperpigmentation that could take months to resolve.  Cons hq later?    Rash of face  -     triamcinolone acetonide 0.1% (KENALOG) 0.1 % cream; AAA bid prn face rash.Stop using steroid topical when skin is smooth and non itchy.  Do not treat dark or red coloring.  Dispense: 45 g; Refill: 1  -     NICHOLAS Screen w/Reflex; Future; Expected date: 04/12/2024  Previous [facility] labs and or records and notes reviewed and considered for their impact on our clinical decision making today.  Reviewed with patient different treatment options and associated risks.  Proper application of medications and or care for affected area(s) and condition(s) reviewed.  Patient instructed in importance in daily sun protection. Sun avoidance and topical protection discussed.     Patient encouraged to wear hat for all outdoor exposure.     Also discussed sun protective clothing.  Proper application of medications and or care for affected area(s) and condition(s) reviewed.  Discussed with patient the risks of topical and injectable steroids, including, but not limited, to atrophy, hypopigmentation, rosacea, acne, glaucoma, cataracts, adrenal suppression, striae.  Do not touch eyes with medication.  Patient to watch for recurrence or flares or worsening and to call the clinic for a follow up appointment for such.  Poss sun sens vs bp meds vs lupus.  Pt reports more with sun exp.             Follow up in about 1 year (around 4/12/2025).

## 2024-04-12 NOTE — PATIENT INSTRUCTIONS
Recommend sun block with plain zinc oxide or sun block products with some zinc or titanium as their base with an spf of at least 30 to be applied every 2-3 hours of outdoor exposure.  Seek an oil free version if dealing with acne.        Shave Biopsy Wound Care    Your doctor has performed a shave biopsy today.  A band aid and vaseline ointment has been placed over the site.  This should remain in place for NO LONGER THAN 48 hours.  It is fine to remove the bandaid after 24 hours, if the area is no longer bleeding. It is recommended that you keep the area dry (do not wet)) for the first 24 hours.  After 24 hours, wash the area with warm soap and water and apply Vaseline jelly.  Many patients prefer to use Neosporin or Bacitracin ointment.  This is acceptable; however, know that you can develop an allergy to this medication even if you have used it safely for years.  It is important to keep the area moist.  Letting it dry out and get air slows healing time, and will worsen the scar.        If you notice increasing redness, tenderness, pain, or yellow drainage at the biopsy site, please notify your doctor.  These are signs of an infection.    If your biopsy site is bleeding, apply firm pressure for 15 minutes straight.  Repeat for another 15 minutes, if it is still bleeding.   If the surgical site continues to bleed, then please contact your doctor.      For [facility] users:   You will receive your biopsy results in [facility] as soon as they are available. Please be assured that your physician/provider will review your results and will then determine what further treatment, evaluation, or planning is required. You should be contacted by your physician's/provider's office within 5 business days of receiving your results; If not, please reach out to directly. This is one more way [facility] is putting you first.     [facility] [place] [address]/ [phone] FAX/ [email or web address]

## 2024-04-15 LAB — ANA SER QL IF: NORMAL

## 2024-04-16 ENCOUNTER — PATIENT MESSAGE (OUTPATIENT)
Dept: DERMATOLOGY | Facility: CLINIC | Age: 74
End: 2024-04-16
Payer: MEDICARE

## 2024-04-16 RX ORDER — MUPIROCIN 20 MG/G
OINTMENT TOPICAL 3 TIMES DAILY
Qty: 30 G | Refills: 1 | Status: SHIPPED | OUTPATIENT
Start: 2024-04-16

## 2024-04-18 ENCOUNTER — PATIENT MESSAGE (OUTPATIENT)
Dept: DERMATOLOGY | Facility: CLINIC | Age: 74
End: 2024-04-18
Payer: MEDICARE

## 2024-04-18 LAB
FINAL PATHOLOGIC DIAGNOSIS: NORMAL
GROSS: NORMAL
Lab: NORMAL
MICROSCOPIC EXAM: NORMAL

## 2024-05-06 ENCOUNTER — TELEPHONE (OUTPATIENT)
Dept: FAMILY MEDICINE | Facility: CLINIC | Age: 74
End: 2024-05-06
Payer: MEDICARE

## 2024-05-06 DIAGNOSIS — M54.50 DORSALGIA OF LUMBAR REGION: Primary | ICD-10-CM

## 2024-05-06 NOTE — TELEPHONE ENCOUNTER
----- Message from Ayleen Durand sent at 5/6/2024 11:10 AM CDT -----  Patient need order for physical therapy for back pain.

## 2024-06-09 DIAGNOSIS — K21.9 GASTROESOPHAGEAL REFLUX DISEASE, UNSPECIFIED WHETHER ESOPHAGITIS PRESENT: ICD-10-CM

## 2024-06-09 NOTE — TELEPHONE ENCOUNTER
No care due was identified.  [facility] Embedded Care Due Messages. Reference number: [id number].   6/09/2024 12:43:04 PM CDT

## 2024-06-10 RX ORDER — PANTOPRAZOLE SODIUM 40 MG/1
40 TABLET, DELAYED RELEASE ORAL 2 TIMES DAILY
Qty: 180 TABLET | Refills: 2 | Status: SHIPPED | OUTPATIENT
Start: 2024-06-10

## 2024-06-10 NOTE — TELEPHONE ENCOUNTER
Refill Routing Note   Medication(s) are not appropriate for processing by [facility] for the following reason(s):        Outside of protocol    [facility] action(s):  Route        Medication Therapy Plan: 40 mg twice daily outside refill center protocol      Appointments  past 12m or future 3m with PCP    Date Provider   Last Visit   11/8/2023 Aidee Lora MD   Next Visit   Visit date not found Aidee Lora MD   ED visits in past 90 days: 0        Note composed:6:31 AM 06/10/2024

## 2024-07-15 ENCOUNTER — TELEPHONE (OUTPATIENT)
Dept: FAMILY MEDICINE | Facility: CLINIC | Age: 74
End: 2024-07-15
Payer: MEDICARE

## 2024-07-15 NOTE — TELEPHONE ENCOUNTER
----- Message from Marisela Segura sent at 7/12/2024  2:18 PM CDT -----  Regarding: orders  Name of caller: Estephania       What is the requesting detail: pt requesting bone density and mammogram orders.Please advise       Can the clinic reply by [facility]:       What number to call back: [phone]

## 2024-07-22 ENCOUNTER — PATIENT OUTREACH (OUTPATIENT)
Dept: ADMINISTRATIVE | Facility: HOSPITAL | Age: 74
End: 2024-07-22
Payer: MEDICARE

## 2024-07-22 DIAGNOSIS — Z12.31 ENCOUNTER FOR SCREENING MAMMOGRAM FOR MALIGNANT NEOPLASM OF BREAST: Primary | ICD-10-CM

## 2024-07-25 DIAGNOSIS — M54.16 LUMBAR RADICULOPATHY: ICD-10-CM

## 2024-07-25 DIAGNOSIS — M51.36 DDD (DEGENERATIVE DISC DISEASE), LUMBAR: ICD-10-CM

## 2024-07-26 RX ORDER — GABAPENTIN 100 MG/1
100 CAPSULE ORAL 3 TIMES DAILY
Qty: 90 CAPSULE | Refills: 0 | Status: SHIPPED | OUTPATIENT
Start: 2024-07-26

## 2024-08-03 ENCOUNTER — HOSPITAL ENCOUNTER (OUTPATIENT)
Dept: RADIOLOGY | Facility: OTHER | Age: 74
Discharge: HOME | End: 2024-08-03
Attending: FAMILY MEDICINE
Payer: MEDICARE

## 2024-08-03 DIAGNOSIS — Z12.31 ENCOUNTER FOR SCREENING MAMMOGRAM FOR MALIGNANT NEOPLASM OF BREAST: ICD-10-CM

## 2024-08-03 PROCEDURE — 77067 SCR MAMMO BI INCL CAD: CPT | Mod: TC

## 2024-08-03 PROCEDURE — 77063 BREAST TOMOSYNTHESIS BI: CPT | Mod: 26,,, | Performed by: RADIOLOGY

## 2024-08-03 PROCEDURE — 77063 BREAST TOMOSYNTHESIS BI: CPT | Mod: TC

## 2024-08-03 PROCEDURE — 77067 SCR MAMMO BI INCL CAD: CPT | Mod: 26,,, | Performed by: RADIOLOGY

## 2024-09-10 DIAGNOSIS — M51.36 DDD (DEGENERATIVE DISC DISEASE), LUMBAR: Primary | ICD-10-CM

## 2024-09-30 RX ORDER — CITALOPRAM 20 MG/1
20 TABLET, FILM COATED ORAL DAILY
Qty: 90 TABLET | Refills: 3 | Status: SHIPPED | OUTPATIENT
Start: 2024-09-30 | End: 2024-10-02 | Stop reason: SDUPTHER

## 2024-09-30 NOTE — TELEPHONE ENCOUNTER
Care Due:                  Date            Visit Type   Department     Provider  --------------------------------------------------------------------------------                                EP -                              [facility] FAMILY  Last Visit: 11-      CARE ([facility])   ALYSIA Lora                               -                              [facility]  Next Visit: 10-      CARE ([facility])   MEDICINE       Aidee Lora                                                            Last  Test          Frequency    Reason                     Performed    Due Date  --------------------------------------------------------------------------------    CMP.........  12 months..  pravastatin..............  08-   10-    Lipid Panel.  12 months..  pravastatin..............  11-   10-    [facility] Embedded Care Due Messages. Reference number: [id number].   9/30/2024 12:23:31 PM CDT

## 2024-10-02 ENCOUNTER — OFFICE VISIT (OUTPATIENT)
Dept: FAMILY MEDICINE | Facility: CLINIC | Age: 74
End: 2024-10-02
Attending: FAMILY MEDICINE
Payer: MEDICARE

## 2024-10-02 VITALS
BODY MASS INDEX: 31.78 KG/M2 | HEART RATE: 64 BPM | WEIGHT: 191 LBS | SYSTOLIC BLOOD PRESSURE: 138 MMHG | DIASTOLIC BLOOD PRESSURE: 86 MMHG | OXYGEN SATURATION: 97 %

## 2024-10-02 DIAGNOSIS — E78.2 MIXED HYPERLIPIDEMIA: ICD-10-CM

## 2024-10-02 DIAGNOSIS — Z00.00 ANNUAL PHYSICAL EXAM: Primary | ICD-10-CM

## 2024-10-02 DIAGNOSIS — Z78.0 ASYMPTOMATIC MENOPAUSE: ICD-10-CM

## 2024-10-02 DIAGNOSIS — M54.16 LUMBAR RADICULOPATHY: ICD-10-CM

## 2024-10-02 DIAGNOSIS — I10 ESSENTIAL HYPERTENSION: ICD-10-CM

## 2024-10-02 PROCEDURE — 99214 OFFICE O/P EST MOD 30 MIN: CPT | Mod: S$PBB,,, | Performed by: FAMILY MEDICINE

## 2024-10-02 PROCEDURE — 99213 OFFICE O/P EST LOW 20 MIN: CPT | Mod: PBBFAC,PO | Performed by: FAMILY MEDICINE

## 2024-10-02 PROCEDURE — 99999 PR PBB SHADOW E&M-EST. PATIENT-LVL III: CPT | Mod: PBBFAC,,, | Performed by: FAMILY MEDICINE

## 2024-10-02 RX ORDER — CITALOPRAM 20 MG/1
20 TABLET, FILM COATED ORAL DAILY
Qty: 90 TABLET | Refills: 3 | Status: SHIPPED | OUTPATIENT
Start: 2024-10-02

## 2024-10-02 RX ORDER — CLOTRIMAZOLE AND BETAMETHASONE DIPROPIONATE 10; .64 MG/G; MG/G
CREAM TOPICAL 2 TIMES DAILY
Qty: 45 G | Refills: 1 | Status: SHIPPED | OUTPATIENT
Start: 2024-10-02 | End: 2024-10-18 | Stop reason: SDUPTHER

## 2024-10-02 RX ORDER — FELODIPINE 10 MG/1
10 TABLET, EXTENDED RELEASE ORAL DAILY
Qty: 90 TABLET | Refills: 3 | Status: SHIPPED | OUTPATIENT
Start: 2024-10-02

## 2024-10-02 RX ORDER — PRAVASTATIN SODIUM 80 MG/1
80 TABLET ORAL DAILY
Qty: 90 TABLET | Refills: 3 | Status: SHIPPED | OUTPATIENT
Start: 2024-10-02

## 2024-10-04 ENCOUNTER — PATIENT MESSAGE (OUTPATIENT)
Dept: FAMILY MEDICINE | Facility: CLINIC | Age: 74
End: 2024-10-04
Payer: MEDICARE

## 2024-10-18 ENCOUNTER — OFFICE VISIT (OUTPATIENT)
Dept: FAMILY MEDICINE | Facility: CLINIC | Age: 74
End: 2024-10-18
Payer: MEDICARE

## 2024-10-18 VITALS
RESPIRATION RATE: 16 BRPM | SYSTOLIC BLOOD PRESSURE: 121 MMHG | WEIGHT: 194 LBS | HEART RATE: 67 BPM | OXYGEN SATURATION: 97 % | HEIGHT: 65 IN | DIASTOLIC BLOOD PRESSURE: 80 MMHG | BODY MASS INDEX: 32.32 KG/M2

## 2024-10-18 DIAGNOSIS — R21 RASH AND NONSPECIFIC SKIN ERUPTION: Primary | ICD-10-CM

## 2024-10-18 PROCEDURE — 99999 PR PBB SHADOW E&M-EST. PATIENT-LVL III: CPT | Mod: PBBFAC,,, | Performed by: NURSE PRACTITIONER

## 2024-10-18 PROCEDURE — 99213 OFFICE O/P EST LOW 20 MIN: CPT | Mod: PBBFAC,PO | Performed by: NURSE PRACTITIONER

## 2024-10-18 RX ORDER — CLOTRIMAZOLE AND BETAMETHASONE DIPROPIONATE 10; .64 MG/G; MG/G
CREAM TOPICAL 2 TIMES DAILY
Qty: 45 G | Refills: 1 | Status: SHIPPED | OUTPATIENT
Start: 2024-10-18

## 2024-10-18 NOTE — PROGRESS NOTES
Subjective:       Patient ID: Estephania Lyman is a 74 y.o. female.    Chief Complaint: Rash    Rash  This is a chronic problem. The current episode started more than 1 month ago. The problem is unchanged. The affected locations include the right hand. The rash is characterized by itchiness, scaling and swelling. It is unknown if there was an exposure to a precipitant. Pertinent negatives include no anorexia, cough, diarrhea, eye pain, fatigue, fever, joint pain, rhinorrhea, shortness of breath, sore throat or vomiting. Past treatments include moisturizer. The treatment provided no relief.     Patient Active Problem List   Diagnosis    HTN (hypertension), benign    Hypercholesterolemia    Hematuria    Abnormal urine    Bilateral impacted cerumen    Osteoarthritis of both hips    Osteopenia    PVC's (premature ventricular contractions)    Sarcoid uveitis    Screening for colon cancer    Sialadenitis - left sided    Cellulitis of submandibular region    Depression    History of sarcoidosis    Incomplete tear of right rotator cuff    Gastroesophageal reflux disease    Family history of colon cancer    GERD (gastroesophageal reflux disease)    Macrocytic anemia    Chronic fatigue    Low vitamin B12 level    Other eosinophilia    COVID    Chronic right-sided low back pain with right-sided sciatica    Lumbar spondylosis    DDD (degenerative disc disease), lumbar    Combined immunodeficiency disorder    Sarcoidosis of lung       Current Outpatient Medications:     albuterol (PROVENTIL/VENTOLIN HFA) 90 mcg/actuation inhaler, Inhale 1 puff into the lungs every 6 (six) hours as needed., Disp: , Rfl:     citalopram (CELEXA) 20 MG tablet, Take 1 tablet (20 mg total) by mouth once daily., Disp: 90 tablet, Rfl: 3    felodipine (PLENDIL) 10 MG 24 hr tablet, Take 1 tablet (10 mg total) by mouth once daily., Disp: 90 tablet, Rfl: 3    fluticasone propionate (FLONASE) 50 mcg/actuation nasal spray, 1 spray (50 mcg total) by Each  Nostril route once daily., Disp: 16 g, Rfl: 3    gabapentin (NEURONTIN) 100 MG capsule, TAKE 1 CAPSULE BY MOUTH THREE TIMES DAILY, Disp: 90 capsule, Rfl: 0    ipratropium (ATROVENT) 42 mcg (0.06 %) nasal spray, USE 2 SPRAY(S) IN EACH NOSTRIL EVERY 6 HOURS AS NEEDED FOR CONGESTION, Disp: , Rfl:     mupirocin (BACTROBAN) 2 % ointment, Apply topically 3 (three) times daily., Disp: 30 g, Rfl: 1    pantoprazole (PROTONIX) 40 MG tablet, Take 1 tablet by mouth twice daily, Disp: 180 tablet, Rfl: 2    pravastatin (PRAVACHOL) 80 MG tablet, Take 1 tablet (80 mg total) by mouth once daily., Disp: 90 tablet, Rfl: 3    clotrimazole-betamethasone 1-0.05% (LOTRISONE) cream, Apply topically 2 (two) times daily., Disp: 45 g, Rfl: 1  No current facility-administered medications for this visit.    Facility-Administered Medications Ordered in Other Visits:     0.9%  NaCl infusion, , Intravenous, Continuous, Valentino Perez MD    LIDOcaine (PF) 10 mg/ml (1%) injection 10 mg, 1 mL, Intradermal, Once PRN, Valentino Perez MD    The following portions of the patient's history were reviewed and updated as appropriate: allergies, past family history, past medical history, past social history and past surgical history.    Review of Systems   Constitutional:  Negative for appetite change, fatigue, fever and unexpected weight change.   HENT:  Negative for hearing loss, rhinorrhea, sneezing, sore throat and trouble swallowing.    Eyes:  Negative for pain and visual disturbance.   Respiratory:  Negative for cough, shortness of breath and wheezing.    Cardiovascular:  Negative for chest pain and palpitations.   Gastrointestinal:  Negative for abdominal pain, anorexia, constipation, diarrhea, nausea and vomiting.   Genitourinary:  Negative for difficulty urinating, dysuria, frequency and hematuria.   Musculoskeletal:  Negative for arthralgias, joint pain and myalgias.   Skin:  Positive for rash.   Neurological:  Negative for  "dizziness, weakness and numbness.   Psychiatric/Behavioral:  Negative for sleep disturbance. The patient is not nervous/anxious.        Objective:      /80 (BP Location: Left arm)   Pulse 67   Resp 16   Ht 5' 5" (1.651 m)   Wt 88 kg (194 lb)   LMP  (LMP Unknown)   SpO2 97%   BMI 32.28 kg/m²     Physical Exam  Constitutional:       General: She is not in acute distress.     Appearance: Normal appearance.   Cardiovascular:      Rate and Rhythm: Normal rate and regular rhythm.      Pulses: Normal pulses.      Heart sounds: Normal heart sounds.   Pulmonary:      Effort: Pulmonary effort is normal.      Breath sounds: Normal breath sounds.   Musculoskeletal:         General: Normal range of motion.   Skin:     General: Skin is warm and dry.      Findings: Rash present. Rash is macular, papular and scaling.          Neurological:      Mental Status: She is alert and oriented to person, place, and time.   Psychiatric:         Mood and Affect: Mood normal.         Behavior: Behavior normal.         Assessment:       1. Rash and nonspecific skin eruption        Plan:   Estephania was seen today for rash.    Diagnoses and all orders for this visit:    Rash and nonspecific skin eruption  -     clotrimazole-betamethasone 1-0.05% (LOTRISONE) cream; Apply topically 2 (two) times daily.      "

## 2024-10-29 ENCOUNTER — OFFICE VISIT (OUTPATIENT)
Dept: SPINE | Facility: CLINIC | Age: 74
End: 2024-10-29
Payer: MEDICARE

## 2024-10-29 VITALS
DIASTOLIC BLOOD PRESSURE: 61 MMHG | SYSTOLIC BLOOD PRESSURE: 119 MMHG | BODY MASS INDEX: 32.28 KG/M2 | RESPIRATION RATE: 19 BRPM | HEART RATE: 66 BPM | OXYGEN SATURATION: 98 % | WEIGHT: 194 LBS | TEMPERATURE: 98 F

## 2024-10-29 DIAGNOSIS — M54.9 DORSALGIA: ICD-10-CM

## 2024-10-29 DIAGNOSIS — M47.816 SPONDYLOSIS OF LUMBAR REGION WITHOUT MYELOPATHY OR RADICULOPATHY: Primary | ICD-10-CM

## 2024-10-29 DIAGNOSIS — M51.362 DEGENERATION OF INTERVERTEBRAL DISC OF LUMBAR REGION WITH DISCOGENIC BACK PAIN AND LOWER EXTREMITY PAIN: ICD-10-CM

## 2024-10-29 DIAGNOSIS — M46.1 SACROILIITIS: ICD-10-CM

## 2024-10-29 PROCEDURE — 99214 OFFICE O/P EST MOD 30 MIN: CPT | Mod: PBBFAC | Performed by: NURSE PRACTITIONER

## 2024-10-29 PROCEDURE — 99999 PR PBB SHADOW E&M-EST. PATIENT-LVL IV: CPT | Mod: PBBFAC,,, | Performed by: NURSE PRACTITIONER

## 2024-10-29 PROCEDURE — 99214 OFFICE O/P EST MOD 30 MIN: CPT | Mod: S$PBB,,, | Performed by: NURSE PRACTITIONER

## 2024-10-29 RX ORDER — METHYLPREDNISOLONE 4 MG/1
TABLET ORAL
Qty: 21 EACH | Refills: 0 | Status: SHIPPED | OUTPATIENT
Start: 2024-10-29 | End: 2024-11-19

## 2024-12-11 ENCOUNTER — OFFICE VISIT (OUTPATIENT)
Dept: SPINE | Facility: CLINIC | Age: 74
End: 2024-12-11
Payer: COMMERCIAL

## 2024-12-11 ENCOUNTER — TELEPHONE (OUTPATIENT)
Dept: FAMILY MEDICINE | Facility: CLINIC | Age: 74
End: 2024-12-11
Payer: MEDICARE

## 2024-12-11 VITALS
RESPIRATION RATE: 18 BRPM | SYSTOLIC BLOOD PRESSURE: 115 MMHG | HEIGHT: 65 IN | DIASTOLIC BLOOD PRESSURE: 57 MMHG | OXYGEN SATURATION: 100 % | BODY MASS INDEX: 32.32 KG/M2 | HEART RATE: 84 BPM | WEIGHT: 194 LBS

## 2024-12-11 DIAGNOSIS — M54.16 LUMBAR RADICULOPATHY: ICD-10-CM

## 2024-12-11 DIAGNOSIS — M51.362 DEGENERATION OF INTERVERTEBRAL DISC OF LUMBAR REGION WITH DISCOGENIC BACK PAIN AND LOWER EXTREMITY PAIN: ICD-10-CM

## 2024-12-11 DIAGNOSIS — M54.9 DORSALGIA: ICD-10-CM

## 2024-12-11 DIAGNOSIS — M51.369 DDD (DEGENERATIVE DISC DISEASE), LUMBAR: ICD-10-CM

## 2024-12-11 DIAGNOSIS — M47.816 SPONDYLOSIS OF LUMBAR REGION WITHOUT MYELOPATHY OR RADICULOPATHY: Primary | ICD-10-CM

## 2024-12-11 PROCEDURE — 3288F FALL RISK ASSESSMENT DOCD: CPT | Mod: CPTII,S$GLB,, | Performed by: NURSE PRACTITIONER

## 2024-12-11 PROCEDURE — 3008F BODY MASS INDEX DOCD: CPT | Mod: CPTII,S$GLB,, | Performed by: NURSE PRACTITIONER

## 2024-12-11 PROCEDURE — 99214 OFFICE O/P EST MOD 30 MIN: CPT | Mod: S$GLB,,, | Performed by: NURSE PRACTITIONER

## 2024-12-11 PROCEDURE — 1101F PT FALLS ASSESS-DOCD LE1/YR: CPT | Mod: CPTII,S$GLB,, | Performed by: NURSE PRACTITIONER

## 2024-12-11 PROCEDURE — 1160F RVW MEDS BY RX/DR IN RCRD: CPT | Mod: CPTII,S$GLB,, | Performed by: NURSE PRACTITIONER

## 2024-12-11 PROCEDURE — 3074F SYST BP LT 130 MM HG: CPT | Mod: CPTII,S$GLB,, | Performed by: NURSE PRACTITIONER

## 2024-12-11 PROCEDURE — 99999 PR PBB SHADOW E&M-EST. PATIENT-LVL V: CPT | Mod: PBBFAC,,, | Performed by: NURSE PRACTITIONER

## 2024-12-11 PROCEDURE — 3078F DIAST BP <80 MM HG: CPT | Mod: CPTII,S$GLB,, | Performed by: NURSE PRACTITIONER

## 2024-12-11 PROCEDURE — 1159F MED LIST DOCD IN RCRD: CPT | Mod: CPTII,S$GLB,, | Performed by: NURSE PRACTITIONER

## 2024-12-11 PROCEDURE — 1125F AMNT PAIN NOTED PAIN PRSNT: CPT | Mod: CPTII,S$GLB,, | Performed by: NURSE PRACTITIONER

## 2024-12-11 RX ORDER — GABAPENTIN 100 MG/1
100 CAPSULE ORAL 3 TIMES DAILY
Qty: 90 CAPSULE | Refills: 2 | Status: SHIPPED | OUTPATIENT
Start: 2024-12-11

## 2024-12-11 NOTE — TELEPHONE ENCOUNTER
----- Message from Tiara sent at 12/11/2024  2:36 PM CST -----  Message should be forward to .  ----- Message -----  From: Esther Yuan MA  Sent: 12/10/2024  10:22 AM CST  To: Tiara Lambert      ----- Message -----  From: Kathy Young  Sent: 12/9/2024   1:44 PM CST  To: Guido VALLECILLO Staff    Type : Patient Call      Who Called :       Does the patient know what this is regarding?: Patient is requesting a callback; pt recently had labs outside of [facility] for work and was advised that she had blood in the urine; she would like to schedule an appt; no availability on my end upon checking; pt received a copy of those results; please advise        Would the patient rather a call back or a response via [facility]? Call        Best Call Back Number: [phone]        Additional Information:

## 2024-12-11 NOTE — TELEPHONE ENCOUNTER
----- Message from Sean Santana sent at 12/10/2024 10:22 AM CST -----    ----- Message -----  From: Kathy Young  Sent: 12/9/2024   1:44 PM CST  To: Guido VALLECILLO Staff    Type : Patient Call      Who Called :       Does the patient know what this is regarding?: Patient is requesting a callback; [email or web address] and was advised that she had blood in the urine; she would like to schedule an appt; no availability on my end upon checking; pt received a copy of those results; please advise        Would the patient rather a call back or a response via [facility]? Call        Best Call Back Number: [phone]        Additional Information:

## 2024-12-11 NOTE — PROGRESS NOTES
Subjective:     Patient ID: Estephania Lyman is a 74 y.o. female.    Chief Complaint: Low-back Pain    Interval History 12/11/24  Mrs. Lyman presents today for followup of low back pain. She reports some improvement with PT, but still has persistent back pain. She is interested in seeing pain management for injections.   Interval history 10/29/24  Mrs. Lyman presents today for increased low back pain.  Last seen by me in 2023.  Did physical therapy with improvement in her symptoms.  Did have bilateral L5 TF LAURO with Dr. Hauser with limited benefit in November 2023.    HPI Ms. Lyman is a 72 year old here for evaluation of low back pain    C/o right low back pain radiating into the right glute, sometimes down the leg  Notes numbness/tingling to glute  Denies Hx similar pain in the past  No PT or injections in the past  Toradol from PCP 5/5 with relief    Lumbar xray 2023    FINDINGS:  Continued convex right curvature thoracolumbar spine.  Lumbar sagittal alignment similar to prior with trace grade 1 anterolisthesis L5 on S1.  The lumbar vertebral body heights and contours relatively stable allowing for scattered endplate degeneration without evidence for acute fracture. Facet degenerative change lower lumbar levels.  Further evaluation as warranted clinically.     Impression:     Please see above     Right hip xray 2023    FINDINGS:  Allowing for relative similar positioning on both views no evidence for acute fracture or definite dislocation right hip.  Bony pelvis is grossly intact.  Probable gas and fecal material projecting over the visualized colon and rectum.  Further evaluation as warranted clinically.     Impression:     Please see above    Past Medical History:   Diagnosis Date    Chronic right-sided low back pain with right-sided sciatica     Depression     GERD (gastroesophageal reflux disease)     Headache     Hyperlipidemia     Hypertension     Personal history of colonic polyps      Sarcoidosis        Past Surgical History:   Procedure Laterality Date    ANKLE FRACTURE SURGERY      ARTHROSCOPIC REPAIR OF ROTATOR CUFF OF SHOULDER Right 07/23/2018    Procedure: REPAIR, ROTATOR CUFF, ARTHROSCOPIC;  Surgeon: YELENA Olivera MD;  [facility];  Service: Orthopedics;  Laterality: Right;  regional with catheter, interscalene  Vladimir/Linvatec & Ann/Arthrex notified 7-18 LO    ARTHROSCOPY OF SHOULDER WITH DECOMPRESSION OF SUBACROMIAL SPACE Right 07/23/2018    Procedure: ARTHROSCOPY, SHOULDER, WITH SUBACROMIAL SPACE DECOMPRESSION;  Surgeon: YELENA Olivera MD;  [facility];  Service: Orthopedics;  Laterality: Right;    ARTHROSCOPY OF SHOULDER WITH REMOVAL OF DISTAL CLAVICLE Right 07/23/2018    Procedure: ARTHROSCOPY, SHOULDER, WITH DISTAL CLAVICLE EXCISION;  Surgeon: YELENA Olivera MD;  [facility];  Service: Orthopedics;  Laterality: Right;    COLONOSCOPY N/A 05/30/2017    Procedure: COLONOSCOPY;  Surgeon: Jed Garcia MD;  [facility]  Service: Endoscopy;  Laterality: N/A;    COLONOSCOPY N/A 05/24/2023    Procedure: COLONOSCOPY;  Surgeon: Beau Bowden MD;  Location: [facility];  Service: Endoscopy;  Laterality: N/A;    ESOPHAGEAL DILATION N/A 05/24/2023    Procedure: DILATION, ESOPHAGUS;  Surgeon: Beau Bowden MD;  Location: [facility];  Service: Endoscopy;  Laterality: N/A;    ESOPHAGOGASTRODUODENOSCOPY N/A 06/30/2020    Procedure: EGD (ESOPHAGOGASTRODUODENOSCOPY);  Surgeon: Russell Durand MD;  Location: [facility] (4TH FLR);  Service: Endoscopy;  Laterality: N/A;  COVID test on 6/28/20 at [facility]    ESOPHAGOGASTRODUODENOSCOPY N/A 07/01/2021    Procedure: EGD (ESOPHAGOGASTRODUODENOSCOPY);  Surgeon: Chucho Mooney MD;  Location: [facility] (4TH FLR);  Service: Endoscopy;  Laterality: N/A;  vaccinated 2/10/21 see media tab 6/7/21, instr portal and mailed -ml    ESOPHAGOGASTRODUODENOSCOPY N/A 05/24/2023    Procedure:  EGD (ESOPHAGOGASTRODUODENOSCOPY);  Surgeon: Beau Bowden MD;  Location: [facility];  Service: Endoscopy;  Laterality: N/A;    FIXATION OF TENDON Right 2018    Procedure: FIXATION, TENDON;  Surgeon: YELENA Olivera MD;  [facility];  Service: Orthopedics;  Laterality: Right;    HYSTERECTOMY  age 39    endo    LEG SURGERY      tibia    OOPHORECTOMY  age 39    endo    TONSILLECTOMY      TRANSFORAMINAL EPIDURAL INJECTION OF STEROID Bilateral 2023    L5    TRANSFORAMINAL EPIDURAL INJECTION OF STEROID Bilateral 2023    Procedure: Injection,steroid,epidural,transforaminal approach---BILATERAL L5 (INFRANEURAL);  Surgeon: Sarbjit Hauser Jr., MD;  Location: [facility]IN MG[facility];  Service: Pain Management;  Laterality: Bilateral;  CONSENT DAY OF PROCEDURE       Family History   Problem Relation Name Age of Onset    Cancer Mother  61        colon    Cancer Father          throat    Hypertension Sister      Breast cancer Sister  59    Migraines Maternal Grandmother      Hypertension Brother      Diabetes Sister      Hypertension Sister      Stroke Neg Hx      Ovarian cancer Neg Hx      Miscarriages / Stillbirths Neg Hx      Eclampsia Neg Hx         Social History     Socioeconomic History    Marital status:    Occupational History     Employer: [employer]   Tobacco Use    Smoking status: Former     Current packs/day: 0.00     Average packs/day: 1 pack/day for 3.0 years (3.0 ttl pk-yrs)     Types: Cigarettes     Start date: 2007     Quit date: 2010     Years since quittin.4     Passive exposure: Past    Smokeless tobacco: Never    Tobacco comments:     last smoked 15 years ago   Substance and Sexual Activity    Alcohol use: Not Currently     Comment: occasional     Drug use: No    Sexual activity: Yes     Partners: Male     Birth control/protection: Surgical     Comment: Hysterectomy     Social Drivers of Health     Financial Resource Strain: Low Risk   (12/27/2023)    Overall Financial Resource Strain (CARDIA)     Difficulty of Paying Living Expenses: Not hard at all   Food Insecurity: Food Insecurity Present (12/27/2023)    Hunger Vital Sign     Worried About Running Out of Food in the Last Year: Sometimes true     Ran Out of Food in the Last Year: Never true   Transportation Needs: No Transportation Needs (12/27/2023)    PRAPARE - Transportation     Lack of Transportation (Medical): No     Lack of Transportation (Non-Medical): No   Physical Activity: Unknown (12/27/2023)    Exercise Vital Sign     Days of Exercise per Week: 3 days   Stress: No Stress Concern Present (12/27/2023)    [place] [employer] - Occupational Stress Questionnaire     Feeling of Stress : Only a little   Housing Stability: High Risk (12/27/2023)    Housing Stability Vital Sign     Unable to Pay for Housing in the Last Year: No     Number of Places Lived in the Last Year: 5612     Unstable Housing in the Last Year: No       Current Outpatient Medications   Medication Sig Dispense Refill    albuterol (PROVENTIL/VENTOLIN HFA) 90 mcg/actuation inhaler Inhale 1 puff into the lungs every 6 (six) hours as needed.      citalopram (CELEXA) 20 MG tablet Take 1 tablet (20 mg total) by mouth once daily. 90 tablet 3    clotrimazole-betamethasone 1-0.05% (LOTRISONE) cream Apply topically 2 (two) times daily. 45 g 1    felodipine (PLENDIL) 10 MG 24 hr tablet Take 1 tablet (10 mg total) by mouth once daily. 90 tablet 3    fluticasone propionate (FLONASE) 50 mcg/actuation nasal spray 1 spray (50 mcg total) by Each Nostril route once daily. 16 g 3    gabapentin (NEURONTIN) 100 MG capsule TAKE 1 CAPSULE BY MOUTH THREE TIMES DAILY 90 capsule 0    ipratropium (ATROVENT) 42 mcg (0.06 %) nasal spray USE 2 SPRAY(S) IN EACH NOSTRIL EVERY 6 HOURS AS NEEDED FOR CONGESTION      mupirocin (BACTROBAN) 2 % ointment Apply topically 3 (three) times daily. 30 g 1    pantoprazole (PROTONIX) 40 MG tablet  Take 1 tablet by mouth twice daily 180 tablet 2    pravastatin (PRAVACHOL) 80 MG tablet Take 1 tablet (80 mg total) by mouth once daily. 90 tablet 3     No current facility-administered medications for this visit.     Facility-Administered Medications Ordered in Other Visits   Medication Dose Route Frequency Provider Last Rate Last Admin    0.9%  NaCl infusion   Intravenous Continuous Valentino Perez MD        LIDOcaine (PF) 10 mg/ml (1%) injection 10 mg  1 mL Intradermal Once PRN Valentino Perez MD           Review of patient's allergies indicates:   Allergen Reactions    Sulfa (sulfonamide antibiotics) Rash     Other reaction(s): Rash         Review of Systems   Constitutional: Negative for fever.   Cardiovascular:  Negative for chest pain.   Respiratory:  Negative for shortness of breath.    Musculoskeletal:  Positive for back pain (right). Negative for falls.   Gastrointestinal:  Negative for abdominal pain, bowel incontinence, nausea and vomiting.   Genitourinary:  Negative for bladder incontinence.   Neurological:  Positive for numbness and paresthesias.        Objective:     General: Estephania is well-developed, well-nourished, appears stated age, in no acute distress, alert and oriented to time, place and person.     General    Vitals reviewed.  Constitutional: She is oriented to person, place, and time. She appears well-developed and well-nourished.   HENT:   Head: Atraumatic.   Nose: Nose normal.   Eyes: Conjunctivae are normal.   Cardiovascular:  Normal rate.            Pulmonary/Chest: Effort normal.   Neurological: She is alert and oriented to person, place, and time.   Psychiatric: She has a normal mood and affect. Her behavior is normal. Judgment and thought content normal.     General Musculoskeletal Exam   Gait: normal     Back (L-Spine & T-Spine) / Neck (C-Spine) Exam     Back (L-Spine & T-Spine) Range of Motion   Extension:  10   Flexion:  80   Lateral bend right:  10   Lateral  bend left:  10     Spinal Sensation   Right Side Sensation  L-Spine Level: normal  S-Spine Level: normal  Left Side Sensation  L-Spine Level: normal  S-Spine Level: normal    Other   She has no scoliosis .  Spinal Kyphosis:  Absent      Muscle Strength   Right Upper Extremity   Biceps: 5/5   Deltoid:  5/5  Triceps:  5/5  Left Upper Extremity  Biceps: 5/5   Deltoid:  5/5  Triceps:  5/5  Right Lower Extremity   Hip Flexion: 5/5   Quadriceps:  5/5   Ankle Dorsiflexion:  5/5   Anterior tibial:  5/5   EHL:  5/5  Left Lower Extremity   Hip Flexion: 5/5   Quadriceps:  5/5   Ankle Dorsiflexion:  5/5   Anterior tibial:  5/5   EHL:  5/5    Reflexes     Left Side  Biceps:  2+  Brachioradialis:  2+  Achilles:  2+    Right Side   Biceps:  2+  Brachioradialis:  2+  Achilles:  2+    Vascular Exam     Right Pulses        Carotid:                  2+    Left Pulses        Carotid:                  2+          Assessment:     1. Spondylosis of lumbar region without myelopathy or radiculopathy    2. Degeneration of intervertebral disc of lumbar region with discogenic back pain and lower extremity pain    3. Dorsalgia    4. DDD (degenerative disc disease), lumbar    5. Lumbar radiculopathy             Plan:        Prior records reviewed today  Complaints of continued back and leg pain  Continue PT  Referral to pain clinic at [facility] to discuss injection options. She does not wish to go back to [place]  We discussed posture sitting and the importance of trying to sit better.    We discussed the benefits of therapy and exercise and continuing to move.   No followup needed    Follow-up: No follow-ups on file. If there are any questions prior to this, the patient was instructed to contact the office.

## 2024-12-12 ENCOUNTER — OFFICE VISIT (OUTPATIENT)
Dept: FAMILY MEDICINE | Facility: CLINIC | Age: 74
End: 2024-12-12
Attending: FAMILY MEDICINE
Payer: COMMERCIAL

## 2024-12-12 VITALS
OXYGEN SATURATION: 97 % | WEIGHT: 191 LBS | SYSTOLIC BLOOD PRESSURE: 119 MMHG | DIASTOLIC BLOOD PRESSURE: 76 MMHG | BODY MASS INDEX: 31.78 KG/M2 | HEART RATE: 78 BPM

## 2024-12-12 DIAGNOSIS — D86.0 SARCOIDOSIS OF LUNG: ICD-10-CM

## 2024-12-12 DIAGNOSIS — R82.90 ABNORMAL URINE: ICD-10-CM

## 2024-12-12 DIAGNOSIS — I10 ESSENTIAL HYPERTENSION: ICD-10-CM

## 2024-12-12 DIAGNOSIS — E78.2 MIXED HYPERLIPIDEMIA: ICD-10-CM

## 2024-12-12 DIAGNOSIS — Z00.00 ANNUAL PHYSICAL EXAM: Primary | ICD-10-CM

## 2024-12-12 DIAGNOSIS — R94.31 ABNORMAL EKG: ICD-10-CM

## 2024-12-12 PROBLEM — D81.9: Status: RESOLVED | Noted: 2023-08-15 | Resolved: 2024-12-12

## 2024-12-12 LAB
BILIRUB UR QL STRIP: NEGATIVE
CLARITY UR REFRACT.AUTO: CLEAR
COLOR UR AUTO: YELLOW
GLUCOSE UR QL STRIP: NEGATIVE
HGB UR QL STRIP: NEGATIVE
KETONES UR QL STRIP: NEGATIVE
LEUKOCYTE ESTERASE UR QL STRIP: NEGATIVE
NITRITE UR QL STRIP: NEGATIVE
PH UR STRIP: 7 [PH] (ref 5–8)
PROT UR QL STRIP: NEGATIVE
SP GR UR STRIP: 1.02 (ref 1–1.03)
URN SPEC COLLECT METH UR: NORMAL

## 2024-12-12 PROCEDURE — 81003 URINALYSIS AUTO W/O SCOPE: CPT | Performed by: FAMILY MEDICINE

## 2024-12-12 PROCEDURE — 3288F FALL RISK ASSESSMENT DOCD: CPT | Mod: CPTII,S$GLB,, | Performed by: FAMILY MEDICINE

## 2024-12-12 PROCEDURE — 3074F SYST BP LT 130 MM HG: CPT | Mod: CPTII,S$GLB,, | Performed by: FAMILY MEDICINE

## 2024-12-12 PROCEDURE — 1101F PT FALLS ASSESS-DOCD LE1/YR: CPT | Mod: CPTII,S$GLB,, | Performed by: FAMILY MEDICINE

## 2024-12-12 PROCEDURE — 99397 PER PM REEVAL EST PAT 65+ YR: CPT | Mod: S$GLB,,, | Performed by: FAMILY MEDICINE

## 2024-12-12 PROCEDURE — 3044F HG A1C LEVEL LT 7.0%: CPT | Mod: CPTII,S$GLB,, | Performed by: FAMILY MEDICINE

## 2024-12-12 PROCEDURE — 1126F AMNT PAIN NOTED NONE PRSNT: CPT | Mod: CPTII,S$GLB,, | Performed by: FAMILY MEDICINE

## 2024-12-12 PROCEDURE — 3008F BODY MASS INDEX DOCD: CPT | Mod: CPTII,S$GLB,, | Performed by: FAMILY MEDICINE

## 2024-12-12 PROCEDURE — 1159F MED LIST DOCD IN RCRD: CPT | Mod: CPTII,S$GLB,, | Performed by: FAMILY MEDICINE

## 2024-12-12 PROCEDURE — 3078F DIAST BP <80 MM HG: CPT | Mod: CPTII,S$GLB,, | Performed by: FAMILY MEDICINE

## 2024-12-12 PROCEDURE — 99999 PR PBB SHADOW E&M-EST. PATIENT-LVL V: CPT | Mod: PBBFAC,,, | Performed by: FAMILY MEDICINE

## 2024-12-12 PROCEDURE — 1160F RVW MEDS BY RX/DR IN RCRD: CPT | Mod: CPTII,S$GLB,, | Performed by: FAMILY MEDICINE

## 2024-12-12 RX ORDER — NAPROXEN SODIUM 220 MG/1
81 TABLET, FILM COATED ORAL DAILY
Qty: 90 TABLET | Refills: 3 | Status: SHIPPED | OUTPATIENT
Start: 2024-12-12 | End: 2025-12-12

## 2024-12-16 NOTE — PROGRESS NOTES
Subjective:       Patient ID: Estephania Lyman is a 74 y.o. female.    Chief Complaint: Annual Exam    HPI  Pt is here for annual exam pt is well no sob/cp cough chest congestion sore throat uri symptoms no n/v/f/c/d/c no change in bowel habits no brbpr  Pt denies dysuria hematuria no vaginal bleeding  Pt has htn stable on plendil no sob/cp  Pt has hypercholesterolemia on statin no muscle aches    Review of Systems   Constitutional:  Negative for activity change, chills, diaphoresis, fatigue and fever.   HENT:  Negative for congestion, ear discharge, ear pain, hearing loss, postnasal drip, rhinorrhea, sinus pressure, sneezing, sore throat, trouble swallowing and voice change.    Eyes:  Negative for photophobia, discharge, redness, itching and visual disturbance.   Respiratory:  Negative for cough, chest tightness, shortness of breath and wheezing.    Cardiovascular:  Negative for chest pain, palpitations and leg swelling.   Gastrointestinal:  Negative for abdominal pain, anal bleeding, blood in stool, constipation, diarrhea, nausea, rectal pain and vomiting.   Genitourinary:  Negative for dyspareunia, dysuria, flank pain, frequency, hematuria, menstrual problem, pelvic pain, urgency, vaginal bleeding and vaginal discharge.   Musculoskeletal:  Negative for arthralgias, back pain, joint swelling and neck pain.   Skin:  Negative for color change and rash.   Neurological:  Negative for dizziness, speech difficulty, weakness, light-headedness, numbness and headaches.   Hematological:  Does not bruise/bleed easily.   Psychiatric/Behavioral:  Negative for agitation, confusion, decreased concentration, sleep disturbance and suicidal ideas. The patient is not nervous/anxious.        Objective:    /76   Pulse 78   Wt 86.6 kg (191 lb)   LMP  (LMP Unknown)   SpO2 97%   BMI 31.78 kg/m²     Physical Exam  Constitutional:       Appearance: Normal appearance. She is well-developed. She is not ill-appearing.   HENT:  "     Head: Normocephalic and atraumatic.      Right Ear: External ear normal.      Left Ear: External ear normal.      Nose: Nose normal.   Eyes:      General:         Right eye: No discharge.         Left eye: No discharge.      Conjunctiva/sclera: Conjunctivae normal.      Pupils: Pupils are equal, round, and reactive to light.   Neck:      Thyroid: No thyromegaly.   Cardiovascular:      Rate and Rhythm: Normal rate and regular rhythm.      Heart sounds: Normal heart sounds. No murmur heard.     No friction rub. No gallop.   Pulmonary:      Effort: Pulmonary effort is normal.      Breath sounds: Normal breath sounds. No wheezing or rales.   Abdominal:      General: Bowel sounds are normal. There is no distension.      Palpations: Abdomen is soft.      Tenderness: There is no abdominal tenderness. There is no guarding or rebound.   Genitourinary:     Vagina: Normal.      Comments: declined  Musculoskeletal:         General: No tenderness. Normal range of motion.      Cervical back: Normal range of motion and neck supple.   Lymphadenopathy:      Cervical: No cervical adenopathy.   Skin:     General: Skin is warm and dry.      Findings: No erythema or rash.   Neurological:      General: No focal deficit present.      Mental Status: She is alert and oriented to person, place, and time.      Cranial Nerves: No cranial nerve deficit.      Motor: No abnormal muscle tone.      Coordination: Coordination normal.   Psychiatric:         Behavior: Behavior normal.         Thought Content: Thought content normal.         Judgment: Judgment normal.         Assessment:       1. Annual physical exam    2. Mixed hyperlipidemia    3. Sarcoidosis of lung    4. Abnormal urine    5. Abnormal EKG    6. Essential hypertension        Plan:     Orders cmp lipid tsh urine hgb A1c  Cont meds  Ada diet  Graded exercise  F/u vascular as directed  Rtc quarterly    Health maintenance  Discussed with pt        "This note will not be shared with " "the patient."     "

## 2024-12-16 NOTE — PROGRESS NOTES
"Subjective:       Patient ID: Estephania Lyman is a 74 y.o. female.    Chief Complaint: Annual Exam    HPI  Pt is here for annual exam pt is well no sob/cp cough chest congestion sore throat uri symptoms no n/v/f/c/d/c no change in bowel habits no brbpr no dysuria hematuria no vaginal bleeding   Pt has pre dm not on ada diet consistently no polys  Pt has htn bp fine on statin no muscle aches and plendil no   Pt has hypercholesterolemia on statin no muscle aches  Review of Systems    Objective:      Physical Exam    Assessment:       1. Annual physical exam    2. Mixed hyperlipidemia    3. Sarcoidosis of lung    4. Abnormal urine    5. Abnormal EKG    6. Essential hypertension        Plan:     Orders cbc cmp lipid tsh urine  Cont meds  Low salt diet  Graded exercise  Rtc 6 months  F/u pulmonary    Health maintenance  Discussed with pt        "This note will not be shared with the patient."     "

## 2024-12-18 ENCOUNTER — PATIENT MESSAGE (OUTPATIENT)
Dept: PAIN MEDICINE | Facility: OTHER | Age: 74
End: 2024-12-18
Payer: MEDICARE

## 2024-12-18 ENCOUNTER — OFFICE VISIT (OUTPATIENT)
Dept: PAIN MEDICINE | Facility: CLINIC | Age: 74
End: 2024-12-18
Attending: ANESTHESIOLOGY
Payer: COMMERCIAL

## 2024-12-18 VITALS
DIASTOLIC BLOOD PRESSURE: 79 MMHG | TEMPERATURE: 98 F | WEIGHT: 190.94 LBS | BODY MASS INDEX: 31.77 KG/M2 | SYSTOLIC BLOOD PRESSURE: 116 MMHG | HEART RATE: 74 BPM

## 2024-12-18 DIAGNOSIS — M54.9 DORSALGIA: ICD-10-CM

## 2024-12-18 DIAGNOSIS — M25.48 EFFUSION OF LUMBAR FACET JOINT: Primary | ICD-10-CM

## 2024-12-18 DIAGNOSIS — M25.48 EFFUSION OF LUMBAR FACET JOINT: ICD-10-CM

## 2024-12-18 DIAGNOSIS — M51.362 DEGENERATION OF INTERVERTEBRAL DISC OF LUMBAR REGION WITH DISCOGENIC BACK PAIN AND LOWER EXTREMITY PAIN: ICD-10-CM

## 2024-12-18 DIAGNOSIS — M47.816 SPONDYLOSIS OF LUMBAR REGION WITHOUT MYELOPATHY OR RADICULOPATHY: ICD-10-CM

## 2024-12-18 DIAGNOSIS — M71.38 CYST OF LUMBAR FACET JOINT: Primary | ICD-10-CM

## 2024-12-18 PROCEDURE — 3288F FALL RISK ASSESSMENT DOCD: CPT | Mod: CPTII,S$GLB,, | Performed by: ANESTHESIOLOGY

## 2024-12-18 PROCEDURE — 1101F PT FALLS ASSESS-DOCD LE1/YR: CPT | Mod: CPTII,S$GLB,, | Performed by: ANESTHESIOLOGY

## 2024-12-18 PROCEDURE — 3074F SYST BP LT 130 MM HG: CPT | Mod: CPTII,S$GLB,, | Performed by: ANESTHESIOLOGY

## 2024-12-18 PROCEDURE — 1125F AMNT PAIN NOTED PAIN PRSNT: CPT | Mod: CPTII,S$GLB,, | Performed by: ANESTHESIOLOGY

## 2024-12-18 PROCEDURE — 99999 PR PBB SHADOW E&M-EST. PATIENT-LVL IV: CPT | Mod: PBBFAC,,, | Performed by: ANESTHESIOLOGY

## 2024-12-18 PROCEDURE — 1159F MED LIST DOCD IN RCRD: CPT | Mod: CPTII,S$GLB,, | Performed by: ANESTHESIOLOGY

## 2024-12-18 PROCEDURE — 3044F HG A1C LEVEL LT 7.0%: CPT | Mod: CPTII,S$GLB,, | Performed by: ANESTHESIOLOGY

## 2024-12-18 PROCEDURE — 1160F RVW MEDS BY RX/DR IN RCRD: CPT | Mod: CPTII,S$GLB,, | Performed by: ANESTHESIOLOGY

## 2024-12-18 PROCEDURE — 3008F BODY MASS INDEX DOCD: CPT | Mod: CPTII,S$GLB,, | Performed by: ANESTHESIOLOGY

## 2024-12-18 PROCEDURE — 3078F DIAST BP <80 MM HG: CPT | Mod: CPTII,S$GLB,, | Performed by: ANESTHESIOLOGY

## 2024-12-18 PROCEDURE — 99214 OFFICE O/P EST MOD 30 MIN: CPT | Mod: S$GLB,,, | Performed by: ANESTHESIOLOGY

## 2024-12-18 NOTE — PROGRESS NOTES
Subjective:     Patient ID: Estehpania Lyman is a 74 y.o. female.    Chief Complaint: No chief complaint on file.      Interval History 12/18/2024:  Ms. Lyman is a 73yo male who presents today for follow up for his low back pain. She was last seen 1 week ago for this issue. She was continued on physical therapy which she is tolerating well. She reports good relief of her pain with TENS stimulation and with US heat modalities. She reports slight improvement since starting her therapies, however has significant pain with prolonged standing and walking with an average pain score of 7/10. She reports improvement of her pain with gabapentin. She is currently taking 100mg qhs.     Additionally she reports left sided neck pain over the past month without inciting event. She reports referral of her pain from her left cervical paraspinals to her left upper trapezius. She denies any radicular pain. Her pain on average is a 5/10, dull and achy in quality, and worse with neck movement. She denies any numbness/tingling, weakness, saddle anesthesia, or bowel/bladder incontinence.    Interval History 12/11/24  Mrs. Lyman presents today for followup of low back pain. She reports some improvement with PT, but still has persistent back pain. She is interested in seeing pain management for injections.   Interval history 10/29/24  Mrs. Lyman presents today for increased low back pain.  Last seen by me in 2023.  Did physical therapy with improvement in her symptoms.  Did have bilateral L5 TF LAURO with Dr. Hauser with limited benefit in November 2023.      Initial HPI  Ms. Lyman is a 72 year old here for evaluation of low back pain    C/o right low back pain radiating into the right glute, sometimes down the leg  Notes numbness/tingling to glute  Denies Hx similar pain in the past  No PT or injections in the past  Toradol from PCP 5/5 with relief    Lumbar xray 2023    FINDINGS:  Continued convex right curvature  thoracolumbar spine.  Lumbar sagittal alignment similar to prior with trace grade 1 anterolisthesis L5 on S1.  The lumbar vertebral body heights and contours relatively stable allowing for scattered endplate degeneration without evidence for acute fracture. Facet degenerative change lower lumbar levels.  Further evaluation as warranted clinically.     Impression:     Please see above     Right hip xray 2023    FINDINGS:  Allowing for relative similar positioning on both views no evidence for acute fracture or definite dislocation right hip.  Bony pelvis is grossly intact.  Probable gas and fecal material projecting over the visualized colon and rectum.  Further evaluation as warranted clinically.     Impression:     Please see above    Past Medical History:   Diagnosis Date    Chronic right-sided low back pain with right-sided sciatica     Depression     GERD (gastroesophageal reflux disease)     Headache     Hyperlipidemia     Hypertension     Personal history of colonic polyps     Sarcoidosis        Past Surgical History:   Procedure Laterality Date    ANKLE FRACTURE SURGERY      ARTHROSCOPIC REPAIR OF ROTATOR CUFF OF SHOULDER Right 07/23/2018    Procedure: REPAIR, ROTATOR CUFF, ARTHROSCOPIC;  Surgeon: YELENA Olivera MD;  Location: [facility] OR [address] [place] [address];  Service: Orthopedics;  Laterality: Right;  regional with catheter, interscalene  Vladimir/Linvatec & Ann/Arthrex notified 7-18 LO    ARTHROSCOPY OF SHOULDER WITH DECOMPRESSION OF SUBACROMIAL SPACE Right 07/23/2018    Procedure: ARTHROSCOPY, SHOULDER, WITH SUBACROMIAL SPACE DECOMPRESSION;  Surgeon: YELENA Olivera MD;  Location: [facility] OR [address] [place] [address];  Service: Orthopedics;  Laterality: Right;    ARTHROSCOPY OF SHOULDER WITH REMOVAL OF DISTAL CLAVICLE Right 07/23/2018    Procedure: ARTHROSCOPY, SHOULDER, WITH DISTAL CLAVICLE EXCISION;  Surgeon: YELENA Olivera MD;  Location: [facility] OR [address] [place] [address];  Service: Orthopedics;  Laterality: Right;    COLONOSCOPY N/A 05/30/2017     Procedure: COLONOSCOPY;  Surgeon: Jed Garcia MD;  Location: [facility] (4TH FLR);  Service: Endoscopy;  Laterality: N/A;    COLONOSCOPY N/A 05/24/2023    Procedure: COLONOSCOPY;  Surgeon: Beau Bowden MD;  Location: [facility] ENDO;  Service: Endoscopy;  Laterality: N/A;    ESOPHAGEAL DILATION N/A 05/24/2023    Procedure: DILATION, ESOPHAGUS;  Surgeon: Beau Bowden MD;  Location: [facility] ENDO;  Service: Endoscopy;  Laterality: N/A;    ESOPHAGOGASTRODUODENOSCOPY N/A 06/30/2020    Procedure: EGD (ESOPHAGOGASTRODUODENOSCOPY);  Surgeon: Russell Durand MD;  Location: [facility] (4TH FLR);  Service: Endoscopy;  Laterality: N/A;  COVID test on 6/28/20 at [facility]    ESOPHAGOGASTRODUODENOSCOPY N/A 07/01/2021    Procedure: EGD (ESOPHAGOGASTRODUODENOSCOPY);  Surgeon: Chucho Mooney MD;  Location: [facility] (4TH FLR);  Service: Endoscopy;  Laterality: N/A;  vaccinated 2/10/21 see media tab 6/7/21, instr portal and mailed -ml    ESOPHAGOGASTRODUODENOSCOPY N/A 05/24/2023    Procedure: EGD (ESOPHAGOGASTRODUODENOSCOPY);  Surgeon: Beau Bowden MD;  Location: [facility];  Service: Endoscopy;  Laterality: N/A;    FIXATION OF TENDON Right 07/23/2018    Procedure: FIXATION, TENDON;  Surgeon: YELENA Olivera MD;  Location: [facility] OR [facility];  Service: Orthopedics;  Laterality: Right;    HYSTERECTOMY  age 39    endo    LEG SURGERY      tibia    OOPHORECTOMY  age 39    endo    TONSILLECTOMY      TRANSFORAMINAL EPIDURAL INJECTION OF STEROID Bilateral 11/02/2023    L5    TRANSFORAMINAL EPIDURAL INJECTION OF STEROID Bilateral 11/2/2023    Procedure: Injection,steroid,epidural,transforaminal approach---BILATERAL L5 (INFRANEURAL);  Surgeon: Sarbjit Hauser Jr., MD;  Location: [facility] PAIN [facility];  Service: Pain Management;  Laterality: Bilateral;  CONSENT DAY OF PROCEDURE       Family History   Problem Relation Name Age of Onset    Cancer Mother  61        colon    Cancer Father          throat    Hypertension Sister      Breast  cancer Sister  59    Migraines Maternal Grandmother      Hypertension Brother      Diabetes Sister      Hypertension Sister      Stroke Neg Hx      Ovarian cancer Neg Hx      Miscarriages / Stillbirths Neg Hx      Eclampsia Neg Hx         Social History     Socioeconomic History    Marital status:    Occupational History     Employer: [employer]   Tobacco Use    Smoking status: Former     Current packs/day: 0.00     Average packs/day: 1 pack/day for 3.0 years (3.0 ttl pk-yrs)     Types: Cigarettes     Start date: 2007     Quit date: 2010     Years since quittin.4     Passive exposure: Past    Smokeless tobacco: Never    Tobacco comments:     last smoked 15 years ago   Substance and Sexual Activity    Alcohol use: Not Currently     Comment: occasional     Drug use: No    Sexual activity: Yes     Partners: Male     Birth control/protection: Surgical     Comment: Hysterectomy     Social Drivers of Health     Financial Resource Strain: Low Risk  (2023)    Overall Financial Resource Strain (CARDIA)     Difficulty of Paying Living Expenses: Not hard at all   Food Insecurity: Food Insecurity Present (2023)    Hunger Vital Sign     Worried About Running Out of Food in the Last Year: Sometimes true     Ran Out of Food in the Last Year: Never true   Transportation Needs: No Transportation Needs (2023)    PRAPARE - Transportation     Lack of Transportation (Medical): No     Lack of Transportation (Non-Medical): No   Physical Activity: Unknown (2023)    Exercise Vital Sign     Days of Exercise per Week: 3 days   Stress: No Stress Concern Present (2023)    [employer] [place] [employer] - Occupational Stress Questionnaire     Feeling of Stress : Only a little   Housing Stability: High Risk (2023)    Housing Stability Vital Sign     Unable to Pay for Housing in the Last Year: No     Number of Places Lived in the Last Year: 5612     Unstable Housing in  the Last Year: No       Current Outpatient Medications   Medication Sig Dispense Refill    albuterol (PROVENTIL/VENTOLIN HFA) 90 mcg/actuation inhaler Inhale 1 puff into the lungs every 6 (six) hours as needed.      aspirin 81 MG Chew Take 1 tablet (81 mg total) by mouth once daily. 90 tablet 3    citalopram (CELEXA) 20 MG tablet Take 1 tablet (20 mg total) by mouth once daily. 90 tablet 3    clotrimazole-betamethasone 1-0.05% (LOTRISONE) cream Apply topically 2 (two) times daily. 45 g 1    felodipine (PLENDIL) 10 MG 24 hr tablet Take 1 tablet (10 mg total) by mouth once daily. 90 tablet 3    fluticasone propionate (FLONASE) 50 mcg/actuation nasal spray 1 spray (50 mcg total) by Each Nostril route once daily. 16 g 3    gabapentin (NEURONTIN) 100 MG capsule Take 1 capsule (100 mg total) by mouth 3 (three) times daily. 90 capsule 2    ipratropium (ATROVENT) 42 mcg (0.06 %) nasal spray USE 2 SPRAY(S) IN EACH NOSTRIL EVERY 6 HOURS AS NEEDED FOR CONGESTION      mupirocin (BACTROBAN) 2 % ointment Apply topically 3 (three) times daily. 30 g 1    pantoprazole (PROTONIX) 40 MG tablet Take 1 tablet by mouth twice daily 180 tablet 2    pravastatin (PRAVACHOL) 80 MG tablet Take 1 tablet (80 mg total) by mouth once daily. 90 tablet 3     No current facility-administered medications for this visit.     Facility-Administered Medications Ordered in Other Visits   Medication Dose Route Frequency Provider Last Rate Last Admin    0.9%  NaCl infusion   Intravenous Continuous Valentino Perez MD        LIDOcaine (PF) 10 mg/ml (1%) injection 10 mg  1 mL Intradermal Once PRN Valentino Perez MD           Review of patient's allergies indicates:   Allergen Reactions    Sulfa (sulfonamide antibiotics) Rash     Other reaction(s): Rash         Review of Systems   Constitutional: Negative for fever.   Cardiovascular:  Negative for chest pain.   Respiratory:  Negative for shortness of breath.    Musculoskeletal:  Positive for  back pain (right). Negative for falls.   Gastrointestinal:  Negative for abdominal pain, bowel incontinence, nausea and vomiting.   Genitourinary:  Negative for bladder incontinence.   Neurological:  Positive for numbness and paresthesias.        Objective:     General: Estephania is well-developed, well-nourished, appears stated age, in no acute distress, alert and oriented to time, place and person.     PHYSICAL EXAM:  General: Alert, pleasant, comfortable  HEENT: EOMI, NCAT  CV: Warm, well perfused  Pulm: Normal chest rise, no tachypnea  Abd: Nondistended  Extremities: No swelling or edema    Cervical MSK:  - Inspection: No obvious deformities. No swelling or erythema  - Palpation: TTP of left cervical paraspinals and left upper trapezii  - ROM: Full ROM, however pain on end range of motion on all planes  - Strength: 5/5 bilateral proximal and distal upper extremities  - Sensation: Intact to light touch throughout upper extremities  - Primitive reflexes: Negative Hoffmans    Lumbar MSK:  - Inspection: No obvious deformities. No swelling or erythema  - Palpation: No midline or lumbar paraspinal tenderness. (+) mild TTP overlying bilateral lower lumbar facet joints  - ROM: Full ROM, however pain throughout range of lumbar extension  - Strength: 5/5 bilateral proximal and distal lower extremities  - Sensation: Intact to light touch throughout lower extremities    Special Tests:  (-) SLR  (+) facet loading bilaterally  (-) FADIR/CARISA    Neuro:  - DTRs: 2+ biceps, brachioradialis, triceps bilaterally    2+ patellar and achilles reflex bilaterally  - Primitive reflexes: No ankle clonus,  negative hoffmans          Assessment:     1. Spondylosis of lumbar region without myelopathy or radiculopathy    2. Degeneration of intervertebral disc of lumbar region with discogenic back pain and lower extremity pain    3. Dorsalgia      Ms. Lyman is a 73yo female who present today for follow up evaluation of her low back pain.  She has continued pain despite conservative management with PT. She feels limited in her PT due to inability to stand for long periods of time. Her clinical presentation is consistent with lumbar facetogenic pain. MRI L spine reviewed which revealed facet joint cysts at the left L4/5 facet and right L5/S1 facet, in addition to bilateral facet arthropathy and effusions at those levels. At this time, will recommend:       Plan:      We discussed with the patient the assessment and recommendations. The following is the plan we agreed on:   Prior records reviewed today  Schedule for bilateral L4/5 and L5/S1 facet joint injections  If she received suboptimal relief, will consider MBB/RFA  Continue PT as tolerated  We discussed posture sitting and the importance of trying to sit better.    We discussed the benefits of therapy and exercise and continuing to move.   Follow up for scheduled procedure    NADIA Elena,   Pain Fellow [employer] 2024  I have personally taken the history and examined this patient and agree with the fellow's note as stated above.

## 2024-12-19 ENCOUNTER — PATIENT MESSAGE (OUTPATIENT)
Dept: PAIN MEDICINE | Facility: OTHER | Age: 74
End: 2024-12-19
Payer: MEDICARE

## 2025-01-02 ENCOUNTER — PATIENT MESSAGE (OUTPATIENT)
Dept: PAIN MEDICINE | Facility: OTHER | Age: 75
End: 2025-01-02
Payer: MEDICARE

## 2025-01-07 ENCOUNTER — OFFICE VISIT (OUTPATIENT)
Dept: PULMONOLOGY | Facility: CLINIC | Age: 75
End: 2025-01-07
Payer: COMMERCIAL

## 2025-01-07 ENCOUNTER — TELEPHONE (OUTPATIENT)
Dept: PAIN MEDICINE | Facility: CLINIC | Age: 75
End: 2025-01-07
Payer: MEDICARE

## 2025-01-07 VITALS
WEIGHT: 198.44 LBS | SYSTOLIC BLOOD PRESSURE: 109 MMHG | OXYGEN SATURATION: 96 % | DIASTOLIC BLOOD PRESSURE: 70 MMHG | HEART RATE: 67 BPM | BODY MASS INDEX: 33.06 KG/M2 | HEIGHT: 65 IN

## 2025-01-07 DIAGNOSIS — D86.9 SARCOIDOSIS: Primary | ICD-10-CM

## 2025-01-07 PROBLEM — D86.0 SARCOIDOSIS OF LUNG: Status: RESOLVED | Noted: 2023-08-15 | Resolved: 2025-01-07

## 2025-01-07 PROCEDURE — 3008F BODY MASS INDEX DOCD: CPT | Mod: CPTII,S$GLB,, | Performed by: INTERNAL MEDICINE

## 2025-01-07 PROCEDURE — 1101F PT FALLS ASSESS-DOCD LE1/YR: CPT | Mod: CPTII,S$GLB,, | Performed by: INTERNAL MEDICINE

## 2025-01-07 PROCEDURE — 3074F SYST BP LT 130 MM HG: CPT | Mod: CPTII,S$GLB,, | Performed by: INTERNAL MEDICINE

## 2025-01-07 PROCEDURE — 3288F FALL RISK ASSESSMENT DOCD: CPT | Mod: CPTII,S$GLB,, | Performed by: INTERNAL MEDICINE

## 2025-01-07 PROCEDURE — 3078F DIAST BP <80 MM HG: CPT | Mod: CPTII,S$GLB,, | Performed by: INTERNAL MEDICINE

## 2025-01-07 PROCEDURE — 1159F MED LIST DOCD IN RCRD: CPT | Mod: CPTII,S$GLB,, | Performed by: INTERNAL MEDICINE

## 2025-01-07 PROCEDURE — 99999 PR PBB SHADOW E&M-EST. PATIENT-LVL IV: CPT | Mod: PBBFAC,,, | Performed by: INTERNAL MEDICINE

## 2025-01-07 PROCEDURE — 99204 OFFICE O/P NEW MOD 45 MIN: CPT | Mod: S$GLB,,, | Performed by: INTERNAL MEDICINE

## 2025-01-07 NOTE — TELEPHONE ENCOUNTER
staff called and left a voicemail for this patient,   Viktoriya has requested a virtual appointment add on for additional documentation for procedure. Please schedule next available add on or double book approved by Viktoriya.  Thank you in advance.

## 2025-01-07 NOTE — PROGRESS NOTES
History & Physical  [facility]    SUBJECTIVE:     Chief Complaint:   sarcoidosis    History of Present Illness:  Estephania Lyman is a 74 y.o. female who presents for evaluation of sarcoidosis. She was diagnosed 9 years ago when an incidental finding of sarcoidosis was made during bilateral cataract surgery one on [facility] [address] (Dr Nagel).    She underwent evaluation with chest ct with dr Pizano, but she never had parenchymal lung involvement & never required any medical treatment.    She is not bothered by respiratory symptoms.    She has never been hospitalized for a breathing problem.    No pets.    No childhood asthma.    She is a lifetime nonsmoker.    Review of patient's allergies indicates:   Allergen Reactions    Sulfa (sulfonamide antibiotics) Rash     Other reaction(s): Rash       Past Medical History:   Diagnosis Date    Chronic right-sided low back pain with right-sided sciatica     Depression     GERD (gastroesophageal reflux disease)     Headache     Hyperlipidemia     Hypertension     Personal history of colonic polyps     Sarcoidosis      Past Surgical History:   Procedure Laterality Date    ANKLE FRACTURE SURGERY      ARTHROSCOPIC REPAIR OF ROTATOR CUFF OF SHOULDER Right 07/23/2018    Procedure: REPAIR, ROTATOR CUFF, ARTHROSCOPIC;  Surgeon: YELENA Olivera MD;  Location: [facility] OR [address] [place] [address];  Service: Orthopedics;  Laterality: Right;  regional with catheter, interscalene  Vladimir/Linvatec & Ann/Arthrex notified 7-18 LO    ARTHROSCOPY OF SHOULDER WITH DECOMPRESSION OF SUBACROMIAL SPACE Right 07/23/2018    Procedure: ARTHROSCOPY, SHOULDER, WITH SUBACROMIAL SPACE DECOMPRESSION;  Surgeon: YELENA Olivera MD;  Location: [facility] OR [address] [place] [address];  Service: Orthopedics;  Laterality: Right;    ARTHROSCOPY OF SHOULDER WITH REMOVAL OF DISTAL CLAVICLE Right 07/23/2018    Procedure: ARTHROSCOPY, SHOULDER, WITH DISTAL CLAVICLE EXCISION;  Surgeon: YELENA Olivera MD;  Location: [facility] OR [address] [place] [address];  Service:  Orthopedics;  Laterality: Right;    COLONOSCOPY N/A 05/30/2017    Procedure: COLONOSCOPY;  Surgeon: Jed Garcia MD;  Location: [facility] (4TH FLR);  Service: Endoscopy;  Laterality: N/A;    COLONOSCOPY N/A 05/24/2023    Procedure: COLONOSCOPY;  Surgeon: Beau Bowden MD;  Location: [facility] ENDO;  Service: Endoscopy;  Laterality: N/A;    ESOPHAGEAL DILATION N/A 05/24/2023    Procedure: DILATION, ESOPHAGUS;  Surgeon: Beau Bowden MD;  Location: [facility] ENDO;  Service: Endoscopy;  Laterality: N/A;    ESOPHAGOGASTRODUODENOSCOPY N/A 06/30/2020    Procedure: EGD (ESOPHAGOGASTRODUODENOSCOPY);  Surgeon: Russell Durand MD;  Location: [facility] (4TH FLR);  Service: Endoscopy;  Laterality: N/A;  COVID test on 6/28/20 at [facility]    ESOPHAGOGASTRODUODENOSCOPY N/A 07/01/2021    Procedure: EGD (ESOPHAGOGASTRODUODENOSCOPY);  Surgeon: Chucho Mooney MD;  Location: [facility] (4TH FLR);  Service: Endoscopy;  Laterality: N/A;  vaccinated 2/10/21 see media tab 6/7/21, instr portal and mailed -ml    ESOPHAGOGASTRODUODENOSCOPY N/A 05/24/2023    Procedure: EGD (ESOPHAGOGASTRODUODENOSCOPY);  Surgeon: Beau Bowden MD;  Location: [facility];  Service: Endoscopy;  Laterality: N/A;    FIXATION OF TENDON Right 07/23/2018    Procedure: FIXATION, TENDON;  Surgeon: YELENA Olivera MD;  Location: [facility] OR 1ST FLR;  Service: Orthopedics;  Laterality: Right;    HYSTERECTOMY  age 39    endo    LEG SURGERY      tibia    OOPHORECTOMY  age 39    endo    TONSILLECTOMY      TRANSFORAMINAL EPIDURAL INJECTION OF STEROID Bilateral 11/02/2023    L5    TRANSFORAMINAL EPIDURAL INJECTION OF STEROID Bilateral 11/2/2023    Procedure: Injection,steroid,epidural,transforaminal approach---BILATERAL L5 (INFRANEURAL);  Surgeon: Sarbjit Hauser Jr., MD;  Location: [facility] PAIN [facility];  Service: Pain Management;  Laterality: Bilateral;  CONSENT DAY OF PROCEDURE     Family History   Problem Relation Name Age of Onset    Cancer Mother  61        colon     Cancer Father          throat    Hypertension Sister      Breast cancer Sister  59    Migraines Maternal Grandmother      Hypertension Brother      Diabetes Sister      Hypertension Sister      Stroke Neg Hx      Ovarian cancer Neg Hx      Miscarriages / Stillbirths Neg Hx      Eclampsia Neg Hx       Social History     Socioeconomic History    Marital status:    Occupational History     Employer: [employer]   Tobacco Use    Smoking status: Former     Current packs/day: 0.00     Average packs/day: 1 pack/day for 3.0 years (3.0 ttl pk-yrs)     Types: Cigarettes     Start date: 2007     Quit date: 2010     Years since quittin.5     Passive exposure: Past    Smokeless tobacco: Never    Tobacco comments:     last smoked 15 years ago   Substance and Sexual Activity    Alcohol use: Not Currently     Comment: occasional     Drug use: No    Sexual activity: Yes     Partners: Male     Birth control/protection: Surgical     Comment: Hysterectomy     Social Drivers of Health     Financial Resource Strain: Low Risk  (2023)    Overall Financial Resource Strain (CARDIA)     Difficulty of Paying Living Expenses: Not hard at all   Food Insecurity: Food Insecurity Present (2023)    Hunger Vital Sign     Worried About Running Out of Food in the Last Year: Sometimes true     Ran Out of Food in the Last Year: Never true   Transportation Needs: No Transportation Needs (2023)    PRAPARE - Transportation     Lack of Transportation (Medical): No     Lack of Transportation (Non-Medical): No   Physical Activity: Unknown (2023)    Exercise Vital Sign     Days of Exercise per Week: 3 days   Stress: No Stress Concern Present (2023)    [place] [employer] - Occupational Stress Questionnaire     Feeling of Stress : Only a little   Housing Stability: High Risk (2023)    Housing Stability Vital Sign     Unable to Pay for Housing in the Last Year: No     Number  "of Places Lived in the Last Year: 5612     Unstable Housing in the Last Year: No     Review of Systems:  CV: no syncope  ENT: no sore throat  Resp: per hpi  Eyes: no eye pain  Gastrointestinal: no vomiting  Integument/Breast: no rash  Musculoskeletal: +arthralgias in lower back & sciatica  Neurological: no headaches  Behavioral/Psych: no confusion  Heme: no bleeding    OBJECTIVE:     Vital Signs  Vitals:    01/07/25 1600   BP: 109/70   BP Location: Left arm   Patient Position: Sitting   Pulse: 67   SpO2: 96%   Weight: 90 kg (198 lb 6.6 oz)   Height: 5' 5" (1.651 m)     Body mass index is 33.02 kg/m².    Physical Exam:  General: no distress  Eyes:  conjunctivae/corneas clear  Nose: no discharge  Neck: trachea midline with no masses appreciated  Lungs:  normal respiratory effort, no wheezes, no rales  Heart: regular rate and rhythm and no murmur  Abdomen: non-distended  Extremities: no cyanosis, no edema, no clubbing  Skin: No rashes or lesions. good skin turgor  Neurologic: alert, oriented, thought content appropriate    Laboratory:  Lab Results   Component Value Date    WBC 5.31 12/12/2024    HGB 12.0 12/12/2024    HCT 38.8 12/12/2024     (H) 12/12/2024     12/12/2024     Chest Imaging, My Impression:   CXR 12/2024: lungs are clear  CT chest 2018: right diaphragm is elevated & there is some mild atelectasis. No parenchymal lung disease.    Diagnostic Results:  ECG 12/2024: normal sinus rhythm with no conduction delays    ASSESSMENT/PLAN:     Sarcoidosis  - Pt with history of incidentally discovered ocular sarcoidosis.  - Reviewed chest imaging notable for no evidence of parenchymal lung involvement.  - The patient is not experiencing any chronic respiratory symptoms.  - Provided education & counseling. Provided reassurance to patient that no treatment is needed from a pulmonary perspective.   - Pt can follow up prn if she develops any chronic respiratory symptoms in the future.    Levy Campbell, " MD [facility]

## 2025-01-08 ENCOUNTER — TELEPHONE (OUTPATIENT)
Dept: PAIN MEDICINE | Facility: CLINIC | Age: 75
End: 2025-01-08
Payer: MEDICARE

## 2025-01-08 NOTE — TELEPHONE ENCOUNTER
Staff left a detailed voicemail for patient to contact clinic and schedule a virtual office visit for additional documentation for procedure.

## 2025-01-09 ENCOUNTER — TELEPHONE (OUTPATIENT)
Dept: PAIN MEDICINE | Facility: CLINIC | Age: 75
End: 2025-01-09
Payer: MEDICARE

## 2025-01-09 NOTE — TELEPHONE ENCOUNTER
Staff tried reaching  patient to get her scheduled to discuss procedure denial. Staff left detailed voicemail for patient to call back.

## 2025-01-10 ENCOUNTER — OFFICE VISIT (OUTPATIENT)
Dept: CARDIOLOGY | Facility: CLINIC | Age: 75
End: 2025-01-10
Payer: COMMERCIAL

## 2025-01-10 VITALS
HEIGHT: 64 IN | DIASTOLIC BLOOD PRESSURE: 68 MMHG | SYSTOLIC BLOOD PRESSURE: 130 MMHG | WEIGHT: 201.5 LBS | BODY MASS INDEX: 34.4 KG/M2 | OXYGEN SATURATION: 95 % | HEART RATE: 78 BPM

## 2025-01-10 DIAGNOSIS — I10 HTN (HYPERTENSION), BENIGN: Primary | ICD-10-CM

## 2025-01-10 DIAGNOSIS — R00.2 PALPITATIONS: ICD-10-CM

## 2025-01-10 DIAGNOSIS — R94.31 ABNORMAL ELECTROCARDIOGRAM: ICD-10-CM

## 2025-01-10 DIAGNOSIS — E78.00 HYPERCHOLESTEROLEMIA: ICD-10-CM

## 2025-01-10 DIAGNOSIS — R94.31 ABNORMAL EKG: ICD-10-CM

## 2025-01-10 PROCEDURE — 99999 PR PBB SHADOW E&M-EST. PATIENT-LVL III: CPT | Mod: PBBFAC,,, | Performed by: INTERNAL MEDICINE

## 2025-01-10 NOTE — ASSESSMENT & PLAN NOTE
She is asymptomatic.  I did not detect any ectopy on exam today.    Repeat Holter not recommended at this time given her asymptomatic status.    She has history of PACs he sees in the past.

## 2025-01-10 NOTE — ASSESSMENT & PLAN NOTE
Possible septal infarct pattern her echo wall motion in 2021 was normal.  I suspect false positive ECG finding.    No workup coronary disease-repeat stress test advised at this time given her asymptomatic status.

## 2025-01-10 NOTE — PROGRESS NOTES
[facility] Cardiology     Subjective:    Patient ID:  Estephania Lyman is a 74 y.o. female who presents for evaluation of Irregular Heart Beat, Hypertension, Hyperlipidemia, and Abnormal ECG    Review of patient's allergies indicates:   Allergen Reactions    Sulfa (sulfonamide antibiotics) Rash     Other reaction(s): Rash     She is here for palpitation evaluation, she had recent visit with her doctor and on exam there was ectopic beats.  She was asymptomatic.  She does take therapy for hypertension Plendil 10 mg daily.  In 2021 she had a stress echo with 4-1/2 minutes exercise.  It was normal.  The ejection fraction was normal.  She denies awareness of palpitations.    In 2015 she wore a Holter monitor.  It showed PACs.  There were rare PVCs.  No atrial fibrillation or sustained arrhythmias noted.  She is with her  today.  She reports that her blood pressures are stable at home.  She is on pravastatin 80 mg.  Most recent  mg% by labs 2024 December.    Her Electrocardiogram in December showed possible LVH, possible septal infarct.  The septal Q-waves date back to 2018 at least.  They are not new findings.    She has a diagnosis of ocular sarcoid.  She saw Pulmonary.  There was no confirmed pulmonary involvement.  Her echo in 2021 did not show abnormalities.  She reports normal exercise tolerance without complaints of shortness of breath.         Review of Systems   Constitutional: Negative for chills, decreased appetite, diaphoresis, fever, malaise/fatigue, night sweats, weight gain and weight loss.   HENT:  Negative for congestion, ear discharge, ear pain, hearing loss, hoarse voice, nosebleeds, odynophagia, sore throat, stridor and tinnitus.    Eyes:  Negative for blurred vision, discharge, double vision, pain, photophobia, redness, vision loss in left eye, vision loss in right eye, visual disturbance and visual halos.    Cardiovascular:  Negative for chest pain, claudication, cyanosis, dyspnea on exertion, irregular heartbeat, leg swelling, near-syncope, orthopnea, palpitations, paroxysmal nocturnal dyspnea and syncope.   Respiratory:  Negative for cough, hemoptysis, shortness of breath, sleep disturbances due to breathing, snoring, sputum production and wheezing.    Endocrine: Negative for cold intolerance, heat intolerance, polydipsia, polyphagia and polyuria.   Hematologic/Lymphatic: Negative for adenopathy and bleeding problem. Does not bruise/bleed easily.   Skin:  Negative for color change, dry skin, flushing, itching, nail changes, poor wound healing, rash, skin cancer, suspicious lesions and unusual hair distribution.   Musculoskeletal:  Negative for arthritis, back pain, falls, gout, joint pain, joint swelling, muscle cramps, muscle weakness, myalgias, neck pain and stiffness.   Gastrointestinal:  Negative for bloating, abdominal pain, anorexia, change in bowel habit, bowel incontinence, constipation, diarrhea, dysphagia, excessive appetite, flatus, heartburn, hematemesis, hematochezia, hemorrhoids, jaundice, melena, nausea and vomiting.   Genitourinary:  Negative for bladder incontinence, decreased libido, dysuria, flank pain, frequency, genital sores, hematuria, hesitancy, incomplete emptying, nocturia and urgency.   Neurological:  Negative for aphonia, brief paralysis, difficulty with concentration, disturbances in coordination, excessive daytime sleepiness, dizziness, focal weakness, headaches, light-headedness, loss of balance, numbness, paresthesias, seizures, sensory change, tremors, vertigo and weakness.   Psychiatric/Behavioral:  Negative for altered mental status, depression, hallucinations, memory loss, substance abuse, suicidal ideas and thoughts of violence. The patient does not have insomnia and is not nervous/anxious.    Allergic/Immunologic: Negative for hives and persistent infections.        Objective:  "      Vitals:    01/10/25 1519   BP: 130/68   Pulse: 78   SpO2: 95%   Weight: 91.4 kg (201 lb 8 oz)   Height: 5' 4" (1.626 m)    Physical Exam  Constitutional:       General: She is not in acute distress.     Appearance: She is well-developed. She is not diaphoretic.   HENT:      Head: Normocephalic and atraumatic.      Nose: Nose normal.   Eyes:      General: No scleral icterus.        Right eye: No discharge.      Conjunctiva/sclera: Conjunctivae normal.      Pupils: Pupils are equal, round, and reactive to light.   Neck:      Thyroid: No thyromegaly.      Vascular: No JVD.      Trachea: No tracheal deviation.   Cardiovascular:      Rate and Rhythm: Normal rate and regular rhythm.      Pulses:           Carotid pulses are 2+ on the right side and 2+ on the left side.       Radial pulses are 2+ on the right side and 2+ on the left side.        Dorsalis pedis pulses are 2+ on the right side and 2+ on the left side.        Posterior tibial pulses are 2+ on the right side and 2+ on the left side.      Heart sounds: Normal heart sounds. No murmur heard.     No friction rub. No gallop.   Pulmonary:      Effort: Pulmonary effort is normal. No respiratory distress.      Breath sounds: Normal breath sounds. No stridor. No wheezing or rales.   Chest:      Chest wall: No tenderness.   Abdominal:      General: Bowel sounds are normal. There is no distension.      Palpations: Abdomen is soft. There is no mass.      Tenderness: There is no abdominal tenderness. There is no guarding or rebound.   Musculoskeletal:         General: No tenderness. Normal range of motion.      Cervical back: Normal range of motion and neck supple.   Lymphadenopathy:      Cervical: No cervical adenopathy.   Skin:     General: Skin is warm and dry.      Coloration: Skin is not pale.      Findings: No erythema or rash.   Neurological:      Mental Status: She is alert and oriented to person, place, and time.      Cranial Nerves: No cranial nerve " deficit.      Coordination: Coordination normal.   Psychiatric:         Behavior: Behavior normal.         Thought Content: Thought content normal.         Judgment: Judgment normal.           Assessment:       1. HTN (hypertension), benign    2. Abnormal EKG    3. Hypercholesterolemia    4. Palpitations    5. Abnormal electrocardiogram      Results for orders placed or performed during the hospital encounter of 12/12/24   EKG 12-lead    Collection Time: 12/12/24 11:13 AM   Result Value Ref Range    QRS Duration 74 ms    OHS QTC Calculation 435 ms         Current Outpatient Medications:     albuterol (PROVENTIL/VENTOLIN HFA) 90 mcg/actuation inhaler, Inhale 1 puff into the lungs every 6 (six) hours as needed., Disp: , Rfl:     aspirin 81 MG Chew, Take 1 tablet (81 mg total) by mouth once daily., Disp: 90 tablet, Rfl: 3    citalopram (CELEXA) 20 MG tablet, Take 1 tablet (20 mg total) by mouth once daily., Disp: 90 tablet, Rfl: 3    clotrimazole-betamethasone 1-0.05% (LOTRISONE) cream, Apply topically 2 (two) times daily., Disp: 45 g, Rfl: 1    felodipine (PLENDIL) 10 MG 24 hr tablet, Take 1 tablet (10 mg total) by mouth once daily., Disp: 90 tablet, Rfl: 3    fluticasone propionate (FLONASE) 50 mcg/actuation nasal spray, 1 spray (50 mcg total) by Each Nostril route once daily., Disp: 16 g, Rfl: 3    gabapentin (NEURONTIN) 100 MG capsule, Take 1 capsule (100 mg total) by mouth 3 (three) times daily., Disp: 90 capsule, Rfl: 2    ipratropium (ATROVENT) 42 mcg (0.06 %) nasal spray, USE 2 SPRAY(S) IN EACH NOSTRIL EVERY 6 HOURS AS NEEDED FOR CONGESTION, Disp: , Rfl:     mupirocin (BACTROBAN) 2 % ointment, Apply topically 3 (three) times daily., Disp: 30 g, Rfl: 1    pantoprazole (PROTONIX) 40 MG tablet, Take 1 tablet by mouth twice daily, Disp: 180 tablet, Rfl: 2    pravastatin (PRAVACHOL) 80 MG tablet, Take 1 tablet (80 mg total) by mouth once daily., Disp: 90 tablet, Rfl: 3  No current facility-administered medications  for this visit.    Facility-Administered Medications Ordered in Other Visits:     0.9%  NaCl infusion, , Intravenous, Continuous, Valentino Perez MD    LIDOcaine (PF) 10 mg/ml (1%) injection 10 mg, 1 mL, Intradermal, Once PRN, Valentino Perez MD     Lab Results   Component Value Date    WBC 5.31 12/12/2024    RBC 3.77 (L) 12/12/2024    HGB 12.0 12/12/2024    HCT 38.8 12/12/2024     (H) 12/12/2024    MCH 31.8 (H) 12/12/2024    MCHC 30.9 (L) 12/12/2024    RDW 12.6 12/12/2024     12/12/2024    MPV 11.0 12/12/2024    GRAN 2.8 12/12/2024    GRAN 52.6 12/12/2024    LYMPH 1.6 12/12/2024    LYMPH 30.9 12/12/2024    MONO 0.5 12/12/2024    MONO 9.4 12/12/2024    EOS 0.3 12/12/2024    BASO 0.05 12/12/2024    EOSINOPHIL 6.0 12/12/2024    BASOPHIL 0.9 12/12/2024    MG 2.0 01/27/2021        CMP  Lab Results   Component Value Date     12/12/2024    K 4.0 12/12/2024     12/12/2024    CO2 25 12/12/2024    GLU 74 12/12/2024    BUN 12 12/12/2024    CREATININE 0.9 12/12/2024    CALCIUM 8.8 12/12/2024    PROT 7.9 12/12/2024    ALBUMIN 3.5 12/12/2024    BILITOT 0.6 12/12/2024    ALKPHOS 103 12/12/2024    AST 17 12/12/2024    ALT 11 12/12/2024    ANIONGAP 10 12/12/2024    ESTGFRAFRICA >60.0 02/25/2022    EGFRNONAA >60.0 02/25/2022        Lab Results   Component Value Date    LABURIN  05/05/2023     Multiple organisms isolated. None in predominance.  Repeat if    LABURIN clinically necessary. 05/05/2023            Results for orders placed or performed during the hospital encounter of 12/12/24   EKG 12-lead    Collection Time: 12/12/24 11:13 AM   Result Value Ref Range    QRS Duration 74 ms    OHS QTC Calculation 435 ms    Narrative    Test Reason : R94.31,    Vent. Rate :  69 BPM     Atrial Rate :  69 BPM     P-R Int : 162 ms          QRS Dur :  74 ms      QT Int : 406 ms       P-R-T Axes :  58 -11  -8 degrees    QTcB Int : 435 ms    Normal sinus rhythm  Voltage criteria for left ventricular  hypertrophy  Cannot rule out Septal infarct ,age undetermined  Abnormal ECG    Confirmed by Wally Garcia ([id number]) on 12/12/2024 12:58:51 PM    Referred By: MEHDI MARROQUIN           Confirmed By: Wally Garcia        X-Ray Chest PA And Lateral 12/12/2024 [id number] Final   X-Ray Chest PA And Lateral 10/03/2024 [id number] Final   DXA Bone Density Axial Skeleton 1 or more sites 10/03/2024 [id number] Final             Plan:       Problem List Items Addressed This Visit          Cardiac/Vascular    HTN (hypertension), benign - Primary     Plendil to continue.  She states her readings are in good range.         Hypercholesterolemia     Pravastatin 80 mg tolerated well.  She is considered primary prevention.  Continue therapy.         Palpitations     She is asymptomatic.  I did not detect any ectopy on exam today.    Repeat Holter not recommended at this time given her asymptomatic status.    She has history of PACs he sees in the past.         Abnormal electrocardiogram     Possible septal infarct pattern her echo wall motion in 2021 was normal.  I suspect false positive ECG finding.    No workup coronary disease-repeat stress test advised at this time given her asymptomatic status.          Other Visit Diagnoses       Abnormal EKG                 She seems to be doing quite well.  I did not make changes patient.  I told her that if she has a awareness of palpitations beta-blocker would be a reasonable choice for blood pressure management.  Given that she is asymptomatic I do not advise changes.    I think her septal infarct pattern on Electrocardiogram is somewhat in question.  She has no coronary artery disease history.  She is asymptomatic in regards to chest pain complaints.    Thank you for allowing me to participate in your patient's care.                Antony Scott MD  01/10/2025   4:11 PM

## 2025-01-14 ENCOUNTER — PATIENT MESSAGE (OUTPATIENT)
Dept: PAIN MEDICINE | Facility: OTHER | Age: 75
End: 2025-01-14
Payer: MEDICARE

## 2025-01-16 ENCOUNTER — PATIENT MESSAGE (OUTPATIENT)
Dept: PAIN MEDICINE | Facility: OTHER | Age: 75
End: 2025-01-16
Payer: MEDICARE

## 2025-01-17 ENCOUNTER — PATIENT MESSAGE (OUTPATIENT)
Dept: INTERNAL MEDICINE | Facility: CLINIC | Age: 75
End: 2025-01-17
Payer: MEDICARE

## 2025-01-27 ENCOUNTER — PATIENT MESSAGE (OUTPATIENT)
Dept: PAIN MEDICINE | Facility: OTHER | Age: 75
End: 2025-01-27
Payer: MEDICARE

## 2025-02-05 ENCOUNTER — PATIENT MESSAGE (OUTPATIENT)
Dept: INTERNAL MEDICINE | Facility: CLINIC | Age: 75
End: 2025-02-05
Payer: MEDICARE

## 2025-02-21 DIAGNOSIS — Z00.00 ENCOUNTER FOR MEDICARE ANNUAL WELLNESS EXAM: ICD-10-CM

## 2025-03-02 ENCOUNTER — OFFICE VISIT (OUTPATIENT)
Dept: URGENT CARE | Facility: CLINIC | Age: 75
End: 2025-03-02
Payer: COMMERCIAL

## 2025-03-02 VITALS
SYSTOLIC BLOOD PRESSURE: 161 MMHG | HEIGHT: 64 IN | DIASTOLIC BLOOD PRESSURE: 87 MMHG | OXYGEN SATURATION: 97 % | BODY MASS INDEX: 34.15 KG/M2 | RESPIRATION RATE: 19 BRPM | HEART RATE: 73 BPM | WEIGHT: 200 LBS | TEMPERATURE: 98 F

## 2025-03-02 DIAGNOSIS — R30.0 DYSURIA: ICD-10-CM

## 2025-03-02 DIAGNOSIS — N30.01 ACUTE CYSTITIS WITH HEMATURIA: Primary | ICD-10-CM

## 2025-03-02 DIAGNOSIS — R35.0 URINARY FREQUENCY: ICD-10-CM

## 2025-03-02 DIAGNOSIS — R39.15 URINARY URGENCY: ICD-10-CM

## 2025-03-02 PROBLEM — R05.1 ACUTE COUGH: Status: ACTIVE | Noted: 2025-03-02

## 2025-03-02 LAB
BILIRUBIN, UA POC OHS: ABNORMAL
BLOOD, UA POC OHS: ABNORMAL
CLARITY, UA POC OHS: ABNORMAL
COLOR, UA POC OHS: ABNORMAL
GLUCOSE, UA POC OHS: NEGATIVE
KETONES, UA POC OHS: 15
LEUKOCYTES, UA POC OHS: ABNORMAL
NITRITE, UA POC OHS: POSITIVE
PH, UA POC OHS: 6
PROTEIN, UA POC OHS: >=300
SPECIFIC GRAVITY, UA POC OHS: >=1.03
UROBILINOGEN, UA POC OHS: 4

## 2025-03-02 PROCEDURE — 87186 SC STD MICRODIL/AGAR DIL: CPT | Performed by: NURSE PRACTITIONER

## 2025-03-02 PROCEDURE — 99213 OFFICE O/P EST LOW 20 MIN: CPT | Mod: S$GLB,,, | Performed by: NURSE PRACTITIONER

## 2025-03-02 PROCEDURE — 87088 URINE BACTERIA CULTURE: CPT | Performed by: NURSE PRACTITIONER

## 2025-03-02 PROCEDURE — 81003 URINALYSIS AUTO W/O SCOPE: CPT | Mod: QW,S$GLB,, | Performed by: NURSE PRACTITIONER

## 2025-03-02 PROCEDURE — 87086 URINE CULTURE/COLONY COUNT: CPT | Performed by: NURSE PRACTITIONER

## 2025-03-02 RX ORDER — NITROFURANTOIN 25; 75 MG/1; MG/1
100 CAPSULE ORAL 2 TIMES DAILY
Qty: 10 CAPSULE | Refills: 0 | Status: SHIPPED | OUTPATIENT
Start: 2025-03-02 | End: 2025-03-07

## 2025-03-02 NOTE — PROGRESS NOTES
"Subjective:      Patient ID: Estephania Lyman is a 74 y.o. female.    Vitals:  height is 5' 4.02" (1.626 m) and weight is 90.7 kg (200 lb). Her oral temperature is 98.1 °F (36.7 °C). Her blood pressure is 161/87 (abnormal) and her pulse is 73. Her respiration is 19 and oxygen saturation is 97%.     Chief Complaint: Dysuria (2 day history associated with gross hematuria, urinary urgency, and urge incontinence. )    73yo female presenting with a 4 day history of dysuria, urinary urgency, and urge incontinence on the background of having recurrent UTIs that were complicated by pyelonephritis, last UTI was several years ago. Patient presents with a past medical history of sarcoidosis, GERD, hypertension, depression and sciatica. Patient endorses symptoms beginning on Saturday but improved with use of OTC Azo. This AM, symptoms became worse prompting her presentation. She reports mild nausea, chills, subjective fevers. She denies flank pain, abdominal pain, vomiting, chest pain, SOB. No history of nephrolithiasis. She reports not taking her antihypertensives this AM.       74-year-old female presents to clinic with c/o urinary urgency, urinary frequency, lower abdominal pressure x 4 days treating with AZO. History of hysterectomy     Dysuria   This is a new problem. The current episode started gradual onset. The problem occurs every urination. The problem has been gradually worsening. The quality of the pain is described as burning. The pain is at a severity of 10/10. The pain is moderate. Maximum temperature: Not measured. She is Not sexually active. There is A history of pyelonephritis. Associated symptoms include chills, frequency, nausea and urgency. Pertinent negatives include no behavior changes, discharge, flank pain, hematuria, possible pregnancy, sweats, vomiting, bubble bath use, constipation, rash or withholding. Treatments tried: Azo. The treatment provided mild relief. Her past medical history is " significant for hypertension. There is no history of catheterization, diabetes insipidus, diabetes mellitus, genitourinary reflux, a single kidney, STD, urinary stasis or a urological procedure.       Constitution: Positive for chills. Negative for fever.   Gastrointestinal:  Positive for abdominal pain and nausea. Negative for vomiting and constipation.   Genitourinary:  Positive for dysuria, frequency and urgency. Negative for flank pain and hematuria.   Skin:  Negative for rash.      Objective:     Physical Exam   Constitutional: She is oriented to person, place, and time. She appears well-developed.   HENT:   Head: Normocephalic and atraumatic.   Ears:   Right Ear: External ear normal.   Left Ear: External ear normal.   Nose: Nose normal. No nasal deformity. No epistaxis.   Mouth/Throat: Oropharynx is clear and moist and mucous membranes are normal.   Eyes: Lids are normal. Extraocular movement intact   Neck: Trachea normal and phonation normal. Neck supple.   Cardiovascular: Normal rate.   Pulmonary/Chest: Effort normal.   Abdominal: Normal appearance and bowel sounds are normal. She exhibits no distension. Soft. There is abdominal tenderness in the suprapubic area. There is no left CVA tenderness and no right CVA tenderness.   Neurological: She is alert and oriented to person, place, and time.   Skin: Skin is warm, dry and intact.   Psychiatric: Her speech is normal and behavior is normal.   Nursing note and vitals reviewed.      Assessment:     1. Acute cystitis with hematuria    2. Dysuria    3. Urinary urgency    4. Urinary frequency      Results for orders placed or performed in visit on 03/02/25   POCT Urinalysis(Instrument)    Collection Time: 03/02/25  1:57 PM   Result Value Ref Range    Color, POC UA Brown (A) Yellow, Straw, Colorless    Clarity, POC UA Cloudy (A) Clear    Glucose, POC UA Negative Negative    Bilirubin, POC UA Large (A) Negative    Ketones, POC UA 15 (A) Negative    Spec Grav POC UA  >=1.030 1.005 - 1.030    Blood, POC UA Large (A) Negative    pH, POC UA 6.0 5.0 - 8.0    Protein, POC UA >=300 (A) Negative    Urobilinogen, POC UA 4.0 (A) <=1.0    Nitrite, POC UA Positive (A) Negative    WBC, POC UA Large (A) Negative      Plan:       Acute cystitis with hematuria  -     Urine Culture High Risk  -     nitrofurantoin, macrocrystal-monohydrate, (MACROBID) 100 MG capsule; Take 1 capsule (100 mg total) by mouth 2 (two) times daily. for 5 days  Dispense: 10 capsule; Refill: 0    Dysuria  -     POCT Urinalysis(Instrument)    Urinary urgency    Urinary frequency      Patient Instructions     Patient Instructions   PLEASE READ YOUR DISCHARGE INSTRUCTIONS ENTIRELY AS IT CONTAINS IMPORTANT INFORMATION.  - A urine culture was sent. You will be contacted once it results and appropriate action will be taken if needed.  We will notify patient of the results in the next 3-5 days; can receive results on [employer].   - Drink plenty of fluids, wipe front to back, take showers not baths, no scented soaps, wear breathable cotton underwear, urinate after sexual intercourse.   - Tylenol or Ibuprofen as directed as needed for pain.    - If you were prescribed antibiotics, please take them to completion. Please supplement with OTC probiotics and yogurt.   -You must understand that you've received an Urgent Care treatment only and that you may be released before all your medical problems are known or treated. You, the patient, will arrange for follow up care as instructed. Please arrange follow up with your primary medical clinic within 2-5 days if your signs and symptoms have not resolved or worsen. Please go to the ER for worsening symptoms including worsening fever, flank pain, vomiting, unable to tolerate fluids, fatigue, etc.   - Follow up with your PCP or specialty clinic as directed in next 2-5 days for re-evaluation.  You can call [phone] to schedule an appointment with the appropriate provider.    - If your  condition worsens or fails to improve we recommend that you receive another evaluation at the emergency room immediately or contact your primary medical clinic to discuss your concerns.      RED FLAGS/WARNING SYMPTOMS DISCUSSED WITH PATIENT THAT WOULD WARRANT EMERGENT MEDICAL ATTENTION. Patient aware and verbalized understanding.

## 2025-03-02 NOTE — PATIENT INSTRUCTIONS
Patient Instructions   PLEASE READ YOUR DISCHARGE INSTRUCTIONS ENTIRELY AS IT CONTAINS IMPORTANT INFORMATION.  - A urine culture was sent. You will be contacted once it results and appropriate action will be taken if needed.  We will notify patient of the results in the next 3-5 days; can receive results on [employer].   - Drink plenty of fluids, wipe front to back, take showers not baths, no scented soaps, wear breathable cotton underwear, urinate after sexual intercourse.   - Tylenol or Ibuprofen as directed as needed for pain.    - If you were prescribed antibiotics, please take them to completion. Please supplement with OTC probiotics and yogurt.   -You must understand that you've received an Urgent Care treatment only and that you may be released before all your medical problems are known or treated. You, the patient, will arrange for follow up care as instructed. Please arrange follow up with your primary medical clinic within 2-5 days if your signs and symptoms have not resolved or worsen. Please go to the ER for worsening symptoms including worsening fever, flank pain, vomiting, unable to tolerate fluids, fatigue, etc.   - Follow up with your PCP or specialty clinic as directed in next 2-5 days for re-evaluation.  You can call [phone] to schedule an appointment with the appropriate provider.    - If your condition worsens or fails to improve we recommend that you receive another evaluation at the emergency room immediately or contact your primary medical clinic to discuss your concerns.      RED FLAGS/WARNING SYMPTOMS DISCUSSED WITH PATIENT THAT WOULD WARRANT EMERGENT MEDICAL ATTENTION. Patient aware and verbalized understanding.

## 2025-03-04 LAB — BACTERIA UR CULT: ABNORMAL

## 2025-03-05 ENCOUNTER — RESULTS FOLLOW-UP (OUTPATIENT)
Dept: URGENT CARE | Facility: CLINIC | Age: 75
End: 2025-03-05

## 2025-05-18 ENCOUNTER — RESULTS FOLLOW-UP (OUTPATIENT)
Dept: URGENT CARE | Facility: CLINIC | Age: 75
End: 2025-05-18

## 2025-05-18 ENCOUNTER — OFFICE VISIT (OUTPATIENT)
Dept: URGENT CARE | Facility: CLINIC | Age: 75
End: 2025-05-18
Payer: MEDICARE

## 2025-05-18 VITALS
SYSTOLIC BLOOD PRESSURE: 136 MMHG | BODY MASS INDEX: 34.15 KG/M2 | HEIGHT: 64 IN | HEART RATE: 73 BPM | RESPIRATION RATE: 18 BRPM | WEIGHT: 200 LBS | DIASTOLIC BLOOD PRESSURE: 84 MMHG | OXYGEN SATURATION: 97 % | TEMPERATURE: 98 F

## 2025-05-18 DIAGNOSIS — R07.9 CHEST PAIN, UNSPECIFIED TYPE: Primary | ICD-10-CM

## 2025-05-18 DIAGNOSIS — R06.02 SHORTNESS OF BREATH: ICD-10-CM

## 2025-05-18 DIAGNOSIS — J01.00 ACUTE NON-RECURRENT MAXILLARY SINUSITIS: ICD-10-CM

## 2025-05-18 DIAGNOSIS — H65.193 ACUTE EFFUSION OF BOTH MIDDLE EARS: ICD-10-CM

## 2025-05-18 DIAGNOSIS — R05.1 ACUTE COUGH: ICD-10-CM

## 2025-05-18 PROCEDURE — 99213 OFFICE O/P EST LOW 20 MIN: CPT | Mod: S$GLB,,, | Performed by: PHYSICIAN ASSISTANT

## 2025-05-18 PROCEDURE — 93005 ELECTROCARDIOGRAM TRACING: CPT | Mod: S$GLB,,, | Performed by: PHYSICIAN ASSISTANT

## 2025-05-18 PROCEDURE — 71046 X-RAY EXAM CHEST 2 VIEWS: CPT | Mod: S$GLB,,, | Performed by: RADIOLOGY

## 2025-05-18 RX ORDER — BENZONATATE 200 MG/1
200 CAPSULE ORAL 3 TIMES DAILY PRN
Qty: 30 CAPSULE | Refills: 0 | Status: SHIPPED | OUTPATIENT
Start: 2025-05-18 | End: 2025-05-28

## 2025-05-18 RX ORDER — FLUTICASONE PROPIONATE 50 MCG
1 SPRAY, SUSPENSION (ML) NASAL DAILY
Qty: 16 G | Refills: 0 | Status: SHIPPED | OUTPATIENT
Start: 2025-05-18 | End: 2025-05-25

## 2025-05-18 RX ORDER — AMOXICILLIN AND CLAVULANATE POTASSIUM 875; 125 MG/1; MG/1
1 TABLET, FILM COATED ORAL EVERY 12 HOURS
Qty: 14 TABLET | Refills: 0 | Status: SHIPPED | OUTPATIENT
Start: 2025-05-18 | End: 2025-05-25

## 2025-05-18 NOTE — PROGRESS NOTES
"Subjective:      Patient ID: Estephania Lyman is a 74 y.o. female.    Vitals:  height is 5' 4.02" (1.626 m) and weight is 90.7 kg (200 lb). Her oral temperature is 98.2 °F (36.8 °C). Her blood pressure is 136/84 and her pulse is 73. Her respiration is 18 and oxygen saturation is 97%.     Chief Complaint: Sinus Problem and Chest Pain    Patient reports sinus symptoms started 1 week ago.Patient reports chest pain off and on for 1 week.Patient took Zyrtec and Tylenol for her symptoms.  Patient describes the intermittent chest pain has a heavy pressure without radiation to the neck arm or back.  Denies any current leg swelling.  Has intermittent shortness a breath.  Patient follows with Cardiology last visit in January 2025 note reviewed.  She was noted to have an abnormal EKG with possible septal infarct that they believed that she did not require any cardiac workup.    Sinus Problem  This is a new problem. The current episode started 1 to 4 weeks ago (1 week). The problem has been gradually worsening since onset. There has been no fever. Her pain is at a severity of 4/10. Associated symptoms include coughing (off and on, dry), headaches, shortness of breath and sinus pressure. Pertinent negatives include no chills, congestion, ear pain, neck pain or sore throat. Past treatments include acetaminophen (Zyrtec). The treatment provided no relief.   Chest Pain   This is a new problem. The pain is at a severity of 8/10. The quality of the pain is described as heavy and pressure. The pain radiates to the upper back. Associated symptoms include a cough (off and on, dry), headaches and shortness of breath. Pertinent negatives include no abdominal pain, back pain, dizziness, fever, hemoptysis, nausea, sputum production or vomiting. Nothing relieves the cough. The pain is aggravated by nothing.       Constitution: Positive for fatigue. Negative for appetite change, chills and fever.   HENT:  Positive for postnasal drip, " sinus pain and sinus pressure. Negative for ear pain, ear discharge, hearing loss, dental problem, drooling, mouth sores, tongue pain, tongue lesion, congestion, sore throat, trouble swallowing and voice change.    Neck: Negative for neck pain and neck stiffness.   Cardiovascular:  Positive for chest pain and sob on exertion. Negative for leg swelling.   Eyes:  Negative for eye discharge and eye itching.   Respiratory:  Positive for cough (off and on, dry), shortness of breath and wheezing. Negative for chest tightness, sputum production and bloody sputum.         Chest pain   Gastrointestinal:  Negative for abdominal pain, nausea, vomiting, constipation and diarrhea.   Musculoskeletal:  Negative for back pain and muscle ache.   Skin:  Negative for rash.   Neurological:  Positive for headaches. Negative for dizziness.      Past Medical History:   Diagnosis Date    Chronic right-sided low back pain with right-sided sciatica     Depression     GERD (gastroesophageal reflux disease)     Headache     Personal history of colonic polyps     Sarcoidosis        Past Surgical History:   Procedure Laterality Date    ANKLE FRACTURE SURGERY      ARTHROSCOPIC REPAIR OF ROTATOR CUFF OF SHOULDER Right 07/23/2018    Procedure: REPAIR, ROTATOR CUFF, ARTHROSCOPIC;  Surgeon: YELENA Olivera MD;  Location: [facility] OR [address] [place] [address];  Service: Orthopedics;  Laterality: Right;  regional with catheter, interscalene  Vladimir/Linvatec & Ann/Arthrex notified 7-18 LO    ARTHROSCOPY OF SHOULDER WITH DECOMPRESSION OF SUBACROMIAL SPACE Right 07/23/2018    Procedure: ARTHROSCOPY, SHOULDER, WITH SUBACROMIAL SPACE DECOMPRESSION;  Surgeon: YELENA Olivera MD;  Location: [facility] OR [address] [place] [address];  Service: Orthopedics;  Laterality: Right;    ARTHROSCOPY OF SHOULDER WITH REMOVAL OF DISTAL CLAVICLE Right 07/23/2018    Procedure: ARTHROSCOPY, SHOULDER, WITH DISTAL CLAVICLE EXCISION;  Surgeon: YELENA Olivera MD;  Location: [facility] OR [address] [place] [address];  Service: Orthopedics;   Laterality: Right;    COLONOSCOPY N/A 05/30/2017    Procedure: COLONOSCOPY;  Surgeon: Jed Garcia MD;  Location: [facility] (4TH FLR);  Service: Endoscopy;  Laterality: N/A;    COLONOSCOPY N/A 05/24/2023    Procedure: COLONOSCOPY;  Surgeon: Beau Bowden MD;  Location: [facility] ENDO;  Service: Endoscopy;  Laterality: N/A;    ESOPHAGEAL DILATION N/A 05/24/2023    Procedure: DILATION, ESOPHAGUS;  Surgeon: Beau Bowden MD;  Location: [facility] ENDO;  Service: Endoscopy;  Laterality: N/A;    ESOPHAGOGASTRODUODENOSCOPY N/A 06/30/2020    Procedure: EGD (ESOPHAGOGASTRODUODENOSCOPY);  Surgeon: Russell Durand MD;  Location: [facility] (4TH FLR);  Service: Endoscopy;  Laterality: N/A;  COVID test on 6/28/20 at [facility]    ESOPHAGOGASTRODUODENOSCOPY N/A 07/01/2021    Procedure: EGD (ESOPHAGOGASTRODUODENOSCOPY);  Surgeon: Chucho Mooney MD;  Location: [facility] ENDO (4TH FLR);  Service: Endoscopy;  Laterality: N/A;  vaccinated 2/10/21 see media tab 6/7/21, instr portal and mailed -ml    ESOPHAGOGASTRODUODENOSCOPY N/A 05/24/2023    Procedure: EGD (ESOPHAGOGASTRODUODENOSCOPY);  Surgeon: Beau Bowden MD;  Location: [facility];  Service: Endoscopy;  Laterality: N/A;    FIXATION OF TENDON Right 07/23/2018    Procedure: FIXATION, TENDON;  Surgeon: YELENA Olivera MD;  Location: [facility] OR [facility] FLR;  Service: Orthopedics;  Laterality: Right;    HYSTERECTOMY  age 39    endo    LEG SURGERY      tibia    OOPHORECTOMY  age 39    endo    TONSILLECTOMY      TRANSFORAMINAL EPIDURAL INJECTION OF STEROID Bilateral 11/02/2023    L5    TRANSFORAMINAL EPIDURAL INJECTION OF STEROID Bilateral 11/2/2023    Procedure: Injection,steroid,epidural,transforaminal approach---BILATERAL L5 (INFRANEURAL);  Surgeon: Sarbjit Hauser Jr., MD;  Location: [facility] PAIN MGMT;  Service: Pain Management;  Laterality: Bilateral;  CONSENT DAY OF PROCEDURE       Family History   Problem Relation Name Age of Onset    Cancer Mother  61        colon    Cancer  Father          throat    Hypertension Sister      Breast cancer Sister  59    Migraines Maternal Grandmother      Hypertension Brother      Diabetes Sister      Hypertension Sister      Stroke Neg Hx      Ovarian cancer Neg Hx      Miscarriages / Stillbirths Neg Hx      Eclampsia Neg Hx         Social History     Socioeconomic History    Marital status:    Occupational History     Employer: [employer]   Tobacco Use    Smoking status: Former     Current packs/day: 0.00     Average packs/day: 1 pack/day for 3.0 years (3.0 ttl pk-yrs)     Types: Cigarettes     Start date: 2007     Quit date: 2010     Years since quittin.8     Passive exposure: Past    Smokeless tobacco: Never    Tobacco comments:     last smoked 15 years ago   Substance and Sexual Activity    Alcohol use: Not Currently     Comment: occasional     Drug use: No    Sexual activity: Yes     Partners: Male     Birth control/protection: Surgical     Comment: Hysterectomy     Social Drivers of Health     Financial Resource Strain: Low Risk  (2023)    Overall Financial Resource Strain (CARDIA)     Difficulty of Paying Living Expenses: Not hard at all   Food Insecurity: Food Insecurity Present (2023)    Hunger Vital Sign     Worried About Running Out of Food in the Last Year: Sometimes true     Ran Out of Food in the Last Year: Never true   Transportation Needs: No Transportation Needs (2023)    PRAPARE - Transportation     Lack of Transportation (Medical): No     Lack of Transportation (Non-Medical): No   Physical Activity: Unknown (2023)    Exercise Vital Sign     Days of Exercise per Week: 3 days   Stress: No Stress Concern Present (2023)    [place] [employer] - Occupational Stress Questionnaire     Feeling of Stress : Only a little   Housing Stability: High Risk (2023)    Housing Stability Vital Sign     Unable to Pay for Housing in the Last Year: No     Number of  Places Lived in the Last Year: [id number]     Unstable Housing in the Last Year: No       Current Medications[1]    Review of patient's allergies indicates:   Allergen Reactions    Sulfa (sulfonamide antibiotics) Rash     Other reaction(s): Rash       Objective:     Physical Exam   Constitutional:  Non-toxic appearance. She does not appear ill. No distress.   HENT:   Head: Normocephalic and atraumatic.   Ears:   Right Ear: External ear and ear canal normal. Tympanic membrane is not injected, not erythematous, not retracted and not bulging. A middle ear effusion is present. no impacted cerumen  Left Ear: External ear and ear canal normal. Tympanic membrane is not injected, not erythematous, not retracted and not bulging. A middle ear effusion is present. no impacted cerumen  Nose: Rhinorrhea present. No congestion. Right sinus exhibits maxillary sinus tenderness. Right sinus exhibits no frontal sinus tenderness. Left sinus exhibits maxillary sinus tenderness. Left sinus exhibits no frontal sinus tenderness.   Mouth/Throat: Mucous membranes are moist. Posterior oropharyngeal erythema present. No oropharyngeal exudate. Tonsils are 1+ on the right. Tonsils are 1+ on the left. No tonsillar exudate.   Eyes: Conjunctivae are normal. Right eye exhibits no discharge. Left eye exhibits no discharge.   Neck: Neck supple.   Cardiovascular: Normal rate, regular rhythm and normal heart sounds.   No murmur heard.Exam reveals no gallop and no friction rub.   Pulmonary/Chest: Effort normal and breath sounds normal. No stridor. No respiratory distress. She has no wheezes. She has no rhonchi. She has no rales. She exhibits no tenderness.   Abdominal: Normal appearance.   Musculoskeletal:      Cervical back: She exhibits no tenderness.      Right lower leg: No edema.      Left lower leg: No edema.   Lymphadenopathy:     She has no cervical adenopathy.   Neurological: She is alert.   Skin: Skin is warm, dry, not diaphoretic and no rash.    Psychiatric: Her behavior is normal. Mood normal.   Nursing note and vitals reviewed.      Assessment:     1. Chest pain, unspecified type    2. Shortness of breath    3. Acute effusion of both middle ears    4. Acute non-recurrent maxillary sinusitis    5. Acute cough        Plan:       Chest pain, unspecified type  -     XR CHEST PA AND LATERAL; Future; Expected date: 05/18/2025  -     EKG 12-lead; Future    Shortness of breath  -     XR CHEST PA AND LATERAL; Future; Expected date: 05/18/2025  -     EKG 12-lead; Future    Acute effusion of both middle ears    Acute non-recurrent maxillary sinusitis  -     amoxicillin-clavulanate 875-125mg (AUGMENTIN) 875-125 mg per tablet; Take 1 tablet by mouth every 12 (twelve) hours. for 7 days  Dispense: 14 tablet; Refill: 0  -     fluticasone propionate (FLONASE) 50 mcg/actuation nasal spray; 1 spray (50 mcg total) by Each Nostril route once daily. for 7 days  Dispense: 16 g; Refill: 0    Acute cough  -     benzonatate (TESSALON) 200 MG capsule; Take 1 capsule (200 mg total) by mouth 3 (three) times daily as needed for Cough.  Dispense: 30 capsule; Refill: 0          Medical Decision Making:   Initial Assessment:   Sinusitis, acute cough  Differential Diagnosis:   Viral URI, acute bronchitis, MI, pulmonary embolism, pneumonia,   Clinical Tests:   Radiological Study: Ordered and Reviewed  Medical Tests: Ordered and Reviewed  Urgent Care Management:  EKG, chest x-ray, Tessalon Perles, Flonase, OTC Zyrtec, Augmentin  I have reviewed the patient chart and pertinent past imaging/labs.  Results reviewed   EKG compared to last EKG December 12, 2024 no significant changes noted.  Normal sinus rhythm voltage criteria for left ventricular hypertrophy, nonspecific T-wave abnormality.  Ventricular rate 72 no QT prolongation.    Patient Instructions                                                             Sinusitis     If your condition worsens or fails to improve we recommend that  you receive another evaluation at the urgent care/ER immediately or contact your PCP to discuss your concerns. You must understand that you've received an urgent care treatment only and that you may be released before all your medical problems are known or treated. You the patient will arrange for followup care as instructed.   Take antibiotics with food and as directed.   Continue using home Zyrtec as directed for fluid behind the ears.  Flonase (fluticasone) is a nasal spray which is available over the counter and may help with your symptoms   Use Tessalon as needed for cough.    I will notify you of official x-ray results once available.  I compared to previous EKG no significant changes noted however reach out to your cardiologist for follow up.  Should your symptoms worsen in your chest pain not resolve increased shortness a breath nausea vomiting or sweating please call 911 and go to the ER for evaluation      Rest and fluids are also important.     Tylenol or ibuprofen can also be used as directed for pain unless you have an allergy to them or medical condition such as stomach ulcers, kidney or liver disease or blood thinners etc for which you should not be taking these type of medications.     If symptoms do not improve in 2-3 days please return for evaluation           [1]   Current Outpatient Medications   Medication Sig Dispense Refill    aspirin 81 MG Chew Take 1 tablet (81 mg total) by mouth once daily. 90 tablet 3    citalopram (CELEXA) 20 MG tablet Take 1 tablet (20 mg total) by mouth once daily. 90 tablet 3    clotrimazole-betamethasone 1-0.05% (LOTRISONE) cream Apply topically 2 (two) times daily. 45 g 1    felodipine (PLENDIL) 10 MG 24 hr tablet Take 1 tablet (10 mg total) by mouth once daily. 90 tablet 3    fluticasone propionate (FLONASE) 50 mcg/actuation nasal spray 1 spray (50 mcg total) by Each Nostril route once daily. 16 g 3    gabapentin (NEURONTIN) 100 MG capsule Take 1 capsule (100 mg  total) by mouth 3 (three) times daily. 90 capsule 2    ipratropium (ATROVENT) 42 mcg (0.06 %) nasal spray USE 2 SPRAY(S) IN EACH NOSTRIL EVERY 6 HOURS AS NEEDED FOR CONGESTION      mupirocin (BACTROBAN) 2 % ointment Apply topically 3 (three) times daily. 30 g 1    pantoprazole (PROTONIX) 40 MG tablet Take 1 tablet by mouth twice daily 180 tablet 2    pravastatin (PRAVACHOL) 80 MG tablet Take 1 tablet (80 mg total) by mouth once daily. 90 tablet 3     No current facility-administered medications for this visit.     Facility-Administered Medications Ordered in Other Visits   Medication Dose Route Frequency Provider Last Rate Last Admin    0.9%  NaCl infusion   Intravenous Continuous Valentino Perez MD        LIDOcaine (PF) 10 mg/ml (1%) injection 10 mg  1 mL Intradermal Once PRN Valentino Perez MD

## 2025-05-18 NOTE — PATIENT INSTRUCTIONS
Sinusitis     If your condition worsens or fails to improve we recommend that you receive another evaluation at the urgent care/ER immediately or contact your PCP to discuss your concerns. You must understand that you've received an urgent care treatment only and that you may be released before all your medical problems are known or treated. You the patient will arrange for followup care as instructed.   Take antibiotics with food and as directed.   Continue using home Zyrtec as directed for fluid behind the ears.  Flonase (fluticasone) is a nasal spray which is available over the counter and may help with your symptoms   Use Tessalon as needed for cough.    I will notify you of official x-ray results once available.  I compared to previous EKG no significant changes noted however reach out to your cardiologist for follow up.  Should your symptoms worsen in your chest pain not resolve increased shortness a breath nausea vomiting or sweating please call 911 and go to the ER for evaluation      Rest and fluids are also important.     Tylenol or ibuprofen can also be used as directed for pain unless you have an allergy to them or medical condition such as stomach ulcers, kidney or liver disease or blood thinners etc for which you should not be taking these type of medications.     If symptoms do not improve in 2-3 days please return for evaluation

## 2025-05-20 LAB
OHS QRS DURATION: 78 MS
OHS QTC CALCULATION: 448 MS

## 2025-07-02 ENCOUNTER — TELEPHONE (OUTPATIENT)
Dept: CARDIOLOGY | Facility: CLINIC | Age: 75
End: 2025-07-02
Payer: MEDICARE

## 2025-07-02 ENCOUNTER — PATIENT MESSAGE (OUTPATIENT)
Dept: CARDIOLOGY | Facility: CLINIC | Age: 75
End: 2025-07-02
Payer: MEDICARE

## 2025-07-11 DIAGNOSIS — K21.9 GASTROESOPHAGEAL REFLUX DISEASE, UNSPECIFIED WHETHER ESOPHAGITIS PRESENT: ICD-10-CM

## 2025-07-11 NOTE — TELEPHONE ENCOUNTER
No care due was identified.  [facility] Embedded Care Due Messages. Reference number: [id number].   7/11/2025 12:12:19 PM CDT

## 2025-07-12 RX ORDER — PANTOPRAZOLE SODIUM 40 MG/1
40 TABLET, DELAYED RELEASE ORAL 2 TIMES DAILY
Qty: 60 TABLET | Refills: 0 | Status: SHIPPED | OUTPATIENT
Start: 2025-07-12

## 2025-07-12 NOTE — TELEPHONE ENCOUNTER
Refill Routing Note   Medication(s) are not appropriate for processing by [facility] for the following reason(s):        Outside of protocol    [facility] action(s):  Route      Medication Therapy Plan: Patient;s total daily dose exceeds [facility] criteria      Appointments  past 12m or future 3m with PCP    Date Provider   Last Visit   12/12/2024 Aidee Lora MD   Next Visit   Visit date not found Aidee Lora MD   ED visits in past 90 days: 0        Note composed:11:39 PM 07/11/2025

## (undated) DEVICE — DRAPE STERI U-SHAPED 47X51IN

## (undated) DEVICE — PROBE ARTHSCP EDGE ENERGY 50

## (undated) DEVICE — SEE MEDLINE ITEM 146313

## (undated) DEVICE — SEE MEDLINE ITEM 152622

## (undated) DEVICE — KIT SHOULDER POSITIONER SPIDER

## (undated) DEVICE — SUPPORT SLING SHOT II MEDIUM

## (undated) DEVICE — PAD ABD 8X10 STERILE

## (undated) DEVICE — DRAPE STERI INSTRUMENT 1018

## (undated) DEVICE — DRAPE STERI-DRAPE 1000 17X11IN

## (undated) DEVICE — TUBE SET INFLOW/OUTFLOW

## (undated) DEVICE — SHAVER ULTRAFFR 4.2MM

## (undated) DEVICE — SYR 30CC LUER LOCK

## (undated) DEVICE — Device

## (undated) DEVICE — BLADE SHAVER 4.5 6/BX

## (undated) DEVICE — DRESSING XEROFORM FOIL PK 1X8

## (undated) DEVICE — SEE MEDLINE ITEM 152530

## (undated) DEVICE — APPLICATOR CHLORAPREP ORN 26ML

## (undated) DEVICE — TAPE SURG DURAPORE 2 X10YD

## (undated) DEVICE — PAD SHOULDER CARE POLAR

## (undated) DEVICE — CANNULA PASSPORT 8 MM X 4CM.

## (undated) DEVICE — DRESSING AQUACEL FOAM 3 X 3

## (undated) DEVICE — NDL ARTHSCP MF SCORPION

## (undated) DEVICE — PUMP COLD THERAPY

## (undated) DEVICE — CANNULA TWIST IN 7MM X 7CM

## (undated) DEVICE — SOL IRR NACL .9% 3000ML

## (undated) DEVICE — KIT PATIENT BREG K500

## (undated) DEVICE — NDL 18GA X1 1/2 REG BEVEL

## (undated) DEVICE — PROBE ARTHO ENERGY 90 DEG

## (undated) DEVICE — COVER LIGHT HANDLE 80/CA